# Patient Record
Sex: FEMALE | Race: WHITE | NOT HISPANIC OR LATINO
[De-identification: names, ages, dates, MRNs, and addresses within clinical notes are randomized per-mention and may not be internally consistent; named-entity substitution may affect disease eponyms.]

---

## 2015-12-29 RX ORDER — AMIODARONE HYDROCHLORIDE 400 MG/1
0.5 TABLET ORAL
Qty: 0 | Refills: 0 | COMMUNITY
Start: 2015-12-29

## 2017-01-19 ENCOUNTER — APPOINTMENT (OUTPATIENT)
Dept: ELECTROPHYSIOLOGY | Facility: CLINIC | Age: 82
End: 2017-01-19

## 2017-01-19 ENCOUNTER — NON-APPOINTMENT (OUTPATIENT)
Age: 82
End: 2017-01-19

## 2017-01-19 VITALS — HEART RATE: 65 BPM | DIASTOLIC BLOOD PRESSURE: 91 MMHG | OXYGEN SATURATION: 97 % | SYSTOLIC BLOOD PRESSURE: 155 MMHG

## 2017-04-17 ENCOUNTER — APPOINTMENT (OUTPATIENT)
Dept: ELECTROPHYSIOLOGY | Facility: CLINIC | Age: 82
End: 2017-04-17

## 2017-04-18 ENCOUNTER — APPOINTMENT (OUTPATIENT)
Dept: ELECTROPHYSIOLOGY | Facility: CLINIC | Age: 82
End: 2017-04-18

## 2017-04-18 ENCOUNTER — NON-APPOINTMENT (OUTPATIENT)
Age: 82
End: 2017-04-18

## 2017-04-18 VITALS — HEART RATE: 60 BPM | SYSTOLIC BLOOD PRESSURE: 139 MMHG | DIASTOLIC BLOOD PRESSURE: 76 MMHG

## 2017-04-18 DIAGNOSIS — Z86.79 PERSONAL HISTORY OF OTHER DISEASES OF THE CIRCULATORY SYSTEM: ICD-10-CM

## 2017-04-26 ENCOUNTER — EMERGENCY (EMERGENCY)
Facility: HOSPITAL | Age: 82
LOS: 1 days | Discharge: HOME | End: 2017-04-26
Attending: EMERGENCY MEDICINE | Admitting: EMERGENCY MEDICINE
Payer: COMMERCIAL

## 2017-04-26 VITALS
DIASTOLIC BLOOD PRESSURE: 84 MMHG | HEART RATE: 66 BPM | RESPIRATION RATE: 17 BRPM | TEMPERATURE: 98 F | SYSTOLIC BLOOD PRESSURE: 186 MMHG | OXYGEN SATURATION: 94 %

## 2017-04-26 VITALS
OXYGEN SATURATION: 98 % | TEMPERATURE: 98 F | RESPIRATION RATE: 18 BRPM | SYSTOLIC BLOOD PRESSURE: 161 MMHG | HEART RATE: 65 BPM | DIASTOLIC BLOOD PRESSURE: 79 MMHG

## 2017-04-26 DIAGNOSIS — Z79.82 LONG TERM (CURRENT) USE OF ASPIRIN: ICD-10-CM

## 2017-04-26 DIAGNOSIS — S40.021A CONTUSION OF RIGHT UPPER ARM, INITIAL ENCOUNTER: ICD-10-CM

## 2017-04-26 DIAGNOSIS — F17.200 NICOTINE DEPENDENCE, UNSPECIFIED, UNCOMPLICATED: ICD-10-CM
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
## 2017-04-26 DIAGNOSIS — S80.211A ABRASION, RIGHT KNEE, INITIAL ENCOUNTER: ICD-10-CM

## 2017-04-26 DIAGNOSIS — I10 ESSENTIAL (PRIMARY) HYPERTENSION: ICD-10-CM

## 2017-04-26 DIAGNOSIS — Z86.79 PERSONAL HISTORY OF OTHER DISEASES OF THE CIRCULATORY SYSTEM: ICD-10-CM
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
## 2017-04-26 DIAGNOSIS — E11.9 TYPE 2 DIABETES MELLITUS WITHOUT COMPLICATIONS: ICD-10-CM

## 2017-04-26 DIAGNOSIS — Z95.0 PRESENCE OF CARDIAC PACEMAKER: ICD-10-CM

## 2017-04-26 LAB
ALBUMIN SERPL ELPH-MCNC: 4 G/DL — SIGNIFICANT CHANGE UP (ref 3.3–5)
ALP SERPL-CCNC: 87 U/L — SIGNIFICANT CHANGE UP (ref 40–120)
ALT FLD-CCNC: 20 U/L RC — SIGNIFICANT CHANGE UP (ref 10–45)
ANION GAP SERPL CALC-SCNC: 15 MMOL/L — SIGNIFICANT CHANGE UP (ref 5–17)
APPEARANCE UR: CLEAR — SIGNIFICANT CHANGE UP
APTT BLD: 29.8 SEC — SIGNIFICANT CHANGE UP (ref 27.5–37.4)
AST SERPL-CCNC: 40 U/L — SIGNIFICANT CHANGE UP (ref 10–40)
BASOPHILS # BLD AUTO: 0.1 K/UL — SIGNIFICANT CHANGE UP (ref 0–0.2)
BASOPHILS NFR BLD AUTO: 0.7 % — SIGNIFICANT CHANGE UP (ref 0–2)
BILIRUB SERPL-MCNC: 0.4 MG/DL — SIGNIFICANT CHANGE UP (ref 0.2–1.2)
BILIRUB UR-MCNC: NEGATIVE — SIGNIFICANT CHANGE UP
BUN SERPL-MCNC: 24 MG/DL — HIGH (ref 7–23)
CALCIUM SERPL-MCNC: 9.5 MG/DL — SIGNIFICANT CHANGE UP (ref 8.4–10.5)
CHLORIDE SERPL-SCNC: 98 MMOL/L — SIGNIFICANT CHANGE UP (ref 96–108)
CK MB BLD-MCNC: 2 % — SIGNIFICANT CHANGE UP (ref 0–3.5)
CK MB CFR SERPL CALC: 4 NG/ML — HIGH (ref 0–3.8)
CK SERPL-CCNC: 198 U/L — HIGH (ref 25–170)
CO2 SERPL-SCNC: 24 MMOL/L — SIGNIFICANT CHANGE UP (ref 22–31)
COLOR SPEC: SIGNIFICANT CHANGE UP
CREAT SERPL-MCNC: 1.61 MG/DL — HIGH (ref 0.5–1.3)
DIFF PNL FLD: NEGATIVE — SIGNIFICANT CHANGE UP
EOSINOPHIL # BLD AUTO: 0.5 K/UL — SIGNIFICANT CHANGE UP (ref 0–0.5)
EOSINOPHIL NFR BLD AUTO: 5.1 % — SIGNIFICANT CHANGE UP (ref 0–6)
EPI CELLS # UR: SIGNIFICANT CHANGE UP /HPF
GAS PNL BLDV: SIGNIFICANT CHANGE UP
GLUCOSE SERPL-MCNC: 143 MG/DL — HIGH (ref 70–99)
GLUCOSE UR QL: ABNORMAL
HCT VFR BLD CALC: 42 % — SIGNIFICANT CHANGE UP (ref 34.5–45)
HGB BLD-MCNC: 13.4 G/DL — SIGNIFICANT CHANGE UP (ref 11.5–15.5)
INR BLD: 1.1 RATIO — SIGNIFICANT CHANGE UP (ref 0.88–1.16)
KETONES UR-MCNC: NEGATIVE — SIGNIFICANT CHANGE UP
LEUKOCYTE ESTERASE UR-ACNC: NEGATIVE — SIGNIFICANT CHANGE UP
LYMPHOCYTES # BLD AUTO: 1.5 K/UL — SIGNIFICANT CHANGE UP (ref 1–3.3)
LYMPHOCYTES # BLD AUTO: 14.2 % — SIGNIFICANT CHANGE UP (ref 13–44)
MCHC RBC-ENTMCNC: 28.5 PG — SIGNIFICANT CHANGE UP (ref 27–34)
MCHC RBC-ENTMCNC: 31.9 GM/DL — LOW (ref 32–36)
MCV RBC AUTO: 89.4 FL — SIGNIFICANT CHANGE UP (ref 80–100)
MONOCYTES # BLD AUTO: 0.8 K/UL — SIGNIFICANT CHANGE UP (ref 0–0.9)
MONOCYTES NFR BLD AUTO: 7.7 % — SIGNIFICANT CHANGE UP (ref 2–14)
NEUTROPHILS # BLD AUTO: 7.7 K/UL — HIGH (ref 1.8–7.4)
NEUTROPHILS NFR BLD AUTO: 72.3 % — SIGNIFICANT CHANGE UP (ref 43–77)
NITRITE UR-MCNC: NEGATIVE — SIGNIFICANT CHANGE UP
NT-PROBNP SERPL-SCNC: 6106 PG/ML — HIGH (ref 0–300)
PH UR: 6.5 — SIGNIFICANT CHANGE UP (ref 5–8)
PLATELET # BLD AUTO: 262 K/UL — SIGNIFICANT CHANGE UP (ref 150–400)
POTASSIUM SERPL-MCNC: 4.5 MMOL/L — SIGNIFICANT CHANGE UP (ref 3.5–5.3)
POTASSIUM SERPL-SCNC: 4.5 MMOL/L — SIGNIFICANT CHANGE UP (ref 3.5–5.3)
PROT SERPL-MCNC: 8.1 G/DL — SIGNIFICANT CHANGE UP (ref 6–8.3)
PROT UR-MCNC: 150 MG/DL
PROTHROM AB SERPL-ACNC: 12 SEC — SIGNIFICANT CHANGE UP (ref 9.8–12.7)
RBC # BLD: 4.7 M/UL — SIGNIFICANT CHANGE UP (ref 3.8–5.2)
RBC # FLD: 14.9 % — HIGH (ref 10.3–14.5)
SODIUM SERPL-SCNC: 137 MMOL/L — SIGNIFICANT CHANGE UP (ref 135–145)
SP GR SPEC: 1.01 — LOW (ref 1.01–1.02)
TROPONIN T SERPL-MCNC: <0.01 NG/ML — SIGNIFICANT CHANGE UP (ref 0–0.06)
UROBILINOGEN FLD QL: NEGATIVE — SIGNIFICANT CHANGE UP
WBC # BLD: 10.7 K/UL — HIGH (ref 3.8–10.5)
WBC # FLD AUTO: 10.7 K/UL — HIGH (ref 3.8–10.5)
WBC UR QL: SIGNIFICANT CHANGE UP /HPF (ref 0–5)

## 2017-04-26 PROCEDURE — 82803 BLOOD GASES ANY COMBINATION: CPT

## 2017-04-26 PROCEDURE — 85730 THROMBOPLASTIN TIME PARTIAL: CPT

## 2017-04-26 PROCEDURE — 71010: CPT | Mod: 26

## 2017-04-26 PROCEDURE — 80053 COMPREHEN METABOLIC PANEL: CPT

## 2017-04-26 PROCEDURE — 82435 ASSAY OF BLOOD CHLORIDE: CPT

## 2017-04-26 PROCEDURE — 82947 ASSAY GLUCOSE BLOOD QUANT: CPT

## 2017-04-26 PROCEDURE — 81001 URINALYSIS AUTO W/SCOPE: CPT

## 2017-04-26 PROCEDURE — 85014 HEMATOCRIT: CPT

## 2017-04-26 PROCEDURE — 99284 EMERGENCY DEPT VISIT MOD MDM: CPT | Mod: 25

## 2017-04-26 PROCEDURE — 71045 X-RAY EXAM CHEST 1 VIEW: CPT

## 2017-04-26 PROCEDURE — 87086 URINE CULTURE/COLONY COUNT: CPT

## 2017-04-26 PROCEDURE — 93010 ELECTROCARDIOGRAM REPORT: CPT

## 2017-04-26 PROCEDURE — 99285 EMERGENCY DEPT VISIT HI MDM: CPT | Mod: 25

## 2017-04-26 PROCEDURE — 85610 PROTHROMBIN TIME: CPT

## 2017-04-26 PROCEDURE — 72125 CT NECK SPINE W/O DYE: CPT

## 2017-04-26 PROCEDURE — 84132 ASSAY OF SERUM POTASSIUM: CPT

## 2017-04-26 PROCEDURE — 82553 CREATINE MB FRACTION: CPT

## 2017-04-26 PROCEDURE — 70450 CT HEAD/BRAIN W/O DYE: CPT | Mod: 26

## 2017-04-26 PROCEDURE — 85027 COMPLETE CBC AUTOMATED: CPT

## 2017-04-26 PROCEDURE — 70450 CT HEAD/BRAIN W/O DYE: CPT

## 2017-04-26 PROCEDURE — 72125 CT NECK SPINE W/O DYE: CPT | Mod: 26

## 2017-04-26 PROCEDURE — 84484 ASSAY OF TROPONIN QUANT: CPT

## 2017-04-26 PROCEDURE — 93005 ELECTROCARDIOGRAM TRACING: CPT

## 2017-04-26 PROCEDURE — 82550 ASSAY OF CK (CPK): CPT

## 2017-04-26 PROCEDURE — 82330 ASSAY OF CALCIUM: CPT

## 2017-04-26 PROCEDURE — 83605 ASSAY OF LACTIC ACID: CPT

## 2017-04-26 PROCEDURE — 84295 ASSAY OF SERUM SODIUM: CPT

## 2017-04-26 PROCEDURE — 83880 ASSAY OF NATRIURETIC PEPTIDE: CPT

## 2017-04-26 RX ORDER — SODIUM CHLORIDE 9 MG/ML
3 INJECTION INTRAMUSCULAR; INTRAVENOUS; SUBCUTANEOUS ONCE
Qty: 0 | Refills: 0 | Status: COMPLETED | OUTPATIENT
Start: 2017-04-26 | End: 2017-04-26

## 2017-04-26 RX ORDER — SODIUM CHLORIDE 9 MG/ML
500 INJECTION INTRAMUSCULAR; INTRAVENOUS; SUBCUTANEOUS ONCE
Qty: 0 | Refills: 0 | Status: COMPLETED | OUTPATIENT
Start: 2017-04-26 | End: 2017-04-26

## 2017-04-26 RX ADMIN — SODIUM CHLORIDE 3 MILLILITER(S): 9 INJECTION INTRAMUSCULAR; INTRAVENOUS; SUBCUTANEOUS at 14:38

## 2017-04-26 RX ADMIN — SODIUM CHLORIDE 500 MILLILITER(S): 9 INJECTION INTRAMUSCULAR; INTRAVENOUS; SUBCUTANEOUS at 14:39
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
## 2017-04-26 NOTE — ED ADULT NURSE NOTE - PMH
Arteriosclerotic Heart Disease (ASHD)    Atrial fibrillation, unspecified type    Cigarette Smoker    Diabetes    HTN (Hypertension)    Overweight    Pacemaker    PVD (Peripheral Vascular Disease)    Renal Cancer
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
## 2017-04-26 NOTE — ED PROVIDER NOTE - CONSTITUTIONAL, MLM
normal... Well appearing, well nourished, awake, alert, oriented to person, place, NOT ORIENTED to time (states 1971) and in no apparent distress.

## 2017-04-26 NOTE — ED ADULT NURSE NOTE - CHPI ED SYMPTOMS NEG
no blurred vision/no numbness/no vomiting/no loss of consciousness/no dizziness/no nausea/no fever/no confusion
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
## 2017-04-27 LAB
CULTURE RESULTS: SIGNIFICANT CHANGE UP
SPECIMEN SOURCE: SIGNIFICANT CHANGE UP

## 2017-04-28 NOTE — ED POST DISCHARGE NOTE - RESULT SUMMARY
UCx contaminated, spoke with patient and advised she follow up with her PMD to repeat the urine culture. She understood and will follow up. Ace Holm PA-C.

## 2017-06-04 ENCOUNTER — EMERGENCY (EMERGENCY)
Facility: HOSPITAL | Age: 82
LOS: 1 days | Discharge: HOME | End: 2017-06-04
Attending: EMERGENCY MEDICINE | Admitting: EMERGENCY MEDICINE
Payer: MEDICARE

## 2017-06-04 VITALS
TEMPERATURE: 98 F | DIASTOLIC BLOOD PRESSURE: 64 MMHG | SYSTOLIC BLOOD PRESSURE: 182 MMHG | HEART RATE: 77 BPM | RESPIRATION RATE: 18 BRPM | OXYGEN SATURATION: 98 %

## 2017-06-04 DIAGNOSIS — R68.83 CHILLS (WITHOUT FEVER): ICD-10-CM

## 2017-06-04 LAB
ALBUMIN SERPL ELPH-MCNC: 3.9 G/DL — SIGNIFICANT CHANGE UP (ref 3.3–5)
ALP SERPL-CCNC: 99 U/L — SIGNIFICANT CHANGE UP (ref 40–120)
ALT FLD-CCNC: 19 U/L RC — SIGNIFICANT CHANGE UP (ref 10–45)
ANION GAP SERPL CALC-SCNC: 16 MMOL/L — SIGNIFICANT CHANGE UP (ref 5–17)
AST SERPL-CCNC: 33 U/L — SIGNIFICANT CHANGE UP (ref 10–40)
BASOPHILS # BLD AUTO: 0 K/UL — SIGNIFICANT CHANGE UP (ref 0–0.2)
BASOPHILS NFR BLD AUTO: 0.2 % — SIGNIFICANT CHANGE UP (ref 0–2)
BILIRUB SERPL-MCNC: 0.3 MG/DL — SIGNIFICANT CHANGE UP (ref 0.2–1.2)
BUN SERPL-MCNC: 23 MG/DL — SIGNIFICANT CHANGE UP (ref 7–23)
CALCIUM SERPL-MCNC: 9.4 MG/DL — SIGNIFICANT CHANGE UP (ref 8.4–10.5)
CHLORIDE SERPL-SCNC: 100 MMOL/L — SIGNIFICANT CHANGE UP (ref 96–108)
CO2 SERPL-SCNC: 22 MMOL/L — SIGNIFICANT CHANGE UP (ref 22–31)
CREAT SERPL-MCNC: 1.56 MG/DL — HIGH (ref 0.5–1.3)
EOSINOPHIL # BLD AUTO: 0.1 K/UL — SIGNIFICANT CHANGE UP (ref 0–0.5)
EOSINOPHIL NFR BLD AUTO: 2 % — SIGNIFICANT CHANGE UP (ref 0–6)
GAS PNL BLDV: SIGNIFICANT CHANGE UP
GLUCOSE SERPL-MCNC: 151 MG/DL — HIGH (ref 70–99)
HCT VFR BLD CALC: 44.7 % — SIGNIFICANT CHANGE UP (ref 34.5–45)
HGB BLD-MCNC: 14.7 G/DL — SIGNIFICANT CHANGE UP (ref 11.5–15.5)
LYMPHOCYTES # BLD AUTO: 1.3 K/UL — SIGNIFICANT CHANGE UP (ref 1–3.3)
LYMPHOCYTES # BLD AUTO: 21.6 % — SIGNIFICANT CHANGE UP (ref 13–44)
MCHC RBC-ENTMCNC: 29.9 PG — SIGNIFICANT CHANGE UP (ref 27–34)
MCHC RBC-ENTMCNC: 32.9 GM/DL — SIGNIFICANT CHANGE UP (ref 32–36)
MCV RBC AUTO: 90.7 FL — SIGNIFICANT CHANGE UP (ref 80–100)
MONOCYTES # BLD AUTO: 0.3 K/UL — SIGNIFICANT CHANGE UP (ref 0–0.9)
MONOCYTES NFR BLD AUTO: 4.9 % — SIGNIFICANT CHANGE UP (ref 2–14)
NEUTROPHILS # BLD AUTO: 4.2 K/UL — SIGNIFICANT CHANGE UP (ref 1.8–7.4)
NEUTROPHILS NFR BLD AUTO: 71.2 % — SIGNIFICANT CHANGE UP (ref 43–77)
PLATELET # BLD AUTO: 298 K/UL — SIGNIFICANT CHANGE UP (ref 150–400)
POTASSIUM SERPL-MCNC: 4.3 MMOL/L — SIGNIFICANT CHANGE UP (ref 3.5–5.3)
POTASSIUM SERPL-SCNC: 4.3 MMOL/L — SIGNIFICANT CHANGE UP (ref 3.5–5.3)
PROT SERPL-MCNC: 7.8 G/DL — SIGNIFICANT CHANGE UP (ref 6–8.3)
RBC # BLD: 4.93 M/UL — SIGNIFICANT CHANGE UP (ref 3.8–5.2)
RBC # FLD: 14.2 % — SIGNIFICANT CHANGE UP (ref 10.3–14.5)
SODIUM SERPL-SCNC: 138 MMOL/L — SIGNIFICANT CHANGE UP (ref 135–145)
WBC # BLD: 5.8 K/UL — SIGNIFICANT CHANGE UP (ref 3.8–10.5)
WBC # FLD AUTO: 5.8 K/UL — SIGNIFICANT CHANGE UP (ref 3.8–10.5)

## 2017-06-04 PROCEDURE — 99284 EMERGENCY DEPT VISIT MOD MDM: CPT | Mod: 25

## 2017-06-04 RX ORDER — DIPHENHYDRAMINE HCL 50 MG
50 CAPSULE ORAL ONCE
Qty: 0 | Refills: 0 | Status: DISCONTINUED | OUTPATIENT
Start: 2017-06-04 | End: 2017-06-04

## 2017-06-04 RX ORDER — DIPHENHYDRAMINE HCL 50 MG
25 CAPSULE ORAL ONCE
Qty: 0 | Refills: 0 | Status: COMPLETED | OUTPATIENT
Start: 2017-06-04 | End: 2017-06-04

## 2017-06-04 RX ORDER — FAMOTIDINE 10 MG/ML
20 INJECTION INTRAVENOUS ONCE
Qty: 0 | Refills: 0 | Status: COMPLETED | OUTPATIENT
Start: 2017-06-04 | End: 2017-06-04

## 2017-06-04 RX ORDER — ONDANSETRON 8 MG/1
4 TABLET, FILM COATED ORAL ONCE
Qty: 0 | Refills: 0 | Status: COMPLETED | OUTPATIENT
Start: 2017-06-04 | End: 2017-06-04

## 2017-06-04 RX ADMIN — ONDANSETRON 4 MILLIGRAM(S): 8 TABLET, FILM COATED ORAL at 23:00

## 2017-06-04 RX ADMIN — Medication 60 MILLIGRAM(S): at 21:49

## 2017-06-04 RX ADMIN — FAMOTIDINE 20 MILLIGRAM(S): 10 INJECTION INTRAVENOUS at 22:50

## 2017-06-04 RX ADMIN — Medication 25 MILLIGRAM(S): at 21:48

## 2017-06-04 NOTE — ED PROVIDER NOTE - PLAN OF CARE
Please do not take amoxicillin any longer.     Please take the prednisone as prescribed for 4 more days to prevent any return of the allergic reaction.     Return to the ED if you develop vomiting, trouble breathing, rashes, or tongue or lip swelling

## 2017-06-04 NOTE — ED PROVIDER NOTE - PROGRESS NOTE DETAILS
Blood pressure stable. . Will continue to monitor. Attending MD Rosenberg: patient received in signout from Dr. Rodas, patient observed for presumed allergic reaction to amoxicillin. No recurrent emesis, no airway edema, no rashes, no e/o residual allergic rxn. Will dc home on prednisone burst x 4 days, advised to stop taking amoxcillin
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
## 2017-06-04 NOTE — ED PROVIDER NOTE - CARE PLAN
Principal Discharge DX:	Allergic reaction caused by a drug, initial encounter Principal Discharge DX:	Allergic reaction caused by a drug, initial encounter  Instructions for follow-up, activity and diet:	Please do not take amoxicillin any longer.     Please take the prednisone as prescribed for 4 more days to prevent any return of the allergic reaction.     Return to the ED if you develop vomiting, trouble breathing, rashes, or tongue or lip swelling

## 2017-06-04 NOTE — ED PROVIDER NOTE - ATTENDING CONTRIBUTION TO CARE
I have seen and evaluated this patient with the resident.   I agree with the findings  unless other wise stated.  I have made appropriate changes in documentations where needed, After my face to face bedside evaluation, I am further  notinF with possible allergic reaction. Will give Benadryl, Solumedrol, Pepcid and re-assess vitals. Will monitor in ED for 6  hours.

## 2017-06-04 NOTE — ED ADULT NURSE NOTE - PSH
Hip Replacement    History of Total Knee Replacement    S/P carpal tunnel release    S/P Cholecystectomy    s/p colon resection    S/P Hernia Surgery    S/P left cataract extraction    S/P Nephrectomy  right
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
## 2017-06-04 NOTE — ED PROVIDER NOTE - MEDICAL DECISION MAKING DETAILS
85F with possible allergic reaction. Will give Benadryl, Solumedrol, Pepcid and re-assess vitals. 85F with possible allergic reaction. Will give Benadryl, Solumedrol, Pepcid and re-assess vitals. Will monitor in ED for 6  hours.

## 2017-06-05 VITALS
HEART RATE: 64 BPM | SYSTOLIC BLOOD PRESSURE: 163 MMHG | OXYGEN SATURATION: 95 % | RESPIRATION RATE: 16 BRPM | DIASTOLIC BLOOD PRESSURE: 84 MMHG

## 2017-06-05 LAB
APPEARANCE UR: CLEAR — SIGNIFICANT CHANGE UP
BILIRUB UR-MCNC: NEGATIVE — SIGNIFICANT CHANGE UP
COLOR SPEC: YELLOW — SIGNIFICANT CHANGE UP
DIFF PNL FLD: NEGATIVE — SIGNIFICANT CHANGE UP
EPI CELLS # UR: SIGNIFICANT CHANGE UP /HPF
GLUCOSE UR QL: 50
KETONES UR-MCNC: NEGATIVE — SIGNIFICANT CHANGE UP
LEUKOCYTE ESTERASE UR-ACNC: NEGATIVE — SIGNIFICANT CHANGE UP
NITRITE UR-MCNC: NEGATIVE — SIGNIFICANT CHANGE UP
PH UR: 6.5 — SIGNIFICANT CHANGE UP (ref 5–8)
PROT UR-MCNC: 500 MG/DL
RBC CASTS # UR COMP ASSIST: SIGNIFICANT CHANGE UP /HPF (ref 0–2)
SP GR SPEC: 1.02 — SIGNIFICANT CHANGE UP (ref 1.01–1.02)
UROBILINOGEN FLD QL: NEGATIVE — SIGNIFICANT CHANGE UP
WBC UR QL: SIGNIFICANT CHANGE UP /HPF (ref 0–5)

## 2017-06-05 PROCEDURE — 96375 TX/PRO/DX INJ NEW DRUG ADDON: CPT

## 2017-06-05 PROCEDURE — 71045 X-RAY EXAM CHEST 1 VIEW: CPT

## 2017-06-05 PROCEDURE — 96374 THER/PROPH/DIAG INJ IV PUSH: CPT

## 2017-06-05 PROCEDURE — 87040 BLOOD CULTURE FOR BACTERIA: CPT

## 2017-06-05 PROCEDURE — 84295 ASSAY OF SERUM SODIUM: CPT

## 2017-06-05 PROCEDURE — 81001 URINALYSIS AUTO W/SCOPE: CPT

## 2017-06-05 PROCEDURE — 84132 ASSAY OF SERUM POTASSIUM: CPT

## 2017-06-05 PROCEDURE — 82435 ASSAY OF BLOOD CHLORIDE: CPT

## 2017-06-05 PROCEDURE — 83605 ASSAY OF LACTIC ACID: CPT

## 2017-06-05 PROCEDURE — 99284 EMERGENCY DEPT VISIT MOD MDM: CPT | Mod: 25

## 2017-06-05 PROCEDURE — 80053 COMPREHEN METABOLIC PANEL: CPT

## 2017-06-05 PROCEDURE — 82803 BLOOD GASES ANY COMBINATION: CPT

## 2017-06-05 PROCEDURE — 85014 HEMATOCRIT: CPT

## 2017-06-05 PROCEDURE — 71010: CPT | Mod: 26

## 2017-06-05 PROCEDURE — 82330 ASSAY OF CALCIUM: CPT

## 2017-06-05 PROCEDURE — 85027 COMPLETE CBC AUTOMATED: CPT

## 2017-06-05 PROCEDURE — 82947 ASSAY GLUCOSE BLOOD QUANT: CPT

## 2017-06-05 NOTE — ED ADULT NURSE REASSESSMENT NOTE - NS ED NURSE REASSESS COMMENT FT1
Received report from previous shift RN. Patient resting comfortably in bed, awaiting reevaluation and d/c home. No apparent distress noted. Denies chest pain, SOB, itching.

## 2017-06-10 LAB
CULTURE RESULTS: SIGNIFICANT CHANGE UP
SPECIMEN SOURCE: SIGNIFICANT CHANGE UP

## 2017-08-15 ENCOUNTER — APPOINTMENT (OUTPATIENT)
Dept: ELECTROPHYSIOLOGY | Facility: CLINIC | Age: 82
End: 2017-08-15
Payer: MEDICARE

## 2017-08-15 ENCOUNTER — NON-APPOINTMENT (OUTPATIENT)
Age: 82
End: 2017-08-15

## 2017-08-15 VITALS
WEIGHT: 165 LBS | BODY MASS INDEX: 28.17 KG/M2 | HEIGHT: 64 IN | OXYGEN SATURATION: 95 % | HEART RATE: 62 BPM | DIASTOLIC BLOOD PRESSURE: 75 MMHG | SYSTOLIC BLOOD PRESSURE: 157 MMHG

## 2017-08-15 DIAGNOSIS — I49.5 SICK SINUS SYNDROME: ICD-10-CM

## 2017-08-15 PROCEDURE — 93280 PM DEVICE PROGR EVAL DUAL: CPT

## 2017-08-15 PROCEDURE — 99215 OFFICE O/P EST HI 40 MIN: CPT

## 2017-08-15 RX ORDER — INSULIN GLARGINE 300 U/ML
300 INJECTION, SOLUTION SUBCUTANEOUS
Refills: 0 | Status: ACTIVE | COMMUNITY

## 2017-09-15 ENCOUNTER — INPATIENT (INPATIENT)
Facility: HOSPITAL | Age: 82
LOS: 5 days | Discharge: HOME | DRG: 291 | End: 2017-09-21
Attending: INTERNAL MEDICINE | Admitting: INTERNAL MEDICINE
Payer: MEDICARE

## 2017-09-15 VITALS
DIASTOLIC BLOOD PRESSURE: 87 MMHG | RESPIRATION RATE: 16 BRPM | TEMPERATURE: 100 F | OXYGEN SATURATION: 91 % | HEART RATE: 64 BPM | SYSTOLIC BLOOD PRESSURE: 135 MMHG

## 2017-09-15 DIAGNOSIS — I48.91 UNSPECIFIED ATRIAL FIBRILLATION: ICD-10-CM

## 2017-09-15 DIAGNOSIS — R06.00 DYSPNEA, UNSPECIFIED: ICD-10-CM

## 2017-09-15 DIAGNOSIS — I12.9 HYPERTENSIVE CHRONIC KIDNEY DISEASE WITH STAGE 1 THROUGH STAGE 4 CHRONIC KIDNEY DISEASE, OR UNSPECIFIED CHRONIC KIDNEY DISEASE: ICD-10-CM

## 2017-09-15 DIAGNOSIS — N18.4 CHRONIC KIDNEY DISEASE, STAGE 4 (SEVERE): ICD-10-CM

## 2017-09-15 DIAGNOSIS — R80.8 OTHER PROTEINURIA: ICD-10-CM

## 2017-09-15 DIAGNOSIS — R60.0 LOCALIZED EDEMA: ICD-10-CM

## 2017-09-15 DIAGNOSIS — E13.22 OTHER SPECIFIED DIABETES MELLITUS WITH DIABETIC CHRONIC KIDNEY DISEASE: ICD-10-CM

## 2017-09-15 DIAGNOSIS — E11.9 TYPE 2 DIABETES MELLITUS WITHOUT COMPLICATIONS: ICD-10-CM

## 2017-09-15 DIAGNOSIS — E87.5 HYPERKALEMIA: ICD-10-CM

## 2017-09-15 LAB
ALBUMIN SERPL ELPH-MCNC: 3.6 G/DL — SIGNIFICANT CHANGE UP (ref 3.3–5)
ALP SERPL-CCNC: 86 U/L — SIGNIFICANT CHANGE UP (ref 40–120)
ALT FLD-CCNC: 19 U/L RC — SIGNIFICANT CHANGE UP (ref 10–45)
ANION GAP SERPL CALC-SCNC: 13 MMOL/L — SIGNIFICANT CHANGE UP (ref 5–17)
APTT BLD: 31.3 SEC — SIGNIFICANT CHANGE UP (ref 27.5–37.4)
AST SERPL-CCNC: 41 U/L — HIGH (ref 10–40)
BASE EXCESS BLDV CALC-SCNC: -0.5 MMOL/L — SIGNIFICANT CHANGE UP (ref -2–2)
BASOPHILS # BLD AUTO: 0 K/UL — SIGNIFICANT CHANGE UP (ref 0–0.2)
BASOPHILS NFR BLD AUTO: 0.2 % — SIGNIFICANT CHANGE UP (ref 0–2)
BILIRUB SERPL-MCNC: 0.6 MG/DL — SIGNIFICANT CHANGE UP (ref 0.2–1.2)
BUN SERPL-MCNC: 27 MG/DL — HIGH (ref 7–23)
CA-I SERPL-SCNC: 1.21 MMOL/L — SIGNIFICANT CHANGE UP (ref 1.12–1.3)
CALCIUM SERPL-MCNC: 8.7 MG/DL — SIGNIFICANT CHANGE UP (ref 8.4–10.5)
CHLORIDE BLDV-SCNC: 103 MMOL/L — SIGNIFICANT CHANGE UP (ref 96–108)
CHLORIDE SERPL-SCNC: 99 MMOL/L — SIGNIFICANT CHANGE UP (ref 96–108)
CK MB BLD-MCNC: 1.3 % — SIGNIFICANT CHANGE UP (ref 0–3.5)
CK MB CFR SERPL CALC: 1.7 NG/ML — SIGNIFICANT CHANGE UP (ref 0–3.8)
CK SERPL-CCNC: 130 U/L — SIGNIFICANT CHANGE UP (ref 25–170)
CO2 BLDV-SCNC: 27 MMOL/L — SIGNIFICANT CHANGE UP (ref 22–30)
CO2 SERPL-SCNC: 24 MMOL/L — SIGNIFICANT CHANGE UP (ref 22–31)
CREAT SERPL-MCNC: 1.63 MG/DL — HIGH (ref 0.5–1.3)
EOSINOPHIL # BLD AUTO: 0.6 K/UL — HIGH (ref 0–0.5)
EOSINOPHIL NFR BLD AUTO: 5 % — SIGNIFICANT CHANGE UP (ref 0–6)
GAS PNL BLDV: 135 MMOL/L — LOW (ref 136–145)
GAS PNL BLDV: SIGNIFICANT CHANGE UP
GAS PNL BLDV: SIGNIFICANT CHANGE UP
GLUCOSE BLDV-MCNC: 137 MG/DL — HIGH (ref 70–99)
GLUCOSE SERPL-MCNC: 142 MG/DL — HIGH (ref 70–99)
HCO3 BLDV-SCNC: 25 MMOL/L — SIGNIFICANT CHANGE UP (ref 21–29)
HCT VFR BLD CALC: 34.9 % — SIGNIFICANT CHANGE UP (ref 34.5–45)
HCT VFR BLDA CALC: 35 % — LOW (ref 39–50)
HGB BLD CALC-MCNC: 11.3 G/DL — LOW (ref 11.5–15.5)
HGB BLD-MCNC: 11.6 G/DL — SIGNIFICANT CHANGE UP (ref 11.5–15.5)
INR BLD: 1.2 RATIO — HIGH (ref 0.88–1.16)
LACTATE BLDV-MCNC: 1.3 MMOL/L — SIGNIFICANT CHANGE UP (ref 0.7–2)
LYMPHOCYTES # BLD AUTO: 1.5 K/UL — SIGNIFICANT CHANGE UP (ref 1–3.3)
LYMPHOCYTES # BLD AUTO: 12.8 % — LOW (ref 13–44)
MCHC RBC-ENTMCNC: 30.5 PG — SIGNIFICANT CHANGE UP (ref 27–34)
MCHC RBC-ENTMCNC: 33.3 GM/DL — SIGNIFICANT CHANGE UP (ref 32–36)
MCV RBC AUTO: 91.6 FL — SIGNIFICANT CHANGE UP (ref 80–100)
MONOCYTES # BLD AUTO: 1.5 K/UL — HIGH (ref 0–0.9)
MONOCYTES NFR BLD AUTO: 12.3 % — SIGNIFICANT CHANGE UP (ref 2–14)
NEUTROPHILS # BLD AUTO: 8.3 K/UL — HIGH (ref 1.8–7.4)
NEUTROPHILS NFR BLD AUTO: 69.6 % — SIGNIFICANT CHANGE UP (ref 43–77)
NT-PROBNP SERPL-SCNC: 8579 PG/ML — HIGH (ref 0–300)
PCO2 BLDV: 50 MMHG — SIGNIFICANT CHANGE UP (ref 35–50)
PH BLDV: 7.33 — LOW (ref 7.35–7.45)
PLATELET # BLD AUTO: 235 K/UL — SIGNIFICANT CHANGE UP (ref 150–400)
PO2 BLDV: 30 MMHG — SIGNIFICANT CHANGE UP (ref 25–45)
POTASSIUM BLDV-SCNC: 4.1 MMOL/L — SIGNIFICANT CHANGE UP (ref 3.5–5)
POTASSIUM SERPL-MCNC: 6 MMOL/L — HIGH (ref 3.5–5.3)
POTASSIUM SERPL-SCNC: 6 MMOL/L — HIGH (ref 3.5–5.3)
PROT SERPL-MCNC: 7.6 G/DL — SIGNIFICANT CHANGE UP (ref 6–8.3)
PROTHROM AB SERPL-ACNC: 13.1 SEC — HIGH (ref 9.8–12.7)
RAPID RVP RESULT: SIGNIFICANT CHANGE UP
RBC # BLD: 3.81 M/UL — SIGNIFICANT CHANGE UP (ref 3.8–5.2)
RBC # FLD: 14.2 % — SIGNIFICANT CHANGE UP (ref 10.3–14.5)
SAO2 % BLDV: 49 % — LOW (ref 67–88)
SODIUM SERPL-SCNC: 136 MMOL/L — SIGNIFICANT CHANGE UP (ref 135–145)
TROPONIN T SERPL-MCNC: <0.01 NG/ML — SIGNIFICANT CHANGE UP (ref 0–0.06)
WBC # BLD: 12 K/UL — HIGH (ref 3.8–10.5)
WBC # FLD AUTO: 12 K/UL — HIGH (ref 3.8–10.5)

## 2017-09-15 PROCEDURE — 71010: CPT | Mod: 26

## 2017-09-15 PROCEDURE — 93010 ELECTROCARDIOGRAM REPORT: CPT

## 2017-09-15 PROCEDURE — 99285 EMERGENCY DEPT VISIT HI MDM: CPT | Mod: 25

## 2017-09-15 RX ORDER — TIOTROPIUM BROMIDE 18 UG/1
1 CAPSULE ORAL; RESPIRATORY (INHALATION) DAILY
Qty: 0 | Refills: 0 | Status: DISCONTINUED | OUTPATIENT
Start: 2017-09-15 | End: 2017-09-21

## 2017-09-15 RX ORDER — AMIODARONE HYDROCHLORIDE 400 MG/1
100 TABLET ORAL DAILY
Qty: 0 | Refills: 0 | Status: DISCONTINUED | OUTPATIENT
Start: 2017-09-15 | End: 2017-09-21

## 2017-09-15 RX ORDER — ARIPIPRAZOLE 15 MG/1
2 TABLET ORAL DAILY
Qty: 0 | Refills: 0 | Status: DISCONTINUED | OUTPATIENT
Start: 2017-09-15 | End: 2017-09-21

## 2017-09-15 RX ORDER — ALBUTEROL 90 UG/1
1 AEROSOL, METERED ORAL EVERY 4 HOURS
Qty: 0 | Refills: 0 | Status: DISCONTINUED | OUTPATIENT
Start: 2017-09-15 | End: 2017-09-21

## 2017-09-15 RX ORDER — DULOXETINE HYDROCHLORIDE 30 MG/1
60 CAPSULE, DELAYED RELEASE ORAL DAILY
Qty: 0 | Refills: 0 | Status: DISCONTINUED | OUTPATIENT
Start: 2017-09-15 | End: 2017-09-21

## 2017-09-15 RX ORDER — DEXTROSE 50 % IN WATER 50 %
1 SYRINGE (ML) INTRAVENOUS ONCE
Qty: 0 | Refills: 0 | Status: DISCONTINUED | OUTPATIENT
Start: 2017-09-15 | End: 2017-09-21

## 2017-09-15 RX ORDER — DEXTROSE 50 % IN WATER 50 %
25 SYRINGE (ML) INTRAVENOUS ONCE
Qty: 0 | Refills: 0 | Status: DISCONTINUED | OUTPATIENT
Start: 2017-09-15 | End: 2017-09-21

## 2017-09-15 RX ORDER — ASPIRIN/CALCIUM CARB/MAGNESIUM 324 MG
81 TABLET ORAL DAILY
Qty: 0 | Refills: 0 | Status: DISCONTINUED | OUTPATIENT
Start: 2017-09-15 | End: 2017-09-21

## 2017-09-15 RX ORDER — METOPROLOL TARTRATE 50 MG
100 TABLET ORAL DAILY
Qty: 0 | Refills: 0 | Status: DISCONTINUED | OUTPATIENT
Start: 2017-09-15 | End: 2017-09-21

## 2017-09-15 RX ORDER — FUROSEMIDE 40 MG
40 TABLET ORAL DAILY
Qty: 0 | Refills: 0 | Status: DISCONTINUED | OUTPATIENT
Start: 2017-09-16 | End: 2017-09-17

## 2017-09-15 RX ORDER — FUROSEMIDE 40 MG
40 TABLET ORAL ONCE
Qty: 0 | Refills: 0 | Status: COMPLETED | OUTPATIENT
Start: 2017-09-15 | End: 2017-09-15

## 2017-09-15 RX ORDER — GLUCAGON INJECTION, SOLUTION 0.5 MG/.1ML
1 INJECTION, SOLUTION SUBCUTANEOUS ONCE
Qty: 0 | Refills: 0 | Status: DISCONTINUED | OUTPATIENT
Start: 2017-09-15 | End: 2017-09-21

## 2017-09-15 RX ORDER — AMLODIPINE BESYLATE 2.5 MG/1
2.5 TABLET ORAL DAILY
Qty: 0 | Refills: 0 | Status: DISCONTINUED | OUTPATIENT
Start: 2017-09-15 | End: 2017-09-18

## 2017-09-15 RX ORDER — SIMVASTATIN 20 MG/1
20 TABLET, FILM COATED ORAL AT BEDTIME
Qty: 0 | Refills: 0 | Status: DISCONTINUED | OUTPATIENT
Start: 2017-09-15 | End: 2017-09-21

## 2017-09-15 RX ORDER — SODIUM CHLORIDE 9 MG/ML
1000 INJECTION, SOLUTION INTRAVENOUS
Qty: 0 | Refills: 0 | Status: DISCONTINUED | OUTPATIENT
Start: 2017-09-15 | End: 2017-09-21

## 2017-09-15 RX ORDER — VANCOMYCIN HCL 1 G
750 VIAL (EA) INTRAVENOUS EVERY 12 HOURS
Qty: 0 | Refills: 0 | Status: DISCONTINUED | OUTPATIENT
Start: 2017-09-15 | End: 2017-09-15

## 2017-09-15 RX ORDER — INSULIN LISPRO 100/ML
VIAL (ML) SUBCUTANEOUS
Qty: 0 | Refills: 0 | Status: DISCONTINUED | OUTPATIENT
Start: 2017-09-15 | End: 2017-09-21

## 2017-09-15 RX ORDER — INSULIN LISPRO 100/ML
7 VIAL (ML) SUBCUTANEOUS
Qty: 0 | Refills: 0 | Status: DISCONTINUED | OUTPATIENT
Start: 2017-09-15 | End: 2017-09-17

## 2017-09-15 RX ORDER — INSULIN GLARGINE 100 [IU]/ML
20 INJECTION, SOLUTION SUBCUTANEOUS AT BEDTIME
Qty: 0 | Refills: 0 | Status: DISCONTINUED | OUTPATIENT
Start: 2017-09-15 | End: 2017-09-17

## 2017-09-15 RX ORDER — INFLUENZA VIRUS VACCINE 15; 15; 15; 15 UG/.5ML; UG/.5ML; UG/.5ML; UG/.5ML
0.5 SUSPENSION INTRAMUSCULAR ONCE
Qty: 0 | Refills: 0 | Status: DISCONTINUED | OUTPATIENT
Start: 2017-09-15 | End: 2017-09-21

## 2017-09-15 RX ORDER — APIXABAN 2.5 MG/1
2.5 TABLET, FILM COATED ORAL EVERY 12 HOURS
Qty: 0 | Refills: 0 | Status: DISCONTINUED | OUTPATIENT
Start: 2017-09-15 | End: 2017-09-21

## 2017-09-15 RX ORDER — CLONAZEPAM 1 MG
0.5 TABLET ORAL
Qty: 0 | Refills: 0 | Status: DISCONTINUED | OUTPATIENT
Start: 2017-09-15 | End: 2017-09-21

## 2017-09-15 RX ORDER — MIRTAZAPINE 45 MG/1
15 TABLET, ORALLY DISINTEGRATING ORAL AT BEDTIME
Qty: 0 | Refills: 0 | Status: DISCONTINUED | OUTPATIENT
Start: 2017-09-15 | End: 2017-09-21

## 2017-09-15 RX ORDER — DEXTROSE 50 % IN WATER 50 %
12.5 SYRINGE (ML) INTRAVENOUS ONCE
Qty: 0 | Refills: 0 | Status: DISCONTINUED | OUTPATIENT
Start: 2017-09-15 | End: 2017-09-21

## 2017-09-15 RX ORDER — DOCUSATE SODIUM 100 MG
100 CAPSULE ORAL DAILY
Qty: 0 | Refills: 0 | Status: DISCONTINUED | OUTPATIENT
Start: 2017-09-15 | End: 2017-09-21

## 2017-09-15 RX ORDER — SODIUM CHLORIDE 9 MG/ML
3 INJECTION INTRAMUSCULAR; INTRAVENOUS; SUBCUTANEOUS ONCE
Qty: 0 | Refills: 0 | Status: COMPLETED | OUTPATIENT
Start: 2017-09-15 | End: 2017-09-15

## 2017-09-15 RX ORDER — IPRATROPIUM/ALBUTEROL SULFATE 18-103MCG
3 AEROSOL WITH ADAPTER (GRAM) INHALATION EVERY 6 HOURS
Qty: 0 | Refills: 0 | Status: DISCONTINUED | OUTPATIENT
Start: 2017-09-15 | End: 2017-09-21

## 2017-09-15 RX ADMIN — Medication 40 MILLIGRAM(S): at 08:17

## 2017-09-15 RX ADMIN — INSULIN GLARGINE 20 UNIT(S): 100 INJECTION, SOLUTION SUBCUTANEOUS at 23:57

## 2017-09-15 RX ADMIN — Medication 100 MILLIGRAM(S): at 17:23

## 2017-09-15 RX ADMIN — Medication 3 MILLILITER(S): at 12:30

## 2017-09-15 RX ADMIN — Medication 40 MILLIGRAM(S): at 14:39

## 2017-09-15 RX ADMIN — Medication 40 MILLIGRAM(S): at 23:26

## 2017-09-15 RX ADMIN — Medication 4: at 18:24

## 2017-09-15 RX ADMIN — AMLODIPINE BESYLATE 2.5 MILLIGRAM(S): 2.5 TABLET ORAL at 17:24

## 2017-09-15 RX ADMIN — APIXABAN 2.5 MILLIGRAM(S): 2.5 TABLET, FILM COATED ORAL at 23:26

## 2017-09-15 RX ADMIN — SODIUM CHLORIDE 3 MILLILITER(S): 9 INJECTION INTRAMUSCULAR; INTRAVENOUS; SUBCUTANEOUS at 07:58

## 2017-09-15 RX ADMIN — Medication 3 MILLILITER(S): at 17:25

## 2017-09-15 RX ADMIN — Medication 81 MILLIGRAM(S): at 12:30

## 2017-09-15 RX ADMIN — MIRTAZAPINE 15 MILLIGRAM(S): 45 TABLET, ORALLY DISINTEGRATING ORAL at 23:26

## 2017-09-15 RX ADMIN — SIMVASTATIN 20 MILLIGRAM(S): 20 TABLET, FILM COATED ORAL at 23:26

## 2017-09-15 RX ADMIN — Medication 0.5 MILLIGRAM(S): at 17:24

## 2017-09-15 RX ADMIN — DULOXETINE HYDROCHLORIDE 60 MILLIGRAM(S): 30 CAPSULE, DELAYED RELEASE ORAL at 23:26

## 2017-09-15 NOTE — H&P ADULT - NSHPLABSRESULTS_GEN_ALL_CORE
11.6   12.0  )-----------( 235      ( 15 Sep 2017 08:07 )             34.9       09-15    136  |  99  |  27<H>  ----------------------------<  142<H>  6.0<H>   |  24  |  1.63<H>    Ca    8.7      15 Sep 2017 08:07    TPro  7.6  /  Alb  3.6  /  TBili  0.6  /  DBili  x   /  AST  41<H>  /  ALT  19  /  AlkPhos  86  09-15                  PT/INR - ( 15 Sep 2017 08:07 )   PT: 13.1 sec;   INR: 1.20 ratio         PTT - ( 15 Sep 2017 08:07 )  PTT:31.3 sec    Lactate Trend      CARDIAC MARKERS ( 15 Sep 2017 08:07 )  x     / <0.01 ng/mL / 130 U/L / x     / 1.7 ng/mL        CAPILLARY BLOOD GLUCOSE

## 2017-09-15 NOTE — ED PROVIDER NOTE - PROGRESS NOTE DETAILS
pt w active CHF, orthopnea and pulmonary edema on xray.  given that pt has low grade fever will treat w abx for possible bacterial source but sepsis protocol for fluids will be deferred as pt has pulmonary edema and crackles at b/l lung bases. Spoke with Dr. Reggie Rhoades, reportedly uses the full time hospitalist, full time hospitalist reports to admit to unattached Dr. Raymond.  - Juan Pickett MD Spoke with Dr. Reggie Rhoades, reportedly uses the full time hospitalist, full time hospitalist reports to admit to unattached.  - Juan Pickett MD

## 2017-09-15 NOTE — ED PROVIDER NOTE - MEDICAL DECISION MAKING DETAILS
Osvaldo:  possibly CHF but given low grade fever pneumonia is consideration as well. labs, lasix, admit

## 2017-09-15 NOTE — ED ADULT NURSE NOTE - OBJECTIVE STATEMENT
86 year old female patient BIBA c/o SOB. Patient states she awoke this morning to difficulty breathing and pain in throat which she attributes it to drinking too much soup last night. EMS reports pt 91%on RA, and rales b/l - given 1 SL nitro and placed on O2 with improvement in SOB. Patient denies chest pain, headache, cough, fever, chills, abd pain, n/v/d. 20G IV present to R hand, flushes without difficulty. EKG completed at bedside. Pt placed on 4L NC with improvement to 99%.

## 2017-09-15 NOTE — CONSULT NOTE ADULT - PROBLEM SELECTOR PROBLEM 6
Other specified diabetes mellitus with stage 4 chronic kidney disease, unspecified long term insulin use status

## 2017-09-15 NOTE — ED PROVIDER NOTE - OBJECTIVE STATEMENT
moderate SOB and chest pressure since last night that is worse w lying down. chest pressure and SOB resolved w sitting up right.  denies fever, cough. +PPM, HTN, DM. moderate SOB and chest pressure since last night that is worse w lying down. chest pressure and SOB resolved w sitting up right.  denies fever, cough. +PPM, HTN, DM.    PCP: Dr Rgegie Rhoades

## 2017-09-15 NOTE — ED PROVIDER NOTE - PHYSICAL EXAMINATION
Gen:  alert, awake, no acute distress  HEENT:  atraumatic head, airway clear, pupils equal and round  CV:  rrr, nl S1, S2, no m/r/g  Pulm:  crackles b/l bases  Abd: s/nt/nd, +BS  Ext:  SAHA, no peripheral edema  Neuro:  grossly intact, no deficits  Skin:  clear, dry, intact

## 2017-09-15 NOTE — ED ADULT NURSE REASSESSMENT NOTE - NS ED NURSE REASSESS COMMENT FT1
Report received from EMMIE Land. Pt. sitting in stretcher in no acute distress. Breathing unlabored on 4L NC. Comfort and safety measures in place. Pending MD evaluation.

## 2017-09-15 NOTE — H&P ADULT - NSHPREVIEWOFSYSTEMS_GEN_ALL_CORE
CONSTITUTIONAL: mild distress  NECK: No pain or stiffness  RESPIRATORY: SOB  CARDIOVASCULAR: chest pressure  GASTROINTESTINAL: No abdominal or epigastric pain. No nausea, vomiting, or hematemesis; No diarrhea or constipation. No melena or hematochezia.  GENITOURINARY: No dysuria, frequency or hematuria  NEUROLOGICAL: No numbness or weakness  SKIN: No itching, burning, rashes, or lesions   All other review of systems is negative unless indicated above.

## 2017-09-15 NOTE — CONSULT NOTE ADULT - SUBJECTIVE AND OBJECTIVE BOX
[facility] NEPHROLOGY- CONSULTATION NOTE    86 year old female with history of below presents with chest pain and associated dyspnea for the past several days. In ER, patient found to have elevated Scr (1.63).    Patient noted to have elevated Scr since 2016. Patient states she had followed with nephrology (Dr. Bass?) for history of chronic kidney disease however has not seen nephrology for 2-3 years. Patient with h/o R RCC s/p nephrectomy in 1964. Patient also with strong smoking history in the past which is thought to be etiology of COPD. Patient with long standing h/o HTN and DM (over 50 years) however denies any diabetic retinopathy or neuropathy. Patient denies any h/o CHF or furosemide use at home. Patient with chronic RLE edema secondary to knee surgery in the past.    Patient denies any h/o nephrolithiasis, hepatitis B, C, HIV, IVDA, tattoos, prior PRBC transfusions, herbal medication use or chronic NSAID use. Patient on valsartan 160 mg daily at home for h/o HTN.      REVIEW OF SYSTEMS:  Gen: no changes in weight  HEENT: no rhinorrhea  Neck: no sore throat  Cards: + chest pain  Resp: + dyspnea  GI: no nausea or vomiting or diarrhea  : no dysuria or hematuria  Vascular: + chronic  RLE edema  Derm: no rashes  Neuro: no numbness/tingling    amoxicillin (Flushing; Vomiting; Nausea)      Home Medications Reviewed  Hospital Medications:   MEDICATIONS  (STANDING):  aspirin  chewable 81 milliGRAM(s) Oral daily  simvastatin 20 milliGRAM(s) Oral at bedtime  ALBUTerol/ipratropium for Nebulization 3 milliLiter(s) Nebulizer every 6 hours  ALBUTerol    90 MICROgram(s) HFA Inhaler 1 Puff(s) Inhalation every 4 hours  tiotropium 18 MICROgram(s) Capsule 1 Capsule(s) Inhalation daily  vancomycin  IVPB 750 milliGRAM(s) IV Intermittent every 12 hours  levoFLOXacin  Tablet 250 milliGRAM(s) Oral every 24 hours  amiodarone    Tablet 100 milliGRAM(s) Oral daily  clonazePAM Tablet 0.5 milliGRAM(s) Oral two times a day  DULoxetine 60 milliGRAM(s) Oral daily  mirtazapine 15 milliGRAM(s) Oral at bedtime  ARIPiprazole 2 milliGRAM(s) Oral daily  metoprolol succinate  milliGRAM(s) Oral daily  amLODIPine   Tablet 2.5 milliGRAM(s) Oral daily  docusate sodium 100 milliGRAM(s) Oral daily  apixaban 2.5 milliGRAM(s) Oral every 12 hours  methylPREDNISolone sodium succinate Injectable 40 milliGRAM(s) IV Push every 8 hours  insulin lispro (HumaLOG) corrective regimen sliding scale   SubCutaneous three times a day before meals  dextrose 5%. 1000 milliLiter(s) (50 mL/Hr) IV Continuous <Continuous>  dextrose 50% Injectable 12.5 Gram(s) IV Push once  dextrose 50% Injectable 25 Gram(s) IV Push once  dextrose 50% Injectable 25 Gram(s) IV Push once      PAST MEDICAL & SURGICAL HISTORY:  Atrial fibrillation, unspecified type  Pacemaker  Renal Cancer  Overweight  PVD (Peripheral Vascular Disease)  Cigarette Smoker  Arteriosclerotic Heart Disease (ASHD)  HTN (Hypertension)  Diabetes  S/P carpal tunnel release  S/P left cataract extraction  s/p colon resection  S/P Nephrectomy: right  Hip Replacement  History of Total Knee Replacement  S/P Hernia Surgery  S/P Cholecystectomy      FAMILY HISTORY:  Family history of pancreatic cancer (Sibling)      SOCIAL HISTORY:  Denies toxic substance use       VITALS:  T(F): 99.5 (09-15-17 @ 12:25), Max: 100.3 (09-15-17 @ 08:16)  HR: 63 (09-15-17 @ 12:25)  BP: 153/68 (09-15-17 @ 12:25)  RR: 18 (09-15-17 @ 12:25)  SpO2: 95% (09-15-17 @ 12:25)  Wt(kg): --      PHYSICAL EXAM:  Gen: NAD, calm  HEENT: MMM  Neck: no JVD  Cards: RRR, +S1/S2, +ZAKI  Resp: CTA B/L  GI: soft, NT/ND, NABS  : no CVA tenderness  Vascular: RLE edema (chronic as per patient)  Derm: no rashes  Neuro: non-focal    LABS:  09-15    136  |  99  |  27<H>  ----------------------------<  142<H>  6.0<H>   |  24  |  1.63<H>    Ca    8.7      15 Sep 2017 08:07    TPro  7.6  /  Alb  3.6  /  TBili  0.6  /  DBili      /  AST  41<H>  /  ALT  19  /  AlkPhos  86  09-15    Creatinine Trend: 1.63 <--                        11.6   12.0  )-----------( 235      ( 15 Sep 2017 08:07 )             34.9     Urine Studies:        RADIOLOGY & ADDITIONAL STUDIES:  CXR reviewd

## 2017-09-15 NOTE — H&P ADULT - HISTORY OF PRESENT ILLNESS
86 yr old F with SOB and chest pressure since last night that is worse w lying down. chest pressure and SOB resolved w sitting up right.  denies fever, cough. +PPM, HTN, DM. 86 yr old F with COPD, CHF A FIB, HTN , here  with SOB and chest pressure since last night that is worse w lying down. Chest pressure is substernal, more like discomfort 5/10 with no radiation, resolved with  sitting up right.  Denies fever, cough , N/V/Diarrhea, HA or any other associated symptoms.

## 2017-09-15 NOTE — ED ADULT NURSE REASSESSMENT NOTE - NS ED NURSE REASSESS COMMENT FT1
Pt. admitted to tele, pending RVP result. Breathing unlabored on NC. Comfort and safety measures in place.

## 2017-09-15 NOTE — H&P ADULT - ASSESSMENT
86 yr old F with COPD, CHF A FIB, HTN , here  with SOB and chest pressure since last night that is worse w lying down. Chest pressure is substernal, more like discomfort 5/10 with no radiation, resolved with  sitting up right.  Denies fever, cough , N/V/Diarrhea, HA or any other associated symptoms.

## 2017-09-15 NOTE — H&P ADULT - NSHPPHYSICALEXAM_GEN_ALL_CORE
General: WN/WD NAD  Neurology: A&Ox3, nonfocal, SAHA x 4  Eyes: PERRLA/ EOMI, Gross vision intact  ENT/Neck: Neck supple, trachea midline, No JVD, Gross hearing intact  Respiratory: CTA B/L, No wheezing, rales, rhonchi  CV: RRR, S1S2, no murmurs, rubs or gallops  Abdominal: Soft, NT, ND +BS,   Extremities: No edema, + peripheral pulses  Skin: No Rashes, Hematoma, Ecchymosis  Incisions:   Tubes:

## 2017-09-15 NOTE — CONSULT NOTE ADULT - SUBJECTIVE AND OBJECTIVE BOX
CHIEF COMPLAINT:Patient is a 86y old  Female who presents with a chief complaint of chest pressure (15 Sep 2017 11:40)      HISTORY OF PRESENT ILLNESS:HPI:  86 yr old F with COPD, CHF A FIB on eliquis, PPM, HTN , here  with SOB and chest pressure since last night that is worse w lying down. Chest pressure is substernal, more like discomfort 5/10 with no radiation, resolved with  sitting up right.  Denies fever, cough , N/V/Diarrhea, HA or any other associated symptoms. (15 Sep 2017 11:40)      PAST MEDICAL & SURGICAL HISTORY:  Atrial fibrillation, unspecified type  Pacemaker  Renal Cancer  Overweight  PVD (Peripheral Vascular Disease)  Cigarette Smoker  Arteriosclerotic Heart Disease (ASHD)  HTN (Hypertension)  Diabetes  S/P carpal tunnel release  S/P left cataract extraction  s/p colon resection  S/P Nephrectomy: right  Hip Replacement  History of Total Knee Replacement  S/P Hernia Surgery  S/P Cholecystectomy          MEDICATIONS:  aspirin  chewable 81 milliGRAM(s) Oral daily  amiodarone    Tablet 100 milliGRAM(s) Oral daily  metoprolol succinate  milliGRAM(s) Oral daily  amLODIPine   Tablet 2.5 milliGRAM(s) Oral daily  apixaban 2.5 milliGRAM(s) Oral every 12 hours    vancomycin  IVPB 750 milliGRAM(s) IV Intermittent every 12 hours  levoFLOXacin  Tablet 250 milliGRAM(s) Oral every 24 hours    ALBUTerol/ipratropium for Nebulization 3 milliLiter(s) Nebulizer every 6 hours  ALBUTerol    90 MICROgram(s) HFA Inhaler 1 Puff(s) Inhalation every 4 hours  tiotropium 18 MICROgram(s) Capsule 1 Capsule(s) Inhalation daily    clonazePAM Tablet 0.5 milliGRAM(s) Oral two times a day  DULoxetine 60 milliGRAM(s) Oral daily  mirtazapine 15 milliGRAM(s) Oral at bedtime  ARIPiprazole 2 milliGRAM(s) Oral daily    docusate sodium 100 milliGRAM(s) Oral daily    simvastatin 20 milliGRAM(s) Oral at bedtime  methylPREDNISolone sodium succinate Injectable 40 milliGRAM(s) IV Push every 8 hours  insulin lispro (HumaLOG) corrective regimen sliding scale   SubCutaneous three times a day before meals  dextrose Gel 1 Dose(s) Oral once PRN  dextrose 50% Injectable 12.5 Gram(s) IV Push once  dextrose 50% Injectable 25 Gram(s) IV Push once  dextrose 50% Injectable 25 Gram(s) IV Push once  glucagon  Injectable 1 milliGRAM(s) IntraMuscular once PRN    dextrose 5%. 1000 milliLiter(s) IV Continuous <Continuous>      FAMILY HISTORY:  Family history of pancreatic cancer (Sibling)      Non-contributory    SOCIAL HISTORY:    not an active smoker    Allergies    amoxicillin (Flushing; Vomiting; Nausea)    Intolerances    	    REVIEW OF SYSTEMS:  CONSTITUTIONAL: No fever  EYES: No eye pain, visual disturbances, or discharge  ENMT:  No difficulty hearing, tinnitus  NECK: No pain or stiffness  RESPIRATORY: No cough, wheezing,  CARDIOVASCULAR: See HPI  GASTROINTESTINAL:  No nausea, vomiting, diarrhea or constipation. No melena.  GENITOURINARY: No dysuria, hematuria  NEUROLOGICAL: No stroke like symptoms  SKIN: No burning or lesions   LYMPH Nodes: No enlarged glands  ENDOCRINE: No heat or cold intolerance  MUSCULOSKELETAL: No joint pain or swelling  PSYCHIATRIC: No  anxiety, mood swings  HEME/LYMPH: No bleeding gums  ALLERY AND IMMUNOLOGIC: No hives or eczema	    All other ROS negative    PHYSICAL EXAM:  T(C): 37.7 (09-15-17 @ 17:18), Max: 37.9 (09-15-17 @ 08:16)  HR: 78 (09-15-17 @ 17:18) (60 - 78)  BP: 158/80 (09-15-17 @ 17:18) (135/87 - 158/80)  RR: 18 (09-15-17 @ 17:18) (16 - 20)  SpO2: 95% (09-15-17 @ 17:18) (91% - 100%)  Wt(kg): --  I&O's Summary      Appearance: Normal	  HEENT:   Normal oral mucosa, EOMI	  Lymphatic: No lymphadenopathy  Cardiovascular: Normal S1 S2, No JVD, No murmurs, No edema  Respiratory: Lungs clear to auscultation	  Psychiatry: Alert, Mood & affect appropriate  Gastrointestinal:  Soft, Non-tender, + BS	  Skin: No rashes, No ecchymoses, No cyanosis	  Neurologic: Non-focal  Extremities:  No clubbing, cyanosis or edema  Vascular: Peripheral pulses palpable 2+ bilaterally  	    ECG:  	  RADIOLOGY:  	  	  CARDIAC MARKERS:  Troponin T, Serum: <0.01 ng/mL (09-15 @ 08:07)                                  11.6   12.0  )-----------( 235      ( 15 Sep 2017 08:07 )             34.9     09-15    136  |  99  |  27<H>  ----------------------------<  142<H>  6.0<H>   |  24  |  1.63<H>    Ca    8.7      15 Sep 2017 08:07    TPro  7.6  /  Alb  3.6  /  TBili  0.6  /  DBili  x   /  AST  41<H>  /  ALT  19  /  AlkPhos  86  09-15    proBNP: Serum Pro-Brain Natriuretic Peptide: 8579 pg/mL (09-15 @ 08:07)    Lipid Profile:   HgA1c:   TSH:       Assesment/Plan:   86 yr old F with COPD, CHF,  A FIB on eliquis, PPM, HTN , HLD,  here  with SOB and chest pressure with decompensated heart failure and ? super imposed PNA  1. Heart failure - Agree with Lasix 40mg IV daily for now with strict ins/outs. Continue with bb and ASA  - TTE to assess EF    2. AF - continue with Eliquis for AC and home dose Amio  - EP to interogate device on Monday    3. HTN - continue with home meds now of Toprol and Norvasc  - will consider adding ARB if ok with renal and CKD stable    4. HLD - continue with statin    5. Pulmonary - Abx/COPD management per pulm/primary team      Waylon Cates DO [facility]  [facility]  [phone] CHIEF COMPLAINT:Patient is a 86y old  Female who presents with a chief complaint of chest pressure (15 Sep 2017 11:40)      HISTORY OF PRESENT ILLNESS:HPI:  86 yr old F with COPD, CHF A FIB on eliquis, PPM, HTN , here  with SOB and chest pressure since last night that is worse w lying down. Chest pressure is substernal, more like discomfort 5/10 with no radiation, resolved with  sitting up right.  Denies fever, cough , N/V/Diarrhea, HA or any other associated symptoms. (15 Sep 2017 11:40)      PAST MEDICAL & SURGICAL HISTORY:  Atrial fibrillation, unspecified type  Pacemaker  Renal Cancer  Overweight  PVD (Peripheral Vascular Disease)  Cigarette Smoker  Arteriosclerotic Heart Disease (ASHD)  HTN (Hypertension)  Diabetes  S/P carpal tunnel release  S/P left cataract extraction  s/p colon resection  S/P Nephrectomy: right  Hip Replacement  History of Total Knee Replacement  S/P Hernia Surgery  S/P Cholecystectomy          MEDICATIONS:  aspirin  chewable 81 milliGRAM(s) Oral daily  amiodarone    Tablet 100 milliGRAM(s) Oral daily  metoprolol succinate  milliGRAM(s) Oral daily  amLODIPine   Tablet 2.5 milliGRAM(s) Oral daily  apixaban 2.5 milliGRAM(s) Oral every 12 hours    vancomycin  IVPB 750 milliGRAM(s) IV Intermittent every 12 hours  levoFLOXacin  Tablet 250 milliGRAM(s) Oral every 24 hours    ALBUTerol/ipratropium for Nebulization 3 milliLiter(s) Nebulizer every 6 hours  ALBUTerol    90 MICROgram(s) HFA Inhaler 1 Puff(s) Inhalation every 4 hours  tiotropium 18 MICROgram(s) Capsule 1 Capsule(s) Inhalation daily    clonazePAM Tablet 0.5 milliGRAM(s) Oral two times a day  DULoxetine 60 milliGRAM(s) Oral daily  mirtazapine 15 milliGRAM(s) Oral at bedtime  ARIPiprazole 2 milliGRAM(s) Oral daily    docusate sodium 100 milliGRAM(s) Oral daily    simvastatin 20 milliGRAM(s) Oral at bedtime  methylPREDNISolone sodium succinate Injectable 40 milliGRAM(s) IV Push every 8 hours  insulin lispro (HumaLOG) corrective regimen sliding scale   SubCutaneous three times a day before meals  dextrose Gel 1 Dose(s) Oral once PRN  dextrose 50% Injectable 12.5 Gram(s) IV Push once  dextrose 50% Injectable 25 Gram(s) IV Push once  dextrose 50% Injectable 25 Gram(s) IV Push once  glucagon  Injectable 1 milliGRAM(s) IntraMuscular once PRN    dextrose 5%. 1000 milliLiter(s) IV Continuous <Continuous>      FAMILY HISTORY:  Family history of pancreatic cancer (Sibling)      Non-contributory    SOCIAL HISTORY:    not an active smoker    Allergies    amoxicillin (Flushing; Vomiting; Nausea)    Intolerances    	    REVIEW OF SYSTEMS:  CONSTITUTIONAL: No fever  EYES: No eye pain, visual disturbances, or discharge  ENMT:  No difficulty hearing, tinnitus  NECK: No pain or stiffness  RESPIRATORY: No cough, wheezing,  CARDIOVASCULAR: See HPI  GASTROINTESTINAL:  No nausea, vomiting, diarrhea or constipation. No melena.  GENITOURINARY: No dysuria, hematuria  NEUROLOGICAL: No stroke like symptoms  SKIN: No burning or lesions   LYMPH Nodes: No enlarged glands  ENDOCRINE: No heat or cold intolerance  MUSCULOSKELETAL: No joint pain or swelling  PSYCHIATRIC: No  anxiety, mood swings  HEME/LYMPH: No bleeding gums  ALLERY AND IMMUNOLOGIC: No hives or eczema	    All other ROS negative    PHYSICAL EXAM:  T(C): 37.7 (09-15-17 @ 17:18), Max: 37.9 (09-15-17 @ 08:16)  HR: 78 (09-15-17 @ 17:18) (60 - 78)  BP: 158/80 (09-15-17 @ 17:18) (135/87 - 158/80)  RR: 18 (09-15-17 @ 17:18) (16 - 20)  SpO2: 95% (09-15-17 @ 17:18) (91% - 100%)  Wt(kg): --  I&O's Summary      Appearance: Normal	  HEENT:   Normal oral mucosa, EOMI	  Lymphatic: No lymphadenopathy  Cardiovascular: Normal S1 S2, No JVD, No murmurs, No edema  Respiratory: Lungs clear to auscultation	  Psychiatry: Alert, Mood & affect appropriate  Gastrointestinal:  Soft, Non-tender, + BS	  Skin: No rashes, No ecchymoses, No cyanosis	  Neurologic: Non-focal  Extremities:  No clubbing, cyanosis or edema  Vascular: Peripheral pulses palpable 2+ bilaterally  	    ECG:  	nsr 1st degree lbbb  RADIOLOGY:  	  	  CARDIAC MARKERS:  Troponin T, Serum: <0.01 ng/mL (09-15 @ 08:07)                                  11.6   12.0  )-----------( 235      ( 15 Sep 2017 08:07 )             34.9     09-15    136  |  99  |  27<H>  ----------------------------<  142<H>  6.0<H>   |  24  |  1.63<H>    Ca    8.7      15 Sep 2017 08:07    TPro  7.6  /  Alb  3.6  /  TBili  0.6  /  DBili  x   /  AST  41<H>  /  ALT  19  /  AlkPhos  86  09-15    proBNP: Serum Pro-Brain Natriuretic Peptide: 8579 pg/mL (09-15 @ 08:07)    Lipid Profile:   HgA1c:   TSH:       Assesment/Plan:   86 yr old F with COPD, CHF,  A FIB on eliquis, PPM, HTN , HLD,  here  with SOB and chest pressure with decompensated heart failure and ? super imposed PNA  1. Heart failure - Agree with Lasix 40mg IV daily for now with strict ins/outs. Continue with bb and ASA  - TTE to assess EF    2. AF - continue with Eliquis for AC and home dose Amio  - EP to interogate device on Monday    3. HTN - continue with home meds now of Toprol and Norvasc  - will consider adding ARB if ok with renal and CKD stable    4. HLD - continue with statin    5. Pulmonary - Abx/COPD management per pulm/primary team      Waylon Cates DO [facility]  [facility]  [phone]

## 2017-09-15 NOTE — H&P ADULT - PROBLEM SELECTOR PLAN 1
COPD and CHF excaerbation  cw duonebs  solumedrol  pulmonary and cards fu  will monitor  cw antibiotics

## 2017-09-15 NOTE — CONSULT NOTE ADULT - ATTENDING COMMENTS
[facility] NEPHROLOGY  Rad Mojica M.D.  Les Mckeon D.O.  Selene Conn M.D.  Odessa Rodriguez, MSN, ANP-C    Telephone: [phone]  Facsimile: [phone]    [address] [place] [address]

## 2017-09-15 NOTE — CONSULT NOTE ADULT - PROBLEM SELECTOR RECOMMENDATION 4
Patient with chronic RLE edema as per history however given dyspnea and elevated pro-BNP, will continue diuresis with lasix 40 mg IV daily for now. Monitor UO.

## 2017-09-15 NOTE — CONSULT NOTE ADULT - SUBJECTIVE AND OBJECTIVE BOX
HPI:  86 yr old F with COPD, CHF A FIB, HTN , here  with SOB and chest pressure since last night that is worse w lying down. Chest pressure is substernal, more like discomfort 5/10 with no radiation, resolved with  sitting up right.  Denies fever, cough , N/V/Diarrhea, HA or any other associated symptoms. (15 Sep 2017 11:40)      PAST MEDICAL & SURGICAL HISTORY:  Atrial fibrillation, unspecified type  Pacemaker  Renal Cancer  Overweight  PVD (Peripheral Vascular Disease)  Cigarette Smoker  Arteriosclerotic Heart Disease (ASHD)  HTN (Hypertension)  Diabetes  S/P carpal tunnel release  S/P left cataract extraction  s/p colon resection  S/P Nephrectomy: right  Hip Replacement  History of Total Knee Replacement  S/P Hernia Surgery  S/P Cholecystectomy      FAMILY HISTORY:  Family history of pancreatic cancer (Sibling)      Social History:    Marital Status:  (   )    (   ) Single    (   )    (  )   Occupation:   Lives with: (  ) alone  (  ) children   (  ) spouse   (  ) parents  (  ) other    Substance Use (street drugs): (  ) never used  (  ) other:  Tobacco Usage:  (   ) never smoked   (   ) former smoker   (   ) current smoker  (     ) pack year  (        ) last cigarette date  Alcohol Usage:      Allergies    amoxicillin (Flushing; Vomiting; Nausea)    Intolerances        MEDICATIONS  (STANDING):  aspirin  chewable 81 milliGRAM(s) Oral daily  simvastatin 20 milliGRAM(s) Oral at bedtime  ALBUTerol/ipratropium for Nebulization 3 milliLiter(s) Nebulizer every 6 hours  ALBUTerol    90 MICROgram(s) HFA Inhaler 1 Puff(s) Inhalation every 4 hours  tiotropium 18 MICROgram(s) Capsule 1 Capsule(s) Inhalation daily  vancomycin  IVPB 750 milliGRAM(s) IV Intermittent every 12 hours  levoFLOXacin  Tablet 250 milliGRAM(s) Oral every 24 hours  amiodarone    Tablet 100 milliGRAM(s) Oral daily  clonazePAM Tablet 0.5 milliGRAM(s) Oral two times a day  DULoxetine 60 milliGRAM(s) Oral daily  mirtazapine 15 milliGRAM(s) Oral at bedtime  ARIPiprazole 2 milliGRAM(s) Oral daily  metoprolol succinate  milliGRAM(s) Oral daily  amLODIPine   Tablet 2.5 milliGRAM(s) Oral daily  docusate sodium 100 milliGRAM(s) Oral daily  apixaban 2.5 milliGRAM(s) Oral every 12 hours  methylPREDNISolone sodium succinate Injectable 40 milliGRAM(s) IV Push every 8 hours  insulin lispro (HumaLOG) corrective regimen sliding scale   SubCutaneous three times a day before meals  dextrose 5%. 1000 milliLiter(s) (50 mL/Hr) IV Continuous <Continuous>  dextrose 50% Injectable 12.5 Gram(s) IV Push once  dextrose 50% Injectable 25 Gram(s) IV Push once  dextrose 50% Injectable 25 Gram(s) IV Push once    MEDICATIONS  (PRN):  dextrose Gel 1 Dose(s) Oral once PRN Blood Glucose LESS THAN 70 milliGRAM(s)/deciLiter  glucagon  Injectable 1 milliGRAM(s) IntraMuscular once PRN Glucose <70 milliGRAM(s)/deciLiter        LABS:    CBC Full  -  ( 15 Sep 2017 08:07 )  WBC Count : 12.0 K/uL  Hemoglobin : 11.6 g/dL  Hematocrit : 34.9 %  Platelet Count - Automated : 235 K/uL  Mean Cell Volume : 91.6 fl  Mean Cell Hemoglobin : 30.5 pg  Mean Cell Hemoglobin Concentration : 33.3 gm/dL  Auto Neutrophil # : 8.3 K/uL  Auto Lymphocyte # : 1.5 K/uL  Auto Monocyte # : 1.5 K/uL  Auto Eosinophil # : 0.6 K/uL  Auto Basophil # : 0.0 K/uL  Auto Neutrophil % : 69.6 %  Auto Lymphocyte % : 12.8 %  Auto Monocyte % : 12.3 %  Auto Eosinophil % : 5.0 %  Auto Basophil % : 0.2 %    09-15    136  |  99  |  27<H>  ----------------------------<  142<H>  6.0<H>   |  24  |  1.63<H>    Ca    8.7      15 Sep 2017 08:07    TPro  7.6  /  Alb  3.6  /  TBili  0.6  /  DBili  x   /  AST  41<H>  /  ALT  19  /  AlkPhos  86  09-15    PT/INR - ( 15 Sep 2017 08:07 )   PT: 13.1 sec;   INR: 1.20 ratio         PTT - ( 15 Sep 2017 08:07 )  PTT:31.3 sec      Blood Gas Venous - Lactate: 1.3 mmoL/L (09-15 @ 08:40)              Cultures:  RECENT CULTURES:          Imaging Studies:    Vital Signs:   Vital Signs Last 24 Hrs  T(C): 37.5 (09-15-17 @ 12:25), Max: 37.9 (09-15-17 @ 08:16)  T(F): 99.5 (09-15-17 @ 12:25), Max: 100.3 (09-15-17 @ 08:16)  HR: 63 (09-15-17 @ 12:25) (60 - 64)  BP: 153/68 (09-15-17 @ 12:25) (135/87 - 155/60)  BP(mean): --  RR: 18 (09-15-17 @ 12:25) (16 - 20)  SpO2: 95% (09-15-17 @ 12:25) (91% - 100%) HPI:  86 yr old F with COPD, CHF A FIB, HTN , here  with SOB and chest pressure for 1 day, orthopnea. Chest pressure is substernal, more like discomfort 5/10 with no radiation, resolved with  sitting up right.  Denies fever, cough , N/V/Diarrhea, HA or any other associated symptoms.   Pt with mild leukocytosis of 12K.  CXR:  Left basilar atelectasis. Otherwise, clear lungs.  Given Levaquin and Vancomycin in ER.    PAST MEDICAL & SURGICAL HISTORY:  Atrial fibrillation, unspecified type  Pacemaker  Renal Cancer  Overweight  PVD (Peripheral Vascular Disease)  Cigarette Smoker  Arteriosclerotic Heart Disease (ASHD)  HTN (Hypertension)  Diabetes  S/P carpal tunnel release  S/P left cataract extraction  s/p colon resection  S/P Nephrectomy: right  Hip Replacement  History of Total Knee Replacement  S/P Hernia Surgery  S/P Cholecystectomy    Family Hx: Non-contributory review     Social Hx: No smoking, no ETOH, no IVDU       Allergies    amoxicillin (Flushing; Vomiting; Nausea)    MEDICATIONS  (STANDING):  aspirin  chewable 81 milliGRAM(s) Oral daily  simvastatin 20 milliGRAM(s) Oral at bedtime  ALBUTerol/ipratropium for Nebulization 3 milliLiter(s) Nebulizer every 6 hours  ALBUTerol    90 MICROgram(s) HFA Inhaler 1 Puff(s) Inhalation every 4 hours  tiotropium 18 MICROgram(s) Capsule 1 Capsule(s) Inhalation daily  vancomycin  IVPB 750 milliGRAM(s) IV Intermittent every 12 hours  levoFLOXacin  Tablet 250 milliGRAM(s) Oral every 24 hours  amiodarone    Tablet 100 milliGRAM(s) Oral daily  clonazePAM Tablet 0.5 milliGRAM(s) Oral two times a day  DULoxetine 60 milliGRAM(s) Oral daily  mirtazapine 15 milliGRAM(s) Oral at bedtime  ARIPiprazole 2 milliGRAM(s) Oral daily  metoprolol succinate  milliGRAM(s) Oral daily  amLODIPine   Tablet 2.5 milliGRAM(s) Oral daily  docusate sodium 100 milliGRAM(s) Oral daily  apixaban 2.5 milliGRAM(s) Oral every 12 hours  methylPREDNISolone sodium succinate Injectable 40 milliGRAM(s) IV Push every 8 hours  insulin lispro (HumaLOG) corrective regimen sliding scale   SubCutaneous three times a day before meals  dextrose 5%. 1000 milliLiter(s) (50 mL/Hr) IV Continuous <Continuous>  dextrose 50% Injectable 12.5 Gram(s) IV Push once  dextrose 50% Injectable 25 Gram(s) IV Push once  dextrose 50% Injectable 25 Gram(s) IV Push once    MEDICATIONS  (PRN):  dextrose Gel 1 Dose(s) Oral once PRN Blood Glucose LESS THAN 70 milliGRAM(s)/deciLiter  glucagon  Injectable 1 milliGRAM(s) IntraMuscular once PRN Glucose <70 milliGRAM(s)/deciLiter        LABS:    CBC Full  -  ( 15 Sep 2017 08:07 )  WBC Count : 12.0 K/uL  Hemoglobin : 11.6 g/dL  Hematocrit : 34.9 %  Platelet Count - Automated : 235 K/uL  Mean Cell Volume : 91.6 fl  Mean Cell Hemoglobin : 30.5 pg  Mean Cell Hemoglobin Concentration : 33.3 gm/dL  Auto Neutrophil # : 8.3 K/uL  Auto Lymphocyte # : 1.5 K/uL  Auto Monocyte # : 1.5 K/uL  Auto Eosinophil # : 0.6 K/uL  Auto Basophil # : 0.0 K/uL  Auto Neutrophil % : 69.6 %  Auto Lymphocyte % : 12.8 %  Auto Monocyte % : 12.3 %  Auto Eosinophil % : 5.0 %  Auto Basophil % : 0.2 %    09-15    136  |  99  |  27<H>  ----------------------------<  142<H>  6.0<H>   |  24  |  1.63<H>    Ca    8.7      15 Sep 2017 08:07    TPro  7.6  /  Alb  3.6  /  TBili  0.6  /  DBili  x   /  AST  41<H>  /  ALT  19  /  AlkPhos  86  09-15    PT/INR - ( 15 Sep 2017 08:07 )   PT: 13.1 sec;   INR: 1.20 ratio         PTT - ( 15 Sep 2017 08:07 )  PTT:31.3 sec      Blood Gas Venous - Lactate: 1.3 mmoL/L (09-15 @ 08:40)    RVP neg    Imaging Studies: CXR:  Left basilar atelectasis. Otherwise, clear lungs.    Vital Signs:   Vital Signs Last 24 Hrs  T(C): 37.5 (09-15-17 @ 12:25), Max: 37.9 (09-15-17 @ 08:16)  T(F): 99.5 (09-15-17 @ 12:25), Max: 100.3 (09-15-17 @ 08:16)  HR: 63 (09-15-17 @ 12:25) (60 - 64)  BP: 153/68 (09-15-17 @ 12:25) (135/87 - 155/60)  BP(mean): --  RR: 18 (09-15-17 @ 12:25) (16 - 20)  SpO2: 95% (09-15-17 @ 12:25) (91% - 100%)

## 2017-09-16 LAB
ALBUMIN SERPL ELPH-MCNC: 3.1 G/DL — LOW (ref 3.3–5)
ALP SERPL-CCNC: 85 U/L — SIGNIFICANT CHANGE UP (ref 40–120)
ALT FLD-CCNC: 13 U/L — SIGNIFICANT CHANGE UP (ref 10–45)
ANION GAP SERPL CALC-SCNC: 18 MMOL/L — HIGH (ref 5–17)
APPEARANCE UR: CLEAR — SIGNIFICANT CHANGE UP
AST SERPL-CCNC: 22 U/L — SIGNIFICANT CHANGE UP (ref 10–40)
BACTERIA # UR AUTO: NEGATIVE — SIGNIFICANT CHANGE UP
BILIRUB SERPL-MCNC: 0.6 MG/DL — SIGNIFICANT CHANGE UP (ref 0.2–1.2)
BILIRUB UR-MCNC: NEGATIVE — SIGNIFICANT CHANGE UP
BUN SERPL-MCNC: 25 MG/DL — HIGH (ref 7–23)
CALCIUM SERPL-MCNC: 9.5 MG/DL — SIGNIFICANT CHANGE UP (ref 8.4–10.5)
CHLORIDE SERPL-SCNC: 97 MMOL/L — SIGNIFICANT CHANGE UP (ref 96–108)
CO2 SERPL-SCNC: 21 MMOL/L — LOW (ref 22–31)
COLOR SPEC: YELLOW — SIGNIFICANT CHANGE UP
CREAT SERPL-MCNC: 1.51 MG/DL — HIGH (ref 0.5–1.3)
DIFF PNL FLD: NEGATIVE — SIGNIFICANT CHANGE UP
EPI CELLS # UR: 3 /HPF — SIGNIFICANT CHANGE UP (ref 0–5)
GLUCOSE SERPL-MCNC: 197 MG/DL — HIGH (ref 70–99)
GLUCOSE UR QL: 100 MG/DL
HBV SURFACE AG SER-ACNC: SIGNIFICANT CHANGE UP
HCT VFR BLD CALC: 38.2 % — SIGNIFICANT CHANGE UP (ref 34.5–45)
HCV AB S/CO SERPL IA: 0.11 S/CO — SIGNIFICANT CHANGE UP
HCV AB SERPL-IMP: SIGNIFICANT CHANGE UP
HGB BLD-MCNC: 12.2 G/DL — SIGNIFICANT CHANGE UP (ref 11.5–15.5)
HYALINE CASTS # UR AUTO: 9 /LPF — HIGH (ref 0–7)
KETONES UR-MCNC: NEGATIVE — SIGNIFICANT CHANGE UP
LEUKOCYTE ESTERASE UR-ACNC: NEGATIVE — SIGNIFICANT CHANGE UP
MCHC RBC-ENTMCNC: 27.7 PG — SIGNIFICANT CHANGE UP (ref 27–34)
MCHC RBC-ENTMCNC: 31.9 GM/DL — LOW (ref 32–36)
MCV RBC AUTO: 86.8 FL — SIGNIFICANT CHANGE UP (ref 80–100)
NITRITE UR-MCNC: NEGATIVE — SIGNIFICANT CHANGE UP
PH UR: 6 — SIGNIFICANT CHANGE UP (ref 5–8)
PLATELET # BLD AUTO: 269 K/UL — SIGNIFICANT CHANGE UP (ref 150–400)
POTASSIUM SERPL-MCNC: 3.9 MMOL/L — SIGNIFICANT CHANGE UP (ref 3.5–5.3)
POTASSIUM SERPL-SCNC: 3.9 MMOL/L — SIGNIFICANT CHANGE UP (ref 3.5–5.3)
PROT SERPL-MCNC: 7.2 G/DL — SIGNIFICANT CHANGE UP (ref 6–8.3)
PROT UR-MCNC: 300 MG/DL
RBC # BLD: 4.4 M/UL — SIGNIFICANT CHANGE UP (ref 3.8–5.2)
RBC # FLD: 15.4 % — HIGH (ref 10.3–14.5)
RBC CASTS # UR COMP ASSIST: 1 /HPF — SIGNIFICANT CHANGE UP (ref 0–4)
SODIUM SERPL-SCNC: 136 MMOL/L — SIGNIFICANT CHANGE UP (ref 135–145)
SP GR SPEC: 1.01 — SIGNIFICANT CHANGE UP (ref 1.01–1.02)
UROBILINOGEN FLD QL: NEGATIVE MG/DL — SIGNIFICANT CHANGE UP
WBC # BLD: 15.11 K/UL — HIGH (ref 3.8–10.5)
WBC # FLD AUTO: 15.11 K/UL — HIGH (ref 3.8–10.5)
WBC UR QL: 40 /HPF — HIGH (ref 0–5)

## 2017-09-16 RX ADMIN — Medication 3 MILLILITER(S): at 00:17

## 2017-09-16 RX ADMIN — Medication 40 MILLIGRAM(S): at 15:40

## 2017-09-16 RX ADMIN — Medication 1: at 09:04

## 2017-09-16 RX ADMIN — MIRTAZAPINE 15 MILLIGRAM(S): 45 TABLET, ORALLY DISINTEGRATING ORAL at 22:27

## 2017-09-16 RX ADMIN — Medication 40 MILLIGRAM(S): at 06:36

## 2017-09-16 RX ADMIN — AMIODARONE HYDROCHLORIDE 100 MILLIGRAM(S): 400 TABLET ORAL at 06:37

## 2017-09-16 RX ADMIN — AMLODIPINE BESYLATE 2.5 MILLIGRAM(S): 2.5 TABLET ORAL at 06:36

## 2017-09-16 RX ADMIN — Medication 81 MILLIGRAM(S): at 12:24

## 2017-09-16 RX ADMIN — Medication 1: at 12:21

## 2017-09-16 RX ADMIN — Medication 0.5 MILLIGRAM(S): at 06:36

## 2017-09-16 RX ADMIN — Medication 3 MILLILITER(S): at 06:37

## 2017-09-16 RX ADMIN — Medication 7 UNIT(S): at 09:03

## 2017-09-16 RX ADMIN — DULOXETINE HYDROCHLORIDE 60 MILLIGRAM(S): 30 CAPSULE, DELAYED RELEASE ORAL at 22:26

## 2017-09-16 RX ADMIN — Medication 100 MILLIGRAM(S): at 06:36

## 2017-09-16 RX ADMIN — SIMVASTATIN 20 MILLIGRAM(S): 20 TABLET, FILM COATED ORAL at 21:09

## 2017-09-16 RX ADMIN — Medication 7 UNIT(S): at 16:58

## 2017-09-16 RX ADMIN — Medication 40 MILLIGRAM(S): at 22:18

## 2017-09-16 RX ADMIN — APIXABAN 2.5 MILLIGRAM(S): 2.5 TABLET, FILM COATED ORAL at 06:36

## 2017-09-16 RX ADMIN — Medication 100 MILLIGRAM(S): at 21:09

## 2017-09-16 RX ADMIN — ARIPIPRAZOLE 2 MILLIGRAM(S): 15 TABLET ORAL at 06:36

## 2017-09-16 RX ADMIN — APIXABAN 2.5 MILLIGRAM(S): 2.5 TABLET, FILM COATED ORAL at 19:26

## 2017-09-16 RX ADMIN — Medication 3 MILLILITER(S): at 12:23

## 2017-09-16 RX ADMIN — INSULIN GLARGINE 20 UNIT(S): 100 INJECTION, SOLUTION SUBCUTANEOUS at 22:28

## 2017-09-16 RX ADMIN — Medication 3 MILLILITER(S): at 19:25

## 2017-09-16 RX ADMIN — Medication 7 UNIT(S): at 12:21

## 2017-09-16 RX ADMIN — Medication 0.5 MILLIGRAM(S): at 19:28

## 2017-09-16 NOTE — PROGRESS NOTE ADULT - SUBJECTIVE AND OBJECTIVE BOX
Patient is a 86y old  Female who presents with a chief complaint of chest pressure (15 Sep 2017 11:40)  looks much better today, less SOB, eating breakfast      INTERVAL HPI/OVERNIGHT EVENTS:  T(C): 36.6 (09-16-17 @ 04:49), Max: 37.7 (09-15-17 @ 17:18)  HR: 61 (09-16-17 @ 06:35) (61 - 78)  BP: 121/71 (09-16-17 @ 06:35) (121/71 - 158/80)  RR: 19 (09-16-17 @ 04:49) (18 - 19)  SpO2: 93% (09-16-17 @ 04:49) (93% - 95%)  Wt(kg): --  I&O's Summary    15 Sep 2017 07:01  -  16 Sep 2017 07:00  --------------------------------------------------------  IN: 0 mL / OUT: 400 mL / NET: -400 mL        LABS:                        11.6   12.0  )-----------( 235      ( 15 Sep 2017 08:07 )             34.9     09-15    136  |  99  |  27<H>  ----------------------------<  142<H>  6.0<H>   |  24  |  1.63<H>    Ca    8.7      15 Sep 2017 08:07    TPro  7.6  /  Alb  3.6  /  TBili  0.6  /  DBili  x   /  AST  41<H>  /  ALT  19  /  AlkPhos  86  09-15    PT/INR - ( 15 Sep 2017 08:07 )   PT: 13.1 sec;   INR: 1.20 ratio         PTT - ( 15 Sep 2017 08:07 )  PTT:31.3 sec    CAPILLARY BLOOD GLUCOSE  174 (16 Sep 2017 07:34)  329 (15 Sep 2017 18:14)                MEDICATIONS  (STANDING):  aspirin  chewable 81 milliGRAM(s) Oral daily  simvastatin 20 milliGRAM(s) Oral at bedtime  ALBUTerol/ipratropium for Nebulization 3 milliLiter(s) Nebulizer every 6 hours  ALBUTerol    90 MICROgram(s) HFA Inhaler 1 Puff(s) Inhalation every 4 hours  tiotropium 18 MICROgram(s) Capsule 1 Capsule(s) Inhalation daily  levoFLOXacin  Tablet 250 milliGRAM(s) Oral every 24 hours  amiodarone    Tablet 100 milliGRAM(s) Oral daily  clonazePAM Tablet 0.5 milliGRAM(s) Oral two times a day  DULoxetine 60 milliGRAM(s) Oral daily  mirtazapine 15 milliGRAM(s) Oral at bedtime  ARIPiprazole 2 milliGRAM(s) Oral daily  metoprolol succinate  milliGRAM(s) Oral daily  amLODIPine   Tablet 2.5 milliGRAM(s) Oral daily  docusate sodium 100 milliGRAM(s) Oral daily  apixaban 2.5 milliGRAM(s) Oral every 12 hours  methylPREDNISolone sodium succinate Injectable 40 milliGRAM(s) IV Push every 8 hours  insulin lispro (HumaLOG) corrective regimen sliding scale   SubCutaneous three times a day before meals  dextrose 5%. 1000 milliLiter(s) (50 mL/Hr) IV Continuous <Continuous>  dextrose 50% Injectable 12.5 Gram(s) IV Push once  dextrose 50% Injectable 25 Gram(s) IV Push once  dextrose 50% Injectable 25 Gram(s) IV Push once  furosemide   Injectable 40 milliGRAM(s) IV Push daily  influenza   Vaccine 0.5 milliLiter(s) IntraMuscular once  insulin glargine Injectable (LANTUS) 20 Unit(s) SubCutaneous at bedtime  insulin lispro Injectable (HumaLOG) 7 Unit(s) SubCutaneous three times a day before meals    MEDICATIONS  (PRN):  dextrose Gel 1 Dose(s) Oral once PRN Blood Glucose LESS THAN 70 milliGRAM(s)/deciLiter  glucagon  Injectable 1 milliGRAM(s) IntraMuscular once PRN Glucose <70 milliGRAM(s)/deciLiter          PHYSICAL EXAM:  GENERAL: NAD, well-groomed, well-developed  HEAD:  Atraumatic, Normocephalic  CHEST/LUNG: bl scattered wheezing present  HEART: Regular rate and rhythm; No murmurs, rubs, or gallops  ABDOMEN: Soft, Nontender, Nondistended; Bowel sounds present  EXTREMITIES:  2+ Peripheral Pulses, No clubbing, cyanosis, or edema  LYMPH: No lymphadenopathy noted  SKIN: No rashes or lesions    Care Discussed with Consultants/Other Providers [ ] YES  [ ] NO

## 2017-09-16 NOTE — PROGRESS NOTE ADULT - SUBJECTIVE AND OBJECTIVE BOX
PULMONARY PROGRESS NOTE    MARCEL AGOSTO  MRN-[id number]    Patient is a 86y old  Female who presents with a chief complaint of chest pressure (15 Sep 2017 11:40)      HPI:  -Patient known to my partner , admitted with chest pressure and tightness.  Was given a new prescription for an inhaler and wasn't sure how to use it.  Was previously stable on advair.  Had been off inhalers for a few days at home prior to admission secondary to not knowing how to use new inhaler.  Denies cough/sputum/fevers/chills/sick contacts.  Currently feeling "100% better"    ROS:  otherwise negative    ACTIVE MEDICATION LIST:  MEDICATIONS  (STANDING):  aspirin  chewable 81 milliGRAM(s) Oral daily  simvastatin 20 milliGRAM(s) Oral at bedtime  ALBUTerol/ipratropium for Nebulization 3 milliLiter(s) Nebulizer every 6 hours  ALBUTerol    90 MICROgram(s) HFA Inhaler 1 Puff(s) Inhalation every 4 hours  tiotropium 18 MICROgram(s) Capsule 1 Capsule(s) Inhalation daily  levoFLOXacin  Tablet 250 milliGRAM(s) Oral every 24 hours  amiodarone    Tablet 100 milliGRAM(s) Oral daily  clonazePAM Tablet 0.5 milliGRAM(s) Oral two times a day  DULoxetine 60 milliGRAM(s) Oral daily  mirtazapine 15 milliGRAM(s) Oral at bedtime  ARIPiprazole 2 milliGRAM(s) Oral daily  metoprolol succinate  milliGRAM(s) Oral daily  amLODIPine   Tablet 2.5 milliGRAM(s) Oral daily  docusate sodium 100 milliGRAM(s) Oral daily  apixaban 2.5 milliGRAM(s) Oral every 12 hours  methylPREDNISolone sodium succinate Injectable 40 milliGRAM(s) IV Push every 8 hours  insulin lispro (HumaLOG) corrective regimen sliding scale   SubCutaneous three times a day before meals  dextrose 5%. 1000 milliLiter(s) (50 mL/Hr) IV Continuous <Continuous>  dextrose 50% Injectable 12.5 Gram(s) IV Push once  dextrose 50% Injectable 25 Gram(s) IV Push once  dextrose 50% Injectable 25 Gram(s) IV Push once  furosemide   Injectable 40 milliGRAM(s) IV Push daily  influenza   Vaccine 0.5 milliLiter(s) IntraMuscular once  insulin glargine Injectable (LANTUS) 20 Unit(s) SubCutaneous at bedtime  insulin lispro Injectable (HumaLOG) 7 Unit(s) SubCutaneous three times a day before meals    MEDICATIONS  (PRN):  dextrose Gel 1 Dose(s) Oral once PRN Blood Glucose LESS THAN 70 milliGRAM(s)/deciLiter  glucagon  Injectable 1 milliGRAM(s) IntraMuscular once PRN Glucose <70 milliGRAM(s)/deciLiter      EXAM:  Vital Signs Last 24 Hrs  T(C): 36.6 (16 Sep 2017 11:43), Max: 37.7 (15 Sep 2017 17:18)  T(F): 97.9 (16 Sep 2017 11:43), Max: 99.9 (15 Sep 2017 17:18)  HR: 60 (16 Sep 2017 11:43) (60 - 78)  BP: 121/70 (16 Sep 2017 11:43) (107/73 - 158/80)  BP(mean): --  RR: 19 (16 Sep 2017 11:43) (18 - 19)  SpO2: 90% (16 Sep 2017 11:43) (90% - 95%)    GENERAL: The patient is awake and alert in no apparent distress.     LUNGS: Clear to auscultation bilaterally    HEART: Regular rate and rhythm without murmur.    ABDOMEN: soft, +BS, nontender    LABS/IMAGING: reviewed                          12.2   15.11 )-----------( 269      ( 16 Sep 2017 08:48 )             38.2     09-16    136  |  97  |  25<H>  ----------------------------<  197<H>  3.9   |  21<L>  |  1.51<H>    Ca    9.5      16 Sep 2017 08:50    TPro  7.2  /  Alb  3.1<L>  /  TBili  0.6  /  DBili  x   /  AST  22  /  ALT  13  /  AlkPhos  85  09-16    < from: Xray Chest 1 View AP/PA (09.15.17 @ 07:45) >  IMPRESSION: Left basilar atelectasis. Otherwise, clear lungs.    < end of copied text >      PROBLEM LIST:  86y Female with HEALTH ISSUES - PROBLEM Dx:  COPD  CKD (chronic kidney disease), stage IV  Diabetes  Atrial fibrillation    RECS:  -COPD exacerbation: would add symbicort BID in house, upon discharge would give new script for Advair, continue spiriva, duonebs  -Continue solumedrol 40mg IV Q8, would decrease to 20mg IV Q8 tomorrow if continues to feel well.  -No evidence of pna clinically or on xray, would consider d/c antibiotics.  -Wean O2 as tolerated, patient only using this at home during the night time.  -Incentive spirometry, OOB to chair, PT    Joy Abdi MD   [phone]

## 2017-09-16 NOTE — PROGRESS NOTE ADULT - SUBJECTIVE AND OBJECTIVE BOX
[facility] NEPHROLOGY- PROGRESS NOTE    86 year old female h/o HTN and DM, RCC s/p right nephrectomy presents with chest pain and dyspnea. Nephrology consulted for elevated Scr.    REVIEW OF SYSTEMS:  Gen: no changes in weight  Cards: no chest pain  Resp: no dyspnea  GI: no nausea or vomiting or diarrhea  Vascular: no LE edema    amoxicillin (Flushing; Vomiting; Nausea)      Hospital Medications: Medications reviewed    VITALS:  T(F): 97.9 (09-16-17 @ 11:43), Max: 99.9 (09-15-17 @ 17:18)  HR: 60 (09-16-17 @ 11:43)  BP: 121/70 (09-16-17 @ 11:43)  RR: 19 (09-16-17 @ 11:43)  SpO2: 90% (09-16-17 @ 11:43)  Wt(kg): --  Height (cm): 165.1 (09-15 @ 20:22)  Weight (kg): 71.7 (09-15 @ 20:22)  BMI (kg/m2): 26.3 (09-15 @ 20:22)  BSA (m2): 1.79 (09-15 @ 20:22)    09-15 @ 07:01  -  09-16 @ 07:00  --------------------------------------------------------  IN: 0 mL / OUT: 400 mL / NET: -400 mL    09-16 @ 07:01  -  09-16 @ 13:00  --------------------------------------------------------  IN: 240 mL / OUT: 800 mL / NET: -560 mL      PHYSICAL EXAM:    Gen: NAD, calm  Cards: RRR, +S1/S2, +ZAKI  Resp: CTA B/L  GI: soft, NT/ND, NABS  Vascular: no LE edema B/L    LABS:  09-16    136  |  97  |  25<H>  ----------------------------<  197<H>  3.9   |  21<L>  |  1.51<H>    Ca    9.5      16 Sep 2017 08:50    TPro  7.2  /  Alb  3.1<L>  /  TBili  0.6  /  DBili      /  AST  22  /  ALT  13  /  AlkPhos  85  09-16    Creatinine Trend: 1.51 <--, 1.63 <--                        12.2   15.11 )-----------( 269      ( 16 Sep 2017 08:48 )             38.2

## 2017-09-16 NOTE — PROGRESS NOTE ADULT - SUBJECTIVE AND OBJECTIVE BOX
HPI:  86 yr old F with COPD, CHF A FIB, HTN , here  with SOB and chest pressure since last night that is worse w lying down. Chest pressure is substernal, more like discomfort 5/10 with no radiation, resolved with  sitting up right.  Denies fever, cough , N/V/Diarrhea, HA or any other associated symptoms. (15 Sep 2017 11:40)  Patient has history of diabetes, on oral meds at home,  no polyuria polydipsia. Patient follows up with PCP for diabetes management.    PAST MEDICAL & SURGICAL HISTORY:  Atrial fibrillation, unspecified type  Pacemaker  Renal Cancer  Overweight  PVD (Peripheral Vascular Disease)  Cigarette Smoker  Arteriosclerotic Heart Disease (ASHD)  HTN (Hypertension)  Diabetes  S/P carpal tunnel release  S/P left cataract extraction  s/p colon resection  S/P Nephrectomy: right  Hip Replacement  History of Total Knee Replacement  S/P Hernia Surgery  S/P Cholecystectomy      FAMILY HISTORY:  Family history of pancreatic cancer (Sibling)      Social History:    Outpatient Medications:    MEDICATIONS  (STANDING):  aspirin  chewable 81 milliGRAM(s) Oral daily  simvastatin 20 milliGRAM(s) Oral at bedtime  ALBUTerol/ipratropium for Nebulization 3 milliLiter(s) Nebulizer every 6 hours  ALBUTerol    90 MICROgram(s) HFA Inhaler 1 Puff(s) Inhalation every 4 hours  tiotropium 18 MICROgram(s) Capsule 1 Capsule(s) Inhalation daily  levoFLOXacin  Tablet 250 milliGRAM(s) Oral every 24 hours  amiodarone    Tablet 100 milliGRAM(s) Oral daily  clonazePAM Tablet 0.5 milliGRAM(s) Oral two times a day  DULoxetine 60 milliGRAM(s) Oral daily  mirtazapine 15 milliGRAM(s) Oral at bedtime  ARIPiprazole 2 milliGRAM(s) Oral daily  metoprolol succinate  milliGRAM(s) Oral daily  amLODIPine   Tablet 2.5 milliGRAM(s) Oral daily  docusate sodium 100 milliGRAM(s) Oral daily  apixaban 2.5 milliGRAM(s) Oral every 12 hours  methylPREDNISolone sodium succinate Injectable 40 milliGRAM(s) IV Push every 8 hours  insulin lispro (HumaLOG) corrective regimen sliding scale   SubCutaneous three times a day before meals  dextrose 5%. 1000 milliLiter(s) (50 mL/Hr) IV Continuous <Continuous>  dextrose 50% Injectable 12.5 Gram(s) IV Push once  dextrose 50% Injectable 25 Gram(s) IV Push once  dextrose 50% Injectable 25 Gram(s) IV Push once  furosemide   Injectable 40 milliGRAM(s) IV Push daily  influenza   Vaccine 0.5 milliLiter(s) IntraMuscular once  insulin glargine Injectable (LANTUS) 20 Unit(s) SubCutaneous at bedtime  insulin lispro Injectable (HumaLOG) 7 Unit(s) SubCutaneous three times a day before meals    MEDICATIONS  (PRN):  dextrose Gel 1 Dose(s) Oral once PRN Blood Glucose LESS THAN 70 milliGRAM(s)/deciLiter  glucagon  Injectable 1 milliGRAM(s) IntraMuscular once PRN Glucose <70 milliGRAM(s)/deciLiter      Allergies    amoxicillin (Flushing; Vomiting; Nausea)    Intolerances      Review of Systems:  Constitutional: No fever, no chills  Eyes: No blurry vision  Neuro: No tremors  HEENT: No pain, no neck swelling  Cardiovascular: No chest pain, no palpitations  Respiratory: Has SOB, no cough  GI: No nausea, vomiting, abdominal pain  : No dysuria  Skin: no rash  MSK: Has leg swelling, no foot ulcers.  Psych: no depression  Endocrine: no polyuria, polydipsia    ALL OTHER SYSTEMS REVIEWED AND NEGATIVE    UNABLE TO OBTAIN    PHYSICAL EXAM:  VITALS: T(C): 36.6 (09-16-17 @ 11:43)  T(F): 97.9 (09-16-17 @ 11:43), Max: 99.1 (09-15-17 @ 19:40)  HR: 60 (09-16-17 @ 11:43) (60 - 76)  BP: 121/70 (09-16-17 @ 11:43) (107/73 - 152/76)  RR:  (18 - 19)  SpO2:  (90% - 95%)  Wt(kg): --  GENERAL: NAD, well-groomed, well-developed  EYES: No proptosis, no lid lag  HEENT:  Atraumatic, Normocephalic  THYROID: Normal size, no palpable nodules  RESPIRATORY: Clear to auscultation bilaterally; No rales, rhonchi, wheezing  CARDIOVASCULAR: Si S2, No murmurs;  GI: Soft, non distended, normal bowel sounds  SKIN: Dry, intact, No rashes or lesions  MUSCULOSKELETAL: Has BL lower extremity edema.  NEURO:  no tremor, sensation decreased in feet BL,    CAPILLARY BLOOD GLUCOSE  131 (09-16 @ 16:12)  222 (09-16 @ 11:33)  174 (09-16 @ 07:34)  329 (09-15 @ 18:14)                            12.2   15.11 )-----------( 269      ( 16 Sep 2017 08:48 )             38.2       09-16    136  |  97  |  25<H>  ----------------------------<  197<H>  3.9   |  21<L>  |  1.51<H>    EGFR if : 36<L>  EGFR if non : 31<L>    Ca    9.5      09-16    TPro  7.2  /  Alb  3.1<L>  /  TBili  0.6  /  DBili  x   /  AST  22  /  ALT  13  /  AlkPhos  85  09-16      Thyroid Function Tests:              Radiology:

## 2017-09-16 NOTE — PROGRESS NOTE ADULT - SUBJECTIVE AND OBJECTIVE BOX
Allergies    amoxicillin (Flushing; Vomiting; Nausea)    Intolerances        MEDICATIONS  (STANDING):  aspirin  chewable 81 milliGRAM(s) Oral daily  simvastatin 20 milliGRAM(s) Oral at bedtime  ALBUTerol/ipratropium for Nebulization 3 milliLiter(s) Nebulizer every 6 hours  ALBUTerol    90 MICROgram(s) HFA Inhaler 1 Puff(s) Inhalation every 4 hours  tiotropium 18 MICROgram(s) Capsule 1 Capsule(s) Inhalation daily  levoFLOXacin  Tablet 250 milliGRAM(s) Oral every 24 hours  amiodarone    Tablet 100 milliGRAM(s) Oral daily  clonazePAM Tablet 0.5 milliGRAM(s) Oral two times a day  DULoxetine 60 milliGRAM(s) Oral daily  mirtazapine 15 milliGRAM(s) Oral at bedtime  ARIPiprazole 2 milliGRAM(s) Oral daily  metoprolol succinate  milliGRAM(s) Oral daily  amLODIPine   Tablet 2.5 milliGRAM(s) Oral daily  docusate sodium 100 milliGRAM(s) Oral daily  apixaban 2.5 milliGRAM(s) Oral every 12 hours  methylPREDNISolone sodium succinate Injectable 40 milliGRAM(s) IV Push every 8 hours  insulin lispro (HumaLOG) corrective regimen sliding scale   SubCutaneous three times a day before meals  dextrose 5%. 1000 milliLiter(s) (50 mL/Hr) IV Continuous <Continuous>  dextrose 50% Injectable 12.5 Gram(s) IV Push once  dextrose 50% Injectable 25 Gram(s) IV Push once  dextrose 50% Injectable 25 Gram(s) IV Push once  furosemide   Injectable 40 milliGRAM(s) IV Push daily  influenza   Vaccine 0.5 milliLiter(s) IntraMuscular once  insulin glargine Injectable (LANTUS) 20 Unit(s) SubCutaneous at bedtime  insulin lispro Injectable (HumaLOG) 7 Unit(s) SubCutaneous three times a day before meals    MEDICATIONS  (PRN):  dextrose Gel 1 Dose(s) Oral once PRN Blood Glucose LESS THAN 70 milliGRAM(s)/deciLiter  glucagon  Injectable 1 milliGRAM(s) IntraMuscular once PRN Glucose <70 milliGRAM(s)/deciLiter          Antimicrobials:  levoFLOXacin  Tablet 250 milliGRAM(s) Oral every 24 hours        LABS:  CBC Full  -  ( 16 Sep 2017 08:48 )  WBC Count : 15.11 K/uL  Hemoglobin : 12.2 g/dL  Hematocrit : 38.2 %  Platelet Count - Automated : 269 K/uL  Mean Cell Volume : 86.8 fl  Mean Cell Hemoglobin : 27.7 pg  Mean Cell Hemoglobin Concentration : 31.9 gm/dL  Auto Neutrophil # : x  Auto Lymphocyte # : x  Auto Monocyte # : x  Auto Eosinophil # : x  Auto Basophil # : x  Auto Neutrophil % : x  Auto Lymphocyte % : x  Auto Monocyte % : x  Auto Eosinophil % : x  Auto Basophil % : x    09-16    136  |  97  |  25<H>  ----------------------------<  197<H>  3.9   |  21<L>  |  1.51<H>    Ca    9.5      16 Sep 2017 08:50    TPro  7.2  /  Alb  3.1<L>  /  TBili  0.6  /  DBili  x   /  AST  22  /  ALT  13  /  AlkPhos  85  09-16    PT/INR - ( 15 Sep 2017 08:07 )   PT: 13.1 sec;   INR: 1.20 ratio         PTT - ( 15 Sep 2017 08:07 )  PTT:31.3 sec      Blood Gas Venous - Lactate: 1.3 mmoL/L (09-15 @ 08:40)            Cultures:  RECENT CULTURES:            Imaging Studies:    Vital Signs:    Vital Signs Last 24 Hrs  T(C): 36.6 (16 Sep 2017 11:43), Max: 37.7 (15 Sep 2017 17:18)  T(F): 97.9 (16 Sep 2017 11:43), Max: 99.9 (15 Sep 2017 17:18)  HR: 60 (16 Sep 2017 11:43) (60 - 78)  BP: 121/70 (16 Sep 2017 11:43) (107/73 - 158/80)  BP(mean): --  RR: 19 (16 Sep 2017 11:43) (18 - 19)  SpO2: 90% (16 Sep 2017 11:43) (90% - 95%) Afebrile, leukocytosis increased to 15K, on steroids.   Feeling much better today. no CP.    Allergies    amoxicillin (Flushing; Vomiting; Nausea)      MEDICATIONS  (STANDING):  aspirin  chewable 81 milliGRAM(s) Oral daily  simvastatin 20 milliGRAM(s) Oral at bedtime  ALBUTerol/ipratropium for Nebulization 3 milliLiter(s) Nebulizer every 6 hours  ALBUTerol    90 MICROgram(s) HFA Inhaler 1 Puff(s) Inhalation every 4 hours  tiotropium 18 MICROgram(s) Capsule 1 Capsule(s) Inhalation daily  levoFLOXacin  Tablet 250 milliGRAM(s) Oral every 24 hours  amiodarone    Tablet 100 milliGRAM(s) Oral daily  clonazePAM Tablet 0.5 milliGRAM(s) Oral two times a day  DULoxetine 60 milliGRAM(s) Oral daily  mirtazapine 15 milliGRAM(s) Oral at bedtime  ARIPiprazole 2 milliGRAM(s) Oral daily  metoprolol succinate  milliGRAM(s) Oral daily  amLODIPine   Tablet 2.5 milliGRAM(s) Oral daily  docusate sodium 100 milliGRAM(s) Oral daily  apixaban 2.5 milliGRAM(s) Oral every 12 hours  methylPREDNISolone sodium succinate Injectable 40 milliGRAM(s) IV Push every 8 hours  insulin lispro (HumaLOG) corrective regimen sliding scale   SubCutaneous three times a day before meals  dextrose 5%. 1000 milliLiter(s) (50 mL/Hr) IV Continuous <Continuous>  dextrose 50% Injectable 12.5 Gram(s) IV Push once  dextrose 50% Injectable 25 Gram(s) IV Push once  dextrose 50% Injectable 25 Gram(s) IV Push once  furosemide   Injectable 40 milliGRAM(s) IV Push daily  influenza   Vaccine 0.5 milliLiter(s) IntraMuscular once  insulin glargine Injectable (LANTUS) 20 Unit(s) SubCutaneous at bedtime  insulin lispro Injectable (HumaLOG) 7 Unit(s) SubCutaneous three times a day before meals    MEDICATIONS  (PRN):  dextrose Gel 1 Dose(s) Oral once PRN Blood Glucose LESS THAN 70 milliGRAM(s)/deciLiter  glucagon  Injectable 1 milliGRAM(s) IntraMuscular once PRN Glucose <70 milliGRAM(s)/deciLiter          Antimicrobials:  levoFLOXacin  Tablet 250 milliGRAM(s) Oral every 24 hours        LABS:  CBC Full  -  ( 16 Sep 2017 08:48 )  WBC Count : 15.11 K/uL  Hemoglobin : 12.2 g/dL  Hematocrit : 38.2 %  Platelet Count - Automated : 269 K/uL  Mean Cell Volume : 86.8 fl  Mean Cell Hemoglobin : 27.7 pg  Mean Cell Hemoglobin Concentration : 31.9 gm/dL  Auto Neutrophil # : x  Auto Lymphocyte # : x  Auto Monocyte # : x  Auto Eosinophil # : x  Auto Basophil # : x  Auto Neutrophil % : x  Auto Lymphocyte % : x  Auto Monocyte % : x  Auto Eosinophil % : x  Auto Basophil % : x    09-16    136  |  97  |  25<H>  ----------------------------<  197<H>  3.9   |  21<L>  |  1.51<H>    Ca    9.5      16 Sep 2017 08:50    TPro  7.2  /  Alb  3.1<L>  /  TBili  0.6  /  DBili  x   /  AST  22  /  ALT  13  /  AlkPhos  85  09-16    PT/INR - ( 15 Sep 2017 08:07 )   PT: 13.1 sec;   INR: 1.20 ratio         PTT - ( 15 Sep 2017 08:07 )  PTT:31.3 sec      Blood Gas Venous - Lactate: 1.3 mmoL/L (09-15 @ 08:40)      Vital Signs:    Vital Signs Last 24 Hrs  T(C): 36.6 (16 Sep 2017 11:43), Max: 37.7 (15 Sep 2017 17:18)  T(F): 97.9 (16 Sep 2017 11:43), Max: 99.9 (15 Sep 2017 17:18)  HR: 60 (16 Sep 2017 11:43) (60 - 78)  BP: 121/70 (16 Sep 2017 11:43) (107/73 - 158/80)  BP(mean): --  RR: 19 (16 Sep 2017 11:43) (18 - 19)  SpO2: 90% (16 Sep 2017 11:43) (90% - 95%)

## 2017-09-16 NOTE — PROGRESS NOTE ADULT - SUBJECTIVE AND OBJECTIVE BOX
INTERVAL HISTORY: No chest pain, SOB, paralysis, fevers, vomiting    TELEMETRY: no events  	  MEDICATIONS:  amiodarone    Tablet 100 milliGRAM(s) Oral daily  metoprolol succinate  milliGRAM(s) Oral daily  amLODIPine   Tablet 2.5 milliGRAM(s) Oral daily  furosemide   Injectable 40 milliGRAM(s) IV Push daily        PHYSICAL EXAM:  T(C): 37 (09-16-17 @ 20:26), Max: 37 (09-16-17 @ 20:26)  HR: 73 (09-16-17 @ 20:26) (60 - 73)  BP: 128/79 (09-16-17 @ 20:26) (107/73 - 142/78)  RR: 19 (09-16-17 @ 20:26) (19 - 19)  SpO2: 96% (09-16-17 @ 20:26) (90% - 96%)  Wt(kg): --  I&O's Summary    15 Sep 2017 07:01  -  16 Sep 2017 07:00  --------------------------------------------------------  IN: 0 mL / OUT: 400 mL / NET: -400 mL    16 Sep 2017 07:01  -  16 Sep 2017 21:11  --------------------------------------------------------  IN: 725 mL / OUT: 1200 mL / NET: -475 mL          Appearance: Normal	  HEENT:    PERRL, EOMI	  Lymphatic: No lymphadenopathy  Cardiovascular: Normal S1 S2, No JVD  Respiratory: Lungs clear to auscultation	  Psychiatry: Alert, Mood & affect appropriate  Gastrointestinal:  Soft, Non-tender, + BS	  Skin: No rashes, No cyanosis  Neurologic: Non-focal  Extremities:  No edema of LE  Vascular: Peripheral pulses palpable  bilaterally      CARDIAC MARKERS:                          12.2   15.11 )-----------( 269      ( 16 Sep 2017 08:48 )             38.2     09-16    136  |  97  |  25<H>  ----------------------------<  197<H>  3.9   |  21<L>  |  1.51<H>    Ca    9.5      16 Sep 2017 08:50    TPro  7.2  /  Alb  3.1<L>  /  TBili  0.6  /  DBili  x   /  AST  22  /  ALT  13  /  AlkPhos  85  09-16         Labs, imaging and telemetry personally reviewed  ekg - sr, 1st degree with lbbb    ASSESSMENT/PLAN: 	  86 yr old F with COPD, CHF,  A FIB on eliquis, PPM, HTN , HLD,  here  with SOB and chest pressure with decompensated heart failure and ? super imposed PNA  1. Heart failure - Agree with Lasix 40mg IV daily for now and switch to Lasix 40mg PO daily on Sunday Continue with bb and ASA  - TTE to assess EF    2. AF - continue with Eliquis for AC and home dose Amio  - EP to interrogate device on Monday    3. HTN - continue with home meds now of Toprol and Norvasc  - will consider adding ARB if ok with renal and CKD stable    4. HLD - continue with statin    5. Pulmonary - Abx/COPD management per pulm/primary team    Discussed with primary team.         Waylon Cates DO [facility]  Cardiovascular Medicine  [phone]

## 2017-09-17 LAB
ANION GAP SERPL CALC-SCNC: 11 MMOL/L — SIGNIFICANT CHANGE UP (ref 5–17)
APPEARANCE UR: CLEAR — SIGNIFICANT CHANGE UP
BILIRUB UR-MCNC: NEGATIVE — SIGNIFICANT CHANGE UP
BUN SERPL-MCNC: 43 MG/DL — HIGH (ref 7–23)
CALCIUM SERPL-MCNC: 9.3 MG/DL — SIGNIFICANT CHANGE UP (ref 8.4–10.5)
CHLORIDE SERPL-SCNC: 96 MMOL/L — SIGNIFICANT CHANGE UP (ref 96–108)
CO2 SERPL-SCNC: 26 MMOL/L — SIGNIFICANT CHANGE UP (ref 22–31)
COLOR SPEC: YELLOW — SIGNIFICANT CHANGE UP
CREAT ?TM UR-MCNC: 53 MG/DL — SIGNIFICANT CHANGE UP
CREAT SERPL-MCNC: 1.78 MG/DL — HIGH (ref 0.5–1.3)
DIFF PNL FLD: NEGATIVE — SIGNIFICANT CHANGE UP
GLUCOSE SERPL-MCNC: 225 MG/DL — HIGH (ref 70–99)
GLUCOSE UR QL: 300
HBA1C BLD-MCNC: 8.6 % — HIGH (ref 4–5.6)
HCT VFR BLD CALC: 40.8 % — SIGNIFICANT CHANGE UP (ref 34.5–45)
HGB BLD-MCNC: 13.2 G/DL — SIGNIFICANT CHANGE UP (ref 11.5–15.5)
KETONES UR-MCNC: NEGATIVE — SIGNIFICANT CHANGE UP
LEUKOCYTE ESTERASE UR-ACNC: NEGATIVE — SIGNIFICANT CHANGE UP
MCHC RBC-ENTMCNC: 28.1 PG — SIGNIFICANT CHANGE UP (ref 27–34)
MCHC RBC-ENTMCNC: 32.4 GM/DL — SIGNIFICANT CHANGE UP (ref 32–36)
MCV RBC AUTO: 87 FL — SIGNIFICANT CHANGE UP (ref 80–100)
NITRITE UR-MCNC: NEGATIVE — SIGNIFICANT CHANGE UP
PH UR: 6 — SIGNIFICANT CHANGE UP (ref 5–8)
PLATELET # BLD AUTO: 272 K/UL — SIGNIFICANT CHANGE UP (ref 150–400)
POTASSIUM SERPL-MCNC: 4.2 MMOL/L — SIGNIFICANT CHANGE UP (ref 3.5–5.3)
POTASSIUM SERPL-SCNC: 4.2 MMOL/L — SIGNIFICANT CHANGE UP (ref 3.5–5.3)
PROT ?TM UR-MCNC: 251 MG/DL — HIGH (ref 0–12)
PROT UR-MCNC: 150 MG/DL
PROT/CREAT UR-RTO: 4.7 RATIO — HIGH (ref 0–0.2)
RBC # BLD: 4.69 M/UL — SIGNIFICANT CHANGE UP (ref 3.8–5.2)
RBC # FLD: 15.8 % — HIGH (ref 10.3–14.5)
SODIUM SERPL-SCNC: 133 MMOL/L — LOW (ref 135–145)
SP GR SPEC: 1.01 — SIGNIFICANT CHANGE UP (ref 1.01–1.02)
UROBILINOGEN FLD QL: NEGATIVE — SIGNIFICANT CHANGE UP
WBC # BLD: 17.34 K/UL — HIGH (ref 3.8–10.5)
WBC # FLD AUTO: 17.34 K/UL — HIGH (ref 3.8–10.5)
WBC UR QL: SIGNIFICANT CHANGE UP /HPF (ref 0–5)

## 2017-09-17 RX ORDER — INSULIN GLARGINE 100 [IU]/ML
32 INJECTION, SOLUTION SUBCUTANEOUS AT BEDTIME
Qty: 0 | Refills: 0 | Status: DISCONTINUED | OUTPATIENT
Start: 2017-09-17 | End: 2017-09-20

## 2017-09-17 RX ORDER — INSULIN LISPRO 100/ML
12 VIAL (ML) SUBCUTANEOUS
Qty: 0 | Refills: 0 | Status: DISCONTINUED | OUTPATIENT
Start: 2017-09-17 | End: 2017-09-20

## 2017-09-17 RX ORDER — BUDESONIDE AND FORMOTEROL FUMARATE DIHYDRATE 160; 4.5 UG/1; UG/1
2 AEROSOL RESPIRATORY (INHALATION)
Qty: 0 | Refills: 0 | Status: DISCONTINUED | OUTPATIENT
Start: 2017-09-17 | End: 2017-09-21

## 2017-09-17 RX ADMIN — BUDESONIDE AND FORMOTEROL FUMARATE DIHYDRATE 2 PUFF(S): 160; 4.5 AEROSOL RESPIRATORY (INHALATION) at 18:38

## 2017-09-17 RX ADMIN — Medication 3 MILLILITER(S): at 05:17

## 2017-09-17 RX ADMIN — APIXABAN 2.5 MILLIGRAM(S): 2.5 TABLET, FILM COATED ORAL at 05:07

## 2017-09-17 RX ADMIN — Medication 3 MILLILITER(S): at 11:48

## 2017-09-17 RX ADMIN — Medication 6: at 12:02

## 2017-09-17 RX ADMIN — Medication 0.5 MILLIGRAM(S): at 05:06

## 2017-09-17 RX ADMIN — Medication 3: at 08:30

## 2017-09-17 RX ADMIN — APIXABAN 2.5 MILLIGRAM(S): 2.5 TABLET, FILM COATED ORAL at 18:39

## 2017-09-17 RX ADMIN — Medication 7 UNIT(S): at 08:30

## 2017-09-17 RX ADMIN — Medication 3: at 17:21

## 2017-09-17 RX ADMIN — Medication 40 MILLIGRAM(S): at 05:10

## 2017-09-17 RX ADMIN — AMIODARONE HYDROCHLORIDE 100 MILLIGRAM(S): 400 TABLET ORAL at 05:06

## 2017-09-17 RX ADMIN — Medication 81 MILLIGRAM(S): at 11:48

## 2017-09-17 RX ADMIN — Medication 100 MILLIGRAM(S): at 05:08

## 2017-09-17 RX ADMIN — INSULIN GLARGINE 32 UNIT(S): 100 INJECTION, SOLUTION SUBCUTANEOUS at 22:27

## 2017-09-17 RX ADMIN — Medication 7 UNIT(S): at 17:20

## 2017-09-17 RX ADMIN — SIMVASTATIN 20 MILLIGRAM(S): 20 TABLET, FILM COATED ORAL at 22:26

## 2017-09-17 RX ADMIN — Medication 3 MILLILITER(S): at 23:24

## 2017-09-17 RX ADMIN — AMLODIPINE BESYLATE 2.5 MILLIGRAM(S): 2.5 TABLET ORAL at 05:08

## 2017-09-17 RX ADMIN — Medication 0.5 MILLIGRAM(S): at 18:41

## 2017-09-17 RX ADMIN — Medication 7 UNIT(S): at 12:02

## 2017-09-17 RX ADMIN — DULOXETINE HYDROCHLORIDE 60 MILLIGRAM(S): 30 CAPSULE, DELAYED RELEASE ORAL at 22:26

## 2017-09-17 RX ADMIN — ARIPIPRAZOLE 2 MILLIGRAM(S): 15 TABLET ORAL at 05:06

## 2017-09-17 RX ADMIN — MIRTAZAPINE 15 MILLIGRAM(S): 45 TABLET, ORALLY DISINTEGRATING ORAL at 22:25

## 2017-09-17 RX ADMIN — Medication 100 MILLIGRAM(S): at 22:25

## 2017-09-17 RX ADMIN — Medication 3 MILLILITER(S): at 18:38

## 2017-09-17 RX ADMIN — Medication 40 MILLIGRAM(S): at 05:14

## 2017-09-17 NOTE — PROGRESS NOTE ADULT - SUBJECTIVE AND OBJECTIVE BOX
PULMONARY PROGRESS NOTE    MARCEL AGOSTO  MRN-[id number]    Patient is a 86y old  Female who presents with a chief complaint of chest pressure (15 Sep 2017 11:40)      HPI:  -feeling "fine" denies cp/sob/cough.  Wants to go home.    ROS:  otherwise negative     MEDICATIONS  (STANDING):  aspirin  chewable 81 milliGRAM(s) Oral daily  simvastatin 20 milliGRAM(s) Oral at bedtime  ALBUTerol/ipratropium for Nebulization 3 milliLiter(s) Nebulizer every 6 hours  ALBUTerol    90 MICROgram(s) HFA Inhaler 1 Puff(s) Inhalation every 4 hours  tiotropium 18 MICROgram(s) Capsule 1 Capsule(s) Inhalation daily  amiodarone    Tablet 100 milliGRAM(s) Oral daily  clonazePAM Tablet 0.5 milliGRAM(s) Oral two times a day  DULoxetine 60 milliGRAM(s) Oral daily  mirtazapine 15 milliGRAM(s) Oral at bedtime  ARIPiprazole 2 milliGRAM(s) Oral daily  metoprolol succinate  milliGRAM(s) Oral daily  amLODIPine   Tablet 2.5 milliGRAM(s) Oral daily  docusate sodium 100 milliGRAM(s) Oral daily  apixaban 2.5 milliGRAM(s) Oral every 12 hours  methylPREDNISolone sodium succinate Injectable 40 milliGRAM(s) IV Push every 8 hours  insulin lispro (HumaLOG) corrective regimen sliding scale   SubCutaneous three times a day before meals  dextrose 5%. 1000 milliLiter(s) (50 mL/Hr) IV Continuous <Continuous>  dextrose 50% Injectable 12.5 Gram(s) IV Push once  dextrose 50% Injectable 25 Gram(s) IV Push once  dextrose 50% Injectable 25 Gram(s) IV Push once  influenza   Vaccine 0.5 milliLiter(s) IntraMuscular once  insulin glargine Injectable (LANTUS) 20 Unit(s) SubCutaneous at bedtime  insulin lispro Injectable (HumaLOG) 7 Unit(s) SubCutaneous three times a day before meals  buDESOnide 160 MICROgram(s)/formoterol 4.5 MICROgram(s) Inhaler 2 Puff(s) Inhalation two times a day    MEDICATIONS  (PRN):  dextrose Gel 1 Dose(s) Oral once PRN Blood Glucose LESS THAN 70 milliGRAM(s)/deciLiter  glucagon  Injectable 1 milliGRAM(s) IntraMuscular once PRN Glucose <70 milliGRAM(s)/deciLiter        Vital Signs Last 24 Hrs  T(C): 36.7 (17 Sep 2017 11:54), Max: 37 (16 Sep 2017 20:26)  T(F): 98 (17 Sep 2017 11:54), Max: 98.6 (16 Sep 2017 20:26)  HR: 60 (17 Sep 2017 11:54) (60 - 74)  BP: 114/70 (17 Sep 2017 11:54) (114/70 - 143/79)  BP(mean): --  RR: 18 (17 Sep 2017 11:54) (18 - 19)  SpO2: 96% (17 Sep 2017 11:54) (93% - 96%)    GENERAL: The patient is awake and alert in no apparent distress.     LUNGS: Clear to auscultation bilaterally    HEART: Regular rate and rhythm without murmur.    ABDOMEN: soft, +BS, nontender    LABS/IMAGING: reviewed                                     13.2   17.34 )-----------( 272      ( 17 Sep 2017 07:21 )             40.8     09-17    133<L>  |  96  |  43<H>  ----------------------------<  225<H>  4.2   |  26  |  1.78<H>    Ca    9.3      17 Sep 2017 07:21    TPro  7.2  /  Alb  3.1<L>  /  TBili  0.6  /  DBili  x   /  AST  22  /  ALT  13  /  AlkPhos  85  09-16      < from: Xray Chest 1 View AP/PA (09.15.17 @ 07:45) >  IMPRESSION: Left basilar atelectasis. Otherwise, clear lungs.    < end of copied text >      PROBLEM LIST:  86y Female with HEALTH ISSUES - PROBLEM Dx:  COPD  CKD (chronic kidney disease), stage IV  Diabetes  Atrial fibrillation    RECS:  -COPD exacerbation: pt asymptomatic, add symbicort BID while in house (ordered), continue spiriva and duonebs, change solumedrol to prednisone 50mg PO daily x 5 days.  Upon discharge please give patient new prescription for advair 250/50 BID  -No evidence of pna clinically or on xray,  antibiotics discontinued  -Wean O2 as tolerated, patient only using this at home during the night time.  -Incentive spirometry, OOB to chair, PT  -No pulmonary objections to discharge home    Joy Abdi MD   [phone]

## 2017-09-17 NOTE — PROGRESS NOTE ADULT - SUBJECTIVE AND OBJECTIVE BOX
Patient is a 86y old  Female who presents with a chief complaint of chest pressure (15 Sep 2017 11:40)  NAD    INTERVAL HPI/OVERNIGHT EVENTS:  T(C): 36.7 (17 @ 11:54), Max: 37 (17 @ 20:26)  HR: 60 (17 @ 11:54) (60 - 74)  BP: 114/70 (17 @ 11:54) (114/70 - 143/79)  RR: 18 (17 @ 11:54) (18 - 19)  SpO2: 96% (17 @ 11:54) (93% - 96%)  Wt(kg): --  I&O's Summary    16 Sep 2017 07:  -  17 Sep 2017 07:00  --------------------------------------------------------  IN: 725 mL / OUT: 1200 mL / NET: -475 mL    17 Sep 2017 07:01  -  17 Sep 2017 19:45  --------------------------------------------------------  IN: 840 mL / OUT: 600 mL / NET: 240 mL        LABS:                        13.2   17.34 )-----------( 272      ( 17 Sep 2017 07:21 )             40.8         133<L>  |  96  |  43<H>  ----------------------------<  225<H>  4.2   |  26  |  1.78<H>    Ca    9.3      17 Sep 2017 07:21    TPro  7.2  /  Alb  3.1<L>  /  TBili  0.6  /  DBili  x   /  AST  22  /  ALT  13  /  AlkPhos  85  09-16      Urinalysis Basic - ( 16 Sep 2017 22:16 )    Color: Yellow / Appearance: Clear / S.015 / pH: x  Gluc: x / Ketone: Negative  / Bili: Negative / Urobili: Negative mg/dL   Blood: x / Protein: 300 mg/dL / Nitrite: Negative   Leuk Esterase: Negative / RBC: 1 /HPF / WBC 40 /HPF   Sq Epi: x / Non Sq Epi: 3 /HPF / Bacteria: Negative      CAPILLARY BLOOD GLUCOSE  293 (17 Sep 2017 16:20)  438 (17 Sep 2017 11:10)  254 (17 Sep 2017 07:56)  187 (16 Sep 2017 21:10)            Urinalysis Basic - ( 16 Sep 2017 22:16 )    Color: Yellow / Appearance: Clear / S.015 / pH: x  Gluc: x / Ketone: Negative  / Bili: Negative / Urobili: Negative mg/dL   Blood: x / Protein: 300 mg/dL / Nitrite: Negative   Leuk Esterase: Negative / RBC: 1 /HPF / WBC 40 /HPF   Sq Epi: x / Non Sq Epi: 3 /HPF / Bacteria: Negative        MEDICATIONS  (STANDING):  aspirin  chewable 81 milliGRAM(s) Oral daily  simvastatin 20 milliGRAM(s) Oral at bedtime  ALBUTerol/ipratropium for Nebulization 3 milliLiter(s) Nebulizer every 6 hours  ALBUTerol    90 MICROgram(s) HFA Inhaler 1 Puff(s) Inhalation every 4 hours  tiotropium 18 MICROgram(s) Capsule 1 Capsule(s) Inhalation daily  amiodarone    Tablet 100 milliGRAM(s) Oral daily  clonazePAM Tablet 0.5 milliGRAM(s) Oral two times a day  DULoxetine 60 milliGRAM(s) Oral daily  mirtazapine 15 milliGRAM(s) Oral at bedtime  ARIPiprazole 2 milliGRAM(s) Oral daily  metoprolol succinate  milliGRAM(s) Oral daily  amLODIPine   Tablet 2.5 milliGRAM(s) Oral daily  docusate sodium 100 milliGRAM(s) Oral daily  apixaban 2.5 milliGRAM(s) Oral every 12 hours  insulin lispro (HumaLOG) corrective regimen sliding scale   SubCutaneous three times a day before meals  dextrose 5%. 1000 milliLiter(s) (50 mL/Hr) IV Continuous <Continuous>  dextrose 50% Injectable 12.5 Gram(s) IV Push once  dextrose 50% Injectable 25 Gram(s) IV Push once  dextrose 50% Injectable 25 Gram(s) IV Push once  influenza   Vaccine 0.5 milliLiter(s) IntraMuscular once  buDESOnide 160 MICROgram(s)/formoterol 4.5 MICROgram(s) Inhaler 2 Puff(s) Inhalation two times a day  predniSONE   Tablet 50 milliGRAM(s) Oral daily  insulin glargine Injectable (LANTUS) 32 Unit(s) SubCutaneous at bedtime  insulin lispro Injectable (HumaLOG) 12 Unit(s) SubCutaneous three times a day before meals    MEDICATIONS  (PRN):  dextrose Gel 1 Dose(s) Oral once PRN Blood Glucose LESS THAN 70 milliGRAM(s)/deciLiter  glucagon  Injectable 1 milliGRAM(s) IntraMuscular once PRN Glucose <70 milliGRAM(s)/deciLiter          PHYSICAL EXAM:  GENERAL: NAD, well-groomed, well-developed  HEAD:  Atraumatic, Normocephalic  CHEST/LUNG: Clear to percussion bilaterally; No rales, rhonchi, wheezing, or rubs  HEART: Regular rate and rhythm; No murmurs, rubs, or gallops  ABDOMEN: Soft, Nontender, Nondistended; Bowel sounds present  EXTREMITIES:  2+ Peripheral Pulses, No clubbing, cyanosis, or edema  LYMPH: No lymphadenopathy noted  SKIN: No rashes or lesions    Care Discussed with Consultants/Other Providers [ ] YES  [ ] NO

## 2017-09-17 NOTE — PROGRESS NOTE ADULT - PROBLEM SELECTOR PLAN 4
Resolved. Patient already received intravenous lasix this morning. Will change to 40 mg PO daily on 9/18. Monitor UO. Resolved. Patient already received intravenous lasix this morning. Will discontinue for now as patient appears euvolemic and start patient on oral diuretic therapy as needed. Monitor UO.

## 2017-09-17 NOTE — PROGRESS NOTE ADULT - SUBJECTIVE AND OBJECTIVE BOX
[facility] NEPHROLOGY- PROGRESS NOTE    86 year old female h/o HTN and DM, RCC s/p right nephrectomy presents with chest pain and dyspnea. Nephrology consulted for elevated Scr.    REVIEW OF SYSTEMS:  Gen: no changes in weight  Cards: no chest pain  Resp: no dyspnea  GI: no nausea or vomiting or diarrhea  Vascular: no LE edema    amoxicillin (Flushing; Vomiting; Nausea)      Hospital Medications: Medications reviewed      VITALS:  T(F): 97.4 (17 @ 04:35), Max: 98.6 (17 @ 20:26)  HR: 74 (17 @ 05:05)  BP: 143/79 (17 @ 05:05)  RR: 19 (17 @ 04:35)  SpO2: 93% (17 @ 04:35)  Wt(kg): --     @ 07:01  -   @ 07:00  --------------------------------------------------------  IN: 725 mL / OUT: 1200 mL / NET: -475 mL     @ 07:01  -   @ 08:56  --------------------------------------------------------  IN: 240 mL / OUT: 200 mL / NET: 40 mL      PHYSICAL EXAM:    Gen: NAD, calm  Cards: RRR, +S1/S2, +ZAKI  Resp: CTA B/L  GI: soft, NT/ND, NABS  Vascular: no LE edema B/L      LABS:      133<L>  |  96  |  43<H>  ----------------------------<  225<H>  4.2   |  26  |  1.78<H>    Ca    9.3      17 Sep 2017 07:21    TPro  7.2  /  Alb  3.1<L>  /  TBili  0.6  /  DBili      /  AST  22  /  ALT  13  /  AlkPhos  85  09-16    Creatinine Trend: 1.78 <--, 1.51 <--, 1.63 <--                        13.2   17.34 )-----------( 272      ( 17 Sep 2017 07:21 )             40.8     Urine Studies:  Urinalysis Basic - ( 16 Sep 2017 22:16 )    Color: Yellow / Appearance: Clear / S.015 / pH:   Gluc:  / Ketone: Negative  / Bili: Negative / Urobili: Negative mg/dL   Blood:  / Protein: 300 mg/dL / Nitrite: Negative   Leuk Esterase: Negative / RBC: 1 /HPF / WBC 40 /HPF   Sq Epi:  / Non Sq Epi: 3 /HPF / Bacteria: Negative      Creatinine, Random Urine: 53 mg/dL ( @ 02:24)  Protein/Creatinine Ratio Calculation: 4.7 Ratio ( @ 02:24)

## 2017-09-17 NOTE — PROGRESS NOTE ADULT - SUBJECTIVE AND OBJECTIVE BOX
Allergies    amoxicillin (Flushing; Vomiting; Nausea)    Intolerances        MEDICATIONS  (STANDING):  aspirin  chewable 81 milliGRAM(s) Oral daily  simvastatin 20 milliGRAM(s) Oral at bedtime  ALBUTerol/ipratropium for Nebulization 3 milliLiter(s) Nebulizer every 6 hours  ALBUTerol    90 MICROgram(s) HFA Inhaler 1 Puff(s) Inhalation every 4 hours  tiotropium 18 MICROgram(s) Capsule 1 Capsule(s) Inhalation daily  amiodarone    Tablet 100 milliGRAM(s) Oral daily  clonazePAM Tablet 0.5 milliGRAM(s) Oral two times a day  DULoxetine 60 milliGRAM(s) Oral daily  mirtazapine 15 milliGRAM(s) Oral at bedtime  ARIPiprazole 2 milliGRAM(s) Oral daily  metoprolol succinate  milliGRAM(s) Oral daily  amLODIPine   Tablet 2.5 milliGRAM(s) Oral daily  docusate sodium 100 milliGRAM(s) Oral daily  apixaban 2.5 milliGRAM(s) Oral every 12 hours  insulin lispro (HumaLOG) corrective regimen sliding scale   SubCutaneous three times a day before meals  dextrose 5%. 1000 milliLiter(s) (50 mL/Hr) IV Continuous <Continuous>  dextrose 50% Injectable 12.5 Gram(s) IV Push once  dextrose 50% Injectable 25 Gram(s) IV Push once  dextrose 50% Injectable 25 Gram(s) IV Push once  influenza   Vaccine 0.5 milliLiter(s) IntraMuscular once  buDESOnide 160 MICROgram(s)/formoterol 4.5 MICROgram(s) Inhaler 2 Puff(s) Inhalation two times a day  predniSONE   Tablet 50 milliGRAM(s) Oral daily  insulin glargine Injectable (LANTUS) 32 Unit(s) SubCutaneous at bedtime  insulin lispro Injectable (HumaLOG) 12 Unit(s) SubCutaneous three times a day before meals    MEDICATIONS  (PRN):  dextrose Gel 1 Dose(s) Oral once PRN Blood Glucose LESS THAN 70 milliGRAM(s)/deciLiter  glucagon  Injectable 1 milliGRAM(s) IntraMuscular once PRN Glucose <70 milliGRAM(s)/deciLiter          Antimicrobials:        LABS:  CBC Full  -  ( 17 Sep 2017 07:21 )  WBC Count : 17.34 K/uL  Hemoglobin : 13.2 g/dL  Hematocrit : 40.8 %  Platelet Count - Automated : 272 K/uL  Mean Cell Volume : 87.0 fl  Mean Cell Hemoglobin : 28.1 pg  Mean Cell Hemoglobin Concentration : 32.4 gm/dL  Auto Neutrophil # : x  Auto Lymphocyte # : x  Auto Monocyte # : x  Auto Eosinophil # : x  Auto Basophil # : x  Auto Neutrophil % : x  Auto Lymphocyte % : x  Auto Monocyte % : x  Auto Eosinophil % : x  Auto Basophil % : x        133<L>  |  96  |  43<H>  ----------------------------<  225<H>  4.2   |  26  |  1.78<H>    Ca    9.3      17 Sep 2017 07:21    TPro  7.2  /  Alb  3.1<L>  /  TBili  0.6  /  DBili  x   /  AST  22  /  ALT  13  /  AlkPhos  85  0916        Urinalysis Basic - ( 16 Sep 2017 22:16 )    Color: Yellow / Appearance: Clear / S.015 / pH: x  Gluc: x / Ketone: Negative  / Bili: Negative / Urobili: Negative mg/dL   Blood: x / Protein: 300 mg/dL / Nitrite: Negative   Leuk Esterase: Negative / RBC: 1 /HPF / WBC 40 /HPF   Sq Epi: x / Non Sq Epi: 3 /HPF / Bacteria: Negative      Blood Gas Venous - Lactate: 1.3 mmoL/L (09-15 @ 08:40)            Cultures:  RECENT CULTURES:            Imaging Studies:    Vital Signs:    Vital Signs Last 24 Hrs  T(C): 36.7 (17 Sep 2017 11:54), Max: 37 (16 Sep 2017 20:26)  T(F): 98 (17 Sep 2017 11:54), Max: 98.6 (16 Sep 2017 20:26)  HR: 60 (17 Sep 2017 11:54) (60 - 74)  BP: 114/70 (17 Sep 2017 11:54) (114/70 - 143/79)  BP(mean): --  RR: 18 (17 Sep 2017 11:54) (18 - 19)  SpO2: 96% (17 Sep 2017 11:54) (93% - 96%) Afebrile, leukocytosis 17K on steroids.  No somatic complaints at present time       Allergies    amoxicillin (Flushing; Vomiting; Nausea)      MEDICATIONS  (STANDING):  aspirin  chewable 81 milliGRAM(s) Oral daily  simvastatin 20 milliGRAM(s) Oral at bedtime  ALBUTerol/ipratropium for Nebulization 3 milliLiter(s) Nebulizer every 6 hours  ALBUTerol    90 MICROgram(s) HFA Inhaler 1 Puff(s) Inhalation every 4 hours  tiotropium 18 MICROgram(s) Capsule 1 Capsule(s) Inhalation daily  amiodarone    Tablet 100 milliGRAM(s) Oral daily  clonazePAM Tablet 0.5 milliGRAM(s) Oral two times a day  DULoxetine 60 milliGRAM(s) Oral daily  mirtazapine 15 milliGRAM(s) Oral at bedtime  ARIPiprazole 2 milliGRAM(s) Oral daily  metoprolol succinate  milliGRAM(s) Oral daily  amLODIPine   Tablet 2.5 milliGRAM(s) Oral daily  docusate sodium 100 milliGRAM(s) Oral daily  apixaban 2.5 milliGRAM(s) Oral every 12 hours  insulin lispro (HumaLOG) corrective regimen sliding scale   SubCutaneous three times a day before meals  dextrose 5%. 1000 milliLiter(s) (50 mL/Hr) IV Continuous <Continuous>  dextrose 50% Injectable 12.5 Gram(s) IV Push once  dextrose 50% Injectable 25 Gram(s) IV Push once  dextrose 50% Injectable 25 Gram(s) IV Push once  influenza   Vaccine 0.5 milliLiter(s) IntraMuscular once  buDESOnide 160 MICROgram(s)/formoterol 4.5 MICROgram(s) Inhaler 2 Puff(s) Inhalation two times a day  predniSONE   Tablet 50 milliGRAM(s) Oral daily  insulin glargine Injectable (LANTUS) 32 Unit(s) SubCutaneous at bedtime  insulin lispro Injectable (HumaLOG) 12 Unit(s) SubCutaneous three times a day before meals    MEDICATIONS  (PRN):  dextrose Gel 1 Dose(s) Oral once PRN Blood Glucose LESS THAN 70 milliGRAM(s)/deciLiter  glucagon  Injectable 1 milliGRAM(s) IntraMuscular once PRN Glucose <70 milliGRAM(s)/deciLiter          Antimicrobials: off AB        LABS:  CBC Full  -  ( 17 Sep 2017 07:21 )  WBC Count : 17.34 K/uL  Hemoglobin : 13.2 g/dL  Hematocrit : 40.8 %  Platelet Count - Automated : 272 K/uL  Mean Cell Volume : 87.0 fl  Mean Cell Hemoglobin : 28.1 pg  Mean Cell Hemoglobin Concentration : 32.4 gm/dL  Auto Neutrophil # : x  Auto Lymphocyte # : x  Auto Monocyte # : x  Auto Eosinophil # : x  Auto Basophil # : x  Auto Neutrophil % : x  Auto Lymphocyte % : x  Auto Monocyte % : x  Auto Eosinophil % : x  Auto Basophil % : x        133<L>  |  96  |  43<H>  ----------------------------<  225<H>  4.2   |  26  |  1.78<H>    Ca    9.3      17 Sep 2017 07:21    TPro  7.2  /  Alb  3.1<L>  /  TBili  0.6  /  DBili  x   /  AST  22  /  ALT  13  /  AlkPhos  85  16        Urinalysis Basic - ( 16 Sep 2017 22:16 )    Color: Yellow / Appearance: Clear / S.015 / pH: x  Gluc: x / Ketone: Negative  / Bili: Negative / Urobili: Negative mg/dL   Blood: x / Protein: 300 mg/dL / Nitrite: Negative   Leuk Esterase: Negative / RBC: 1 /HPF / WBC 40 /HPF   Sq Epi: x / Non Sq Epi: 3 /HPF / Bacteria: Negative      Blood Gas Venous - Lactate: 1.3 mmoL/L (09-15 @ 08:40)      Vital Signs:    Vital Signs Last 24 Hrs  T(C): 36.7 (17 Sep 2017 11:54), Max: 37 (16 Sep 2017 20:26)  T(F): 98 (17 Sep 2017 11:54), Max: 98.6 (16 Sep 2017 20:26)  HR: 60 (17 Sep 2017 11:54) (60 - 74)  BP: 114/70 (17 Sep 2017 11:54) (114/70 - 143/79)  BP(mean): --  RR: 18 (17 Sep 2017 11:54) (18 - 19)  SpO2: 96% (17 Sep 2017 11:54) (93% - 96%)

## 2017-09-17 NOTE — PROGRESS NOTE ADULT - SUBJECTIVE AND OBJECTIVE BOX
INTERVAL HISTORY: No chest pain, SOB, paralysis, fevers, vomiting    TELEMETRY: no events  	  MEDICATIONS:  amiodarone    Tablet 100 milliGRAM(s) Oral daily  metoprolol succinate  milliGRAM(s) Oral daily  amLODIPine   Tablet 2.5 milliGRAM(s) Oral daily        PHYSICAL EXAM:  T(C): 36.4 (09-17-17 @ 20:30), Max: 36.7 (09-17-17 @ 11:54)  HR: 62 (09-17-17 @ 20:30) (60 - 74)  BP: 139/73 (09-17-17 @ 20:30) (114/70 - 143/79)  RR: 18 (09-17-17 @ 20:30) (18 - 19)  SpO2: 97% (09-17-17 @ 20:30) (93% - 97%)  Wt(kg): --  I&O's Summary    16 Sep 2017 07:01  -  17 Sep 2017 07:00  --------------------------------------------------------  IN: 725 mL / OUT: 1200 mL / NET: -475 mL    17 Sep 2017 07:01  -  17 Sep 2017 22:14  --------------------------------------------------------  IN: 840 mL / OUT: 1100 mL / NET: -260 mL          Appearance: Normal	  HEENT:    PERRL, EOMI	  Lymphatic: No lymphadenopathy  Cardiovascular: Normal S1 S2, No JVD  Respiratory: Lungs clear to auscultation	  Psychiatry: Alert, Mood & affect appropriate  Gastrointestinal:  Soft, Non-tender, + BS	  Skin: No rashes, No cyanosis  Neurologic: Non-focal  Extremities:  No edema of LE  Vascular: Peripheral pulses palpable  bilaterally      CARDIAC MARKERS:                                  13.2   17.34 )-----------( 272      ( 17 Sep 2017 07:21 )             40.8     09-17    133<L>  |  96  |  43<H>  ----------------------------<  225<H>  4.2   |  26  |  1.78<H>    Ca    9.3      17 Sep 2017 07:21    TPro  7.2  /  Alb  3.1<L>  /  TBili  0.6  /  DBili  x   /  AST  22  /  ALT  13  /  AlkPhos  85  09-16    proBNP:   Lipid Profile:   HgA1c: Hemoglobin A1C, Whole Blood: 8.6 % (09-17 @ 07:21)    TSH:     Labs, imaging and telemetry personally reviewed      ASSESSMENT/PLAN: 	  86 yr old F with COPD, CHF,  A FIB on eliquis, PPM, HTN , HLD,  here  with SOB and chest pressure with decompensated heart failure and ? super imposed PNA  1. Heart failure - Hold lasix for now, appears dry. Continue with bb and ASA  - TTE to assess EF    2. AF - continue with Eliquis for AC and home dose Amio  - EP to interrogate device on Monday    3. HTN - continue with home meds now of Toprol and Norvasc  - will consider adding ARB if ok with renal and CKD stable    4. HLD - continue with statin    5. Pulmonary - Abx/COPD management per pulm/primary team      Discussed with primary team.         Waylon Cates DO [facility]  Cardiovascular Medicine  [phone]

## 2017-09-17 NOTE — PROGRESS NOTE ADULT - SUBJECTIVE AND OBJECTIVE BOX
Chief complaint  Patient is a 86y old  Female who presents with a chief complaint of chest pressure (15 Sep 2017 11:40)   Review of systems  Patient in bed, comfortable, no fever,  on steroids now, no hypoglycemia.    Labs and Fingersticks    CAPILLARY BLOOD GLUCOSE  293 (17 Sep 2017 16:20)  438 (17 Sep 2017 11:10)  254 (17 Sep 2017 07:56)  187 (16 Sep 2017 21:10)  131 (16 Sep 2017 16:12)  222 (16 Sep 2017 11:33)  174 (16 Sep 2017 07:34)  329 (15 Sep 2017 18:14)    Anion Gap, Serum: 11 (09-17 @ 07:21)  Anion Gap, Serum: 18 <H> (09-16 @ 08:50)    Hemoglobin A1C, Whole Blood: 8.6 <H> (09-17 @ 07:21)    Calcium, Total Serum: 9.3 (09-17 @ 07:21)  Calcium, Total Serum: 9.5 (09-16 @ 08:50)  Albumin, Serum: 3.1 <L> (09-16 @ 08:50)    Alanine Aminotransferase (ALT/SGPT): 13 (09-16 @ 08:50)  Alkaline Phosphatase, Serum: 85 (09-16 @ 08:50)  Aspartate Aminotransferase (AST/SGOT): 22 (09-16 @ 08:50)        09-17    133<L>  |  96  |  43<H>  ----------------------------<  225<H>  4.2   |  26  |  1.78<H>    Ca    9.3      17 Sep 2017 07:21    TPro  7.2  /  Alb  3.1<L>  /  TBili  0.6  /  DBili  x   /  AST  22  /  ALT  13  /  AlkPhos  85  09-16                        13.2   17.34 )-----------( 272      ( 17 Sep 2017 07:21 )             40.8     Medications  MEDICATIONS  (STANDING):  aspirin  chewable 81 milliGRAM(s) Oral daily  simvastatin 20 milliGRAM(s) Oral at bedtime  ALBUTerol/ipratropium for Nebulization 3 milliLiter(s) Nebulizer every 6 hours  ALBUTerol    90 MICROgram(s) HFA Inhaler 1 Puff(s) Inhalation every 4 hours  tiotropium 18 MICROgram(s) Capsule 1 Capsule(s) Inhalation daily  amiodarone    Tablet 100 milliGRAM(s) Oral daily  clonazePAM Tablet 0.5 milliGRAM(s) Oral two times a day  DULoxetine 60 milliGRAM(s) Oral daily  mirtazapine 15 milliGRAM(s) Oral at bedtime  ARIPiprazole 2 milliGRAM(s) Oral daily  metoprolol succinate  milliGRAM(s) Oral daily  amLODIPine   Tablet 2.5 milliGRAM(s) Oral daily  docusate sodium 100 milliGRAM(s) Oral daily  apixaban 2.5 milliGRAM(s) Oral every 12 hours  insulin lispro (HumaLOG) corrective regimen sliding scale   SubCutaneous three times a day before meals  dextrose 5%. 1000 milliLiter(s) (50 mL/Hr) IV Continuous <Continuous>  dextrose 50% Injectable 12.5 Gram(s) IV Push once  dextrose 50% Injectable 25 Gram(s) IV Push once  dextrose 50% Injectable 25 Gram(s) IV Push once  influenza   Vaccine 0.5 milliLiter(s) IntraMuscular once  buDESOnide 160 MICROgram(s)/formoterol 4.5 MICROgram(s) Inhaler 2 Puff(s) Inhalation two times a day  predniSONE   Tablet 50 milliGRAM(s) Oral daily  insulin glargine Injectable (LANTUS) 32 Unit(s) SubCutaneous at bedtime  insulin lispro Injectable (HumaLOG) 12 Unit(s) SubCutaneous three times a day before meals      Physical Exam  General: Patient comfortable in bed  Vital Signs Last 12 Hrs  T(F): 98 (09-17-17 @ 11:54), Max: 98 (09-17-17 @ 11:54)  HR: 60 (09-17-17 @ 11:54) (60 - 60)  BP: 114/70 (09-17-17 @ 11:54) (114/70 - 114/70)  BP(mean): --  RR: 18 (09-17-17 @ 11:54) (18 - 18)  SpO2: 96% (09-17-17 @ 11:54) (96% - 96%)  Neck: No palpable thyroid nodules.  CVS: S1S2, No murmurs  Respiratory: No wheezing, no crepitations  GI: Abdomen soft, bowel sounds positive  Musculoskeletal: Positive edema lower extremities bilaterally  Skin: No skin rashes, no ecchymosis    Diagnostics    Hemoglobin A1C, Whole Blood: Routine  Cancel Reason: -Lab Reordered (09-15 @ 13:44)

## 2017-09-18 LAB
% ALBUMIN: 43.4 % — SIGNIFICANT CHANGE UP
% ALPHA 1: 6.7 % — SIGNIFICANT CHANGE UP
% ALPHA 2: 17.1 % — SIGNIFICANT CHANGE UP
% BETA: 11.6 % — SIGNIFICANT CHANGE UP
% GAMMA: 21.2 % — SIGNIFICANT CHANGE UP
% M SPIKE: 6.3 % — SIGNIFICANT CHANGE UP
ALBUMIN SERPL ELPH-MCNC: 3.1 G/DL — LOW (ref 3.6–5.5)
ALBUMIN/GLOB SERPL ELPH: 0.8 RATIO — SIGNIFICANT CHANGE UP
ALPHA1 GLOB SERPL ELPH-MCNC: 0.5 G/DL — HIGH (ref 0.1–0.4)
ALPHA2 GLOB SERPL ELPH-MCNC: 1.2 G/DL — HIGH (ref 0.5–1)
ANION GAP SERPL CALC-SCNC: 17 MMOL/L — SIGNIFICANT CHANGE UP (ref 5–17)
B-GLOBULIN SERPL ELPH-MCNC: 0.8 G/DL — SIGNIFICANT CHANGE UP (ref 0.5–1)
BUN SERPL-MCNC: 59 MG/DL — HIGH (ref 7–23)
CALCIUM SERPL-MCNC: 9.2 MG/DL — SIGNIFICANT CHANGE UP (ref 8.4–10.5)
CHLORIDE SERPL-SCNC: 95 MMOL/L — LOW (ref 96–108)
CO2 SERPL-SCNC: 23 MMOL/L — SIGNIFICANT CHANGE UP (ref 22–31)
CREAT ?TM UR-MCNC: 53 MG/DL — SIGNIFICANT CHANGE UP
CREAT SERPL-MCNC: 2.07 MG/DL — HIGH (ref 0.5–1.3)
GAMMA GLOBULIN: 1.5 G/DL — SIGNIFICANT CHANGE UP (ref 0.6–1.6)
GLUCOSE SERPL-MCNC: 147 MG/DL — HIGH (ref 70–99)
HCT VFR BLD CALC: 39.2 % — SIGNIFICANT CHANGE UP (ref 34.5–45)
HGB BLD-MCNC: 12.5 G/DL — SIGNIFICANT CHANGE UP (ref 11.5–15.5)
INTERPRETATION SERPL IFE-IMP: SIGNIFICANT CHANGE UP
KAPPA LC SER QL IFE: 7.55 MG/DL — HIGH (ref 0.33–1.94)
KAPPA/LAMBDA FREE LIGHT CHAIN RATIO, SERUM: 2.23 RATIO — HIGH (ref 0.26–1.65)
LAMBDA LC SER QL IFE: 3.39 MG/DL — HIGH (ref 0.57–2.63)
M-SPIKE: 0.5 G/DL — HIGH (ref 0–0)
MCHC RBC-ENTMCNC: 27.3 PG — SIGNIFICANT CHANGE UP (ref 27–34)
MCHC RBC-ENTMCNC: 31.9 GM/DL — LOW (ref 32–36)
MCV RBC AUTO: 85.6 FL — SIGNIFICANT CHANGE UP (ref 80–100)
PLATELET # BLD AUTO: 342 K/UL — SIGNIFICANT CHANGE UP (ref 150–400)
POTASSIUM SERPL-MCNC: 4 MMOL/L — SIGNIFICANT CHANGE UP (ref 3.5–5.3)
POTASSIUM SERPL-SCNC: 4 MMOL/L — SIGNIFICANT CHANGE UP (ref 3.5–5.3)
PROT ?TM UR-MCNC: 146 MG/DL — HIGH (ref 0–12)
PROT PATTERN SERPL ELPH-IMP: SIGNIFICANT CHANGE UP
PROT/CREAT UR-RTO: 2.8 RATIO — HIGH (ref 0–0.2)
RBC # BLD: 4.58 M/UL — SIGNIFICANT CHANGE UP (ref 3.8–5.2)
RBC # FLD: 15.7 % — HIGH (ref 10.3–14.5)
SODIUM SERPL-SCNC: 135 MMOL/L — SIGNIFICANT CHANGE UP (ref 135–145)
WBC # BLD: 18.27 K/UL — HIGH (ref 3.8–10.5)
WBC # FLD AUTO: 18.27 K/UL — HIGH (ref 3.8–10.5)

## 2017-09-18 PROCEDURE — 93306 TTE W/DOPPLER COMPLETE: CPT | Mod: 26

## 2017-09-18 RX ADMIN — AMIODARONE HYDROCHLORIDE 100 MILLIGRAM(S): 400 TABLET ORAL at 05:39

## 2017-09-18 RX ADMIN — SIMVASTATIN 20 MILLIGRAM(S): 20 TABLET, FILM COATED ORAL at 22:36

## 2017-09-18 RX ADMIN — BUDESONIDE AND FORMOTEROL FUMARATE DIHYDRATE 2 PUFF(S): 160; 4.5 AEROSOL RESPIRATORY (INHALATION) at 05:41

## 2017-09-18 RX ADMIN — DULOXETINE HYDROCHLORIDE 60 MILLIGRAM(S): 30 CAPSULE, DELAYED RELEASE ORAL at 22:36

## 2017-09-18 RX ADMIN — Medication 3 MILLILITER(S): at 12:07

## 2017-09-18 RX ADMIN — Medication 0.5 MILLIGRAM(S): at 05:36

## 2017-09-18 RX ADMIN — APIXABAN 2.5 MILLIGRAM(S): 2.5 TABLET, FILM COATED ORAL at 07:35

## 2017-09-18 RX ADMIN — AMLODIPINE BESYLATE 2.5 MILLIGRAM(S): 2.5 TABLET ORAL at 05:37

## 2017-09-18 RX ADMIN — Medication 12 UNIT(S): at 16:43

## 2017-09-18 RX ADMIN — INSULIN GLARGINE 32 UNIT(S): 100 INJECTION, SOLUTION SUBCUTANEOUS at 22:35

## 2017-09-18 RX ADMIN — Medication 3 MILLILITER(S): at 17:40

## 2017-09-18 RX ADMIN — ARIPIPRAZOLE 2 MILLIGRAM(S): 15 TABLET ORAL at 05:39

## 2017-09-18 RX ADMIN — Medication 3: at 16:42

## 2017-09-18 RX ADMIN — Medication 3 MILLILITER(S): at 05:42

## 2017-09-18 RX ADMIN — Medication 81 MILLIGRAM(S): at 12:07

## 2017-09-18 RX ADMIN — BUDESONIDE AND FORMOTEROL FUMARATE DIHYDRATE 2 PUFF(S): 160; 4.5 AEROSOL RESPIRATORY (INHALATION) at 17:55

## 2017-09-18 RX ADMIN — Medication 50 MILLIGRAM(S): at 05:40

## 2017-09-18 RX ADMIN — Medication 12 UNIT(S): at 08:20

## 2017-09-18 RX ADMIN — Medication 3: at 12:06

## 2017-09-18 RX ADMIN — Medication 12 UNIT(S): at 12:06

## 2017-09-18 RX ADMIN — Medication 100 MILLIGRAM(S): at 22:36

## 2017-09-18 RX ADMIN — Medication 0.5 MILLIGRAM(S): at 17:40

## 2017-09-18 RX ADMIN — MIRTAZAPINE 15 MILLIGRAM(S): 45 TABLET, ORALLY DISINTEGRATING ORAL at 22:36

## 2017-09-18 RX ADMIN — Medication 100 MILLIGRAM(S): at 05:37

## 2017-09-18 RX ADMIN — APIXABAN 2.5 MILLIGRAM(S): 2.5 TABLET, FILM COATED ORAL at 17:40

## 2017-09-18 NOTE — PROGRESS NOTE ADULT - SUBJECTIVE AND OBJECTIVE BOX
[facility] NEPHROLOGY- PROGRESS NOTE    86 year old female h/o HTN and DM, RCC s/p right nephrectomy presents with chest pain and dyspnea. Nephrology consulted for elevated Scr.    REVIEW OF SYSTEMS:  Gen: no changes in weight  Cards: no chest pain  Resp: + dyspnea  GI: no nausea or vomiting or diarrhea  Vascular: no LE edema    amoxicillin (Flushing; Vomiting; Nausea)      Hospital Medications: Medications reviewed      VITALS:  T(F): 97.5 (09-18-17 @ 13:22), Max: 97.5 (09-17-17 @ 20:30)  HR: 75 (09-18-17 @ 13:22)  BP: 131/62 (09-18-17 @ 13:22)  RR: 18 (09-18-17 @ 13:22)  SpO2: 93% (09-18-17 @ 13:22)  Wt(kg): --    09-17 @ 07:01  -  09-18 @ 07:00  --------------------------------------------------------  IN: 840 mL / OUT: 1400 mL / NET: -560 mL    09-18 @ 07:01  -  09-18 @ 13:34  --------------------------------------------------------  IN: 240 mL / OUT: 300 mL / NET: -60 mL      PHYSICAL EXAM:    Gen: NAD, calm  Cards: RRR, +S1/S2, +ZAKI  Resp: decreased airway entry B/L  GI: soft, NT/ND, NABS  Vascular: trace RLE edema (chronic as per patient)      LABS:  09-18    135  |  95<L>  |  59<H>  ----------------------------<  147<H>  4.0   |  23  |  2.07<H>    Ca    9.2      18 Sep 2017 09:36      Creatinine Trend: 2.07 <--, 1.78 <--, 1.51 <--, 1.63 <--                        12.5   18.27 )-----------( 342      ( 18 Sep 2017 09:24 )             39.2

## 2017-09-18 NOTE — PROGRESS NOTE ADULT - SUBJECTIVE AND OBJECTIVE BOX
PULMONARY PROGRESS NOTE    MARCEL AGOSTO  MRN-[id number]    Patient is a 86y old  Female who presents with a chief complaint of chest pressure (15 Sep 2017 11:40)      HPI:  breathing comfortably, no cough/wheeze, no chest pain    ROS:  otherwise negative    MEDICATIONS  (STANDING):  aspirin  chewable 81 milliGRAM(s) Oral daily  simvastatin 20 milliGRAM(s) Oral at bedtime  ALBUTerol/ipratropium for Nebulization 3 milliLiter(s) Nebulizer every 6 hours  ALBUTerol    90 MICROgram(s) HFA Inhaler 1 Puff(s) Inhalation every 4 hours  tiotropium 18 MICROgram(s) Capsule 1 Capsule(s) Inhalation daily  amiodarone    Tablet 100 milliGRAM(s) Oral daily  clonazePAM Tablet 0.5 milliGRAM(s) Oral two times a day  DULoxetine 60 milliGRAM(s) Oral daily  mirtazapine 15 milliGRAM(s) Oral at bedtime  ARIPiprazole 2 milliGRAM(s) Oral daily  metoprolol succinate  milliGRAM(s) Oral daily  amLODIPine   Tablet 2.5 milliGRAM(s) Oral daily  docusate sodium 100 milliGRAM(s) Oral daily  apixaban 2.5 milliGRAM(s) Oral every 12 hours  insulin lispro (HumaLOG) corrective regimen sliding scale   SubCutaneous three times a day before meals  dextrose 5%. 1000 milliLiter(s) (50 mL/Hr) IV Continuous <Continuous>  dextrose 50% Injectable 12.5 Gram(s) IV Push once  dextrose 50% Injectable 25 Gram(s) IV Push once  dextrose 50% Injectable 25 Gram(s) IV Push once  influenza   Vaccine 0.5 milliLiter(s) IntraMuscular once  buDESOnide 160 MICROgram(s)/formoterol 4.5 MICROgram(s) Inhaler 2 Puff(s) Inhalation two times a day  predniSONE   Tablet 50 milliGRAM(s) Oral daily  insulin glargine Injectable (LANTUS) 32 Unit(s) SubCutaneous at bedtime  insulin lispro Injectable (HumaLOG) 12 Unit(s) SubCutaneous three times a day before meals    MEDICATIONS  (PRN):  dextrose Gel 1 Dose(s) Oral once PRN Blood Glucose LESS THAN 70 milliGRAM(s)/deciLiter  glucagon  Injectable 1 milliGRAM(s) IntraMuscular once PRN Glucose <70 milliGRAM(s)/deciLiter        Vital Signs Last 24 Hrs  T(C): 36.4 (18 Sep 2017 13:22), Max: 36.4 (17 Sep 2017 20:30)  T(F): 97.5 (18 Sep 2017 13:22), Max: 97.5 (17 Sep 2017 20:30)  HR: 75 (18 Sep 2017 13:22) (60 - 75)  BP: 131/62 (18 Sep 2017 13:22) (131/62 - 145/74)  BP(mean): --  RR: 18 (18 Sep 2017 13:22) (18 - 18)  SpO2: 93% (18 Sep 2017 13:22) (93% - 97%)    GENERAL: The patient is awake and alert in no apparent distress.     LUNGS: Clear to auscultation bilaterally    ABDOMEN: soft, +BS, nontender    LABS/IMAGING: reviewed                                     12.5 18.27 )-----------( 342      ( 18 Sep 2017 09:24 )             39.2     09-18    135  |  95<L>  |  59<H>  ----------------------------<  147<H>  4.0   |  23  |  2.07<H>    Ca    9.2      18 Sep 2017 09:36          < from: Xray Chest 1 View AP/PA (09.15.17 @ 07:45) >  IMPRESSION: Left basilar atelectasis. Otherwise, clear lungs.    < end of copied text >      PROBLEM LIST:  86y Female with HEALTH ISSUES - PROBLEM Dx:  COPD  CKD (chronic kidney disease), stage IV  Diabetes  Atrial fibrillation    RECS:  -COPD exacerbation: pt asymptomatic, cont symbicort BID while in house , continue spiriva and duonebs, continue prednisone 50mg PO daily x 5 days.  Upon discharge please give patient new prescription for advair 250/50 BID  -No evidence of pna clinically or on xray,  antibiotics discontinued  -Wean O2 as tolerated, patient only using this at home during the night time, check ambulatory sat.  -Incentive spirometry, OOB to chair, PT  -No pulmonary objections to discharge home    Joy Abdi MD   [phone]

## 2017-09-18 NOTE — PROGRESS NOTE ADULT - SUBJECTIVE AND OBJECTIVE BOX
Allergies    amoxicillin (Flushing; Vomiting; Nausea)    Intolerances        MEDICATIONS  (STANDING):  aspirin  chewable 81 milliGRAM(s) Oral daily  simvastatin 20 milliGRAM(s) Oral at bedtime  ALBUTerol/ipratropium for Nebulization 3 milliLiter(s) Nebulizer every 6 hours  ALBUTerol    90 MICROgram(s) HFA Inhaler 1 Puff(s) Inhalation every 4 hours  tiotropium 18 MICROgram(s) Capsule 1 Capsule(s) Inhalation daily  amiodarone    Tablet 100 milliGRAM(s) Oral daily  clonazePAM Tablet 0.5 milliGRAM(s) Oral two times a day  DULoxetine 60 milliGRAM(s) Oral daily  mirtazapine 15 milliGRAM(s) Oral at bedtime  ARIPiprazole 2 milliGRAM(s) Oral daily  metoprolol succinate  milliGRAM(s) Oral daily  amLODIPine   Tablet 2.5 milliGRAM(s) Oral daily  docusate sodium 100 milliGRAM(s) Oral daily  apixaban 2.5 milliGRAM(s) Oral every 12 hours  insulin lispro (HumaLOG) corrective regimen sliding scale   SubCutaneous three times a day before meals  dextrose 5%. 1000 milliLiter(s) (50 mL/Hr) IV Continuous <Continuous>  dextrose 50% Injectable 12.5 Gram(s) IV Push once  dextrose 50% Injectable 25 Gram(s) IV Push once  dextrose 50% Injectable 25 Gram(s) IV Push once  influenza   Vaccine 0.5 milliLiter(s) IntraMuscular once  buDESOnide 160 MICROgram(s)/formoterol 4.5 MICROgram(s) Inhaler 2 Puff(s) Inhalation two times a day  predniSONE   Tablet 50 milliGRAM(s) Oral daily  insulin glargine Injectable (LANTUS) 32 Unit(s) SubCutaneous at bedtime  insulin lispro Injectable (HumaLOG) 12 Unit(s) SubCutaneous three times a day before meals    MEDICATIONS  (PRN):  dextrose Gel 1 Dose(s) Oral once PRN Blood Glucose LESS THAN 70 milliGRAM(s)/deciLiter  glucagon  Injectable 1 milliGRAM(s) IntraMuscular once PRN Glucose <70 milliGRAM(s)/deciLiter          Antimicrobials:        LABS:  CBC Full  -  ( 18 Sep 2017 09:24 )  WBC Count : 18.27 K/uL  Hemoglobin : 12.5 g/dL  Hematocrit : 39.2 %  Platelet Count - Automated : 342 K/uL  Mean Cell Volume : 85.6 fl  Mean Cell Hemoglobin : 27.3 pg  Mean Cell Hemoglobin Concentration : 31.9 gm/dL  Auto Neutrophil # : x  Auto Lymphocyte # : x  Auto Monocyte # : x  Auto Eosinophil # : x  Auto Basophil # : x  Auto Neutrophil % : x  Auto Lymphocyte % : x  Auto Monocyte % : x  Auto Eosinophil % : x  Auto Basophil % : x        135  |  95<L>  |  59<H>  ----------------------------<  147<H>  4.0   |  23  |  2.07<H>    Ca    9.2      18 Sep 2017 09:36          Urinalysis Basic - ( 17 Sep 2017 21:18 )    Color: Yellow / Appearance: Clear / S.010 / pH: x  Gluc: x / Ketone: Negative  / Bili: Negative / Urobili: Negative   Blood: x / Protein: 150 mg/dL / Nitrite: Negative   Leuk Esterase: Negative / RBC: x / WBC 3-5 /HPF   Sq Epi: x / Non Sq Epi: x / Bacteria: x      Blood Gas Venous - Lactate: 1.3 mmoL/L (09-15 @ 08:40)            Cultures:  RECENT CULTURES:            Imaging Studies:    Vital Signs:    Vital Signs Last 24 Hrs  T(C): 36.4 (18 Sep 2017 13:22), Max: 36.4 (17 Sep 2017 20:30)  T(F): 97.5 (18 Sep 2017 13:22), Max: 97.5 (17 Sep 2017 20:30)  HR: 75 (18 Sep 2017 13:22) (60 - 75)  BP: 131/62 (18 Sep 2017 13:22) (131/62 - 145/74)  BP(mean): --  RR: 18 (18 Sep 2017 13:22) (18 - 18)  SpO2: 93% (18 Sep 2017 13:22) (93% - 97%) Feeling better.  Afebrile,  leukocytosis 18.2 on steroids.        Allergies    amoxicillin (Flushing; Vomiting; Nausea)      MEDICATIONS  (STANDING):  aspirin  chewable 81 milliGRAM(s) Oral daily  simvastatin 20 milliGRAM(s) Oral at bedtime  ALBUTerol/ipratropium for Nebulization 3 milliLiter(s) Nebulizer every 6 hours  ALBUTerol    90 MICROgram(s) HFA Inhaler 1 Puff(s) Inhalation every 4 hours  tiotropium 18 MICROgram(s) Capsule 1 Capsule(s) Inhalation daily  amiodarone    Tablet 100 milliGRAM(s) Oral daily  clonazePAM Tablet 0.5 milliGRAM(s) Oral two times a day  DULoxetine 60 milliGRAM(s) Oral daily  mirtazapine 15 milliGRAM(s) Oral at bedtime  ARIPiprazole 2 milliGRAM(s) Oral daily  metoprolol succinate  milliGRAM(s) Oral daily  amLODIPine   Tablet 2.5 milliGRAM(s) Oral daily  docusate sodium 100 milliGRAM(s) Oral daily  apixaban 2.5 milliGRAM(s) Oral every 12 hours  insulin lispro (HumaLOG) corrective regimen sliding scale   SubCutaneous three times a day before meals  dextrose 5%. 1000 milliLiter(s) (50 mL/Hr) IV Continuous <Continuous>  dextrose 50% Injectable 12.5 Gram(s) IV Push once  dextrose 50% Injectable 25 Gram(s) IV Push once  dextrose 50% Injectable 25 Gram(s) IV Push once  influenza   Vaccine 0.5 milliLiter(s) IntraMuscular once  buDESOnide 160 MICROgram(s)/formoterol 4.5 MICROgram(s) Inhaler 2 Puff(s) Inhalation two times a day  predniSONE   Tablet 50 milliGRAM(s) Oral daily  insulin glargine Injectable (LANTUS) 32 Unit(s) SubCutaneous at bedtime  insulin lispro Injectable (HumaLOG) 12 Unit(s) SubCutaneous three times a day before meals    MEDICATIONS  (PRN):  dextrose Gel 1 Dose(s) Oral once PRN Blood Glucose LESS THAN 70 milliGRAM(s)/deciLiter  glucagon  Injectable 1 milliGRAM(s) IntraMuscular once PRN Glucose <70 milliGRAM(s)/deciLiter          Antimicrobials:        LABS:  CBC Full  -  ( 18 Sep 2017 09:24 )  WBC Count : 18.27 K/uL  Hemoglobin : 12.5 g/dL  Hematocrit : 39.2 %  Platelet Count - Automated : 342 K/uL  Mean Cell Volume : 85.6 fl  Mean Cell Hemoglobin : 27.3 pg  Mean Cell Hemoglobin Concentration : 31.9 gm/dL  Auto Neutrophil # : x  Auto Lymphocyte # : x  Auto Monocyte # : x  Auto Eosinophil # : x  Auto Basophil # : x  Auto Neutrophil % : x  Auto Lymphocyte % : x  Auto Monocyte % : x  Auto Eosinophil % : x  Auto Basophil % : x        135  |  95<L>  |  59<H>  ----------------------------<  147<H>  4.0   |  23  |  2.07<H>    Ca    9.2      18 Sep 2017 09:36          Urinalysis Basic - ( 17 Sep 2017 21:18 )    Color: Yellow / Appearance: Clear / S.010 / pH: x  Gluc: x / Ketone: Negative  / Bili: Negative / Urobili: Negative   Blood: x / Protein: 150 mg/dL / Nitrite: Negative   Leuk Esterase: Negative / RBC: x / WBC 3-5 /HPF   Sq Epi: x / Non Sq Epi: x / Bacteria: x      Blood Gas Venous - Lactate: 1.3 mmoL/L (09-15 @ 08:40)      Vital Signs:    Vital Signs Last 24 Hrs  T(C): 36.4 (18 Sep 2017 13:22), Max: 36.4 (17 Sep 2017 20:30)  T(F): 97.5 (18 Sep 2017 13:22), Max: 97.5 (17 Sep 2017 20:30)  HR: 75 (18 Sep 2017 13:22) (60 - 75)  BP: 131/62 (18 Sep 2017 13:22) (131/62 - 145/74)  BP(mean): --  RR: 18 (18 Sep 2017 13:22) (18 - 18)  SpO2: 93% (18 Sep 2017 13:22) (93% - 97%)

## 2017-09-18 NOTE — PROGRESS NOTE ADULT - SUBJECTIVE AND OBJECTIVE BOX
Chief complaint  Patient is a 86y old  Female who presents with a chief complaint of chest pressure (15 Sep 2017 11:40)   Review of systems  Patient in bed, comfortable, no fever, no hypoglycemia.    Labs and Fingersticks    CAPILLARY BLOOD GLUCOSE  233 (18 Sep 2017 21:15)  268 (18 Sep 2017 16:08)  253 (18 Sep 2017 11:27)  139 (18 Sep 2017 07:07)  255 (17 Sep 2017 21:01)  293 (17 Sep 2017 16:20)  438 (17 Sep 2017 11:10)  254 (17 Sep 2017 07:56)  187 (16 Sep 2017 21:10)  131 (16 Sep 2017 16:12)  222 (16 Sep 2017 11:33)  174 (16 Sep 2017 07:34)    Anion Gap, Serum: 17 (09-18 @ 09:36)  Anion Gap, Serum: 11 (09-17 @ 07:21)    Hemoglobin A1C, Whole Blood: 8.6 <H> (09-17 @ 07:21)    Calcium, Total Serum: 9.2 (09-18 @ 09:36)  Calcium, Total Serum: 9.3 (09-17 @ 07:21)          09-18    135  |  95<L>  |  59<H>  ----------------------------<  147<H>  4.0   |  23  |  2.07<H>    Ca    9.2      18 Sep 2017 09:36                          12.5   18.27 )-----------( 342      ( 18 Sep 2017 09:24 )             39.2     Medications  MEDICATIONS  (STANDING):  aspirin  chewable 81 milliGRAM(s) Oral daily  simvastatin 20 milliGRAM(s) Oral at bedtime  ALBUTerol/ipratropium for Nebulization 3 milliLiter(s) Nebulizer every 6 hours  ALBUTerol    90 MICROgram(s) HFA Inhaler 1 Puff(s) Inhalation every 4 hours  tiotropium 18 MICROgram(s) Capsule 1 Capsule(s) Inhalation daily  amiodarone    Tablet 100 milliGRAM(s) Oral daily  clonazePAM Tablet 0.5 milliGRAM(s) Oral two times a day  DULoxetine 60 milliGRAM(s) Oral daily  mirtazapine 15 milliGRAM(s) Oral at bedtime  ARIPiprazole 2 milliGRAM(s) Oral daily  metoprolol succinate  milliGRAM(s) Oral daily  docusate sodium 100 milliGRAM(s) Oral daily  apixaban 2.5 milliGRAM(s) Oral every 12 hours  insulin lispro (HumaLOG) corrective regimen sliding scale   SubCutaneous three times a day before meals  dextrose 5%. 1000 milliLiter(s) (50 mL/Hr) IV Continuous <Continuous>  dextrose 50% Injectable 12.5 Gram(s) IV Push once  dextrose 50% Injectable 25 Gram(s) IV Push once  dextrose 50% Injectable 25 Gram(s) IV Push once  influenza   Vaccine 0.5 milliLiter(s) IntraMuscular once  buDESOnide 160 MICROgram(s)/formoterol 4.5 MICROgram(s) Inhaler 2 Puff(s) Inhalation two times a day  predniSONE   Tablet 50 milliGRAM(s) Oral daily  insulin glargine Injectable (LANTUS) 32 Unit(s) SubCutaneous at bedtime  insulin lispro Injectable (HumaLOG) 12 Unit(s) SubCutaneous three times a day before meals      Physical Exam  General: Patient comfortable in bed  Vital Signs Last 12 Hrs  T(F): 98 (09-18-17 @ 20:16), Max: 98 (09-18-17 @ 20:16)  HR: 66 (09-18-17 @ 20:16) (66 - 75)  BP: 117/65 (09-18-17 @ 20:16) (117/65 - 131/62)  BP(mean): --  RR: 18 (09-18-17 @ 20:16) (18 - 18)  SpO2: 94% (09-18-17 @ 20:16) (93% - 94%)  Neck: No palpable thyroid nodules.  CVS: S1S2, No murmurs  Respiratory: No wheezing, no crepitations  GI: Abdomen soft, bowel sounds positive  Musculoskeletal: Positive edema lower extremities bilaterally  Skin: No skin rashes, no ecchymosis    Diagnostics    Hemoglobin A1C, Whole Blood: Routine  Cancel Reason: -Lab Reordered (09-15 @ 13:44)

## 2017-09-18 NOTE — PROGRESS NOTE ADULT - SUBJECTIVE AND OBJECTIVE BOX
Patient is a 86y old  Female who presents with a chief complaint of chest pressure (15 Sep 2017 11:40)  SOB much better    INTERVAL HPI/OVERNIGHT EVENTS:  T(C): 36.4 (17 @ 13:22), Max: 36.4 (17 @ 20:30)  HR: 75 (17 @ 13:22) (60 - 75)  BP: 131/62 (17 @ 13:22) (131/62 - 145/74)  RR: 18 (17 @ 13:22) (18 - 18)  SpO2: 93% (17 @ 13:22) (93% - 97%)  Wt(kg): --  I&O's Summary    17 Sep 2017 07:  -  18 Sep 2017 07:00  --------------------------------------------------------  IN: 840 mL / OUT: 1400 mL / NET: -560 mL    18 Sep 2017 07:01  -  18 Sep 2017 16:11  --------------------------------------------------------  IN: 480 mL / OUT: 500 mL / NET: -20 mL        LABS:                        12.5   18. )-----------( 342      ( 18 Sep 2017 09:24 )             39.2         135  |  95<L>  |  59<H>  ----------------------------<  147<H>  4.0   |  23  |  2.07<H>    Ca    9.2      18 Sep 2017 09:36        Urinalysis Basic - ( 17 Sep 2017 21:18 )    Color: Yellow / Appearance: Clear / S.010 / pH: x  Gluc: x / Ketone: Negative  / Bili: Negative / Urobili: Negative   Blood: x / Protein: 150 mg/dL / Nitrite: Negative   Leuk Esterase: Negative / RBC: x / WBC 3-5 /HPF   Sq Epi: x / Non Sq Epi: x / Bacteria: x      CAPILLARY BLOOD GLUCOSE  268 (18 Sep 2017 16:08)  253 (18 Sep 2017 11:27)  139 (18 Sep 2017 07:07)  255 (17 Sep 2017 21:01)  293 (17 Sep 2017 16:20)            Urinalysis Basic - ( 17 Sep 2017 21:18 )    Color: Yellow / Appearance: Clear / S.010 / pH: x  Gluc: x / Ketone: Negative  / Bili: Negative / Urobili: Negative   Blood: x / Protein: 150 mg/dL / Nitrite: Negative   Leuk Esterase: Negative / RBC: x / WBC 3-5 /HPF   Sq Epi: x / Non Sq Epi: x / Bacteria: x        MEDICATIONS  (STANDING):  aspirin  chewable 81 milliGRAM(s) Oral daily  simvastatin 20 milliGRAM(s) Oral at bedtime  ALBUTerol/ipratropium for Nebulization 3 milliLiter(s) Nebulizer every 6 hours  ALBUTerol    90 MICROgram(s) HFA Inhaler 1 Puff(s) Inhalation every 4 hours  tiotropium 18 MICROgram(s) Capsule 1 Capsule(s) Inhalation daily  amiodarone    Tablet 100 milliGRAM(s) Oral daily  clonazePAM Tablet 0.5 milliGRAM(s) Oral two times a day  DULoxetine 60 milliGRAM(s) Oral daily  mirtazapine 15 milliGRAM(s) Oral at bedtime  ARIPiprazole 2 milliGRAM(s) Oral daily  metoprolol succinate  milliGRAM(s) Oral daily  amLODIPine   Tablet 2.5 milliGRAM(s) Oral daily  docusate sodium 100 milliGRAM(s) Oral daily  apixaban 2.5 milliGRAM(s) Oral every 12 hours  insulin lispro (HumaLOG) corrective regimen sliding scale   SubCutaneous three times a day before meals  dextrose 5%. 1000 milliLiter(s) (50 mL/Hr) IV Continuous <Continuous>  dextrose 50% Injectable 12.5 Gram(s) IV Push once  dextrose 50% Injectable 25 Gram(s) IV Push once  dextrose 50% Injectable 25 Gram(s) IV Push once  influenza   Vaccine 0.5 milliLiter(s) IntraMuscular once  buDESOnide 160 MICROgram(s)/formoterol 4.5 MICROgram(s) Inhaler 2 Puff(s) Inhalation two times a day  predniSONE   Tablet 50 milliGRAM(s) Oral daily  insulin glargine Injectable (LANTUS) 32 Unit(s) SubCutaneous at bedtime  insulin lispro Injectable (HumaLOG) 12 Unit(s) SubCutaneous three times a day before meals    MEDICATIONS  (PRN):  dextrose Gel 1 Dose(s) Oral once PRN Blood Glucose LESS THAN 70 milliGRAM(s)/deciLiter  glucagon  Injectable 1 milliGRAM(s) IntraMuscular once PRN Glucose <70 milliGRAM(s)/deciLiter          PHYSICAL EXAM:  GENERAL: NAD, well-groomed, well-developed  HEAD:  Atraumatic, Normocephalic  CHEST/LUNG: Clear to percussion bilaterally; No rales, rhonchi, wheezing, or rubs  HEART: Regular rate and rhythm; No murmurs, rubs, or gallops  ABDOMEN: Soft, Nontender, Nondistended; Bowel sounds present  EXTREMITIES:  2+ Peripheral Pulses, No clubbing, cyanosis, or edema  LYMPH: No lymphadenopathy noted  SKIN: No rashes or lesions    Care Discussed with Consultants/Other Providers [ ] YES  [ ] NO

## 2017-09-18 NOTE — PROGRESS NOTE ADULT - SUBJECTIVE AND OBJECTIVE BOX
INTERVAL HISTORY: No chest pain, SOB, paralysis, fevers, vomiting    TELEMETRY: no events  	  MEDICATIONS:  amiodarone    Tablet 100 milliGRAM(s) Oral daily  metoprolol succinate  milliGRAM(s) Oral daily  amLODIPine   Tablet 2.5 milliGRAM(s) Oral daily        PHYSICAL EXAM:  T(C): 36.7 (09-18-17 @ 20:16), Max: 36.7 (09-18-17 @ 20:16)  HR: 66 (09-18-17 @ 20:16) (60 - 75)  BP: 117/65 (09-18-17 @ 20:16) (117/65 - 145/74)  RR: 18 (09-18-17 @ 20:16) (18 - 18)  SpO2: 94% (09-18-17 @ 20:16) (93% - 97%)  Wt(kg): --  I&O's Summary    17 Sep 2017 07:01  -  18 Sep 2017 07:00  --------------------------------------------------------  IN: 840 mL / OUT: 1400 mL / NET: -560 mL    18 Sep 2017 07:01  -  18 Sep 2017 20:52  --------------------------------------------------------  IN: 720 mL / OUT: 1000 mL / NET: -280 mL          Appearance: Normal	  HEENT:    PERRL, EOMI	  Lymphatic: No lymphadenopathy  Cardiovascular: Normal S1 S2, No JVD  Respiratory: Lungs clear to auscultation	  Psychiatry: Alert, Mood & affect appropriate  Gastrointestinal:  Soft, Non-tender, + BS	  Skin: No rashes, No cyanosis  Neurologic: Non-focal  Extremities:  No edema of LE  Vascular: Peripheral pulses palpable  bilaterally      CARDIAC MARKERS:                                  12.5   18.27 )-----------( 342      ( 18 Sep 2017 09:24 )             39.2     09-18    135  |  95<L>  |  59<H>  ----------------------------<  147<H>  4.0   |  23  |  2.07<H>    Ca    9.2      18 Sep 2017 09:36        Labs, imaging and telemetry personally reviewed      < from: Transthoracic Echocardiogram (09.18.17 @ 17:36) >  1. Mitral annular calcification. Mild-moderate mitral  regurgitation.  2. Normal trileaflet aortic valve. Mild aortic  regurgitation.  3. Eccentric left ventricular hypertrophy (dilated left  ventricle with normal relative wall thickness).  4. Moderate-severe segmental left ventricular systolic  dysfunction. The mid to distal inferior wall, mid to distal  septal wall and apex are hypokinetic.  5. Mild diastolic dysfunction (Stage I).  6. Normal right ventricular size and function.        ASSESSMENT/PLAN: 	  86 yr old F with COPD, CHF,  A FIB on eliquis, PPM, HTN , HLD,  here  with SOB and chest pressure with decompensated heart failure and ? super imposed PNA  1. Systolic Heart failure - Hold lasix for now, appears dry. Continue with bb and ASA  - TTE with EF 35% with  mid to distal inferior wall, mid to distal septal wall and apex. Pt denies h/o HF  - Will need NM stress test to evaluate for ischemia vs infarct    2. AF - continue with Eliquis for AC and home dose Amio  - EP to interrogate device     3. HTN - continue with home meds now of Toprol and Norvasc  - will consider adding ARB if ok with renal and CKD stable    4. HLD - continue with statin    5. Pulmonary - Abx/COPD management per pulm/primary team      Discussed with primary team.         Waylon Cates DO [facility]  Cardiovascular Medicine  [phone]

## 2017-09-19 DIAGNOSIS — R06.09 OTHER FORMS OF DYSPNEA: ICD-10-CM

## 2017-09-19 DIAGNOSIS — R07.89 OTHER CHEST PAIN: ICD-10-CM

## 2017-09-19 DIAGNOSIS — D47.2 MONOCLONAL GAMMOPATHY: ICD-10-CM

## 2017-09-19 LAB
ANION GAP SERPL CALC-SCNC: 16 MMOL/L — SIGNIFICANT CHANGE UP (ref 5–17)
BUN SERPL-MCNC: 56 MG/DL — HIGH (ref 7–23)
CALCIUM SERPL-MCNC: 8.8 MG/DL — SIGNIFICANT CHANGE UP (ref 8.4–10.5)
CHLORIDE SERPL-SCNC: 99 MMOL/L — SIGNIFICANT CHANGE UP (ref 96–108)
CO2 SERPL-SCNC: 23 MMOL/L — SIGNIFICANT CHANGE UP (ref 22–31)
CREAT SERPL-MCNC: 1.72 MG/DL — HIGH (ref 0.5–1.3)
CULTURE RESULTS: SIGNIFICANT CHANGE UP
GLUCOSE SERPL-MCNC: 129 MG/DL — HIGH (ref 70–99)
HCT VFR BLD CALC: 38.8 % — SIGNIFICANT CHANGE UP (ref 34.5–45)
HGB BLD-MCNC: 12.2 G/DL — SIGNIFICANT CHANGE UP (ref 11.5–15.5)
MCHC RBC-ENTMCNC: 27.3 PG — SIGNIFICANT CHANGE UP (ref 27–34)
MCHC RBC-ENTMCNC: 31.4 GM/DL — LOW (ref 32–36)
MCV RBC AUTO: 86.8 FL — SIGNIFICANT CHANGE UP (ref 80–100)
PLATELET # BLD AUTO: 323 K/UL — SIGNIFICANT CHANGE UP (ref 150–400)
POTASSIUM SERPL-MCNC: 4 MMOL/L — SIGNIFICANT CHANGE UP (ref 3.5–5.3)
POTASSIUM SERPL-SCNC: 4 MMOL/L — SIGNIFICANT CHANGE UP (ref 3.5–5.3)
RBC # BLD: 4.47 M/UL — SIGNIFICANT CHANGE UP (ref 3.8–5.2)
RBC # FLD: 15.7 % — HIGH (ref 10.3–14.5)
SODIUM SERPL-SCNC: 138 MMOL/L — SIGNIFICANT CHANGE UP (ref 135–145)
SPECIMEN SOURCE: SIGNIFICANT CHANGE UP
WBC # BLD: 12.23 K/UL — HIGH (ref 3.8–10.5)
WBC # FLD AUTO: 12.23 K/UL — HIGH (ref 3.8–10.5)

## 2017-09-19 RX ADMIN — APIXABAN 2.5 MILLIGRAM(S): 2.5 TABLET, FILM COATED ORAL at 17:10

## 2017-09-19 RX ADMIN — SIMVASTATIN 20 MILLIGRAM(S): 20 TABLET, FILM COATED ORAL at 22:27

## 2017-09-19 RX ADMIN — Medication 12 UNIT(S): at 08:25

## 2017-09-19 RX ADMIN — ARIPIPRAZOLE 2 MILLIGRAM(S): 15 TABLET ORAL at 05:31

## 2017-09-19 RX ADMIN — BUDESONIDE AND FORMOTEROL FUMARATE DIHYDRATE 2 PUFF(S): 160; 4.5 AEROSOL RESPIRATORY (INHALATION) at 05:32

## 2017-09-19 RX ADMIN — Medication 100 MILLIGRAM(S): at 05:31

## 2017-09-19 RX ADMIN — MIRTAZAPINE 15 MILLIGRAM(S): 45 TABLET, ORALLY DISINTEGRATING ORAL at 22:27

## 2017-09-19 RX ADMIN — DULOXETINE HYDROCHLORIDE 60 MILLIGRAM(S): 30 CAPSULE, DELAYED RELEASE ORAL at 12:23

## 2017-09-19 RX ADMIN — APIXABAN 2.5 MILLIGRAM(S): 2.5 TABLET, FILM COATED ORAL at 05:31

## 2017-09-19 RX ADMIN — Medication 100 MILLIGRAM(S): at 12:23

## 2017-09-19 RX ADMIN — Medication 81 MILLIGRAM(S): at 12:23

## 2017-09-19 RX ADMIN — Medication 3 MILLILITER(S): at 12:23

## 2017-09-19 RX ADMIN — Medication 12 UNIT(S): at 12:22

## 2017-09-19 RX ADMIN — BUDESONIDE AND FORMOTEROL FUMARATE DIHYDRATE 2 PUFF(S): 160; 4.5 AEROSOL RESPIRATORY (INHALATION) at 17:11

## 2017-09-19 RX ADMIN — AMIODARONE HYDROCHLORIDE 100 MILLIGRAM(S): 400 TABLET ORAL at 05:31

## 2017-09-19 RX ADMIN — Medication 0.5 MILLIGRAM(S): at 17:10

## 2017-09-19 RX ADMIN — Medication 2: at 12:22

## 2017-09-19 RX ADMIN — Medication 0.5 MILLIGRAM(S): at 05:31

## 2017-09-19 RX ADMIN — Medication 12 UNIT(S): at 17:10

## 2017-09-19 RX ADMIN — Medication 50 MILLIGRAM(S): at 05:32

## 2017-09-19 RX ADMIN — Medication 3 MILLILITER(S): at 17:12

## 2017-09-19 RX ADMIN — INSULIN GLARGINE 32 UNIT(S): 100 INJECTION, SOLUTION SUBCUTANEOUS at 22:28

## 2017-09-19 RX ADMIN — Medication 3 MILLILITER(S): at 05:32

## 2017-09-19 RX ADMIN — Medication 3: at 17:10

## 2017-09-19 NOTE — PHYSICAL THERAPY INITIAL EVALUATION ADULT - ADDITIONAL COMMENTS
Pt resides alone in private home with 4 steps to enter (pt states no handrail, usually holds onto wall for support). Pt states that her granddaughter and her 3 year old are currently living with her, however, they are looking for an apt to move into and are not around as the granddaughter works and attends school. Pt states that she is alone most of the time, pt owns cane & rolling walker, has home 02 that she dons when sleeping. Pt recently started receiving HHA services (unclear hours) & received home PT.

## 2017-09-19 NOTE — CONSULT NOTE ADULT - SUBJECTIVE AND OBJECTIVE BOX
History of Present Illness:  Chief Complaint/Reason for Admission: chest pressure	  History of Present Illness: 	  86 yr old F with COPD, CHF A FIB, HTN , admitted  with SOB and chest pressure since the night before admission that was worse w lying down. Chest pressure is substernal, more like discomfort 5/10 with no radiation, resolved with  sitting up right.  Denies fever, cough , N/V/Diarrhea, HA or any other associated symptoms. Blood work done showed monoclonal protein spike on SPEP and hence hematology was called.     86 yr old F with SOB and chest pressure since last night that is worse w lying down. chest pressure and SOB resolved w sitting up right.  denies fever, cough. +PPM, HTN, DM.        Review of Systems:  Review of Systems: CONSTITUTIONAL: mild distress  NECK: No pain or stiffness  RESPIRATORY: SOB  CARDIOVASCULAR: chest pressure  GASTROINTESTINAL: No abdominal or epigastric pain. No nausea, vomiting, or hematemesis; No diarrhea or constipation. No melena or hematochezia.  GENITOURINARY: No dysuria, frequency or hematuria  NEUROLOGICAL: No numbness or weakness  SKIN: No itching, burning, rashes, or lesions  All other review of systems is negative unless indicated above.	      Allergies and Intolerances:        Allergies:  	amoxicillin: Drug, Flushing, Vomiting, Nausea    Home Medications:   * Incomplete Medication History as of 15-Sep-2017 11:20 documented in Structured Notes  · 	predniSONE 50 mg oral tablet: 1 tab(s) orally once a day, Last Dose Taken:    · 	fluticasone-salmeterol 250 mcg-50 mcg inhalation powder: 1 puff(s) inhaled 2 times a day, Last Dose Taken:    · 	albuterol 90 mcg/inh inhalation aerosol: 1 puff(s) inhaled every 4 hours, As Needed, Last Dose Taken:    · 	tiotropium 18 mcg inhalation capsule: 1 cap(s) inhaled once a day, Last Dose Taken:    · 	pantoprazole 40 mg oral delayed release tablet: 1 tab(s) orally once a day (before a meal), Last Dose Taken:    · 	predniSONE 20 mg oral tablet: 1 tab(s) orally once a day, Last Dose Taken:    · 	insulin lispro 100 units/mL subcutaneous solution: 6 unit(s) subcutaneous 3 times a day (before meals), Last Dose Taken:    · 	escitalopram 5 mg oral tablet: 1 tab(s) orally once a day, Last Dose Taken:    · 	amiodarone 200 mg oral tablet: 1 tab(s) orally once a day, Last Dose Taken:    · 	Eliquis 5 mg oral tablet: 1 tab(s) orally 2 times a day, Last Dose Taken:    · 	Diovan: 160 milligram(s) orally once a day, Last Dose Taken:    · 	Remeron 15 mg oral tablet: 1 tab(s) orally once a day (at bedtime), Last Dose Taken:    · 	Cymbalta: 90 milligram(s) orally once a day, Last Dose Taken:    APTT value, not to exceed 100 seconds. Recommended therapeutic Refludan  range is 1.5 to 2.5 times the90.5: baseline APTT. sec (12.22.15 @ 16:24)              · 	Levemir 100 units/mL subcutaneous solution: 20 unit(s) subcutaneous once a day, Last Dose Taken:    · 	amLODIPine 5 mg oral tablet: 1 tab(s) orally once a day, Last Dose Taken:    · 	aspirin 81 mg oral tablet: 1 tab(s) orally once a day, Last Dose Taken:    · 	metoprolol succinate 50 mg oral tablet, extended release: 1 tab(s) orally once a day, Last Dose Taken:    · 	simvastatin 20 mg oral tablet: 1 tab(s) orally once a day (at bedtime), Last Dose Taken:         .       Patient History:    Past Medical History:  Arteriosclerotic Heart Disease (ASHD)    Atrial fibrillation, unspecified type    Cigarette Smoker    Diabetes    HTN (Hypertension)    Overweight    Pacemaker    PVD (Peripheral Vascular Disease)    Renal Cancer.     Past Surgical History:  Hip Replacement    History of Total Knee Replacement    S/P carpal tunnel release    S/P Cholecystectomy    s/p colon resection    S/P Hernia Surgery    S/P left cataract extraction    S/P Nephrectomy  right.     Family History:  Sibling  Still living? No  Family history of pancreatic cancer, Age at diagnosis: Age Unknown.     Social History:  Social History (marital status, living situation, occupation, tobacco use, alcohol and drug use, and sexual history): smoker	     Tobacco Screening:  · Core Measure Site	No	  · Has the patient used tobacco in the past 30 days?	Yes 	    Risk Assessment:    Present on Admission:  Deep Venous Thrombosis	no 	  Pulmonary Embolus	no

## 2017-09-19 NOTE — PHYSICAL THERAPY INITIAL EVALUATION ADULT - GAIT DEVIATIONS NOTED, PT EVAL
decreased velocity of limb motion/decreased stride length/decreased weight-shifting ability/decreased step length/decreased lukasz/increased time in double stance

## 2017-09-19 NOTE — CONSULT NOTE ADULT - PROBLEM SELECTOR RECOMMENDATION 3
BP uncontrolled. Restart home medications. Valsartan held for now as patient on IV diuretic therapy.
Patient likely has small paraproteins due to her underlying comorbidities. Full workup requested to rule out underlying plasma cell dyscrasia. She should follow ni 2 weeks as outpt for discussing the results.

## 2017-09-19 NOTE — PROGRESS NOTE ADULT - SUBJECTIVE AND OBJECTIVE BOX
Chief complaint  Patient is a 86y old  Female who presents with a chief complaint of chest pressure (15 Sep 2017 11:40)   Review of systems  Patient in bed, comfortable, no fever,  no hypoglycemia.    Labs and Fingersticks    CAPILLARY BLOOD GLUCOSE  293 (19 Sep 2017 16:58)  243 (19 Sep 2017 11:35)  129 (19 Sep 2017 07:53)  233 (18 Sep 2017 21:15)  268 (18 Sep 2017 16:08)  253 (18 Sep 2017 11:27)  139 (18 Sep 2017 07:07)  255 (17 Sep 2017 21:01)  293 (17 Sep 2017 16:20)  438 (17 Sep 2017 11:10)  254 (17 Sep 2017 07:56)  187 (16 Sep 2017 21:10)    Anion Gap, Serum: 16 (09-19 @ 07:33)  Anion Gap, Serum: 17 (09-18 @ 09:36)      Calcium, Total Serum: 8.8 (09-19 @ 07:33)  Calcium, Total Serum: 9.2 (09-18 @ 09:36)          09-19    138  |  99  |  56<H>  ----------------------------<  129<H>  4.0   |  23  |  1.72<H>    Ca    8.8      19 Sep 2017 07:33                          12.2   12.23 )-----------( 323      ( 19 Sep 2017 07:32 )             38.8     Medications  MEDICATIONS  (STANDING):  aspirin  chewable 81 milliGRAM(s) Oral daily  simvastatin 20 milliGRAM(s) Oral at bedtime  ALBUTerol/ipratropium for Nebulization 3 milliLiter(s) Nebulizer every 6 hours  ALBUTerol    90 MICROgram(s) HFA Inhaler 1 Puff(s) Inhalation every 4 hours  tiotropium 18 MICROgram(s) Capsule 1 Capsule(s) Inhalation daily  amiodarone    Tablet 100 milliGRAM(s) Oral daily  clonazePAM Tablet 0.5 milliGRAM(s) Oral two times a day  DULoxetine 60 milliGRAM(s) Oral daily  mirtazapine 15 milliGRAM(s) Oral at bedtime  ARIPiprazole 2 milliGRAM(s) Oral daily  metoprolol succinate  milliGRAM(s) Oral daily  docusate sodium 100 milliGRAM(s) Oral daily  apixaban 2.5 milliGRAM(s) Oral every 12 hours  insulin lispro (HumaLOG) corrective regimen sliding scale   SubCutaneous three times a day before meals  dextrose 5%. 1000 milliLiter(s) (50 mL/Hr) IV Continuous <Continuous>  dextrose 50% Injectable 12.5 Gram(s) IV Push once  dextrose 50% Injectable 25 Gram(s) IV Push once  dextrose 50% Injectable 25 Gram(s) IV Push once  influenza   Vaccine 0.5 milliLiter(s) IntraMuscular once  buDESOnide 160 MICROgram(s)/formoterol 4.5 MICROgram(s) Inhaler 2 Puff(s) Inhalation two times a day  predniSONE   Tablet 50 milliGRAM(s) Oral daily  insulin glargine Injectable (LANTUS) 32 Unit(s) SubCutaneous at bedtime  insulin lispro Injectable (HumaLOG) 12 Unit(s) SubCutaneous three times a day before meals      Physical Exam  General: Patient comfortable in bed  Vital Signs Last 12 Hrs  T(F): 98 (09-19-17 @ 12:42), Max: 98 (09-19-17 @ 12:42)  HR: 68 (09-19-17 @ 12:42) (61 - 68)  BP: 122/64 (09-19-17 @ 12:42) (122/64 - 133/58)  BP(mean): --  RR: 18 (09-19-17 @ 12:42) (18 - 18)  SpO2: 96% (09-19-17 @ 12:42) (94% - 96%)  Neck: No palpable thyroid nodules.  CVS: S1S2, No murmurs  Respiratory: No wheezing, no crepitations  GI: Abdomen soft, bowel sounds positive  Musculoskeletal: Positive edema lower extremities bilaterally  Skin: No skin rashes, no ecchymosis    Diagnostics    Hemoglobin A1C, Whole Blood: Routine  Cancel Reason: -Lab Reordered (09-15 @ 13:44)

## 2017-09-19 NOTE — PROCEDURE NOTE - ADDITIONAL PROCEDURE DETAILS
1) Indication for interrogation: Decompensated heart failure  2) Presenting rhythm: A pace/ V sense at 60s  3) Measured data WNL, NL PM function, Pt is not PM dependent  4) Since last interrogation on 8/15/17, stored data revealed no tachyarrhythmia for review.  5) Total AT/AF burden: 0%, Total A pace: 74%, Total V pace: 2%  6) Changes made: none    [id number]

## 2017-09-19 NOTE — PROGRESS NOTE ADULT - SUBJECTIVE AND OBJECTIVE BOX
[facility] NEPHROLOGY- PROGRESS NOTE    86 year old female h/o HTN and DM, RCC s/p right nephrectomy presents with chest pain and dyspnea. Nephrology consulted for elevated Scr.    REVIEW OF SYSTEMS:  Gen: no changes in weight  Cards: no chest pain  Resp: + dyspnea resolved  GI: no nausea or vomiting or diarrhea  Vascular: no LE edema    amoxicillin (Flushing; Vomiting; Nausea)      Hospital Medications: Medications reviewed      VITALS:  T(F): 97.7 (09-19-17 @ 05:26), Max: 98 (09-18-17 @ 20:16)  HR: 64 (09-19-17 @ 05:26)  BP: 117/73 (09-19-17 @ 05:26)  RR: 18 (09-19-17 @ 05:26)  SpO2: 95% (09-19-17 @ 05:26)  Wt(kg): --    09-18 @ 07:01  -  09-19 @ 07:00  --------------------------------------------------------  IN: 720 mL / OUT: 1850 mL / NET: -1130 mL    09-19 @ 07:01  -  09-19 @ 09:39  --------------------------------------------------------  IN: 0 mL / OUT: 420 mL / NET: -420 mL      PHYSICAL EXAM:    Gen: NAD, calm  Cards: RRR, +S1/S2, +ZAKI  Resp: CTA B/L  GI: soft, NT/ND, NABS  Vascular: no LE edema B/L      LABS:  09-18    135  |  95<L>  |  59<H>  ----------------------------<  147<H>  4.0   |  23  |  2.07<H>    Ca    9.2      18 Sep 2017 09:36      Creatinine Trend: 2.07 <--, 1.78 <--, 1.51 <--, 1.63 <--                        12.5   18.27 )-----------( 342      ( 18 Sep 2017 09:24 )             39.2

## 2017-09-19 NOTE — PHYSICAL THERAPY INITIAL EVALUATION ADULT - GENERAL OBSERVATIONS, REHAB EVAL
Pt received in bed, A&Ox4, following simple commands, +supplemental 02, somewhat impulsive with poor safety awareness at times

## 2017-09-19 NOTE — CHART NOTE - NSCHARTNOTEFT_GEN_A_CORE
Called from stress lab to verify with pulm for clearance of stress 2/2 to pt copd and vasodilators used in test  I spoke to Dr. Rendon , pt is cleared for stress

## 2017-09-19 NOTE — PROGRESS NOTE ADULT - PROBLEM SELECTOR PLAN 4
Improved. Holding lasix given ARUN. Will restart on oral therapy as needed (possible 40 mg every other day starting tomorrow?). Monitor UO.

## 2017-09-19 NOTE — PROGRESS NOTE ADULT - COMMENTS
No new somatic complaints at present time
No somatic complaints at present time
No new somatic complaints at present time

## 2017-09-19 NOTE — CONSULT NOTE ADULT - PROBLEM SELECTOR RECOMMENDATION 2
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 Check UA, spot urine TP/CR and proteinuria work up (hepatitis B, C, paraproteinemia). Valsartan held on admission. Can continue to hold and restart once patient on PO diuretic therapy for anti-proteinuric effect.
Due to COPD exacerbation. Management per medicine.

## 2017-09-19 NOTE — CONSULT NOTE ADULT - ASSESSMENT
86 year old female h/o HTN and DM, RCC s/p right nephrectomy presents with chest pain and dyspnea. Nephrology consulted for elevated Scr.
86 yr old F with COPD, CHF A FIB, HTN ,admitted with shortness of breath and cheat pain. Going for stress test tomorrow. Incidentally found to have positive SPEP.
A/P; 86 yr old F with COPD, CHF A FIB, HTN , here  with SOB and chest pressure for 1 day, orthopnea. Chest pressure is substernal, more like discomfort 5/10 with no radiation, resolved with  sitting up right.  Denies fever, cough , N/V/Diarrhea, HA or any other associated symptoms.   Pt with mild leukocytosis of 12K.  CXR:  Left basilar atelectasis. Otherwise, clear lungs.  Given Levaquin and Vancomycin in ER.  No obvious PNA, possibly COPD exacerbation.  D/c vancomycin. Continue Levaquin.  Repeat cbc with diff in am.  Pulmonary evaluation.

## 2017-09-19 NOTE — CONSULT NOTE ADULT - PROBLEM SELECTOR PROBLEM 3
Hypertensive kidney disease with chronic kidney disease, stage 1 through stage 4 or unspecified 
Monoclonal paraproteinemia

## 2017-09-19 NOTE — PROGRESS NOTE ADULT - GUM GEN PE MLT EXAM PC
Normal genitalia; no lesions; no discharge

## 2017-09-19 NOTE — CONSULT NOTE ADULT - PROBLEM SELECTOR RECOMMENDATION 9
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 Renal function appears to be stable and at baseline. Avoid nephrotoxins.
Continue current management and followup with her PCP post discharge.

## 2017-09-19 NOTE — PROGRESS NOTE ADULT - SUBJECTIVE AND OBJECTIVE BOX
INTERVAL HISTORY: No chest pain, SOB, paralysis, fevers, vomiting    TELEMETRY: no evnets  	  MEDICATIONS:  amiodarone    Tablet 100 milliGRAM(s) Oral daily  metoprolol succinate  milliGRAM(s) Oral daily        PHYSICAL EXAM:  T(C): 36.5 (09-19-17 @ 05:26), Max: 36.7 (09-18-17 @ 20:16)  HR: 61 (09-19-17 @ 09:55) (61 - 75)  BP: 133/58 (09-19-17 @ 09:55) (117/65 - 133/58)  RR: 18 (09-19-17 @ 05:26) (18 - 18)  SpO2: 94% (09-19-17 @ 09:55) (93% - 95%)  Wt(kg): --  I&O's Summary    18 Sep 2017 07:01  -  19 Sep 2017 07:00  --------------------------------------------------------  IN: 720 mL / OUT: 1850 mL / NET: -1130 mL    19 Sep 2017 07:01  -  19 Sep 2017 11:58  --------------------------------------------------------  IN: 0 mL / OUT: 420 mL / NET: -420 mL          Appearance: Normal	  HEENT:    PERRL, EOMI	  Lymphatic: No lymphadenopathy  Cardiovascular: Normal S1 S2, No JVD  Respiratory: Lungs clear to auscultation	  Psychiatry: Alert, Mood & affect appropriate  Gastrointestinal:  Soft, Non-tender, + BS	  Skin: No rashes, No cyanosis  Neurologic: Non-focal  Extremities:  No edema of LE  Vascular: Peripheral pulses palpable  bilaterally      CARDIAC MARKERS:                     12.2   12.23 )-----------( 323      ( 19 Sep 2017 07:32 )             38.8     09-19    138  |  99  |  56<H>  ----------------------------<  129<H>  4.0   |  23  |  1.72<H>    Ca    8.8      19 Sep 2017 07:33      proBNP:   Lipid Profile:   HgA1c:   TSH:     Labs, imaging and telemetry personally reviewed      ASSESSMENT/PLAN: 	      Discussed with primary team.         Waylon Cates DO [facility]  Cardiovascular Medicine  [phone] INTERVAL HISTORY: No chest pain, SOB, paralysis, fevers, vomiting    TELEMETRY: no evnets  	  MEDICATIONS:  amiodarone    Tablet 100 milliGRAM(s) Oral daily  metoprolol succinate  milliGRAM(s) Oral daily        PHYSICAL EXAM:  T(C): 36.5 (09-19-17 @ 05:26), Max: 36.7 (09-18-17 @ 20:16)  HR: 61 (09-19-17 @ 09:55) (61 - 75)  BP: 133/58 (09-19-17 @ 09:55) (117/65 - 133/58)  RR: 18 (09-19-17 @ 05:26) (18 - 18)  SpO2: 94% (09-19-17 @ 09:55) (93% - 95%)  Wt(kg): --  I&O's Summary    18 Sep 2017 07:01  -  19 Sep 2017 07:00  --------------------------------------------------------  IN: 720 mL / OUT: 1850 mL / NET: -1130 mL    19 Sep 2017 07:01  -  19 Sep 2017 11:58  --------------------------------------------------------  IN: 0 mL / OUT: 420 mL / NET: -420 mL          Appearance: Normal	  HEENT:    PERRL, EOMI	  Lymphatic: No lymphadenopathy  Cardiovascular: Normal S1 S2, No JVD  Respiratory: Lungs clear to auscultation	  Psychiatry: Alert, Mood & affect appropriate  Gastrointestinal:  Soft, Non-tender, + BS	  Skin: No rashes, No cyanosis  Neurologic: Non-focal  Extremities:  No edema of LE  Vascular: Peripheral pulses palpable  bilaterally      CARDIAC MARKERS:                     12.2   12.23 )-----------( 323      ( 19 Sep 2017 07:32 )             38.8     09-19    138  |  99  |  56<H>  ----------------------------<  129<H>  4.0   |  23  |  1.72<H>    Ca    8.8      19 Sep 2017 07:33        Labs, imaging and telemetry personally reviewed      ASSESSMENT/PLAN: 	  86 yr old F with COPD, CHF,  A FIB on eliquis, PPM, HTN , HLD,  here  with SOB and chest pressure with decompensated heart failure and ? super imposed PNA  1. New acute Systolic Heart failure - continue to Hold lasix for now, good urine outpt. Continue with bb and ASA  - TTE with EF 35% with  mid to distal inferior wall, mid to distal septal wall and apex. Pt denies h/o HF  - Will need NM stress test to evaluate for ischemia vs infarct, ordered ?today    2. AF - continue with Eliquis for AC and home dose Amio  - EP to interrogate device, discussed with EP NP- Pt of Dr Mon    3. HTN - continue with home meds now of Toprol and Norvasc  - will consider adding ARB if ok with renal and CKD stable    4. HLD - continue with statin    5. Pulmonary - Abx/COPD management per pulm/primary team      Discussed with primary team.         Waylon Cates DO [facility]  Cardiovascular Medicine  [phone] INTERVAL HISTORY: No chest pain, SOB, paralysis, fevers, vomiting    TELEMETRY: no evnets  	  MEDICATIONS:  amiodarone    Tablet 100 milliGRAM(s) Oral daily  metoprolol succinate  milliGRAM(s) Oral daily        PHYSICAL EXAM:  T(C): 36.5 (09-19-17 @ 05:26), Max: 36.7 (09-18-17 @ 20:16)  HR: 61 (09-19-17 @ 09:55) (61 - 75)  BP: 133/58 (09-19-17 @ 09:55) (117/65 - 133/58)  RR: 18 (09-19-17 @ 05:26) (18 - 18)  SpO2: 94% (09-19-17 @ 09:55) (93% - 95%)  Wt(kg): --  I&O's Summary    18 Sep 2017 07:01  -  19 Sep 2017 07:00  --------------------------------------------------------  IN: 720 mL / OUT: 1850 mL / NET: -1130 mL    19 Sep 2017 07:01  -  19 Sep 2017 11:58  --------------------------------------------------------  IN: 0 mL / OUT: 420 mL / NET: -420 mL          Appearance: Normal	  HEENT:    PERRL, EOMI	  Lymphatic: No lymphadenopathy  Cardiovascular: Normal S1 S2, No JVD  Respiratory: Lungs clear to auscultation	  Psychiatry: Alert, Mood & affect appropriate  Gastrointestinal:  Soft, Non-tender, + BS	  Skin: No rashes, No cyanosis  Neurologic: Non-focal  Extremities:  No edema of LE  Vascular: Peripheral pulses palpable  bilaterally      CARDIAC MARKERS:                     12.2   12.23 )-----------( 323      ( 19 Sep 2017 07:32 )             38.8     09-19    138  |  99  |  56<H>  ----------------------------<  129<H>  4.0   |  23  |  1.72<H>    Ca    8.8      19 Sep 2017 07:33        Labs, imaging and telemetry personally reviewed      ASSESSMENT/PLAN: 	  86 yr old F with COPD, CHF,  A FIB on eliquis, PPM, HTN , HLD,  here  with SOB and chest pressure with decompensated heart failure and ? super imposed PNA  1. New acute Systolic Heart failure - continue to Hold lasix for now, good urine outpt. Continue with bb and ASA  - TTE with EF 35% with  mid to distal inferior wall, mid to distal septal wall and apex. Pt denies h/o HF  - Will need NM stress test to evaluate for ischemia vs infarct, ordered ?today  - Call placed to Dr Filiberto fang to discuss above    2. AF - continue with Eliquis for AC and home dose Amio  - EP to interrogate device, discussed with EP NP- Pt of Dr Mon    3. HTN - continue with home meds now of Toprol and Norvasc  - will consider adding ARB if ok with renal and CKD stable    4. HLD - continue with statin    5. Pulmonary - Abx/COPD management per pulm/primary team      Discussed with primary team.         Waylon Cates DO [facility]  Cardiovascular Medicine  [phone]

## 2017-09-19 NOTE — PHYSICAL THERAPY INITIAL EVALUATION ADULT - DISCHARGE DISPOSITION, PT EVAL
subacute rehab. if pt/family wants home; home, resume home PT services, assist for all functional mobility and ADL's, pt owns rolling walker./rehabilitation facility

## 2017-09-19 NOTE — PROGRESS NOTE ADULT - SUBJECTIVE AND OBJECTIVE BOX
Afebrile,  leukocytosis decreased to 12k.      Allergies    amoxicillin (Flushing; Vomiting; Nausea)      MEDICATIONS  (STANDING):  aspirin  chewable 81 milliGRAM(s) Oral daily  simvastatin 20 milliGRAM(s) Oral at bedtime  ALBUTerol/ipratropium for Nebulization 3 milliLiter(s) Nebulizer every 6 hours  ALBUTerol    90 MICROgram(s) HFA Inhaler 1 Puff(s) Inhalation every 4 hours  tiotropium 18 MICROgram(s) Capsule 1 Capsule(s) Inhalation daily  amiodarone    Tablet 100 milliGRAM(s) Oral daily  clonazePAM Tablet 0.5 milliGRAM(s) Oral two times a day  DULoxetine 60 milliGRAM(s) Oral daily  mirtazapine 15 milliGRAM(s) Oral at bedtime  ARIPiprazole 2 milliGRAM(s) Oral daily  metoprolol succinate  milliGRAM(s) Oral daily  docusate sodium 100 milliGRAM(s) Oral daily  apixaban 2.5 milliGRAM(s) Oral every 12 hours  insulin lispro (HumaLOG) corrective regimen sliding scale   SubCutaneous three times a day before meals  dextrose 5%. 1000 milliLiter(s) (50 mL/Hr) IV Continuous <Continuous>  dextrose 50% Injectable 12.5 Gram(s) IV Push once  dextrose 50% Injectable 25 Gram(s) IV Push once  dextrose 50% Injectable 25 Gram(s) IV Push once  influenza   Vaccine 0.5 milliLiter(s) IntraMuscular once  buDESOnide 160 MICROgram(s)/formoterol 4.5 MICROgram(s) Inhaler 2 Puff(s) Inhalation two times a day  predniSONE   Tablet 50 milliGRAM(s) Oral daily  insulin glargine Injectable (LANTUS) 32 Unit(s) SubCutaneous at bedtime  insulin lispro Injectable (HumaLOG) 12 Unit(s) SubCutaneous three times a day before meals    MEDICATIONS  (PRN):  dextrose Gel 1 Dose(s) Oral once PRN Blood Glucose LESS THAN 70 milliGRAM(s)/deciLiter  glucagon  Injectable 1 milliGRAM(s) IntraMuscular once PRN Glucose <70 milliGRAM(s)/deciLiter          LABS:  CBC Full  -  ( 19 Sep 2017 07:32 )  WBC Count : 12.23 K/uL  Hemoglobin : 12.2 g/dL  Hematocrit : 38.8 %  Platelet Count - Automated : 323 K/uL  Mean Cell Volume : 86.8 fl  Mean Cell Hemoglobin : 27.3 pg  Mean Cell Hemoglobin Concentration : 31.4 gm/dL  Auto Neutrophil # : x  Auto Lymphocyte # : x  Auto Monocyte # : x  Auto Eosinophil # : x  Auto Basophil # : x  Auto Neutrophil % : x  Auto Lymphocyte % : x  Auto Monocyte % : x  Auto Eosinophil % : x  Auto Basophil % : x    09-19    138  |  99  |  56<H>  ----------------------------<  129<H>  4.0   |  23  |  1.72<H>    Ca    8.8      19 Sep 2017 07:33            Blood Gas Venous - Lactate: 1.3 mmoL/L (09-15 @ 08:40)      Vital Signs:    Vital Signs Last 24 Hrs  T(C): 36.8 (19 Sep 2017 20:00), Max: 36.8 (19 Sep 2017 20:00)  T(F): 98.2 (19 Sep 2017 20:00), Max: 98.2 (19 Sep 2017 20:00)  HR: 76 (19 Sep 2017 20:00) (61 - 76)  BP: 108/62 (19 Sep 2017 20:00) (108/62 - 133/58)  BP(mean): --  RR: 18 (19 Sep 2017 20:00) (18 - 18)  SpO2: 98% (19 Sep 2017 20:00) (94% - 98%)

## 2017-09-19 NOTE — PROGRESS NOTE ADULT - SUBJECTIVE AND OBJECTIVE BOX
Patient is a 86y old  Female who presents with a chief complaint of chest pressure (15 Sep 2017 11:40)  NAD      INTERVAL HPI/OVERNIGHT EVENTS:  T(C): 36.7 (17 @ 12:42), Max: 36.7 (17 @ 20:16)  HR: 68 (17 @ 12:42) (61 - 68)  BP: 122/64 (17 @ 12:42) (117/65 - 133/58)  RR: 18 (17 @ 12:42) (18 - 18)  SpO2: 96% (17 @ 12:42) (94% - 96%)  Wt(kg): --  I&O's Summary    18 Sep 2017 07:  -  19 Sep 2017 07:00  --------------------------------------------------------  IN: 720 mL / OUT: 1850 mL / NET: -1130 mL    19 Sep 2017 07:01  -  19 Sep 2017 18:48  --------------------------------------------------------  IN: 480 mL / OUT: 720 mL / NET: -240 mL        LABS:                        12.2   12. )-----------( 323      ( 19 Sep 2017 07:32 )             38.8         138  |  99  |  56<H>  ----------------------------<  129<H>  4.0   |  23  |  1.72<H>    Ca    8.8      19 Sep 2017 07:33        Urinalysis Basic - ( 17 Sep 2017 21:18 )    Color: Yellow / Appearance: Clear / S.010 / pH: x  Gluc: x / Ketone: Negative  / Bili: Negative / Urobili: Negative   Blood: x / Protein: 150 mg/dL / Nitrite: Negative   Leuk Esterase: Negative / RBC: x / WBC 3-5 /HPF   Sq Epi: x / Non Sq Epi: x / Bacteria: x      CAPILLARY BLOOD GLUCOSE  293 (19 Sep 2017 16:58)  243 (19 Sep 2017 11:35)  129 (19 Sep 2017 07:53)  233 (18 Sep 2017 21:15)            Urinalysis Basic - ( 17 Sep 2017 21:18 )    Color: Yellow / Appearance: Clear / S.010 / pH: x  Gluc: x / Ketone: Negative  / Bili: Negative / Urobili: Negative   Blood: x / Protein: 150 mg/dL / Nitrite: Negative   Leuk Esterase: Negative / RBC: x / WBC 3-5 /HPF   Sq Epi: x / Non Sq Epi: x / Bacteria: x        MEDICATIONS  (STANDING):  aspirin  chewable 81 milliGRAM(s) Oral daily  simvastatin 20 milliGRAM(s) Oral at bedtime  ALBUTerol/ipratropium for Nebulization 3 milliLiter(s) Nebulizer every 6 hours  ALBUTerol    90 MICROgram(s) HFA Inhaler 1 Puff(s) Inhalation every 4 hours  tiotropium 18 MICROgram(s) Capsule 1 Capsule(s) Inhalation daily  amiodarone    Tablet 100 milliGRAM(s) Oral daily  clonazePAM Tablet 0.5 milliGRAM(s) Oral two times a day  DULoxetine 60 milliGRAM(s) Oral daily  mirtazapine 15 milliGRAM(s) Oral at bedtime  ARIPiprazole 2 milliGRAM(s) Oral daily  metoprolol succinate  milliGRAM(s) Oral daily  docusate sodium 100 milliGRAM(s) Oral daily  apixaban 2.5 milliGRAM(s) Oral every 12 hours  insulin lispro (HumaLOG) corrective regimen sliding scale   SubCutaneous three times a day before meals  dextrose 5%. 1000 milliLiter(s) (50 mL/Hr) IV Continuous <Continuous>  dextrose 50% Injectable 12.5 Gram(s) IV Push once  dextrose 50% Injectable 25 Gram(s) IV Push once  dextrose 50% Injectable 25 Gram(s) IV Push once  influenza   Vaccine 0.5 milliLiter(s) IntraMuscular once  buDESOnide 160 MICROgram(s)/formoterol 4.5 MICROgram(s) Inhaler 2 Puff(s) Inhalation two times a day  predniSONE   Tablet 50 milliGRAM(s) Oral daily  insulin glargine Injectable (LANTUS) 32 Unit(s) SubCutaneous at bedtime  insulin lispro Injectable (HumaLOG) 12 Unit(s) SubCutaneous three times a day before meals    MEDICATIONS  (PRN):  dextrose Gel 1 Dose(s) Oral once PRN Blood Glucose LESS THAN 70 milliGRAM(s)/deciLiter  glucagon  Injectable 1 milliGRAM(s) IntraMuscular once PRN Glucose <70 milliGRAM(s)/deciLiter          PHYSICAL EXAM:  GENERAL: NAD, well-groomed, well-developed  HEAD:  Atraumatic, Normocephalic  CHEST/LUNG: Clear to percussion bilaterally; No rales, rhonchi, wheezing, or rubs  HEART: Regular rate and rhythm; No murmurs, rubs, or gallops  ABDOMEN: Soft, Nontender, Nondistended; Bowel sounds present  EXTREMITIES:  2+ Peripheral Pulses, No clubbing, cyanosis, or edema  LYMPH: No lymphadenopathy noted  SKIN: No rashes or lesions    Care Discussed with Consultants/Other Providers [ ] YES  [ ] NO

## 2017-09-19 NOTE — PHYSICAL THERAPY INITIAL EVALUATION ADULT - PERTINENT HX OF CURRENT PROBLEM, REHAB EVAL
Pt is 86F admitted 9/15/17 PMHx COPD,DM, CHF, AFIB, HTN, p/w SOB & chest pressure since last night; worse when lying down.

## 2017-09-20 ENCOUNTER — TRANSCRIPTION ENCOUNTER (OUTPATIENT)
Age: 82
End: 2017-09-20

## 2017-09-20 LAB
ANION GAP SERPL CALC-SCNC: 15 MMOL/L — SIGNIFICANT CHANGE UP (ref 5–17)
BUN SERPL-MCNC: 49 MG/DL — HIGH (ref 7–23)
CALCIUM SERPL-MCNC: 8.6 MG/DL — SIGNIFICANT CHANGE UP (ref 8.4–10.5)
CHLORIDE SERPL-SCNC: 99 MMOL/L — SIGNIFICANT CHANGE UP (ref 96–108)
CO2 SERPL-SCNC: 23 MMOL/L — SIGNIFICANT CHANGE UP (ref 22–31)
CREAT SERPL-MCNC: 1.93 MG/DL — HIGH (ref 0.5–1.3)
CULTURE RESULTS: SIGNIFICANT CHANGE UP
CULTURE RESULTS: SIGNIFICANT CHANGE UP
GLUCOSE SERPL-MCNC: 58 MG/DL — LOW (ref 70–99)
HCT VFR BLD CALC: 38.5 % — SIGNIFICANT CHANGE UP (ref 34.5–45)
HGB BLD-MCNC: 12.3 G/DL — SIGNIFICANT CHANGE UP (ref 11.5–15.5)
IGA FLD-MCNC: 151 MG/DL — SIGNIFICANT CHANGE UP (ref 68–378)
IGG FLD-MCNC: 1180 MG/DL — SIGNIFICANT CHANGE UP (ref 694–1618)
IGM SERPL-MCNC: 84 MG/DL — SIGNIFICANT CHANGE UP (ref 40–230)
KAPPA LC SER QL IFE: 4.33 MG/DL — HIGH (ref 0.33–1.94)
KAPPA LC SER QL IFE: 4.33 MG/DL — HIGH (ref 0.33–1.94)
KAPPA/LAMBDA FREE LIGHT CHAIN RATIO, SERUM: 1.66 RATIO — HIGH (ref 0.26–1.65)
KAPPA/LAMBDA FREE LIGHT CHAIN RATIO, SERUM: 1.66 RATIO — HIGH (ref 0.26–1.65)
LAMBDA LC SER QL IFE: 2.61 MG/DL — SIGNIFICANT CHANGE UP (ref 0.57–2.63)
LAMBDA LC SER QL IFE: 2.61 MG/DL — SIGNIFICANT CHANGE UP (ref 0.57–2.63)
MCHC RBC-ENTMCNC: 27.7 PG — SIGNIFICANT CHANGE UP (ref 27–34)
MCHC RBC-ENTMCNC: 31.9 GM/DL — LOW (ref 32–36)
MCV RBC AUTO: 86.7 FL — SIGNIFICANT CHANGE UP (ref 80–100)
PLATELET # BLD AUTO: 327 K/UL — SIGNIFICANT CHANGE UP (ref 150–400)
POTASSIUM SERPL-MCNC: 4 MMOL/L — SIGNIFICANT CHANGE UP (ref 3.5–5.3)
POTASSIUM SERPL-SCNC: 4 MMOL/L — SIGNIFICANT CHANGE UP (ref 3.5–5.3)
RBC # BLD: 4.44 M/UL — SIGNIFICANT CHANGE UP (ref 3.8–5.2)
RBC # FLD: 15.3 % — HIGH (ref 10.3–14.5)
SODIUM SERPL-SCNC: 137 MMOL/L — SIGNIFICANT CHANGE UP (ref 135–145)
SPECIMEN SOURCE: SIGNIFICANT CHANGE UP
SPECIMEN SOURCE: SIGNIFICANT CHANGE UP
WBC # BLD: 11.58 K/UL — HIGH (ref 3.8–10.5)
WBC # FLD AUTO: 11.58 K/UL — HIGH (ref 3.8–10.5)

## 2017-09-20 PROCEDURE — 93018 CV STRESS TEST I&R ONLY: CPT

## 2017-09-20 PROCEDURE — 78452 HT MUSCLE IMAGE SPECT MULT: CPT | Mod: 26

## 2017-09-20 PROCEDURE — 93016 CV STRESS TEST SUPVJ ONLY: CPT

## 2017-09-20 RX ORDER — INSULIN LISPRO 100/ML
7 VIAL (ML) SUBCUTANEOUS
Qty: 0 | Refills: 0 | Status: DISCONTINUED | OUTPATIENT
Start: 2017-09-20 | End: 2017-09-21

## 2017-09-20 RX ORDER — ASPIRIN/CALCIUM CARB/MAGNESIUM 324 MG
1 TABLET ORAL
Qty: 0 | Refills: 0 | COMMUNITY
Start: 2017-09-20

## 2017-09-20 RX ORDER — INSULIN GLARGINE 100 [IU]/ML
25 INJECTION, SOLUTION SUBCUTANEOUS AT BEDTIME
Qty: 0 | Refills: 0 | Status: DISCONTINUED | OUTPATIENT
Start: 2017-09-20 | End: 2017-09-21

## 2017-09-20 RX ORDER — VALSARTAN 80 MG/1
1 TABLET ORAL
Qty: 0 | Refills: 0 | COMMUNITY

## 2017-09-20 RX ORDER — AMLODIPINE BESYLATE 2.5 MG/1
1 TABLET ORAL
Qty: 0 | Refills: 0 | COMMUNITY

## 2017-09-20 RX ADMIN — APIXABAN 2.5 MILLIGRAM(S): 2.5 TABLET, FILM COATED ORAL at 17:44

## 2017-09-20 RX ADMIN — Medication 100 MILLIGRAM(S): at 12:21

## 2017-09-20 RX ADMIN — APIXABAN 2.5 MILLIGRAM(S): 2.5 TABLET, FILM COATED ORAL at 05:33

## 2017-09-20 RX ADMIN — Medication 3 MILLILITER(S): at 12:21

## 2017-09-20 RX ADMIN — Medication 100 MILLIGRAM(S): at 05:33

## 2017-09-20 RX ADMIN — MIRTAZAPINE 15 MILLIGRAM(S): 45 TABLET, ORALLY DISINTEGRATING ORAL at 22:16

## 2017-09-20 RX ADMIN — Medication 0.5 MILLIGRAM(S): at 17:47

## 2017-09-20 RX ADMIN — Medication 7 UNIT(S): at 17:44

## 2017-09-20 RX ADMIN — Medication 12 UNIT(S): at 12:20

## 2017-09-20 RX ADMIN — DULOXETINE HYDROCHLORIDE 60 MILLIGRAM(S): 30 CAPSULE, DELAYED RELEASE ORAL at 22:17

## 2017-09-20 RX ADMIN — BUDESONIDE AND FORMOTEROL FUMARATE DIHYDRATE 2 PUFF(S): 160; 4.5 AEROSOL RESPIRATORY (INHALATION) at 17:45

## 2017-09-20 RX ADMIN — Medication 2: at 17:44

## 2017-09-20 RX ADMIN — Medication 81 MILLIGRAM(S): at 12:21

## 2017-09-20 RX ADMIN — Medication 12 UNIT(S): at 08:22

## 2017-09-20 RX ADMIN — ARIPIPRAZOLE 2 MILLIGRAM(S): 15 TABLET ORAL at 06:16

## 2017-09-20 RX ADMIN — Medication 50 MILLIGRAM(S): at 05:33

## 2017-09-20 RX ADMIN — INSULIN GLARGINE 25 UNIT(S): 100 INJECTION, SOLUTION SUBCUTANEOUS at 22:26

## 2017-09-20 RX ADMIN — SIMVASTATIN 20 MILLIGRAM(S): 20 TABLET, FILM COATED ORAL at 22:17

## 2017-09-20 RX ADMIN — BUDESONIDE AND FORMOTEROL FUMARATE DIHYDRATE 2 PUFF(S): 160; 4.5 AEROSOL RESPIRATORY (INHALATION) at 05:35

## 2017-09-20 RX ADMIN — Medication 0.5 MILLIGRAM(S): at 05:33

## 2017-09-20 RX ADMIN — AMIODARONE HYDROCHLORIDE 100 MILLIGRAM(S): 400 TABLET ORAL at 05:33

## 2017-09-20 RX ADMIN — Medication 3 MILLILITER(S): at 17:45

## 2017-09-20 NOTE — PROGRESS NOTE ADULT - SUBJECTIVE AND OBJECTIVE BOX
Chief complaint  Patient is a 86y old  Female who presents with a chief complaint of chest pressure (15 Sep 2017 11:40)   Review of systems  Patient in bed, comfortable, no fever,  no hypoglycemia.    Labs and Fingersticks    CAPILLARY BLOOD GLUCOSE  124 (20 Sep 2017 11:41)  92 (20 Sep 2017 07:23)  240 (19 Sep 2017 21:02)  293 (19 Sep 2017 16:58)  243 (19 Sep 2017 11:35)  129 (19 Sep 2017 07:53)  233 (18 Sep 2017 21:15)  268 (18 Sep 2017 16:08)  253 (18 Sep 2017 11:27)  139 (18 Sep 2017 07:07)  255 (17 Sep 2017 21:01)  293 (17 Sep 2017 16:20)    Anion Gap, Serum: 15 (09-20 @ 07:30)  Anion Gap, Serum: 16 (09-19 @ 07:33)      Calcium, Total Serum: 8.6 (09-20 @ 07:30)  Calcium, Total Serum: 8.8 (09-19 @ 07:33)          09-20    137  |  99  |  49<H>  ----------------------------<  58<L>  4.0   |  23  |  1.93<H>    Ca    8.6      20 Sep 2017 07:30                          12.3   11.58 )-----------( 327      ( 20 Sep 2017 07:25 )             38.5     Medications  MEDICATIONS  (STANDING):  aspirin  chewable 81 milliGRAM(s) Oral daily  simvastatin 20 milliGRAM(s) Oral at bedtime  ALBUTerol/ipratropium for Nebulization 3 milliLiter(s) Nebulizer every 6 hours  ALBUTerol    90 MICROgram(s) HFA Inhaler 1 Puff(s) Inhalation every 4 hours  tiotropium 18 MICROgram(s) Capsule 1 Capsule(s) Inhalation daily  amiodarone    Tablet 100 milliGRAM(s) Oral daily  clonazePAM Tablet 0.5 milliGRAM(s) Oral two times a day  DULoxetine 60 milliGRAM(s) Oral daily  mirtazapine 15 milliGRAM(s) Oral at bedtime  ARIPiprazole 2 milliGRAM(s) Oral daily  metoprolol succinate  milliGRAM(s) Oral daily  docusate sodium 100 milliGRAM(s) Oral daily  apixaban 2.5 milliGRAM(s) Oral every 12 hours  insulin lispro (HumaLOG) corrective regimen sliding scale   SubCutaneous three times a day before meals  dextrose 5%. 1000 milliLiter(s) (50 mL/Hr) IV Continuous <Continuous>  dextrose 50% Injectable 12.5 Gram(s) IV Push once  dextrose 50% Injectable 25 Gram(s) IV Push once  dextrose 50% Injectable 25 Gram(s) IV Push once  influenza   Vaccine 0.5 milliLiter(s) IntraMuscular once  buDESOnide 160 MICROgram(s)/formoterol 4.5 MICROgram(s) Inhaler 2 Puff(s) Inhalation two times a day  predniSONE   Tablet 50 milliGRAM(s) Oral daily  insulin glargine Injectable (LANTUS) 25 Unit(s) SubCutaneous at bedtime  insulin lispro Injectable (HumaLOG) 7 Unit(s) SubCutaneous three times a day before meals      Physical Exam  General: Patient comfortable in bed  Vital Signs Last 12 Hrs  T(F): 98.6 (09-20-17 @ 11:43), Max: 98.6 (09-20-17 @ 11:43)  HR: 61 (09-20-17 @ 11:43) (61 - 63)  BP: 122/75 (09-20-17 @ 11:43) (122/75 - 157/85)  BP(mean): --  RR: 18 (09-20-17 @ 11:43) (18 - 18)  SpO2: 98% (09-20-17 @ 11:43) (98% - 100%)  Neck: No palpable thyroid nodules.  CVS: S1S2, No murmurs  Respiratory: No wheezing, no crepitations  GI: Abdomen soft, bowel sounds positive  Musculoskeletal: Positive edema lower extremities bilaterally  Skin: No skin rashes, no ecchymosis    Diagnostics    Hemoglobin A1C, Whole Blood: Routine  Cancel Reason: -Lab Reordered (09-15 @ 13:44)

## 2017-09-20 NOTE — DISCHARGE NOTE ADULT - HOME CARE AGENCY
[facility] [phone] RN to provide skilled services, home physical therapy, home health aide evaluation

## 2017-09-20 NOTE — DISCHARGE NOTE ADULT - HOSPITAL COURSE
86 yr old F with COPD, CHF,  A FIB on eliquis, PPM, HTN , HLD,  here  with SOB and chest pressure with decompensated heart failure and ? super imposed PNA  1. New acute Systolic Heart failure - continue to Hold lasix for now, good urine outpt. Continue with bb and ASA  - TTE with EF 35% with  mid to distal inferior wall, mid to distal septal wall and apex. Pt denies h/o HF  -  NM stress test to evaluate for ischemia vs infarct deepti  - Call placed to Dr Filiberto fang and discussed above    2. AF - continue with Eliquis for AC and home dose Amio  - device interrogation unremarkable    3. HTN - continue with home meds now of Toprol and Norvasc  - will consider adding ARB if ok with renal and CKD stable for cardiomyopathy/systolic heart failure    4. HLD - continue with statin    5. Pulmonary - Abx/COPD management per pulm/primary team    Discussed with primary team. 86 yr old F with COPD, CHF,  A FIB on eliquis, PPM, HTN , HLD,  here  with SOB and chest pressure with decompensated heart failure and ? super imposed PNA  - New acute Systolic Heart failure - continue to Hold lasix for now, good urine outpt. Continue with bb and ASA  - TTE with EF 35% with  mid to distal inferior wall, mid to distal septal wall and apex. Pt denies h/o HF  - AF - continue with Eliquis for AC and home dose Amio  - device interrogation unremarkable  - HTN - continue with home meds now of Toprol and Norvasc  - Added back low dose ace on discharge   - HLD - continue with statin  - Pulmonary - Abx/COPD management per pulm/primary team

## 2017-09-20 NOTE — PROGRESS NOTE ADULT - SUBJECTIVE AND OBJECTIVE BOX
INTERVAL HISTORY: No chest pain, SOB, paralysis, fevers, vomiting    TELEMETRY: sr  	  MEDICATIONS:  amiodarone    Tablet 100 milliGRAM(s) Oral daily  metoprolol succinate  milliGRAM(s) Oral daily        PHYSICAL EXAM:  T(C): 37 (09-20-17 @ 11:43), Max: 37 (09-20-17 @ 11:43)  HR: 61 (09-20-17 @ 11:43) (61 - 76)  BP: 122/75 (09-20-17 @ 11:43) (108/62 - 157/85)  RR: 18 (09-20-17 @ 11:43) (18 - 18)  SpO2: 98% (09-20-17 @ 11:43) (98% - 100%)  Wt(kg): --  I&O's Summary    19 Sep 2017 07:01  -  20 Sep 2017 07:00  --------------------------------------------------------  IN: 720 mL / OUT: 720 mL / NET: 0 mL          Appearance: Normal	  HEENT:    PERRL, EOMI	  Lymphatic: No lymphadenopathy  Cardiovascular: Normal S1 S2, No JVD  Respiratory: Lungs clear to auscultation	  Psychiatry: Alert, Mood & affect appropriate  Gastrointestinal:  Soft, Non-tender, + BS	  Skin: No rashes, No cyanosis  Neurologic: Non-focal  Extremities:  No edema of LE  Vascular: Peripheral pulses palpable  bilaterally      CARDIAC MARKERS:                                  12.3   11.58 )-----------( 327      ( 20 Sep 2017 07:25 )             38.5     09-20    137  |  99  |  49<H>  ----------------------------<  58<L>  4.0   |  23  |  1.93<H>    Ca    8.6      20 Sep 2017 07:30      proBNP:   Lipid Profile:   HgA1c:   TSH:     Labs, imaging and telemetry personally reviewed      ASSESSMENT/PLAN: 	  86 yr old F with COPD, CHF,  A FIB on eliquis, PPM, HTN , HLD,  here  with SOB and chest pressure with decompensated heart failure and ? super imposed PNA  1. New acute Systolic Heart failure - continue to Hold lasix for now, good urine outpt. Continue with bb and ASA  - TTE with EF 35% with  mid to distal inferior wall, mid to distal septal wall and apex. Pt denies h/o HF  -  NM stress test to evaluate for ischemia vs infarct deepti  - Call placed to Dr Filiberto fang and discussed above    2. AF - continue with Eliquis for AC and home dose Amio  - device interrogation unremarkable    3. HTN - continue with home meds now of Toprol and Norvasc  - will consider adding ARB if ok with renal and CKD stable for cardiomyopathy/systolic heart failure    4. HLD - continue with statin    5. Pulmonary - Abx/COPD management per pulm/primary team    Discussed with primary team.         Waylon Cates DO [facility]  Cardiovascular Medicine  [phone]

## 2017-09-20 NOTE — PROGRESS NOTE ADULT - RESPIRATORY
Breath Sounds equal & clear to percussion & auscultation, no accessory muscle use
detailed exam
detailed exam

## 2017-09-20 NOTE — DISCHARGE NOTE ADULT - MEDICATION SUMMARY - MEDICATIONS TO TAKE
I will START or STAY ON the medications listed below when I get home from the hospital:    predniSONE 50 mg oral tablet  -- 1 tab(s) by mouth once a day , ends on 09/22/17  -- It is very important that you take or use this exactly as directed.  Do not skip doses or discontinue unless directed by your doctor.  Obtain medical advice before taking any non-prescription drugs as some may affect the action of this medication.  Take with food or milk.    -- Indication: For Monoclonal paraproteinemia    aspirin 81 mg oral tablet, chewable  -- 1 tab(s) by mouth once a day  -- Indication: For Other chest pain    lisinopril 2.5 mg oral tablet  -- 1 tab(s) by mouth once a day   -- Do not take this drug if you are pregnant.  It is very important that you take or use this exactly as directed.  Do not skip doses or discontinue unless directed by your doctor.  Some non-prescription drugs may aggravate your condition.  Read all labels carefully.  If a warning appears, check with your doctor before taking.    -- Indication: For Hypertensive kidney disease with chronic kidney disease, stage 1 through stage 4 or unspecified     amiodarone 200 mg oral tablet  -- 0.5 tab(s) by mouth once a day  -- Indication: For Atrial fibrillation, unspecified type    Eliquis 2.5 mg oral tablet  -- 1 tab(s) by mouth 2 times a day  -- Indication: For Atrial fibrillation, unspecified type    KlonoPIN 0.5 mg oral tablet  -- 1 tablet by mouth 1-2 times daily, As Needed  -- Indication: For Anticonvulsants     Cymbalta 60 mg oral delayed release capsule  -- 1 cap(s) by mouth once a day  -- Indication: For Anti depression     Remeron 15 mg oral tablet  -- 1 tab(s) by mouth once a day (at bedtime)  -- Indication: For Anti depression     HumaLOG KwikPen 100 units/mL subcutaneous solution  -- 4 unit(s) subcutaneous 3 times a day  -- Indication: For Diabetes    Toujeo SoloStar 300 units/mL subcutaneous solution  -- 12 unit(s) subcutaneous once a day  -- Indication: For Diabetes    simvastatin 20 mg oral tablet  -- 1 tab(s) by mouth once a day (at bedtime)  -- Indication: For Hld    Abilify 2 mg oral tablet  -- 1 tab(s) by mouth once a day  -- Indication: For Antipsychotics     Toprol- mg oral tablet, extended release  -- 1 tab(s) by mouth once a day  -- Indication: For Htn    Advair Diskus 250 mcg-50 mcg inhalation powder  -- 1 puff(s) inhaled 2 times a day  -- Indication: For Dyspnea    Colace 100 mg oral capsule  -- 4 cap(s) by mouth once a day (at night)  -- Indication: For Constipation I will START or STAY ON the medications listed below when I get home from the hospital:    predniSONE 50 mg oral tablet  -- 1 tab(s) by mouth once a day , ends on 09/22/17  -- It is very important that you take or use this exactly as directed.  Do not skip doses or discontinue unless directed by your doctor.  Obtain medical advice before taking any non-prescription drugs as some may affect the action of this medication.  Take with food or milk.    -- Indication: For Monoclonal paraproteinemia    aspirin 81 mg oral tablet, chewable  -- 1 tab(s) by mouth once a day  -- Indication: For Other chest pain    lisinopril 2.5 mg oral tablet  -- 1 tab(s) by mouth once a day   -- Do not take this drug if you are pregnant.  It is very important that you take or use this exactly as directed.  Do not skip doses or discontinue unless directed by your doctor.  Some non-prescription drugs may aggravate your condition.  Read all labels carefully.  If a warning appears, check with your doctor before taking.    -- Indication: For Hypertension, unspecified type    amiodarone 200 mg oral tablet  -- 0.5 tab(s) by mouth once a day  -- Indication: For Chronic diastolic congestive heart failure    Eliquis 2.5 mg oral tablet  -- 1 tab(s) by mouth 2 times a day  -- Indication: For Atrial fibrillation, unspecified type    KlonoPIN 0.5 mg oral tablet  -- 1 tablet by mouth 1-2 times daily, As Needed  -- Indication: For Depression    Cymbalta 60 mg oral delayed release capsule  -- 1 cap(s) by mouth once a day  -- Indication: For Depression    Remeron 15 mg oral tablet  -- 1 tab(s) by mouth once a day (at bedtime)  -- Indication: For Depression    HumaLOG KwikPen 100 units/mL subcutaneous solution  -- 4 unit(s) subcutaneous 3 times a day  -- Indication: For Diabetes    Toujeo SoloStar 300 units/mL subcutaneous solution  -- 12 unit(s) subcutaneous once a day  -- Indication: For Diabetes    simvastatin 20 mg oral tablet  -- 1 tab(s) by mouth once a day (at bedtime)  -- Indication: For HLD    Abilify 2 mg oral tablet  -- 1 tab(s) by mouth once a day  -- Indication: For Depression    Toprol- mg oral tablet, extended release  -- 1 tab(s) by mouth once a day  -- Indication: For Hypertension, unspecified type    Advair Diskus 250 mcg-50 mcg inhalation powder  -- 1 puff(s) inhaled 2 times a day  -- Indication: For Chronic obstructive pulmonary disease, unspecified COPD type    Colace 100 mg oral capsule  -- 4 cap(s) by mouth once a day (at night)  -- Indication: For CONSTIPATION

## 2017-09-20 NOTE — DISCHARGE NOTE ADULT - MEDICATION SUMMARY - MEDICATIONS TO STOP TAKING
I will STOP taking the medications listed below when I get home from the hospital:    amLODIPine 5 mg oral tablet  -- 1 tab(s) by mouth once a day    metoprolol succinate 50 mg oral tablet, extended release  -- 1 tab(s) by mouth once a day    Diovan  -- 160 milligram(s) by mouth once a day    predniSONE 20 mg oral tablet  -- 1 tab(s) by mouth once a day    amiodarone 200 mg oral tablet  -- 0.5 tab(s) by mouth once a day    amLODIPine 2.5 mg oral tablet  -- 1 tab(s) by mouth once a day    Diovan 160 mg oral tablet  -- 1 tab(s) by mouth once a day    Eliquis 2.5 mg oral tablet  -- 1 tab(s) by mouth 2 times a day    Toprol- mg oral tablet, extended release  -- 1 tab(s) by mouth once a day I will STOP taking the medications listed below when I get home from the hospital:    amLODIPine 5 mg oral tablet  -- 1 tab(s) by mouth once a day    metoprolol succinate 50 mg oral tablet, extended release  -- 1 tab(s) by mouth once a day    amiodarone 200 mg oral tablet  -- 1 tab(s) by mouth once a day    Eliquis 5 mg oral tablet  -- 1 tab(s) by mouth 2 times a day    Diovan  -- 160 milligram(s) by mouth once a day    predniSONE 20 mg oral tablet  -- 1 tab(s) by mouth once a day    amLODIPine 2.5 mg oral tablet  -- 1 tab(s) by mouth once a day    Diovan 160 mg oral tablet  -- 1 tab(s) by mouth once a day

## 2017-09-20 NOTE — PROGRESS NOTE ADULT - SUBJECTIVE AND OBJECTIVE BOX
Patient seen this morning, no overnight events.  Vital Signs Last 24 Hrs  T(C): 36.8 (20 Sep 2017 20:07), Max: 37 (20 Sep 2017 11:43)  T(F): 98.3 (20 Sep 2017 20:07), Max: 98.6 (20 Sep 2017 11:43)  HR: 60 (20 Sep 2017 20:07) (60 - 63)  BP: 119/68 (20 Sep 2017 20:07) (119/68 - 157/85)  BP(mean): --  RR: 18 (20 Sep 2017 20:07) (18 - 18)  SpO2: 94% (20 Sep 2017 20:07) (94% - 100%)                          12.3   11.58 )-----------( 327      ( 20 Sep 2017 07:25 )             38.5       09-20    137  |  99  |  49<H>  ----------------------------<  58<L>  4.0   |  23  |  1.93<H>    Ca    8.6      20 Sep 2017 07:30                diagnosis

## 2017-09-20 NOTE — DISCHARGE NOTE ADULT - PATIENT PORTAL LINK FT
“You can access the [employer] Patient Portal, offered by [facility], by registering with the following website: [email or web address]”

## 2017-09-20 NOTE — PROGRESS NOTE ADULT - SUBJECTIVE AND OBJECTIVE BOX
[facility] NEPHROLOGY- PROGRESS NOTE    86 year old female h/o HTN and DM, RCC s/p right nephrectomy presents with chest pain and dyspnea. Nephrology consulted for elevated Scr.    REVIEW OF SYSTEMS:  Gen: no changes in weight  Cards: no chest pain  Resp: + dyspnea resolved  GI: no nausea or vomiting or diarrhea  Vascular: no LE edema    amoxicillin (Flushing; Vomiting; Nausea)      Hospital Medications: Medications reviewed      VITALS:  T(F): 98 (09-20-17 @ 04:59), Max: 98.2 (09-19-17 @ 20:00)  HR: 63 (09-20-17 @ 04:59)  BP: 157/85 (09-20-17 @ 04:59)  RR: 18 (09-20-17 @ 04:59)  SpO2: 100% (09-20-17 @ 04:59)  Wt(kg): --    09-19 @ 07:01  -  09-20 @ 07:00  --------------------------------------------------------  IN: 720 mL / OUT: 720 mL / NET: 0 mL      PHYSICAL EXAM:    Gen: NAD, calm  Cards: RRR, +S1/S2, +ZAKI  Resp: CTA B/L  GI: soft, NT/ND, NABS  Vascular: no LE edema B/L      LABS:  09-20    137  |  99  |  49<H>  ----------------------------<  58<L>  4.0   |  23  |  1.93<H>    Ca    8.6      20 Sep 2017 07:30      Creatinine Trend: 1.93 <--, 1.72 <--, 2.07 <--, 1.78 <--, 1.51 <--, 1.63 <--                        12.3   11.58 )-----------( 327      ( 20 Sep 2017 07:25 )             38.5

## 2017-09-20 NOTE — DISCHARGE NOTE ADULT - SECONDARY DIAGNOSIS.
Other specified diabetes mellitus with stage 4 chronic kidney disease, unspecified long term insulin use status Hypertension, unspecified type Atrial fibrillation, unspecified type Chronic obstructive pulmonary disease, unspecified COPD type

## 2017-09-20 NOTE — DISCHARGE NOTE ADULT - ADDITIONAL INSTRUCTIONS
Please f/u with PMD in 1-2 weeks                        cardiologist in 1-2 weeks                        Nephrologist in 1-2 weeks                        Hematologist 1-3 weeks Please f/u with PMD in 1-2 weeks                        cardiologist in 1-2 weeks                        Nephrologist in 1-2 weeks                        Hematologist in 1-3 weeks                         Hematologist 1-3 weeks

## 2017-09-20 NOTE — DISCHARGE NOTE ADULT - CARE PROVIDERS DIRECT ADDRESSES
[email or web address],DirectAddress_Unknown,DirectAddress_Unknown

## 2017-09-20 NOTE — DISCHARGE NOTE ADULT - CARE PROVIDER_API CALL
Julio Mckeon), Internal Medicine  [address] [place] [address]  Phone: [phone]  Fax: [phone]    Waylon Cates (), Internal Medicine  Phone: [phone]    Jayson Vernon), Critical Care Medicine; Internal Medicine; Pulmonary Disease; Sleep Medicine  [address] [place] [address]  Phone: [phone]  Fax: [phone]

## 2017-09-20 NOTE — PROGRESS NOTE ADULT - SUBJECTIVE AND OBJECTIVE BOX
Patient is a 86y old  Female who presents with a chief complaint of chest pressure (15 Sep 2017 11:40)  NAD      INTERVAL HPI/OVERNIGHT EVENTS:  T(C): 37 (09-20-17 @ 11:43), Max: 37 (09-20-17 @ 11:43)  HR: 61 (09-20-17 @ 11:43) (61 - 76)  BP: 122/75 (09-20-17 @ 11:43) (108/62 - 157/85)  RR: 18 (09-20-17 @ 11:43) (18 - 18)  SpO2: 98% (09-20-17 @ 11:43) (98% - 100%)  Wt(kg): --  I&O's Summary    19 Sep 2017 07:01  -  20 Sep 2017 07:00  --------------------------------------------------------  IN: 720 mL / OUT: 720 mL / NET: 0 mL        LABS:                        12.3   11.58 )-----------( 327      ( 20 Sep 2017 07:25 )             38.5     09-20    137  |  99  |  49<H>  ----------------------------<  58<L>  4.0   |  23  |  1.93<H>    Ca    8.6      20 Sep 2017 07:30          CAPILLARY BLOOD GLUCOSE  124 (20 Sep 2017 11:41)  92 (20 Sep 2017 07:23)  240 (19 Sep 2017 21:02)  293 (19 Sep 2017 16:58)                MEDICATIONS  (STANDING):  aspirin  chewable 81 milliGRAM(s) Oral daily  simvastatin 20 milliGRAM(s) Oral at bedtime  ALBUTerol/ipratropium for Nebulization 3 milliLiter(s) Nebulizer every 6 hours  ALBUTerol    90 MICROgram(s) HFA Inhaler 1 Puff(s) Inhalation every 4 hours  tiotropium 18 MICROgram(s) Capsule 1 Capsule(s) Inhalation daily  amiodarone    Tablet 100 milliGRAM(s) Oral daily  clonazePAM Tablet 0.5 milliGRAM(s) Oral two times a day  DULoxetine 60 milliGRAM(s) Oral daily  mirtazapine 15 milliGRAM(s) Oral at bedtime  ARIPiprazole 2 milliGRAM(s) Oral daily  metoprolol succinate  milliGRAM(s) Oral daily  docusate sodium 100 milliGRAM(s) Oral daily  apixaban 2.5 milliGRAM(s) Oral every 12 hours  insulin lispro (HumaLOG) corrective regimen sliding scale   SubCutaneous three times a day before meals  dextrose 5%. 1000 milliLiter(s) (50 mL/Hr) IV Continuous <Continuous>  dextrose 50% Injectable 12.5 Gram(s) IV Push once  dextrose 50% Injectable 25 Gram(s) IV Push once  dextrose 50% Injectable 25 Gram(s) IV Push once  influenza   Vaccine 0.5 milliLiter(s) IntraMuscular once  buDESOnide 160 MICROgram(s)/formoterol 4.5 MICROgram(s) Inhaler 2 Puff(s) Inhalation two times a day  predniSONE   Tablet 50 milliGRAM(s) Oral daily  insulin glargine Injectable (LANTUS) 25 Unit(s) SubCutaneous at bedtime  insulin lispro Injectable (HumaLOG) 7 Unit(s) SubCutaneous three times a day before meals    MEDICATIONS  (PRN):  dextrose Gel 1 Dose(s) Oral once PRN Blood Glucose LESS THAN 70 milliGRAM(s)/deciLiter  glucagon  Injectable 1 milliGRAM(s) IntraMuscular once PRN Glucose <70 milliGRAM(s)/deciLiter          PHYSICAL EXAM:  GENERAL: NAD, well-groomed, well-developed  HEAD:  Atraumatic, Normocephalic  CHEST/LUNG: Clear to percussion bilaterally; No rales, rhonchi, wheezing, or rubs  HEART: Regular rate and rhythm; No murmurs, rubs, or gallops  ABDOMEN: Soft, Nontender, Nondistended; Bowel sounds present  EXTREMITIES:  2+ Peripheral Pulses, No clubbing, cyanosis, or edema  LYMPH: No lymphadenopathy noted  SKIN: No rashes or lesions    Care Discussed with Consultants/Other Providers [+ ] YES  [ ] NO

## 2017-09-20 NOTE — DISCHARGE NOTE ADULT - PLAN OF CARE
Symptoms resolving Weigh yourself daily.  If you gain 3lbs in 3 days, or 5lbs in a week call your Health Care Provider.  Do not eat or drink foods containing more than 2000mg of salt (sodium) in your diet every day.  Call your Health Care Provider if you have any swelling or increased swelling in your feet, ankles, and/or stomach.  Take all of your medication as directed.  If you become dizzy call your Health Care Provider. HgA1C this admission.  Make sure you get your HgA1c checked every three months.  If you take oral diabetes medications, check your blood glucose two times a day.  If you take insulin, check your blood glucose before meals and at bedtime.  It's important not to skip any meals.  Keep a log of your blood glucose results and always take it with you to your doctor appointments.  Keep a list of your current medications including injectables and over the counter medications and bring this medication list with you to all your doctor appointments.  If you have not seen your ophthalmologist this year call for appointment.  Check your feet daily for redness, sores, or openings. Do not self treat. If no improvement in two days call your primary care physician for an appointment.  Low blood sugar (hypoglycemia) is a blood sugar below 70mg/dl. Check your blood sugar if you feel signs/symptoms of hypoglycemia. If your blood sugar is below 70 take 15 grams of carbohydrates (ex 4 oz of apple juice, 3-4 glucose tablets, or 4-6 oz of regular soda) wait 15 minutes and repeat blood sugar to make sure it comes up above 70.  If your blood sugar is above 70 and you are due for a meal, have a meal.  If you are not due for a meal have a snack.  This snack helps keeps your blood sugar at a safe range. Low salt diet  Activity as tolerated.  Take all medication as prescribed.  Follow up with your medical doctor for routine blood pressure monitoring at your next visit.  Notify your doctor if you have any of the following symptoms:   Dizziness, Lightheadedness, Blurry vision, Headache, Chest pain, Shortness of breath Atrial fibrillation is the most common heart rhythm problem.  The condition puts you at risk for has stroke and heart attack  It helps if you control your blood pressure, not drink more than 1-2 alcohol drinks per day, cut down on caffeine, getting treatment for over active thyroid gland, and get regular exercise  Call your doctor if you feel your heart racing or beating unusually, chest tightness or pain, lightheaded, faint, shortness of breath especially with exercise  It is important to take your heart medication as prescribed  You may be on anticoagulation which is very important to take as directed - you may need blood work to monitor drug levels Please take all meds as needed

## 2017-09-20 NOTE — PROGRESS NOTE ADULT - SUBJECTIVE AND OBJECTIVE BOX
PULMONARY PROGRESS NOTE    MARCEL AGOSTO  MRN-[id number]    Patient is a 86y old  Female who presents with a chief complaint of chest pressure (15 Sep 2017 11:40)      HPI:  breathing comfortably, no cough/wheeze, no chest pain.  awaiting cardiac stress test    ROS:  otherwise negative    MEDICATIONS  (STANDING):  aspirin  chewable 81 milliGRAM(s) Oral daily  simvastatin 20 milliGRAM(s) Oral at bedtime  ALBUTerol/ipratropium for Nebulization 3 milliLiter(s) Nebulizer every 6 hours  ALBUTerol    90 MICROgram(s) HFA Inhaler 1 Puff(s) Inhalation every 4 hours  tiotropium 18 MICROgram(s) Capsule 1 Capsule(s) Inhalation daily  amiodarone    Tablet 100 milliGRAM(s) Oral daily  clonazePAM Tablet 0.5 milliGRAM(s) Oral two times a day  DULoxetine 60 milliGRAM(s) Oral daily  mirtazapine 15 milliGRAM(s) Oral at bedtime  ARIPiprazole 2 milliGRAM(s) Oral daily  metoprolol succinate  milliGRAM(s) Oral daily  docusate sodium 100 milliGRAM(s) Oral daily  apixaban 2.5 milliGRAM(s) Oral every 12 hours  insulin lispro (HumaLOG) corrective regimen sliding scale   SubCutaneous three times a day before meals  dextrose 5%. 1000 milliLiter(s) (50 mL/Hr) IV Continuous <Continuous>  dextrose 50% Injectable 12.5 Gram(s) IV Push once  dextrose 50% Injectable 25 Gram(s) IV Push once  dextrose 50% Injectable 25 Gram(s) IV Push once  influenza   Vaccine 0.5 milliLiter(s) IntraMuscular once  buDESOnide 160 MICROgram(s)/formoterol 4.5 MICROgram(s) Inhaler 2 Puff(s) Inhalation two times a day  predniSONE   Tablet 50 milliGRAM(s) Oral daily  insulin glargine Injectable (LANTUS) 32 Unit(s) SubCutaneous at bedtime  insulin lispro Injectable (HumaLOG) 12 Unit(s) SubCutaneous three times a day before meals    MEDICATIONS  (PRN):  dextrose Gel 1 Dose(s) Oral once PRN Blood Glucose LESS THAN 70 milliGRAM(s)/deciLiter  glucagon  Injectable 1 milliGRAM(s) IntraMuscular once PRN Glucose <70 milliGRAM(s)/deciLiter      Vital Signs Last 24 Hrs  T(C): 37 (20 Sep 2017 11:43), Max: 37 (20 Sep 2017 11:43)  T(F): 98.6 (20 Sep 2017 11:43), Max: 98.6 (20 Sep 2017 11:43)  HR: 61 (20 Sep 2017 11:43) (61 - 76)  BP: 122/75 (20 Sep 2017 11:43) (108/62 - 157/85)  BP(mean): --  RR: 18 (20 Sep 2017 11:43) (18 - 18)  SpO2: 98% (20 Sep 2017 11:43) (96% - 100%)    GENERAL: The patient is awake and alert in no apparent distress.     LUNGS: Clear to auscultation bilaterally    ABDOMEN: soft, +BS, nontender    LABS/IMAGING: reviewed                        12.3   11.58 )-----------( 327      ( 20 Sep 2017 07:25 )             38.5     09-20    137  |  99  |  49<H>  ----------------------------<  58<L>  4.0   |  23  |  1.93<H>    Ca    8.6      20 Sep 2017 07:30            < from: Xray Chest 1 View AP/PA (09.15.17 @ 07:45) >  IMPRESSION: Left basilar atelectasis. Otherwise, clear lungs.    < end of copied text >    < from: Transthoracic Echocardiogram (09.18.17 @ 17:36) >  Conclusions:  1. Mitral annular calcification. Mild-moderate mitral  regurgitation.  2. Normal trileaflet aortic valve. Mild aortic  regurgitation.  3. Eccentric left ventricular hypertrophy (dilated left  ventricle with normal relative wall thickness).  4. Moderate-severe segmental left ventricular systolic  dysfunction. The mid to distal inferior wall, mid to distal  septal wall and apex are hypokinetic.  5. Mild diastolic dysfunction (Stage I).  6. Normal right ventricular size and function.    < end of copied text >    PROBLEM LIST:  86y Female with HEALTH ISSUES - PROBLEM Dx:  COPD  CKD (chronic kidney disease), stage IV  Diabetes  Atrial fibrillation    RECS:  -COPD exacerbation: pt asymptomatic, cont symbicort BID while in house (change to advair 250/50 upon dc) , continue spiriva and duonebs, continue prednisone 50mg PO daily x 5 days.    -No evidence of pna clinically or on xray,  antibiotics discontinued  -No pulmonary objection to cardiac stress test  -Wean O2 as tolerated, patient only using this at home during the night time, check ambulatory sat.  -Incentive spirometry, OOB to chair, PT  -No pulmonary objections to discharge home if no cardiac interventions needed.    Joy Abdi MD   [phone]

## 2017-09-20 NOTE — DISCHARGE NOTE ADULT - CARE PLAN
Principal Discharge DX:	Chronic diastolic congestive heart failure  Goal:	Symptoms resolving  Instructions for follow-up, activity and diet:	Weigh yourself daily.  If you gain 3lbs in 3 days, or 5lbs in a week call your Health Care Provider.  Do not eat or drink foods containing more than 2000mg of salt (sodium) in your diet every day.  Call your Health Care Provider if you have any swelling or increased swelling in your feet, ankles, and/or stomach.  Take all of your medication as directed.  If you become dizzy call your Health Care Provider.  Secondary Diagnosis:	Other specified diabetes mellitus with stage 4 chronic kidney disease, unspecified long term insulin use status  Instructions for follow-up, activity and diet:	HgA1C this admission.  Make sure you get your HgA1c checked every three months.  If you take oral diabetes medications, check your blood glucose two times a day.  If you take insulin, check your blood glucose before meals and at bedtime.  It's important not to skip any meals.  Keep a log of your blood glucose results and always take it with you to your doctor appointments.  Keep a list of your current medications including injectables and over the counter medications and bring this medication list with you to all your doctor appointments.  If you have not seen your ophthalmologist this year call for appointment.  Check your feet daily for redness, sores, or openings. Do not self treat. If no improvement in two days call your primary care physician for an appointment.  Low blood sugar (hypoglycemia) is a blood sugar below 70mg/dl. Check your blood sugar if you feel signs/symptoms of hypoglycemia. If your blood sugar is below 70 take 15 grams of carbohydrates (ex 4 oz of apple juice, 3-4 glucose tablets, or 4-6 oz of regular soda) wait 15 minutes and repeat blood sugar to make sure it comes up above 70.  If your blood sugar is above 70 and you are due for a meal, have a meal.  If you are not due for a meal have a snack.  This snack helps keeps your blood sugar at a safe range.  Secondary Diagnosis:	Hypertension, unspecified type  Instructions for follow-up, activity and diet:	Low salt diet  Activity as tolerated.  Take all medication as prescribed.  Follow up with your medical doctor for routine blood pressure monitoring at your next visit.  Notify your doctor if you have any of the following symptoms:   Dizziness, Lightheadedness, Blurry vision, Headache, Chest pain, Shortness of breath  Secondary Diagnosis:	Atrial fibrillation, unspecified type  Instructions for follow-up, activity and diet:	Atrial fibrillation is the most common heart rhythm problem.  The condition puts you at risk for has stroke and heart attack  It helps if you control your blood pressure, not drink more than 1-2 alcohol drinks per day, cut down on caffeine, getting treatment for over active thyroid gland, and get regular exercise  Call your doctor if you feel your heart racing or beating unusually, chest tightness or pain, lightheaded, faint, shortness of breath especially with exercise  It is important to take your heart medication as prescribed  You may be on anticoagulation which is very important to take as directed - you may need blood work to monitor drug levels  Secondary Diagnosis:	Chronic obstructive pulmonary disease, unspecified COPD type  Instructions for follow-up, activity and diet:	Please take all meds as needed

## 2017-09-21 VITALS
TEMPERATURE: 99 F | OXYGEN SATURATION: 96 % | SYSTOLIC BLOOD PRESSURE: 114 MMHG | DIASTOLIC BLOOD PRESSURE: 61 MMHG | HEART RATE: 61 BPM | RESPIRATION RATE: 18 BRPM

## 2017-09-21 LAB
ANION GAP SERPL CALC-SCNC: 11 MMOL/L — SIGNIFICANT CHANGE UP (ref 5–17)
BUN SERPL-MCNC: 52 MG/DL — HIGH (ref 7–23)
CALCIUM SERPL-MCNC: 9 MG/DL — SIGNIFICANT CHANGE UP (ref 8.4–10.5)
CHLORIDE SERPL-SCNC: 101 MMOL/L — SIGNIFICANT CHANGE UP (ref 96–108)
CO2 SERPL-SCNC: 23 MMOL/L — SIGNIFICANT CHANGE UP (ref 22–31)
CREAT ?TM UR-MCNC: 45 MG/DL — SIGNIFICANT CHANGE UP
CREAT SERPL-MCNC: 1.65 MG/DL — HIGH (ref 0.5–1.3)
GLUCOSE SERPL-MCNC: 104 MG/DL — HIGH (ref 70–99)
POTASSIUM SERPL-MCNC: 3.8 MMOL/L — SIGNIFICANT CHANGE UP (ref 3.5–5.3)
POTASSIUM SERPL-SCNC: 3.8 MMOL/L — SIGNIFICANT CHANGE UP (ref 3.5–5.3)
PROT ?TM UR-MCNC: 104 MG/DL — HIGH (ref 0–12)
SODIUM SERPL-SCNC: 135 MMOL/L — SIGNIFICANT CHANGE UP (ref 135–145)
SODIUM UR-SCNC: 22 MMOL/L — SIGNIFICANT CHANGE UP

## 2017-09-21 RX ORDER — LISINOPRIL 2.5 MG/1
1 TABLET ORAL
Qty: 30 | Refills: 0 | OUTPATIENT
Start: 2017-09-21 | End: 2017-10-21

## 2017-09-21 RX ADMIN — Medication 100 MILLIGRAM(S): at 05:51

## 2017-09-21 RX ADMIN — Medication 81 MILLIGRAM(S): at 12:05

## 2017-09-21 RX ADMIN — AMIODARONE HYDROCHLORIDE 100 MILLIGRAM(S): 400 TABLET ORAL at 05:50

## 2017-09-21 RX ADMIN — Medication 100 MILLIGRAM(S): at 12:06

## 2017-09-21 RX ADMIN — Medication 50 MILLIGRAM(S): at 05:51

## 2017-09-21 RX ADMIN — Medication 7 UNIT(S): at 12:16

## 2017-09-21 RX ADMIN — Medication 0.5 MILLIGRAM(S): at 05:49

## 2017-09-21 RX ADMIN — Medication 4: at 12:17

## 2017-09-21 RX ADMIN — Medication 7 UNIT(S): at 08:22

## 2017-09-21 RX ADMIN — APIXABAN 2.5 MILLIGRAM(S): 2.5 TABLET, FILM COATED ORAL at 05:51

## 2017-09-21 RX ADMIN — Medication 3 MILLILITER(S): at 05:51

## 2017-09-21 RX ADMIN — Medication 3 MILLILITER(S): at 12:05

## 2017-09-21 RX ADMIN — ARIPIPRAZOLE 2 MILLIGRAM(S): 15 TABLET ORAL at 05:51

## 2017-09-21 RX ADMIN — BUDESONIDE AND FORMOTEROL FUMARATE DIHYDRATE 2 PUFF(S): 160; 4.5 AEROSOL RESPIRATORY (INHALATION) at 05:52

## 2017-09-21 NOTE — PROGRESS NOTE ADULT - PROBLEM SELECTOR PROBLEM 3
Hypertensive kidney disease with chronic kidney disease, stage 1 through stage 4 or unspecified 
Dyspnea
Hypertensive kidney disease with chronic kidney disease, stage 1 through stage 4 or unspecified 
Monoclonal paraproteinemia

## 2017-09-21 NOTE — PROGRESS NOTE ADULT - SUBJECTIVE AND OBJECTIVE BOX
Chief complaint  Patient is a 86y old  Female who presents with a chief complaint of chest pressure (20 Sep 2017 17:27)   Review of systems  Patient in bed, comfortable, no fever, feeling better, no hypoglycemia.    Labs and Fingersticks    CAPILLARY BLOOD GLUCOSE  338 (21 Sep 2017 11:23)  109 (21 Sep 2017 07:18)  243 (20 Sep 2017 21:13)  202 (20 Sep 2017 17:30)  124 (20 Sep 2017 11:41)  92 (20 Sep 2017 07:23)  240 (19 Sep 2017 21:02)  293 (19 Sep 2017 16:58)  243 (19 Sep 2017 11:35)  129 (19 Sep 2017 07:53)  233 (18 Sep 2017 21:15)    Anion Gap, Serum: 11 (09-21 @ 07:29)  Anion Gap, Serum: 15 (09-20 @ 07:30)      Calcium, Total Serum: 9.0 (09-21 @ 07:29)  Calcium, Total Serum: 8.6 (09-20 @ 07:30)          09-21    135  |  101  |  52<H>  ----------------------------<  104<H>  3.8   |  23  |  1.65<H>    Ca    9.0      21 Sep 2017 07:29                          12.3   11.58 )-----------( 327      ( 20 Sep 2017 07:25 )             38.5     Medications  MEDICATIONS  (STANDING):  aspirin  chewable 81 milliGRAM(s) Oral daily  simvastatin 20 milliGRAM(s) Oral at bedtime  ALBUTerol/ipratropium for Nebulization 3 milliLiter(s) Nebulizer every 6 hours  ALBUTerol    90 MICROgram(s) HFA Inhaler 1 Puff(s) Inhalation every 4 hours  tiotropium 18 MICROgram(s) Capsule 1 Capsule(s) Inhalation daily  amiodarone    Tablet 100 milliGRAM(s) Oral daily  clonazePAM Tablet 0.5 milliGRAM(s) Oral two times a day  DULoxetine 60 milliGRAM(s) Oral daily  mirtazapine 15 milliGRAM(s) Oral at bedtime  ARIPiprazole 2 milliGRAM(s) Oral daily  metoprolol succinate  milliGRAM(s) Oral daily  docusate sodium 100 milliGRAM(s) Oral daily  apixaban 2.5 milliGRAM(s) Oral every 12 hours  insulin lispro (HumaLOG) corrective regimen sliding scale   SubCutaneous three times a day before meals  dextrose 5%. 1000 milliLiter(s) (50 mL/Hr) IV Continuous <Continuous>  dextrose 50% Injectable 12.5 Gram(s) IV Push once  dextrose 50% Injectable 25 Gram(s) IV Push once  dextrose 50% Injectable 25 Gram(s) IV Push once  influenza   Vaccine 0.5 milliLiter(s) IntraMuscular once  buDESOnide 160 MICROgram(s)/formoterol 4.5 MICROgram(s) Inhaler 2 Puff(s) Inhalation two times a day  predniSONE   Tablet 50 milliGRAM(s) Oral daily  insulin glargine Injectable (LANTUS) 25 Unit(s) SubCutaneous at bedtime  insulin lispro Injectable (HumaLOG) 7 Unit(s) SubCutaneous three times a day before meals      Physical Exam  General: Patient comfortable in bed  Vital Signs Last 12 Hrs  T(F): 98.7 (09-21-17 @ 11:23), Max: 98.7 (09-21-17 @ 11:23)  HR: 61 (09-21-17 @ 11:23) (61 - 61)  BP: 114/61 (09-21-17 @ 11:23) (114/61 - 114/61)  BP(mean): --  RR: 18 (09-21-17 @ 11:23) (18 - 18)  SpO2: 96% (09-21-17 @ 11:23) (96% - 96%)  Neck: No palpable thyroid nodules.  CVS: S1S2, No murmurs  Respiratory: No wheezing, no crepitations  GI: Abdomen soft, bowel sounds positive  Musculoskeletal: Positive edema lower extremities bilaterally  Skin: No skin rashes, no ecchymosis    Diagnostics    Hemoglobin A1C, Whole Blood: Routine  Cancel Reason: -Lab Reordered (09-15 @ 13:44)

## 2017-09-21 NOTE — PROGRESS NOTE ADULT - PROBLEM SELECTOR PROBLEM 2
Nephrogenous proteinuria
CKD (chronic kidney disease), stage IV
Nephrogenous proteinuria
Dyspnea on exertion

## 2017-09-21 NOTE — PROGRESS NOTE ADULT - SUBJECTIVE AND OBJECTIVE BOX
Cardiovascular Disease Progress Note    Overnight events: No acute events overnight. Ms. Marcus feels well. She denies chest pain or SOB.  Otherwise review of systems negative    Objective Findings:  T(C): 36.2 (09-21-17 @ 04:37), Max: 37 (09-20-17 @ 11:43)  HR: 61 (09-21-17 @ 04:37) (60 - 61)  BP: 129/76 (09-21-17 @ 04:37) (119/68 - 129/76)  RR: 18 (09-21-17 @ 04:37) (18 - 18)  SpO2: 91% (09-21-17 @ 04:37) (91% - 98%)  Wt(kg): --  Daily     Daily       Physical Exam:  Gen: NAD  HEENT: EOMI  CV: RRR, normal S1 + S2, no m/r/g  Lungs: Decreased breath sounds at bases  Abd: soft, non-tender  Ext: No edema    Telemetry: Sinus 60s; no ectopy    Laboratory Data:                        12.3   11.58 )-----------( 327      ( 20 Sep 2017 07:25 )             38.5     09-20    137  |  99  |  49<H>  ----------------------------<  58<L>  4.0   |  23  |  1.93<H>    Ca    8.6      20 Sep 2017 07:30      Inpatient Medications:  MEDICATIONS  (STANDING):  aspirin  chewable 81 milliGRAM(s) Oral daily  simvastatin 20 milliGRAM(s) Oral at bedtime  ALBUTerol/ipratropium for Nebulization 3 milliLiter(s) Nebulizer every 6 hours  ALBUTerol    90 MICROgram(s) HFA Inhaler 1 Puff(s) Inhalation every 4 hours  tiotropium 18 MICROgram(s) Capsule 1 Capsule(s) Inhalation daily  amiodarone    Tablet 100 milliGRAM(s) Oral daily  clonazePAM Tablet 0.5 milliGRAM(s) Oral two times a day  DULoxetine 60 milliGRAM(s) Oral daily  mirtazapine 15 milliGRAM(s) Oral at bedtime  ARIPiprazole 2 milliGRAM(s) Oral daily  metoprolol succinate  milliGRAM(s) Oral daily  docusate sodium 100 milliGRAM(s) Oral daily  apixaban 2.5 milliGRAM(s) Oral every 12 hours  insulin lispro (HumaLOG) corrective regimen sliding scale   SubCutaneous three times a day before meals  dextrose 5%. 1000 milliLiter(s) (50 mL/Hr) IV Continuous <Continuous>  dextrose 50% Injectable 12.5 Gram(s) IV Push once  dextrose 50% Injectable 25 Gram(s) IV Push once  dextrose 50% Injectable 25 Gram(s) IV Push once  influenza   Vaccine 0.5 milliLiter(s) IntraMuscular once  buDESOnide 160 MICROgram(s)/formoterol 4.5 MICROgram(s) Inhaler 2 Puff(s) Inhalation two times a day  predniSONE   Tablet 50 milliGRAM(s) Oral daily  insulin glargine Injectable (LANTUS) 25 Unit(s) SubCutaneous at bedtime  insulin lispro Injectable (HumaLOG) 7 Unit(s) SubCutaneous three times a day before meals      Assessment: 86 year old woman with COPD, systolic CHF,  A FIB on eliquis, PPM, HTN , HLD,  presents with respiratory distress, chest pain, and ADHF       #Acute decompensated systolic CHF- Class III, Stage C  Clinically improving.  Continue to hold lasix for now, as patient with good urine output.    Nuclear stress test reviewed- mild ischemia in the inferolateral wall.  No plan for angiogram at this time in the setting of ARUN.  Continue ASA, statin, and beta-blocker for now.    #Paroxysmal atrial fibrillation- currently in sinus.  Continue with Eliquis for AC and home dose Amiodarone.    #HTN - BP controlled on current regimen  Eventually restart ARB once okay with nephrology for cardiomyopathy/systolic heart failure    #HLD - continue with statin    #Pulmonary - COPD management per pulm/primary team    Discussed with primary team.     Over 35 minutes spent on total encounter; more than 50% of the visit was spent counseling and/or coordinating care by the attending physician.      Cristi Lock M.D.   Cardiovascular Disease  [phone] Cardiovascular Disease Progress Note  (For Dr. Cates)    Overnight events: No acute events overnight. Ms. Marcus feels well. She denies chest pain or SOB.  Otherwise review of systems negative    Objective Findings:  T(C): 36.2 (09-21-17 @ 04:37), Max: 37 (09-20-17 @ 11:43)  HR: 61 (09-21-17 @ 04:37) (60 - 61)  BP: 129/76 (09-21-17 @ 04:37) (119/68 - 129/76)  RR: 18 (09-21-17 @ 04:37) (18 - 18)  SpO2: 91% (09-21-17 @ 04:37) (91% - 98%)  Wt(kg): --  Daily     Daily       Physical Exam:  Gen: NAD  HEENT: EOMI  CV: RRR, normal S1 + S2, no m/r/g  Lungs: Decreased breath sounds at bases  Abd: soft, non-tender  Ext: No edema    Telemetry: Sinus 60s; no ectopy    Laboratory Data:                        12.3   11.58 )-----------( 327      ( 20 Sep 2017 07:25 )             38.5     09-20    137  |  99  |  49<H>  ----------------------------<  58<L>  4.0   |  23  |  1.93<H>    Ca    8.6      20 Sep 2017 07:30      Inpatient Medications:  MEDICATIONS  (STANDING):  aspirin  chewable 81 milliGRAM(s) Oral daily  simvastatin 20 milliGRAM(s) Oral at bedtime  ALBUTerol/ipratropium for Nebulization 3 milliLiter(s) Nebulizer every 6 hours  ALBUTerol    90 MICROgram(s) HFA Inhaler 1 Puff(s) Inhalation every 4 hours  tiotropium 18 MICROgram(s) Capsule 1 Capsule(s) Inhalation daily  amiodarone    Tablet 100 milliGRAM(s) Oral daily  clonazePAM Tablet 0.5 milliGRAM(s) Oral two times a day  DULoxetine 60 milliGRAM(s) Oral daily  mirtazapine 15 milliGRAM(s) Oral at bedtime  ARIPiprazole 2 milliGRAM(s) Oral daily  metoprolol succinate  milliGRAM(s) Oral daily  docusate sodium 100 milliGRAM(s) Oral daily  apixaban 2.5 milliGRAM(s) Oral every 12 hours  insulin lispro (HumaLOG) corrective regimen sliding scale   SubCutaneous three times a day before meals  dextrose 5%. 1000 milliLiter(s) (50 mL/Hr) IV Continuous <Continuous>  dextrose 50% Injectable 12.5 Gram(s) IV Push once  dextrose 50% Injectable 25 Gram(s) IV Push once  dextrose 50% Injectable 25 Gram(s) IV Push once  influenza   Vaccine 0.5 milliLiter(s) IntraMuscular once  buDESOnide 160 MICROgram(s)/formoterol 4.5 MICROgram(s) Inhaler 2 Puff(s) Inhalation two times a day  predniSONE   Tablet 50 milliGRAM(s) Oral daily  insulin glargine Injectable (LANTUS) 25 Unit(s) SubCutaneous at bedtime  insulin lispro Injectable (HumaLOG) 7 Unit(s) SubCutaneous three times a day before meals      Assessment: 86 year old woman with COPD, systolic CHF,  A FIB on eliquis, PPM, HTN , HLD,  presents with respiratory distress, chest pain, and ADHF       #Acute decompensated systolic CHF- Class III, Stage C  Clinically improving.  Continue to hold lasix for now, as patient with good urine output.    Nuclear stress test reviewed- mild ischemia in the inferolateral wall.  No plan for angiogram at this time in the setting of ARUN.  Continue ASA, statin, and beta-blocker for now.    #Paroxysmal atrial fibrillation- currently in sinus.  Continue with Eliquis for AC and home dose Amiodarone.    #HTN - BP controlled on current regimen  Eventually restart ARB once okay with nephrology for cardiomyopathy/systolic heart failure    #HLD - continue with statin    #Pulmonary - COPD management per pulm/primary team    Discussed with primary team.     Over 35 minutes spent on total encounter; more than 50% of the visit was spent counseling and/or coordinating care by the attending physician.      Cristi Lock M.D.   (For Dr. Cates)  Cardiovascular Disease  [phone] Cardiovascular Disease Progress Note  (For Dr. Cates)    Overnight events: No acute events overnight. Ms. Marcus feels well. She denies chest pain or SOB.  Otherwise review of systems negative    Objective Findings:  T(C): 36.2 (09-21-17 @ 04:37), Max: 37 (09-20-17 @ 11:43)  HR: 61 (09-21-17 @ 04:37) (60 - 61)  BP: 129/76 (09-21-17 @ 04:37) (119/68 - 129/76)  RR: 18 (09-21-17 @ 04:37) (18 - 18)  SpO2: 91% (09-21-17 @ 04:37) (91% - 98%)  Wt(kg): --  Daily     Daily       Physical Exam:  Gen: NAD  HEENT: EOMI  CV: RRR, normal S1 + S2, no m/r/g  Lungs: Decreased breath sounds at bases  Abd: soft, non-tender  Ext: No edema    Telemetry: Sinus 60s; no ectopy    Laboratory Data:                        12.3   11.58 )-----------( 327      ( 20 Sep 2017 07:25 )             38.5     09-20    137  |  99  |  49<H>  ----------------------------<  58<L>  4.0   |  23  |  1.93<H>    Ca    8.6      20 Sep 2017 07:30      Inpatient Medications:  MEDICATIONS  (STANDING):  aspirin  chewable 81 milliGRAM(s) Oral daily  simvastatin 20 milliGRAM(s) Oral at bedtime  ALBUTerol/ipratropium for Nebulization 3 milliLiter(s) Nebulizer every 6 hours  ALBUTerol    90 MICROgram(s) HFA Inhaler 1 Puff(s) Inhalation every 4 hours  tiotropium 18 MICROgram(s) Capsule 1 Capsule(s) Inhalation daily  amiodarone    Tablet 100 milliGRAM(s) Oral daily  clonazePAM Tablet 0.5 milliGRAM(s) Oral two times a day  DULoxetine 60 milliGRAM(s) Oral daily  mirtazapine 15 milliGRAM(s) Oral at bedtime  ARIPiprazole 2 milliGRAM(s) Oral daily  metoprolol succinate  milliGRAM(s) Oral daily  docusate sodium 100 milliGRAM(s) Oral daily  apixaban 2.5 milliGRAM(s) Oral every 12 hours  insulin lispro (HumaLOG) corrective regimen sliding scale   SubCutaneous three times a day before meals  dextrose 5%. 1000 milliLiter(s) (50 mL/Hr) IV Continuous <Continuous>  dextrose 50% Injectable 12.5 Gram(s) IV Push once  dextrose 50% Injectable 25 Gram(s) IV Push once  dextrose 50% Injectable 25 Gram(s) IV Push once  influenza   Vaccine 0.5 milliLiter(s) IntraMuscular once  buDESOnide 160 MICROgram(s)/formoterol 4.5 MICROgram(s) Inhaler 2 Puff(s) Inhalation two times a day  predniSONE   Tablet 50 milliGRAM(s) Oral daily  insulin glargine Injectable (LANTUS) 25 Unit(s) SubCutaneous at bedtime  insulin lispro Injectable (HumaLOG) 7 Unit(s) SubCutaneous three times a day before meals      Assessment: 86 year old woman with COPD, systolic CHF,  A FIB on eliquis, PPM, HTN , HLD,  presents with respiratory distress, chest pain, and ADHF       #Acute decompensated systolic CHF- Class III, Stage C  Clinically improving.  Continue to hold lasix for now, as patient with good urine output.    Nuclear stress test reviewed- mild ischemia in the inferolateral wall.  No plan for angiogram at this time.  Continue ASA, statin, and beta-blocker for now.    #Paroxysmal atrial fibrillation- currently in sinus.  Continue with Eliquis for AC and home dose Amiodarone.    #HTN - BP controlled on current regimen  Eventually restart ARB once okay with nephrology for cardiomyopathy/systolic heart failure    #HLD - continue with statin    #Pulmonary - COPD management per pulm/primary team    Discussed with primary team.     Over 35 minutes spent on total encounter; more than 50% of the visit was spent counseling and/or coordinating care by the attending physician.      Cristi Lock M.D.   (For Dr. Cates)  Cardiovascular Disease  [phone]

## 2017-09-21 NOTE — PROGRESS NOTE ADULT - PROBLEM SELECTOR PLAN 5
as per primary team.
Resolved. Monitor for hypokalemia on IV lasix.
as per primary team.

## 2017-09-21 NOTE — PROGRESS NOTE ADULT - PROBLEM SELECTOR PLAN 2
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 Paraproteinemia work up positive with two IgG lambda bands. Follow up hematology. Valsartan held on admission. Can restart ACE-I at lower dose prior to discharge for anti-proteinuric effect (lisinopril 2.5 mg daily).
CKD: monitor labs, Renal FU
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 F/U paraproteinemia work up. Valsartan held on admission. Would continue to hold and restart at lower dose on 9/18 if renal function stable for anti-proteinuric effect.
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 F/U paraproteinemia work up. Valsartan held on admission. Would continue to hold and restart at lower dose once ARUN resolved for anti-proteinuric effect.
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 Paraproteinemia work up positive with two IgG lambda bands. Follow up hematology. Valsartan held on admission. Would continue to hold and restart at lower dose once ARUN resolved for anti-proteinuric effect.
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 Paraproteinemia work up positive with two IgG lambda bands. Recommend hematology consult. Valsartan held on admission. Would continue to hold and restart at lower dose once ARUN resolved for anti-proteinuric effect.
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 Check UA, spot urine TP/CR and paraproteinemia work up. Valsartan held on admission. Can continue to hold and restart once patient on PO diuretic therapy for anti-proteinuric effect.
Due to COPD,on supplemental oxygen.

## 2017-09-21 NOTE — PROGRESS NOTE ADULT - PROBLEM SELECTOR PLAN 1
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 Renal function improving towards baseline (1.5-1.6). Avoid nephrotoxins.
DARLING home on home dm meds to FU
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 Renal function appears to be stable and at baseline (1.5-1.6). Avoid nephrotoxins.
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 Scr increased above baseline (1.5-1.6) likely secondary to IV diuretics for which lasix discontinued. F/U renal panel today (P). Avoid nephrotoxins.
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 Scr increased above baseline today (1.5-1.6). Check urine sodium and urine creatinine. Avoid nephrotoxins.
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 Scr increased today above baseline (1.5-1.6) likely secondary to IV diuretics for which lasix discontinued. Avoid nephrotoxins.
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 Scr mildly increased today however near baseline (1.5-1.6). UA with significant pyuria. Can check urine culture and follow up infectious disease recommendations. Avoid nephrotoxins.
Will continue current insulin regimen for now. Will continue monitoring FS, log, will Follow up.  Patient counseled for compliance with consistent low carb diet.
Will increase Lantus to 32 units at bed time.  Will increase Humalog to 12 units before each meal in addition to Humalog correction scale coverage.  Patient counseled for compliance with consistent low carb diet.
continue to follow wit nephrology.

## 2017-09-21 NOTE — PROGRESS NOTE ADULT - PROBLEM SELECTOR PROBLEM 5
Other specified diabetes mellitus with stage 4 chronic kidney disease, unspecified long term insulin use status
Hyperkalemia
Other specified diabetes mellitus with stage 4 chronic kidney disease, unspecified long term insulin use status

## 2017-09-21 NOTE — PROGRESS NOTE ADULT - PROBLEM SELECTOR PROBLEM 1
CKD (chronic kidney disease), stage IV
Diabetes
CKD (chronic kidney disease), stage IV

## 2017-09-21 NOTE — PROGRESS NOTE ADULT - SUBJECTIVE AND OBJECTIVE BOX
[facility] NEPHROLOGY- PROGRESS NOTE    86 year old female h/o HTN and DM, RCC s/p right nephrectomy presents with chest pain and dyspnea. Nephrology consulted for elevated Scr.    REVIEW OF SYSTEMS:  Gen: no changes in weight  Cards: no chest pain  Resp: no dyspnea  GI: no nausea or vomiting or diarrhea  Vascular: no LE edema    amoxicillin (Flushing; Vomiting; Nausea)      Hospital Medications: Medications reviewed      VITALS:  T(F): 97.2 (09-21-17 @ 04:37), Max: 98.6 (09-20-17 @ 11:43)  HR: 61 (09-21-17 @ 04:37)  BP: 129/76 (09-21-17 @ 04:37)  RR: 18 (09-21-17 @ 04:37)  SpO2: 91% (09-21-17 @ 04:37)  Wt(kg): --    09-20 @ 07:01  -  09-21 @ 07:00  --------------------------------------------------------  IN: 960 mL / OUT: 600 mL / NET: 360 mL      PHYSICAL EXAM:    Gen: NAD, calm  Cards: RRR, +S1/S2, +ZAKI  Resp: CTA B/L  GI: soft, NT/ND, NABS  Vascular: no LE edema B/L      LABS:  09-20    137  |  99  |  49<H>  ----------------------------<  58<L>  4.0   |  23  |  1.93<H>    Ca    8.6      20 Sep 2017 07:30      Creatinine Trend: 1.93 <--, 1.72 <--, 2.07 <--, 1.78 <--, 1.51 <--, 1.63 <--                        12.3   11.58 )-----------( 327      ( 20 Sep 2017 07:25 )             38.5

## 2017-09-21 NOTE — PROGRESS NOTE ADULT - PROBLEM SELECTOR PROBLEM 4
Edema, lower extremity
Atrial fibrillation, unspecified type
Edema, lower extremity
Other chest pain

## 2017-09-21 NOTE — CHART NOTE - NSCHARTNOTEFT_GEN_A_CORE
Spoke with Dr. Dasilva/ , asked to facilitate patient discharge home. Medication reconciliation reviewed, revised and resolved with Dr. Peñaloza who has medically cleared patient for discharge with follow up advised.    - Stress test reviewed by cards  - Will follow up with Cards.

## 2017-09-21 NOTE — PROGRESS NOTE ADULT - PROBLEM SELECTOR PLAN 4
Improved. Holding lasix as patient appears euvolemic. Elevated BUN likely secondary to steroids. Monitor UO.

## 2017-09-21 NOTE — PROGRESS NOTE ADULT - PROBLEM SELECTOR PLAN 3
BP controlled. Continue with current medications and low sodium diet. Monitor BP.
BP controlled. Continue with current medications and low sodium diet. Can restart low dose ACE-I/ARB on 9/18 if renal function stable. Monitor BP.
BP controlled. Continue with current medications and low sodium diet. Can restart low dose ACE-I/ARB once on PO diuretic therapy. Monitor BP.
BP controlled. Continue with current medications and low sodium diet. Holding valsartan for now. Monitor BP.
Continue Tx fu primary team.
Workup requested.Followup with the results.

## 2017-09-21 NOTE — PROGRESS NOTE ADULT - ASSESSMENT
86 year old female h/o HTN and DM, RCC s/p right nephrectomy presents with chest pain and dyspnea. Nephrology consulted for elevated Scr.
A/P; 86 yr old F with COPD, CHF A FIB, HTN , here  with SOB and chest pressure for 1 day, orthopnea. Chest pressure is substernal, more like discomfort 5/10 with no radiation, resolved with  sitting up right.  Denies fever, cough , N/V/Diarrhea, HA or any other associated symptoms.   CXR:  Left basilar atelectasis. Otherwise, clear lungs.  Given Levaquin and Vancomycin in ER.  No obvious PNA, possibly COPD exacerbation.  CP resolved, no cough.  Leukocytosis is likely due to steroids.  WBC decreased to 12.2K.  Continue off ABs.
86 year old female h/o HTN and DM, RCC s/p right nephrectomy presents with chest pain and dyspnea. Nephrology consulted for elevated Scr.
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  HTN: Controlled, On med.  HLD:  On statin, tolerating.  SOB: Continue Tx FU with primary team
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  HTN: Controlled, On med.  HLD:  On statin, tolerating.  SOB: Continue Tx FU with primary team
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  HTN: Controlled, On med.  HLD:  On statin, tolerating.  SOB: Continue Tx FU with primary team
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  HTN: Controlled, On med.  HLD:  On statin, tolerating.  SOB: Continue Tx FU with primary team
·  Problem: Dyspnea.  Plan: COPD and CHF excaerbation  cw duonebs  solumedrol  pulmonary and cards fu  will monitor  cw antibiotics.       ·  Problem: Atrial fibrillation, unspecified type.  Plan: cw eliquis  will monitor.       ·  Problem: Diabetes.  Plan: diabetic diet  basal/bolus.
·  Problem: Dyspnea.  Plan: COPD and CHF excaerbation  cw duonebs  solumedrol  pulmonary and cards fu  will monitor  cw antibiotics.       ·  Problem: Atrial fibrillation, unspecified type.  Plan: cw eliquis  will monitor.       ·  Problem: Diabetes.  Plan: diabetic diet  basal/bolus.
·  Problem: Dyspnea.  Plan: COPD and CHF excaerbation  cw duonebs  solumedrol  pulmonary and cards fu  will monitor  cw antibiotics.       ·  Problem: Atrial fibrillation, unspecified type.  Plan: cw eliquis  will monitor.       ·  Problem: Diabetes.  Plan: diabetic diet  basal/bolus.     if stable dc planning in am
·  Problem: Dyspnea.  Plan: COPD and CHF excaerbation  cw duonebs  solumedrol  pulmonary and cards fu  will monitor  cw antibiotics.   fu stress test      ·  Problem: Atrial fibrillation, unspecified type.  Plan: cw eliquis  will monitor.       ·  Problem: Diabetes.  Plan: diabetic diet  basal/bolus.
·  Problem: Dyspnea.  Plan: COPD and CHF excaerbation  cw duonebs  solumedrol  pulmonary and cards fu  will monitor  cw antibiotics.   fu stress test      ·  Problem: Atrial fibrillation, unspecified type.  Plan: cw eliquis  will monitor.       ·  Problem: Diabetes.  Plan: diabetic diet  basal/bolus.     dc if stress test normal
A/P; 86 yr old F with COPD, CHF A FIB, HTN , here  with SOB and chest pressure for 1 day, orthopnea. Chest pressure is substernal, more like discomfort 5/10 with no radiation, resolved with  sitting up right.  Denies fever, cough , N/V/Diarrhea, HA or any other associated symptoms.   CXR:  Left basilar atelectasis. Otherwise, clear lungs.  Given Levaquin and Vancomycin in ER.  No obvious PNA, possibly COPD exacerbation.  CP resolved, no cough.  WBC 18K, likely due to steroids.  Continue off ABs.
A/P; 86 yr old F with COPD, CHF A FIB, HTN , here  with SOB and chest pressure for 1 day, orthopnea. Chest pressure is substernal, more like discomfort 5/10 with no radiation, resolved with  sitting up right.  Denies fever, cough , N/V/Diarrhea, HA or any other associated symptoms.   CXR:  Left basilar atelectasis. Otherwise, clear lungs.  Given Levaquin and Vancomycin in ER.  No obvious PNA, possibly COPD exacerbation.  CP resolved, no cough.  Off AB.  WBC 17K, likely due to steroids.  Continue off ABs.
86 yr old F with COPD, CHF A FIB, HTN ,admitted with shortness of breath and cheat pain. Going for stress test tomorrow. Incidentally found to have positive SPEP.Her blood work and urine tests are requested to rule out underlying plasma cell dyscrasias.
A/P; 86 yr old F with COPD, CHF A FIB, HTN , here  with SOB and chest pressure for 1 day, orthopnea. Chest pressure is substernal, more like discomfort 5/10 with no radiation, resolved with  sitting up right.  Denies fever, cough , N/V/Diarrhea, HA or any other associated symptoms.   Pt with mild leukocytosis of 12K.  CXR:  Left basilar atelectasis. Otherwise, clear lungs.  Given Levaquin and Vancomycin in ER.  No obvious PNA, possibly COPD exacerbation.  Feeling much better today.   Pulmonary consult appreciated.  D/c Levaquin

## 2017-09-21 NOTE — PROGRESS NOTE ADULT - PROVIDER SPECIALTY LIST ADULT
Cardiology
Endocrinology
Heme/Onc
Hospitalist
Infectious Disease
Nephrology
Pulmonology
Infectious Disease
Pulmonology
Nephrology

## 2017-09-21 NOTE — PROGRESS NOTE ADULT - ATTENDING COMMENTS
[facility] NEPHROLOGY  Rad Mojica M.D.  Les Mckeon D.O.  Selene Conn M.D.  Odessa Rodriguez, MSN, ANP-C    Telephone: [phone]  Facsimile: [phone]    [address] [place] [address]
[facility] NEPHROLOGY  Rad Mojica M.D.  Les Mckeon D.O.  Selene Conn M.D.  Odessa Rodriguez, MSN, ANP-C    Telephone: [phone]  Facsimile: [phone]    [address] [place] [address]
[facility] NEPHROLOGY  Rad Mojica M.D.  Les Mckeon D.O.  Selene Conn M.D.  Odessa Rodriguez, MSN, ANP-C    Telephone: [phone]  Facsimile: [phone]    [address] [place] [address]
[facility] NEPHROLOGY  Rad Mojica M.D.  Les Mckeon D.O.  Selene Conn M.D.  Odessa Rodriguez, MSN, ANP-C    Telephone: [phone]  Facsimile: [phone]    [address] [place] [address]
[facility] NEPHROLOGY  Rad Mojica M.D.  Les Mckeon D.O.  Selene Conn M.D.  Odessa Rodriguez, MSN, ANP-C    Telephone: [phone]  Facsimile: [phone]    [address] [place] [address]
[facility] NEPHROLOGY  Rad Mojica M.D.  Les Mckeon D.O.  Selene Conn M.D.  Odessa Rodriguez, MSN, ANP-C    Telephone: [phone]  Facsimile: [phone]    [address] [place] [address]

## 2017-09-22 LAB
CREATININE, URINE RESULT: 38 MG/DL — SIGNIFICANT CHANGE UP
PROT ?TM UR-MCNC: 83 MG/DL — HIGH (ref 0–12)

## 2017-09-24 LAB
DEPRECATED KAPPA LC FREE/LAMBDA SER: 14.37 — HIGH (ref 2.04–10.37)
KAPPA LC UR-MCNC: 126 MG/L — HIGH (ref 1.35–24.19)
LAMBDA LC UR-MCNC: 8.77 MG/L — HIGH (ref 0.24–6.66)

## 2017-09-25 LAB
% GAMMA, URINE: 18.2 % — SIGNIFICANT CHANGE UP
ALBUMIN 24H MFR UR ELPH: 62.3 % — SIGNIFICANT CHANGE UP
ALPHA1 GLOB 24H MFR UR ELPH: 5.9 % — SIGNIFICANT CHANGE UP
ALPHA2 GLOB 24H MFR UR ELPH: 6.1 % — SIGNIFICANT CHANGE UP
B-GLOBULIN 24H MFR UR ELPH: 7.5 % — SIGNIFICANT CHANGE UP
INTERPRETATION 24H UR IFE-IMP: SIGNIFICANT CHANGE UP
INTERPRETATION 24H UR IFE-IMP: SIGNIFICANT CHANGE UP
INTERPRETATION SERPL IFE-IMP: SIGNIFICANT CHANGE UP
M PROTEIN 24H UR ELPH-MRATE: SIGNIFICANT CHANGE UP
PROT ?TM UR-MCNC: 83 MG/DL — HIGH (ref 0–12)
PROT PATTERN 24H UR ELPH-IMP: SIGNIFICANT CHANGE UP
TOTAL VOLUME - 24 HOUR: SIGNIFICANT CHANGE UP ML
URINE CREATININE CALCULATION: SIGNIFICANT CHANGE UP G/24 H (ref 0.8–1.8)

## 2017-10-09 ENCOUNTER — INPATIENT (INPATIENT)
Facility: HOSPITAL | Age: 82
LOS: 2 days | Discharge: HOME HEALTH | DRG: 291 | End: 2017-10-12
Attending: INTERNAL MEDICINE | Admitting: INTERNAL MEDICINE
Payer: MEDICARE

## 2017-10-09 ENCOUNTER — TRANSCRIPTION ENCOUNTER (OUTPATIENT)
Age: 82
End: 2017-10-09

## 2017-10-09 VITALS
DIASTOLIC BLOOD PRESSURE: 88 MMHG | TEMPERATURE: 99 F | RESPIRATION RATE: 20 BRPM | OXYGEN SATURATION: 100 % | HEART RATE: 104 BPM | SYSTOLIC BLOOD PRESSURE: 120 MMHG

## 2017-10-09 DIAGNOSIS — D47.2 MONOCLONAL GAMMOPATHY: ICD-10-CM

## 2017-10-09 DIAGNOSIS — I10 ESSENTIAL (PRIMARY) HYPERTENSION: ICD-10-CM

## 2017-10-09 DIAGNOSIS — C64.9 MALIGNANT NEOPLASM OF UNSPECIFIED KIDNEY, EXCEPT RENAL PELVIS: ICD-10-CM

## 2017-10-09 DIAGNOSIS — E11.9 TYPE 2 DIABETES MELLITUS WITHOUT COMPLICATIONS: ICD-10-CM

## 2017-10-09 DIAGNOSIS — I50.9 HEART FAILURE, UNSPECIFIED: ICD-10-CM

## 2017-10-09 DIAGNOSIS — I48.91 UNSPECIFIED ATRIAL FIBRILLATION: ICD-10-CM

## 2017-10-09 DIAGNOSIS — N17.9 ACUTE KIDNEY FAILURE, UNSPECIFIED: ICD-10-CM

## 2017-10-09 DIAGNOSIS — Z95.0 PRESENCE OF CARDIAC PACEMAKER: ICD-10-CM

## 2017-10-09 DIAGNOSIS — I50.43 ACUTE ON CHRONIC COMBINED SYSTOLIC (CONGESTIVE) AND DIASTOLIC (CONGESTIVE) HEART FAILURE: ICD-10-CM

## 2017-10-09 LAB
ALBUMIN SERPL ELPH-MCNC: 3.7 G/DL — SIGNIFICANT CHANGE UP (ref 3.3–5)
ALP SERPL-CCNC: 88 U/L — SIGNIFICANT CHANGE UP (ref 40–120)
ALT FLD-CCNC: 10 U/L RC — SIGNIFICANT CHANGE UP (ref 10–45)
ANION GAP SERPL CALC-SCNC: 16 MMOL/L — SIGNIFICANT CHANGE UP (ref 5–17)
APPEARANCE UR: CLEAR — SIGNIFICANT CHANGE UP
AST SERPL-CCNC: 22 U/L — SIGNIFICANT CHANGE UP (ref 10–40)
BASOPHILS # BLD AUTO: 0.1 K/UL — SIGNIFICANT CHANGE UP (ref 0–0.2)
BASOPHILS NFR BLD AUTO: 0.6 % — SIGNIFICANT CHANGE UP (ref 0–2)
BILIRUB SERPL-MCNC: 0.3 MG/DL — SIGNIFICANT CHANGE UP (ref 0.2–1.2)
BILIRUB UR-MCNC: NEGATIVE — SIGNIFICANT CHANGE UP
BUN SERPL-MCNC: 27 MG/DL — HIGH (ref 7–23)
CALCIUM SERPL-MCNC: 9.1 MG/DL — SIGNIFICANT CHANGE UP (ref 8.4–10.5)
CHLORIDE SERPL-SCNC: 98 MMOL/L — SIGNIFICANT CHANGE UP (ref 96–108)
CK MB CFR SERPL CALC: 1.9 NG/ML — SIGNIFICANT CHANGE UP (ref 0–3.8)
CK SERPL-CCNC: 59 U/L — SIGNIFICANT CHANGE UP (ref 25–170)
CO2 SERPL-SCNC: 25 MMOL/L — SIGNIFICANT CHANGE UP (ref 22–31)
COLOR SPEC: SIGNIFICANT CHANGE UP
CREAT SERPL-MCNC: 1.96 MG/DL — HIGH (ref 0.5–1.3)
DIFF PNL FLD: NEGATIVE — SIGNIFICANT CHANGE UP
EOSINOPHIL # BLD AUTO: 0.3 K/UL — SIGNIFICANT CHANGE UP (ref 0–0.5)
EOSINOPHIL NFR BLD AUTO: 3.5 % — SIGNIFICANT CHANGE UP (ref 0–6)
GLUCOSE SERPL-MCNC: 237 MG/DL — HIGH (ref 70–99)
GLUCOSE UR QL: NEGATIVE — SIGNIFICANT CHANGE UP
HCT VFR BLD CALC: 39.6 % — SIGNIFICANT CHANGE UP (ref 34.5–45)
HGB BLD-MCNC: 12.7 G/DL — SIGNIFICANT CHANGE UP (ref 11.5–15.5)
KETONES UR-MCNC: NEGATIVE — SIGNIFICANT CHANGE UP
LEUKOCYTE ESTERASE UR-ACNC: NEGATIVE — SIGNIFICANT CHANGE UP
LYMPHOCYTES # BLD AUTO: 1.2 K/UL — SIGNIFICANT CHANGE UP (ref 1–3.3)
LYMPHOCYTES # BLD AUTO: 15 % — SIGNIFICANT CHANGE UP (ref 13–44)
MCHC RBC-ENTMCNC: 29.3 PG — SIGNIFICANT CHANGE UP (ref 27–34)
MCHC RBC-ENTMCNC: 32.1 GM/DL — SIGNIFICANT CHANGE UP (ref 32–36)
MCV RBC AUTO: 91.3 FL — SIGNIFICANT CHANGE UP (ref 80–100)
MONOCYTES # BLD AUTO: 1 K/UL — HIGH (ref 0–0.9)
MONOCYTES NFR BLD AUTO: 11.7 % — SIGNIFICANT CHANGE UP (ref 2–14)
NEUTROPHILS # BLD AUTO: 5.8 K/UL — SIGNIFICANT CHANGE UP (ref 1.8–7.4)
NEUTROPHILS NFR BLD AUTO: 69.2 % — SIGNIFICANT CHANGE UP (ref 43–77)
NITRITE UR-MCNC: NEGATIVE — SIGNIFICANT CHANGE UP
NT-PROBNP SERPL-SCNC: HIGH PG/ML (ref 0–300)
PH UR: 7 — SIGNIFICANT CHANGE UP (ref 5–8)
PLATELET # BLD AUTO: 265 K/UL — SIGNIFICANT CHANGE UP (ref 150–400)
POTASSIUM SERPL-MCNC: 4.8 MMOL/L — SIGNIFICANT CHANGE UP (ref 3.5–5.3)
POTASSIUM SERPL-SCNC: 4.8 MMOL/L — SIGNIFICANT CHANGE UP (ref 3.5–5.3)
PROT SERPL-MCNC: 7.1 G/DL — SIGNIFICANT CHANGE UP (ref 6–8.3)
PROT UR-MCNC: 100 MG/DL
RBC # BLD: 4.34 M/UL — SIGNIFICANT CHANGE UP (ref 3.8–5.2)
RBC # FLD: 13.7 % — SIGNIFICANT CHANGE UP (ref 10.3–14.5)
SODIUM SERPL-SCNC: 139 MMOL/L — SIGNIFICANT CHANGE UP (ref 135–145)
SP GR SPEC: 1.01 — LOW (ref 1.01–1.02)
TROPONIN T SERPL-MCNC: 0.02 NG/ML — SIGNIFICANT CHANGE UP (ref 0–0.06)
UROBILINOGEN FLD QL: NEGATIVE — SIGNIFICANT CHANGE UP
WBC # BLD: 8.4 K/UL — SIGNIFICANT CHANGE UP (ref 3.8–10.5)
WBC # FLD AUTO: 8.4 K/UL — SIGNIFICANT CHANGE UP (ref 3.8–10.5)

## 2017-10-09 PROCEDURE — 71010: CPT | Mod: 26

## 2017-10-09 PROCEDURE — 99285 EMERGENCY DEPT VISIT HI MDM: CPT | Mod: 25

## 2017-10-09 PROCEDURE — 93010 ELECTROCARDIOGRAM REPORT: CPT

## 2017-10-09 RX ORDER — DEXTROSE 50 % IN WATER 50 %
25 SYRINGE (ML) INTRAVENOUS ONCE
Qty: 0 | Refills: 0 | Status: DISCONTINUED | OUTPATIENT
Start: 2017-10-09 | End: 2017-10-12

## 2017-10-09 RX ORDER — TIOTROPIUM BROMIDE 18 UG/1
1 CAPSULE ORAL; RESPIRATORY (INHALATION) DAILY
Qty: 0 | Refills: 0 | Status: DISCONTINUED | OUTPATIENT
Start: 2017-10-09 | End: 2017-10-12

## 2017-10-09 RX ORDER — ARIPIPRAZOLE 15 MG/1
2 TABLET ORAL DAILY
Qty: 0 | Refills: 0 | Status: DISCONTINUED | OUTPATIENT
Start: 2017-10-09 | End: 2017-10-12

## 2017-10-09 RX ORDER — ASPIRIN/CALCIUM CARB/MAGNESIUM 324 MG
81 TABLET ORAL DAILY
Qty: 0 | Refills: 0 | Status: DISCONTINUED | OUTPATIENT
Start: 2017-10-09 | End: 2017-10-12

## 2017-10-09 RX ORDER — ARIPIPRAZOLE 15 MG/1
1 TABLET ORAL
Qty: 0 | Refills: 0 | COMMUNITY

## 2017-10-09 RX ORDER — INSULIN LISPRO 100/ML
3 VIAL (ML) SUBCUTANEOUS
Qty: 0 | Refills: 0 | Status: DISCONTINUED | OUTPATIENT
Start: 2017-10-09 | End: 2017-10-09

## 2017-10-09 RX ORDER — DOCUSATE SODIUM 100 MG
100 CAPSULE ORAL DAILY
Qty: 0 | Refills: 0 | Status: DISCONTINUED | OUTPATIENT
Start: 2017-10-09 | End: 2017-10-12

## 2017-10-09 RX ORDER — DEXTROSE 50 % IN WATER 50 %
12.5 SYRINGE (ML) INTRAVENOUS ONCE
Qty: 0 | Refills: 0 | Status: DISCONTINUED | OUTPATIENT
Start: 2017-10-09 | End: 2017-10-12

## 2017-10-09 RX ORDER — INSULIN LISPRO 100/ML
6 VIAL (ML) SUBCUTANEOUS
Qty: 0 | Refills: 0 | Status: DISCONTINUED | OUTPATIENT
Start: 2017-10-09 | End: 2017-10-12

## 2017-10-09 RX ORDER — APIXABAN 2.5 MG/1
2.5 TABLET, FILM COATED ORAL EVERY 12 HOURS
Qty: 0 | Refills: 0 | Status: DISCONTINUED | OUTPATIENT
Start: 2017-10-09 | End: 2017-10-12

## 2017-10-09 RX ORDER — AMIODARONE HYDROCHLORIDE 400 MG/1
100 TABLET ORAL DAILY
Qty: 0 | Refills: 0 | Status: DISCONTINUED | OUTPATIENT
Start: 2017-10-09 | End: 2017-10-12

## 2017-10-09 RX ORDER — ALBUTEROL 90 UG/1
1 AEROSOL, METERED ORAL EVERY 4 HOURS
Qty: 0 | Refills: 0 | Status: DISCONTINUED | OUTPATIENT
Start: 2017-10-09 | End: 2017-10-12

## 2017-10-09 RX ORDER — GLUCAGON INJECTION, SOLUTION 0.5 MG/.1ML
1 INJECTION, SOLUTION SUBCUTANEOUS ONCE
Qty: 0 | Refills: 0 | Status: DISCONTINUED | OUTPATIENT
Start: 2017-10-09 | End: 2017-10-12

## 2017-10-09 RX ORDER — SODIUM CHLORIDE 9 MG/ML
1000 INJECTION, SOLUTION INTRAVENOUS
Qty: 0 | Refills: 0 | Status: DISCONTINUED | OUTPATIENT
Start: 2017-10-09 | End: 2017-10-12

## 2017-10-09 RX ORDER — IPRATROPIUM/ALBUTEROL SULFATE 18-103MCG
3 AEROSOL WITH ADAPTER (GRAM) INHALATION EVERY 6 HOURS
Qty: 0 | Refills: 0 | Status: DISCONTINUED | OUTPATIENT
Start: 2017-10-09 | End: 2017-10-12

## 2017-10-09 RX ORDER — LISINOPRIL 2.5 MG/1
2.5 TABLET ORAL DAILY
Qty: 0 | Refills: 0 | Status: DISCONTINUED | OUTPATIENT
Start: 2017-10-09 | End: 2017-10-12

## 2017-10-09 RX ORDER — CLONAZEPAM 1 MG
0.5 TABLET ORAL
Qty: 0 | Refills: 0 | Status: DISCONTINUED | OUTPATIENT
Start: 2017-10-09 | End: 2017-10-10

## 2017-10-09 RX ORDER — FUROSEMIDE 40 MG
40 TABLET ORAL ONCE
Qty: 0 | Refills: 0 | Status: COMPLETED | OUTPATIENT
Start: 2017-10-09 | End: 2017-10-09

## 2017-10-09 RX ORDER — INSULIN LISPRO 100/ML
2 VIAL (ML) SUBCUTANEOUS ONCE
Qty: 0 | Refills: 0 | Status: COMPLETED | OUTPATIENT
Start: 2017-10-09 | End: 2017-10-09

## 2017-10-09 RX ORDER — DEXTROSE 50 % IN WATER 50 %
1 SYRINGE (ML) INTRAVENOUS ONCE
Qty: 0 | Refills: 0 | Status: DISCONTINUED | OUTPATIENT
Start: 2017-10-09 | End: 2017-10-12

## 2017-10-09 RX ORDER — FUROSEMIDE 40 MG
40 TABLET ORAL DAILY
Qty: 0 | Refills: 0 | Status: DISCONTINUED | OUTPATIENT
Start: 2017-10-09 | End: 2017-10-09

## 2017-10-09 RX ORDER — MIRTAZAPINE 45 MG/1
15 TABLET, ORALLY DISINTEGRATING ORAL AT BEDTIME
Qty: 0 | Refills: 0 | Status: DISCONTINUED | OUTPATIENT
Start: 2017-10-09 | End: 2017-10-12

## 2017-10-09 RX ORDER — INSULIN LISPRO 100/ML
VIAL (ML) SUBCUTANEOUS
Qty: 0 | Refills: 0 | Status: DISCONTINUED | OUTPATIENT
Start: 2017-10-09 | End: 2017-10-12

## 2017-10-09 RX ORDER — DULOXETINE HYDROCHLORIDE 30 MG/1
60 CAPSULE, DELAYED RELEASE ORAL DAILY
Qty: 0 | Refills: 0 | Status: DISCONTINUED | OUTPATIENT
Start: 2017-10-09 | End: 2017-10-12

## 2017-10-09 RX ORDER — INSULIN GLARGINE 100 [IU]/ML
13 INJECTION, SOLUTION SUBCUTANEOUS AT BEDTIME
Qty: 0 | Refills: 0 | Status: DISCONTINUED | OUTPATIENT
Start: 2017-10-09 | End: 2017-10-12

## 2017-10-09 RX ORDER — INSULIN GLARGINE 100 [IU]/ML
10 INJECTION, SOLUTION SUBCUTANEOUS AT BEDTIME
Qty: 0 | Refills: 0 | Status: DISCONTINUED | OUTPATIENT
Start: 2017-10-09 | End: 2017-10-09

## 2017-10-09 RX ORDER — FUROSEMIDE 40 MG
40 TABLET ORAL
Qty: 0 | Refills: 0 | Status: DISCONTINUED | OUTPATIENT
Start: 2017-10-09 | End: 2017-10-10

## 2017-10-09 RX ORDER — SIMVASTATIN 20 MG/1
20 TABLET, FILM COATED ORAL AT BEDTIME
Qty: 0 | Refills: 0 | Status: DISCONTINUED | OUTPATIENT
Start: 2017-10-09 | End: 2017-10-12

## 2017-10-09 RX ORDER — METOPROLOL TARTRATE 50 MG
100 TABLET ORAL DAILY
Qty: 0 | Refills: 0 | Status: DISCONTINUED | OUTPATIENT
Start: 2017-10-09 | End: 2017-10-12

## 2017-10-09 RX ADMIN — APIXABAN 2.5 MILLIGRAM(S): 2.5 TABLET, FILM COATED ORAL at 17:26

## 2017-10-09 RX ADMIN — Medication 6 UNIT(S): at 17:38

## 2017-10-09 RX ADMIN — Medication 40 MILLIGRAM(S): at 17:26

## 2017-10-09 RX ADMIN — INSULIN GLARGINE 13 UNIT(S): 100 INJECTION, SOLUTION SUBCUTANEOUS at 22:22

## 2017-10-09 RX ADMIN — Medication 3 MILLILITER(S): at 17:26

## 2017-10-09 RX ADMIN — SIMVASTATIN 20 MILLIGRAM(S): 20 TABLET, FILM COATED ORAL at 22:22

## 2017-10-09 RX ADMIN — MIRTAZAPINE 15 MILLIGRAM(S): 45 TABLET, ORALLY DISINTEGRATING ORAL at 22:22

## 2017-10-09 RX ADMIN — Medication 3 UNIT(S): at 12:12

## 2017-10-09 RX ADMIN — Medication 3 MILLILITER(S): at 13:46

## 2017-10-09 RX ADMIN — Medication 40 MILLIGRAM(S): at 06:12

## 2017-10-09 RX ADMIN — Medication 2 UNIT(S): at 10:01

## 2017-10-09 RX ADMIN — Medication 0.5 MILLIGRAM(S): at 17:26

## 2017-10-09 RX ADMIN — Medication 81 MILLIGRAM(S): at 13:46

## 2017-10-09 RX ADMIN — Medication 100 MILLIGRAM(S): at 13:47

## 2017-10-09 RX ADMIN — Medication 3 MILLILITER(S): at 23:01

## 2017-10-09 RX ADMIN — Medication 2: at 12:11

## 2017-10-09 RX ADMIN — LISINOPRIL 2.5 MILLIGRAM(S): 2.5 TABLET ORAL at 13:46

## 2017-10-09 RX ADMIN — DULOXETINE HYDROCHLORIDE 60 MILLIGRAM(S): 30 CAPSULE, DELAYED RELEASE ORAL at 22:22

## 2017-10-09 RX ADMIN — AMIODARONE HYDROCHLORIDE 100 MILLIGRAM(S): 400 TABLET ORAL at 13:47

## 2017-10-09 NOTE — ED ADULT NURSE NOTE - ED STAT RN HANDOFF DETAILS
0700 report received from night nurse Luiza Troy RN. Pt in ER Red rm 34. A&Ox3. Family at [facility]. Fall risk precautions maintained. IVL intact LACF without sx of infilt. A fib on monitor. Denies c/o chest pain , palp or dizziness. states breathing improved since on oxygen. Crackles 1/3 up on right and at left base. 0715 voided 475 cc clear yellow urine on bedpan. 0700 report received from night nurse Luiza Troy RN. Pt in ER Red rm 34. A&Ox3. Family at [facility]. Fall risk precautions maintained. IVL intact LACF without sx of infilt. A fib on monitor. Denies c/o chest pain , palp or dizziness. states breathing improved since on oxygen. Crackles 1/3 up on right and at left base. 0715 voided 475 cc clear yellow urine on bedpan. TBA tele. Awaiting bed

## 2017-10-09 NOTE — CONSULT NOTE ADULT - PROBLEM SELECTOR RECOMMENDATION 4
Patient with chronic RLE edema mild due to CHF and venous insufficiency. Continue with current anti-hypertensive medications. Monitor BP.  Cardiology may change meds as necessary for treatment of HF.

## 2017-10-09 NOTE — CONSULT NOTE ADULT - ASSESSMENT
86 year old female h/o HTN and DM, RCC s/p right nephrectomy presents with dyspnea. Nephrology consulted for elevated Scr.    ARUN in the setting of decompensated HF.  CKD IV due to chronic heart failure.   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  HTN with BP controlled.    f asdf 86 year old female h/o HTN and DM, RCC s/p right nephrectomy presents with dyspnea. Nephrology consulted for elevated Scr.    ARUN in the setting of decompensated HF.  CKD IV due to chronic heart failure.   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  HTN with BP controlled.

## 2017-10-09 NOTE — ED PROVIDER NOTE - ATTENDING CONTRIBUTION TO CARE
Attending MD Haas:  I personally have seen and examined this patient.  Resident note reviewed and agree on plan of care and except where noted.  See MDM for details.

## 2017-10-09 NOTE — CONSULT NOTE ADULT - PROBLEM SELECTOR PROBLEM 3
Hypertensive kidney disease with chronic kidney disease, stage 1 through stage 4 or unspecified  Nephrogenous proteinuria

## 2017-10-09 NOTE — ED PROVIDER NOTE - CHPI ED SYMPTOMS NEG
no diaphoresis/no chills/no dizziness/no vomiting/no back pain/no syncope/no cough/no fever/no nausea/no chest pain

## 2017-10-09 NOTE — ED ADULT NURSE NOTE - ED STAT RN HANDOFF DETAILS 2
0750 Report given to @ U EMMIE Rodrigues. 0758 c/o sweating. "states I think my blood sugar is low". Pt eating a piece of bread prior to fingerstick Fingerstick checked by Ed tech 201. Denies chest pain.0812 repeat EKG done. Pt reevaluated by Dr. Ceballos written pt okay to go to floor off tele per Dr Delgadillo

## 2017-10-09 NOTE — DISCHARGE NOTE ADULT - PATIENT PORTAL LINK FT
“You can access the Follow[employer] Patient Portal, offered by [facility], by registering with the following website: [email or web address]”

## 2017-10-09 NOTE — CONSULT NOTE ADULT - ASSESSMENT
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  HTN: Controlled, On med.  HLD:  On statin, tolerating.  SOB: Continue Tx FU with primary team

## 2017-10-09 NOTE — ED ADULT NURSE NOTE - OBJECTIVE STATEMENT
Patent arrived by EMS complaining of SOB when laying flat, pt with Hx of CHF, diabetes pace maker, a-fib on lasix, eloquis. Patient presented on 95% room air, oxygen 2L given. Patient alerted and oriented x, no signs of acute respiratory or cardiac distress noted, EKG done, presented. Patient breathing fast, mouth open, lungs clear. Heart sounds normal, abdomen soft, non distended, hernia noted on left abdomen. No edeman noted on legs, as per family, pt had lost 10 pounds on the last week - family at the bedside. IV placed on left AC 20 G, blood drawn, Oxygen given 2L on NC. Pt placed on cardiac monitor. Will continue to monitor closely.

## 2017-10-09 NOTE — PROGRESS NOTE ADULT - SUBJECTIVE AND OBJECTIVE BOX
PULMONARY PROGRESS NOTE    MARCEL AGOSTO  MRN-[id number]    Patient is a 86y old  Female who presents with a chief complaint of sob (09 Oct 2017 14:15)      HPI:  -feeling much better than on admission.  denies sob/cough/chest pain    ROS:   -no abdominal pain, afebrile    ACTIVE MEDICATION LIST:  MEDICATIONS  (STANDING):  ALBUTerol    90 MICROgram(s) HFA Inhaler 1 Puff(s) Inhalation every 4 hours  ALBUTerol/ipratropium for Nebulization 3 milliLiter(s) Nebulizer every 6 hours  amiodarone    Tablet 100 milliGRAM(s) Oral daily  apixaban 2.5 milliGRAM(s) Oral every 12 hours  ARIPiprazole 2 milliGRAM(s) Oral daily  aspirin  chewable 81 milliGRAM(s) Oral daily  clonazePAM Tablet 0.5 milliGRAM(s) Oral two times a day  dextrose 5%. 1000 milliLiter(s) (50 mL/Hr) IV Continuous <Continuous>  dextrose 50% Injectable 12.5 Gram(s) IV Push once  dextrose 50% Injectable 25 Gram(s) IV Push once  dextrose 50% Injectable 25 Gram(s) IV Push once  docusate sodium 100 milliGRAM(s) Oral daily  DULoxetine 60 milliGRAM(s) Oral daily  furosemide   Injectable 40 milliGRAM(s) IV Push two times a day  insulin glargine Injectable (LANTUS) 13 Unit(s) SubCutaneous at bedtime  insulin lispro (HumaLOG) corrective regimen sliding scale   SubCutaneous three times a day before meals  insulin lispro Injectable (HumaLOG) 6 Unit(s) SubCutaneous three times a day before meals  lisinopril 2.5 milliGRAM(s) Oral daily  metoprolol succinate  milliGRAM(s) Oral daily  mirtazapine 15 milliGRAM(s) Oral at bedtime  simvastatin 20 milliGRAM(s) Oral at bedtime  tiotropium 18 MICROgram(s) Capsule 1 Capsule(s) Inhalation daily    MEDICATIONS  (PRN):  dextrose Gel 1 Dose(s) Oral once PRN Blood Glucose LESS THAN 70 milliGRAM(s)/deciliter  glucagon  Injectable 1 milliGRAM(s) IntraMuscular once PRN Glucose LESS THAN 70 milligrams/deciliter      EXAM:  Vital Signs Last 24 Hrs  T(C): 36.5 (09 Oct 2017 13:37), Max: 37.3 (09 Oct 2017 07:10)  T(F): 97.7 (09 Oct 2017 13:37), Max: 99.1 (09 Oct 2017 07:10)  HR: 83 (09 Oct 2017 13:37) (83 - 107)  BP: 125/76 (09 Oct 2017 13:37) (120/88 - 137/83)  BP(mean): --  RR: 20 (09 Oct 2017 13:37) (19 - 26)  SpO2: 97% (09 Oct 2017 13:37) (92% - 100%)    GENERAL: The patient is awake and alert in no apparent distress.     LUNGS: clear     HEART: S1/S2    LABS/IMAGING: reviewed                        12.7   8.4   )-----------( 265      ( 09 Oct 2017 05:58 )             39.6     10-09    139  |  98  |  27<H>  ----------------------------<  237<H>  4.8   |  25  |  1.96<H>    Ca    9.1      09 Oct 2017 05:58    TPro  7.1  /  Alb  3.7  /  TBili  0.3  /  DBili  x   /  AST  22  /  ALT  10  /  AlkPhos  88  10-09    ProBNP--22,000+    < from: Xray Chest 1 View AP/PA (10.09.17 @ 06:10) >  IMPRESSION:    Congestive heart failure, unchanged from theprior examination.    < end of copied text >        PROBLEM LIST:  86y Female with HEALTH ISSUES - PROBLEM Dx:  COPD not in exacerbation  Acute on chronic combined systolic and diastolic heart failure  Acute kidney injury  Monoclonal paraproteinemia  Malignant neoplasm of kidney  HTN (Hypertension)  Atrial fibrillation      RECS:  -COPD: stable on advair at home, would give symbicort while in house.  Cont spiriva and nebs  -CHF: pulm edema on cxr and elevated BNP improved with diuresis, cardiology follow up.  -Incentive candy, OOB to chair  -wean O2   -PT      Joy Abdi MD   [phone]

## 2017-10-09 NOTE — DISCHARGE NOTE ADULT - MEDICATION SUMMARY - MEDICATIONS TO STOP TAKING
I will STOP taking the medications listed below when I get home from the hospital:    predniSONE 50 mg oral tablet  -- 1 tab(s) by mouth once a day , ends on 09/22/17  -- It is very important that you take or use this exactly as directed.  Do not skip doses or discontinue unless directed by your doctor.  Obtain medical advice before taking any non-prescription drugs as some may affect the action of this medication.  Take with food or milk.

## 2017-10-09 NOTE — CONSULT NOTE ADULT - PROBLEM SELECTOR RECOMMENDATION 2
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 Check UA, spot urine TP/CR and proteinuria work up (hepatitis B, C, paraproteinemia). Valsartan held on admission. Can continue to hold and restart once patient on PO diuretic therapy for anti-proteinuric effect. 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 Renal function appears to be stable and at baseline. Avoid nephrotoxins.

## 2017-10-09 NOTE — DISCHARGE NOTE ADULT - PLAN OF CARE
Patient remains hemodynamically stable. Weigh yourself daily.  If you gain 3lbs in 3 days, or 5lbs in a week call your Health Care Provider.  Do not eat or drink foods containing more than 2000mg of salt (sodium) in your diet every day.  Call your Health Care Provider if you have any swelling or increased swelling in your feet, ankles, and/or stomach.  Take all of your medication as directed.  If you become dizzy call your Health Care Provider. Make sure you get your HgA1c checked every three months.  If you take oral diabetes medications, check your blood glucose two times a day.  If you take insulin, check your blood glucose before meals and at bedtime.  It's important not to skip any meals.  Keep a log of your blood glucose results and always take it with you to your doctor appointments.  Keep a list of your current medications including injectables and over the counter medications and bring this medication list with you to all your doctor appointments.  If you have not seen your opthalmologist this year call for appointment.  Check your feet daily for redness, sores, or openings. Do not self treat. If no improvement in two days call your primary care physician for an appointment.  Low blood sugar (hypoglycemia) is a blood sugar below 70mg/dl. Check your blood sugar if you feel signs/symptoms of hypoglycemia. If your blood sugar is below 70 take 15 grams of carbohydrates (ex 4 oz of apple juice, 3-4 glucosr tablets, or 4-6 oz of regular soda) wait 15 minutes and repeat blood sugar to make sure it comes up above 70.  If your blood sugar is above 70 and you are due for a meal, have a meal.  If you are not due for a meal have a snack.  This snack helps keeps your blood sugar at a safe range. Atrial fibrillation is the most common heart rhythm problem & has the risk of stroke & heart attack  It helps if you control your blood pressure, not drink more than 1-2 alcohol drinks per day, cut down on caffeine, getting treatment for over active thyroid gland, & getting exercise  Call your doctor if you feel your heart racing or beating unusually, chest tightness or pain, lightheaded, faint, shortness of breath especially with exercise  It is important to take your heart medication as prescribed  You may be on anticoagulation which is very important to take as directed - you may need blood work to monitor drug levels Low salt diet  Activity as tolerated.  Take all medication as prescribed.  Follow up with your medical doctor for routine blood pressure monitoring at your next visit.  Notify your doctor if you have any of the following symptoms:   Dizziness, Lightheadedness, Blurry vision, Headache, Chest pain, Shortness of breath

## 2017-10-09 NOTE — CONSULT NOTE ADULT - PROBLEM SELECTOR RECOMMENDATION 3
BP uncontrolled. Restart home medications. Valsartan held for now as patient on IV diuretic therapy. 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 Check UA, spot urine TP/CR and proteinuria work up (hepatitis B, C, paraproteinemia). Valsartan held on admission. Can continue to hold and restart once patient on PO diuretic therapy for anti-proteinuric effect.

## 2017-10-09 NOTE — CONSULT NOTE ADULT - PROBLEM SELECTOR RECOMMENDATION 9
Will increase Lantus to 13 units at bed time.  Will increase Humalog to 6 units before each meal in addition to Humalog correction scale coverage.  Patient counseled for compliance with consistent low carb diet.

## 2017-10-09 NOTE — H&P ADULT - NSHPLABSRESULTS_GEN_ALL_CORE
12.7   8.4   )-----------( 265      ( 09 Oct 2017 05:58 )             39.6       10-09    139  |  98  |  27<H>  ----------------------------<  237<H>  4.8   |  25  |  1.96<H>    Ca    9.1      09 Oct 2017 05:58    TPro  7.1  /  Alb  3.7  /  TBili  0.3  /  DBili  x   /  AST  22  /  ALT  10  /  AlkPhos  88  10-09              Urinalysis Basic - ( 09 Oct 2017 07:40 )    Color: PL Yellow / Appearance: Clear / S.009 / pH: x  Gluc: x / Ketone: Negative  / Bili: Negative / Urobili: Negative   Blood: x / Protein: 100 mg/dL / Nitrite: Negative   Leuk Esterase: Negative / RBC: 0-2 /HPF / WBC 0-2 /HPF   Sq Epi: x / Non Sq Epi: x / Bacteria: x            Lactate Trend      CARDIAC MARKERS ( 09 Oct 2017 05:58 )  x     / 0.02 ng/mL / 59 U/L / x     / 1.9 ng/mL        CAPILLARY BLOOD GLUCOSE  212 (09 Oct 2017 09:31)            Culture Results:   Normal Urogenital anthony present ( @ 13:36)  Culture Results:   No growth at 5 days. (09-15 @ 11:01)  Culture Results:   No growth at 5 days. (09-15 @ 11:01)

## 2017-10-09 NOTE — ED PROVIDER NOTE - OBJECTIVE STATEMENT
86 yr old F with COPD on nocturnal o2 (2L) , HrEF,  AFIB on eliquis, PPM, HTN , HLD, recent dx of Monoclonal paraproteinemia presents with complains of orthopnea x 3 days. Last night, pt was unable to sleep and pressed her medic bracelet to call EMS. Pt was recently discharged from [facility] for ADHF-per family she is currently not on any diuretics. SHe follows with Dr. Doty and was told to call if she gains 3 pounds and then cardiologist would start her on diuretics. Pt denies SOB on exertion, chest pain, ever, chills, diarrhea, abd pain, dysuria, denies PND, or URI symptoms, denies weight gain.

## 2017-10-09 NOTE — H&P ADULT - NSHPREVIEWOFSYSTEMS_GEN_ALL_CORE
CONSTITUTIONAL: No weakness, fevers or chills  EYES/ENT: No visual changes;  No vertigo or throat pain   NECK: No pain or stiffness  RESPIRATORY: No cough, wheezing, hemoptysis; No shortness of breath  CARDIOVASCULAR: SOB  GASTROINTESTINAL: No abdominal or epigastric pain. No nausea, vomiting, or hematemesis; No diarrhea or constipation. No melena or hematochezia.  GENITOURINARY: No dysuria, frequency or hematuria  NEUROLOGICAL: No numbness or weakness  SKIN: No itching, burning, rashes, or lesions   All other review of systems is negative unless indicated above.

## 2017-10-09 NOTE — H&P ADULT - ASSESSMENT
86 yr old F with COPD on OXYGEN, CHF,  Afib on eliquis, PPM, HTN , HLD, recent dx of Monoclonal paraproteinemia presents with complains of orthopnea x 3 days. Last night, pt was unable to sleep and pressed her medic bracelet to call EMS. Pt was recently discharged from [facility] for ADHF-per family she is currently not on any diuretics. She follows with Dr. Doty and was told to call if she gains 3 pounds and then cardiologist would start her on diuretics. Pt denies SOB on exertion, chest pain, ever, chills, diarrhea, abd pain, dysuria, denies PND, or URI symptoms, denies weight gain. No other associated symptoms

## 2017-10-09 NOTE — H&P ADULT - NSHPPHYSICALEXAM_GEN_ALL_CORE
General: WN/WD NAD  Neurology: A&Ox3  neck SUPPLE  Eyes: PERRLA/ EOMI, Gross vision intact  ENT/Neck: Neck supple, trachea midline, No JVD, Gross hearing intact  Respiratory: CTA B/L, No wheezing, rales, rhonchi  CV: RRR, S1S2, no murmurs, rubs or gallops  Abdominal: Soft, NT, ND +BS,   Extremities: No edema, + peripheral pulses

## 2017-10-09 NOTE — CONSULT NOTE ADULT - PROBLEM SELECTOR RECOMMENDATION 9
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 Renal function appears to be stable and at baseline. Avoid nephrotoxins. Due to decompensated HF.  Lasix per cardiology.  Avoid nephrotoxins/ RCA/ NSAIDs. Monitor BMP.

## 2017-10-09 NOTE — DISCHARGE NOTE ADULT - HOME CARE AGENCY
Home [facility]- visiting nurse, PT, RN will assess for HHA. [phone]. They will call you to set up 1st appointment, requested for 10/13.

## 2017-10-09 NOTE — ED PROVIDER NOTE - MEDICAL DECISION MAKING DETAILS
Samina BRUNO: 87 y/o female with PMH of A fib on Eliquis, CHF, HTN, HLD here with orthopnea. Patient reports 3days of inability to lay down w/o getting SOB. Patient was recently admitted to [facility] for CHF exacerbation and is not on diuretics curently. Denies CP, palpitations, MCDONALD, fever, cough, LE edema, abd pain, back pain or trauma. Exam shows a female in no resp distress with rales in lung exam and irregular rhythm with HR of 103 bpm. Abd soft and nontender and nondistended. LE pitting edema +2. Consider CHF exacerbation vs CAD vs Pulm edema vs CAP. Plan CBC, CMP, CXR, Diuretics, Cardiacs, BNP, EKG and admit.

## 2017-10-09 NOTE — CONSULT NOTE ADULT - PROBLEM SELECTOR PROBLEM 4
Edema, lower extremity Hypertensive kidney disease with chronic kidney disease, stage 1 through stage 4 or unspecified

## 2017-10-09 NOTE — H&P ADULT - HISTORY OF PRESENT ILLNESS
86 yr old F with COPD on nocturnal o2 (2L) , HrEF,  AFIB on eliquis, PPM, HTN , HLD, recent dx of Monoclonal paraproteinemia presents with complains of orthopnea x 3 days. Last night, pt was unable to sleep and pressed her medic bracelet to call EMS. Pt was recently discharged from [facility] for ADHF-per family she is currently not on any diuretics. SHe follows with Dr. Doty and was told to call if she gains 3 pounds and then cardiologist would start her on diuretics. Pt denies SOB on exertion, chest pain, ever, chills, diarrhea, abd pain, dysuria, denies PND, or URI symptoms, denies weight gain. 86 yr old F with COPD on OXYGEN, CHF,  Afib on eliquis, PPM, HTN , HLD, recent dx of Monoclonal paraproteinemia presents with complains of orthopnea x 3 days. Last night, pt was unable to sleep and pressed her medic bracelet to call EMS. Pt was recently discharged from [facility] for ADHF-per family she is currently not on any diuretics. She follows with Dr. Doty and was told to call if she gains 3 pounds and then cardiologist would start her on diuretics. Pt denies SOB on exertion, chest pain, ever, chills, diarrhea, abd pain, dysuria, denies PND, or URI symptoms, denies weight gain. No other associated symptoms

## 2017-10-09 NOTE — CONSULT NOTE ADULT - ASSESSMENT
86 yr old F with COPD, CHF,  A FIB on eliquis, PPM, HTN , HLD,  here  with SOB and chest pressure with decompensated heart failure and ? super imposed PNA. Recently discharged about 2 weeks ago when found to have monoclonal proteins in her blood.  At present there is no sign of end orgsn damage of excesive proteins.Monoclonal band can be either due to MGUS or indolent myeloma. She will need outpt followup with repeat myeloma profile every 4-6 months to follow the trend.

## 2017-10-09 NOTE — CONSULT NOTE ADULT - SUBJECTIVE AND OBJECTIVE BOX
CHIEF COMPLAINT:Patient is a 86y old  Female who presents with a chief complaint of sob (09 Oct 2017 14:15)      HISTORY OF PRESENT ILLNESS:HPI:  86 yr old F with COPD on OXYGEN, CHF,  Afib on eliquis, PPM, HTN , HLD, recent dx of Monoclonal paraproteinemia presents with complains of orthopnea x 3 days. Last night, pt was unable to sleep and pressed her medic bracelet to call EMS. Pt was recently discharged from [facility] for ASHF- she is currently not on any diuretics as she was euvolemic when she saw Dr Dickinson (outpt cardio) and was told to call if she gains 3 pounds and then cardiologist would start her on diuretics. Pt denies SOB on exertion, chest pain, ever, chills, diarrhea, abd pain, dysuria, denies PND, or URI symptoms, denies weight gain. No other associated symptoms (09 Oct 2017 07:49)      PAST MEDICAL & SURGICAL HISTORY:  Atrial fibrillation, unspecified type  Pacemaker  Overweight  PVD (Peripheral Vascular Disease)  Cigarette Smoker  Arteriosclerotic Heart Disease (ASHD)  HTN (Hypertension)  Diabetes  S/P carpal tunnel release  S/P left cataract extraction  s/p colon resection  S/P Nephrectomy: right  Hip Replacement  History of Total Knee Replacement  S/P Hernia Surgery  S/P Cholecystectomy          MEDICATIONS:  amiodarone    Tablet 100 milliGRAM(s) Oral daily  apixaban 2.5 milliGRAM(s) Oral every 12 hours  aspirin  chewable 81 milliGRAM(s) Oral daily  furosemide   Injectable 40 milliGRAM(s) IV Push two times a day  lisinopril 2.5 milliGRAM(s) Oral daily  metoprolol succinate  milliGRAM(s) Oral daily      ALBUTerol    90 MICROgram(s) HFA Inhaler 1 Puff(s) Inhalation every 4 hours  ALBUTerol/ipratropium for Nebulization 3 milliLiter(s) Nebulizer every 6 hours  tiotropium 18 MICROgram(s) Capsule 1 Capsule(s) Inhalation daily    ARIPiprazole 2 milliGRAM(s) Oral daily  clonazePAM Tablet 0.5 milliGRAM(s) Oral two times a day  DULoxetine 60 milliGRAM(s) Oral daily  mirtazapine 15 milliGRAM(s) Oral at bedtime    docusate sodium 100 milliGRAM(s) Oral daily    dextrose 50% Injectable 12.5 Gram(s) IV Push once  dextrose 50% Injectable 25 Gram(s) IV Push once  dextrose 50% Injectable 25 Gram(s) IV Push once  dextrose Gel 1 Dose(s) Oral once PRN  glucagon  Injectable 1 milliGRAM(s) IntraMuscular once PRN  insulin glargine Injectable (LANTUS) 13 Unit(s) SubCutaneous at bedtime  insulin lispro (HumaLOG) corrective regimen sliding scale   SubCutaneous three times a day before meals  insulin lispro Injectable (HumaLOG) 6 Unit(s) SubCutaneous three times a day before meals  simvastatin 20 milliGRAM(s) Oral at bedtime    dextrose 5%. 1000 milliLiter(s) IV Continuous <Continuous>      FAMILY HISTORY:  Family history of pancreatic cancer (Sibling)      Non-contributory    SOCIAL HISTORY:    not an active smoker    Allergies    amoxicillin (Flushing; Vomiting; Nausea)    Intolerances    	    REVIEW OF SYSTEMS:  CONSTITUTIONAL: No fever  EYES: No eye pain, visual disturbances, or discharge  ENMT:  No difficulty hearing, tinnitus  NECK: No pain or stiffness  RESPIRATORY: No cough, wheezing,  CARDIOVASCULAR: See HPI  GASTROINTESTINAL:  No nausea, vomiting, diarrhea or constipation. No melena.  GENITOURINARY: No dysuria, hematuria  NEUROLOGICAL: No stroke like symptoms  SKIN: No burning or lesions   LYMPH Nodes: No enlarged glands  ENDOCRINE: No heat or cold intolerance  MUSCULOSKELETAL: No joint pain or swelling  PSYCHIATRIC: No  anxiety, mood swings  HEME/LYMPH: No bleeding gums  ALLERY AND IMMUNOLOGIC: No hives or eczema	    All other ROS negative    PHYSICAL EXAM:  T(C): 36.7 (10-09-17 @ 20:40), Max: 37.3 (10-09-17 @ 07:10)  HR: 78 (10-09-17 @ 20:40) (78 - 107)  BP: 127/80 (10-09-17 @ 20:40) (120/88 - 137/83)  RR: 20 (10-09-17 @ 20:40) (19 - 26)  SpO2: 96% (10-09-17 @ 20:40) (92% - 100%)  Wt(kg): --  I&O's Summary    09 Oct 2017 07:01  -  09 Oct 2017 23:11  --------------------------------------------------------  IN: 800 mL / OUT: 875 mL / NET: -75 mL        Appearance: Normal	  HEENT:   Normal oral mucosa, EOMI	  Lymphatic: No lymphadenopathy  Cardiovascular: Normal S1 S2, No JVD, No murmurs, No edema  Respiratory: Lungs clear to auscultation	  Psychiatry: Alert, Mood & affect appropriate  Gastrointestinal:  Soft, Non-tender, + BS	  Skin: No rashes, No ecchymoses, No cyanosis	  Neurologic: Non-focal  Extremities:  No clubbing, cyanosis or edema  Vascular: Peripheral pulses palpable 2+ bilaterally  	    ECG:  af rvr, lbbb  RADIOLOGY: chf on CXR  	  	  CARDIAC MARKERS:  Troponin T, Serum: 0.02 ng/mL (10-09 @ 05:58)                                  12.7   8.4   )-----------( 265      ( 09 Oct 2017 05:58 )             39.6     10-09    139  |  98  |  27<H>  ----------------------------<  237<H>  4.8   |  25  |  1.96<H>    Ca    9.1      09 Oct 2017 05:58    TPro  7.1  /  Alb  3.7  /  TBili  0.3  /  DBili  x   /  AST  22  /  ALT  10  /  AlkPhos  88  10-09    proBNP: Serum Pro-Brain Natriuretic Peptide: 51994 pg/mL (10-09 @ 05:58)    Lipid Profile:   HgA1c:   TSH:       Assesment/Plan:   86 yr old F with COPD, CHF,  A FIB on eliquis, PPM, HTN , HLD,  here  with SOB in acute decompensated systolic heart failure    1. Systolic Heart failure - Lasix 40mg IV BID for today with strict ins/outs. Continue with bb, ACE, and ASA  - TTE last admission with severe reduced EF, NM stress test with mild ischemia and mostly infarct - cath deferred 2/2 renal function    2. AF - continue with Eliquis for AC and home dose Amio    3. HTN - continue with home meds now of Toprol and Norvasc and Lisinopril 2.5mg    4. HLD - continue with statin    5. Pulmonary - COPD management per pulm/primary team      Waylon Cates DO [facility]  [phone]
HPI:  86 yr old F with COPD on OXYGEN, CHF,  Afib on eliquis, PPM, HTN , HLD, recent dx of Monoclonal paraproteinemia presents with complains of orthopnea x 3 days. Last night, pt was unable to sleep and pressed her medic bracelet to call EMS. Pt was recently discharged from [facility] for ADHF-per family she is currently not on any diuretics. She follows with Dr. Doty and was told to call if she gains 3 pounds and then cardiologist would start her on diuretics. Pt denies SOB on exertion, chest pain, ever, chills, diarrhea, abd pain, dysuria, denies PND, or URI symptoms, denies weight gain. No other associated symptoms (09 Oct 2017 07:49)  Patient has history of diabetes, on insulin at home, no recent hypoglycemic episodes, no polyuria polydipsia. Patient follows up with PCP for diabetes management.    PAST MEDICAL & SURGICAL HISTORY:  Atrial fibrillation, unspecified type  Pacemaker  Overweight  PVD (Peripheral Vascular Disease)  Cigarette Smoker  Arteriosclerotic Heart Disease (ASHD)  HTN (Hypertension)  Diabetes  S/P carpal tunnel release  S/P left cataract extraction  s/p colon resection  S/P Nephrectomy: right  Hip Replacement  History of Total Knee Replacement  S/P Hernia Surgery  S/P Cholecystectomy      FAMILY HISTORY:  Family history of pancreatic cancer (Sibling)      Social History:    Outpatient Medications:    MEDICATIONS  (STANDING):  ALBUTerol    90 MICROgram(s) HFA Inhaler 1 Puff(s) Inhalation every 4 hours  ALBUTerol/ipratropium for Nebulization 3 milliLiter(s) Nebulizer every 6 hours  amiodarone    Tablet 100 milliGRAM(s) Oral daily  apixaban 2.5 milliGRAM(s) Oral every 12 hours  ARIPiprazole 2 milliGRAM(s) Oral daily  aspirin  chewable 81 milliGRAM(s) Oral daily  clonazePAM Tablet 0.5 milliGRAM(s) Oral two times a day  dextrose 5%. 1000 milliLiter(s) (50 mL/Hr) IV Continuous <Continuous>  dextrose 50% Injectable 12.5 Gram(s) IV Push once  dextrose 50% Injectable 25 Gram(s) IV Push once  dextrose 50% Injectable 25 Gram(s) IV Push once  docusate sodium 100 milliGRAM(s) Oral daily  DULoxetine 60 milliGRAM(s) Oral daily  furosemide   Injectable 40 milliGRAM(s) IV Push two times a day  insulin glargine Injectable (LANTUS) 13 Unit(s) SubCutaneous at bedtime  insulin lispro (HumaLOG) corrective regimen sliding scale   SubCutaneous three times a day before meals  insulin lispro Injectable (HumaLOG) 6 Unit(s) SubCutaneous three times a day before meals  lisinopril 2.5 milliGRAM(s) Oral daily  metoprolol succinate  milliGRAM(s) Oral daily  mirtazapine 15 milliGRAM(s) Oral at bedtime  simvastatin 20 milliGRAM(s) Oral at bedtime  tiotropium 18 MICROgram(s) Capsule 1 Capsule(s) Inhalation daily    MEDICATIONS  (PRN):  dextrose Gel 1 Dose(s) Oral once PRN Blood Glucose LESS THAN 70 milliGRAM(s)/deciliter  glucagon  Injectable 1 milliGRAM(s) IntraMuscular once PRN Glucose LESS THAN 70 milligrams/deciliter      Allergies    amoxicillin (Flushing; Vomiting; Nausea)    Intolerances      Review of Systems:  Constitutional: No fever, no chills  Eyes: No blurry vision  Neuro: No tremors  HEENT: No pain, no neck swelling  Cardiovascular: No chest pain, no palpitations  Respiratory: Has SOB, no cough  GI: No nausea, vomiting, abdominal pain  : No dysuria  Skin: no rash  MSK: Has leg swelling, no foot ulcers.  Psych: no depression  Endocrine: no polyuria, polydipsia    ALL OTHER SYSTEMS REVIEWED AND NEGATIVE    UNABLE TO OBTAIN    PHYSICAL EXAM:  VITALS: T(C): 36.5 (10-09-17 @ 13:37)  T(F): 97.7 (10-09-17 @ 13:37), Max: 99.1 (10-09-17 @ 07:10)  HR: 83 (10-09-17 @ 13:37) (83 - 107)  BP: 125/76 (10-09-17 @ 13:37) (120/88 - 137/83)  RR:  (19 - 26)  SpO2:  (92% - 100%)  Wt(kg): --  GENERAL: NAD, well-groomed, well-developed  EYES: No proptosis, no lid lag  HEENT:  Atraumatic, Normocephalic  THYROID: Normal size, no palpable nodules  RESPIRATORY: Clear to auscultation bilaterally; No rales, rhonchi, wheezing  CARDIOVASCULAR: Si S2, No murmurs;  GI: Soft, non distended, normal bowel sounds  SKIN: Dry, intact, No rashes or lesions  MUSCULOSKELETAL: Has BL lower extremity edema.  NEURO:  no tremor, sensation decreased in feet BL,    CAPILLARY BLOOD GLUCOSE  237 (10-09 @ 12:09)  212 (10-09 @ 09:31)  201 (10-09 @ 08:05)                            12.7   8.4   )-----------( 265      ( 09 Oct 2017 05:58 )             39.6       10-09    139  |  98  |  27<H>  ----------------------------<  237<H>  4.8   |  25  |  1.96<H>    EGFR if : 26<L>  EGFR if non : 23<L>    Ca    9.1      10-09    TPro  7.1  /  Alb  3.7  /  TBili  0.3  /  DBili  x   /  AST  22  /  ALT  10  /  AlkPhos  88  10-09      Thyroid Function Tests:      Hemoglobin A1C, Whole Blood: 8.6 % <H> [4.0 - 5.6] (09-17-17 @ 07:21)          Radiology:
[facility] NEPHROLOGY- CONSULTATION NOTE    86 year old female with CKD in the setting of CHF with recent admission two weeks ago for decompensated HF presents with SOB and thought to have the same.  She reports imporvement overall with lasix, but still SOB now.    Past kidney Hx:  Patient noted to have elevated Scr since 2016. Patient states she had followed with nephrology (Dr. Bass?) for history of chronic kidney disease however has not seen nephrology for 2-3 years. Patient with h/o R RCC s/p nephrectomy in . Patient also with strong smoking history in the past which is thought to be etiology of COPD. Patient with long standing h/o HTN and DM (over 50 years) however denies any diabetic retinopathy or neuropathy. Patient denies any h/o CHF or furosemide use at home. Patient with chronic RLE edema secondary to knee surgery in the past.  Patient denies any h/o nephrolithiasis, hepatitis B, C, HIV, IVDA, tattoos, prior PRBC transfusions, herbal medication use or chronic NSAID use. Patient on valsartan 160 mg daily at home for h/o HTN.    REVIEW OF SYSTEMS:  Gen: no changes in weight  HEENT: no rhinorrhea  Neck: no sore throat  Cards: no chest pain  Resp: + dyspnea  GI: no nausea or vomiting or diarrhea  : no dysuria or hematuria  Vascular: + chronic  RLE edema  Derm: no rashes  Neuro: no numbness/tingling    amoxicillin (Flushing; Vomiting; Nausea)      Home Medications Reviewed    PAST MEDICAL & SURGICAL HISTORY:  Atrial fibrillation, unspecified type  Pacemaker  Renal Cancer  Overweight  PVD (Peripheral Vascular Disease)  Cigarette Smoker  Arteriosclerotic Heart Disease (ASHD)  HTN (Hypertension)  Diabetes  S/P carpal tunnel release  S/P left cataract extraction  s/p colon resection  S/P Nephrectomy: right  Hip Replacement  History of Total Knee Replacement  S/P Hernia Surgery  S/P Cholecystectomy      FAMILY HISTORY:  Family history of pancreatic cancer (Sibling)      SOCIAL HISTORY:  Denies toxic substance use       VITALS:  T(F): 98 (10-09-17 @ 08:49), Max: 99.1 (10-09-17 @ 07:10)  HR: 89 (10-09-17 @ 08:49)  BP: 125/74 (10-09-17 @ 08:49)  RR: 20 (10-09-17 @ 08:49)  SpO2: 92% (10-09-17 @ 08:49)  Wt(kg): --    10-09 @ 07:01  -  10-09 @ 12:37  --------------------------------------------------------  IN: 440 mL / OUT: 675 mL / NET: -235 mL      Height (cm): 165.1 (10-09 @ 07:10)  Weight (kg): 72.6 (10-09 @ 07:10)  BMI (kg/m2): 26.6 (10-09 @ 07:10)  BSA (m2): 1.8 (10-09 @ :10)      PHYSICAL EXAM:  Gen: NAD, calm  HEENT: MMM  Neck: no JVD  Cards: RRR, +S1/S2, +ZAKI  Resp: CTA B/L  GI: soft, NT/ND, NABS  : no CVA tenderness  Vascular: RLE edema, mild (chronic as per patient)  Derm: no rashes  Neuro: non-focal      LABS:  10-09    139  |  98  |  27<H>  ----------------------------<  237<H>  4.8   |  25  |  1.96<H>    Ca    9.1      09 Oct 2017 05:58    TPro  7.1  /  Alb  3.7  /  TBili  0.3  /  DBili      /  AST  22  /  ALT  10  /  AlkPhos  88  10-09    Creatinine Trend: 1.96 <--                        12.7   8.4   )-----------( 265      ( 09 Oct 2017 05:58 )             39.6     Urine Studies:  Urinalysis Basic - ( 09 Oct 2017 07:40 )    Color: PL Yellow / Appearance: Clear / S.009 / pH:   Gluc:  / Ketone: Negative  / Bili: Negative / Urobili: Negative   Blood:  / Protein: 100 mg/dL / Nitrite: Negative   Leuk Esterase: Negative / RBC: 0-2 /HPF / WBC 0-2 /HPF   Sq Epi:  / Non Sq Epi:  / Bacteria:     RADIOLOGY:    < from: Xray Chest 1 View AP/PA (10.09.17 @ 06:10) >    EXAM:  CHEST SINGLE AP OR PA                            PROCEDURE DATE:  10/09/2017            INTERPRETATION:  CLINICAL INDICATION: Shortness of breath, orthopnea.    TECHNIQUE: Frontal view chest    COMPARISON: Radiograph the chest from 9/15/2017.    FINDINGS:     The heart is large.    Pacing electrodes enter the heart via the left subclavian vein.    There is pulmonary vascular congestion.     There is a left pleural effusion.      IMPRESSION:    Congestive heart failure, unchanged from theprior examination.                EPIFANIO HONG M.D., RADIOLOGY RESIDENT  This document has been electronically signed.  ARLYN SELLERS M.D., ATTENDING RADIOLOGIST  This document has been electronically signed. Oct  9 2017 10:52AM            < from: Transthoracic Echocardiogram (17 @ 17:36) >    Patient name: MARCEL AGOSTO  YOB: 1931   Age: 86 (F)   MR#: [id number]  Study Date: 2017  [facility] Madison Hall [facility]  Study quality: Technically good  Referring Physician: BRAD COULTER MD  Blood Pressure: 133/81 mmHg  Height: 152 cm  Weight: 81 kg  BSA: 1.8 m2  ------------------------------------------------------------------------  PROCEDURE: Transthoracic echocardiogram with 2-D, M-Mode  and complete spectral and color flow Doppler.  INDICATION: Heart failure, unspecified (I50.9)  ------------------------------------------------------------------------  Dimensions:    Normal Values:  LA:     4.1    2.0 - 4.0 cm  Ao:     2.5    2.0 - 3.8 cm  SEPTUM: 1.1    0.6 - 1.2 cm  PWT:    1.0    0.6 -1.1 cm  LVIDd:  5.4    3.0 - 5.6 cm  LVIDs:  4.6    1.8 - 4.0 cm  Derived variables:  LVMI: 124 g/m2  RWT: 0.37  Fractional short: 15 %  EF (Visual Estimate): 35 %  Doppler Peak Velocity (m/sec): AoV=2.4  ------------------------------------------------------------------------  Observations:  Mitral Valve: Mitral annular calcification. Mild-moderate  mitral regurgitation.  Aortic Valve/Aorta: Normal trileaflet aortic valve. Peak  transaortic valve gradient equals 23 mm Hg, mean  transaortic valvegradient equals 13 mm Hg, aortic valve  velocity time integral equals 41 cm, consistent with mild  aortic stenosis. Mild aortic regurgitation.  Peak left  ventricular outflow tract gradient equals 4 mm Hg, mean  gradient is equal to 2 mm Hg, LVOT velocity time integral  equals 20 cm.  Aortic Root: 2.5 cm.  LVOT diameter: 1.9 cm.  Left Atrium: Mildly dilated left atrium.  LA volume index =  41 cc/m2.  Left Ventricle: Moderate-severe segmental left ventricular  systolic dysfunction. The mid to distal inferior wall, mid  to distal septal wall and apex are hypokinetic.   Eccentric  left ventricular hypertrophy (dilated left ventricle with  normal relative wall thickness). Mild diastolic dysfunction  (Stage I).  Right Heart: Normal right atrium. Normal right ventricular  size and function. Normal tricuspid valve. Normal pulmonic  valve.  Pericardium/Pleura: Normal pericardium with no pericardial  effusion.  Hemodynamic: Estimated right atrial pressure is 8 mm Hg.  Inadequate tricuspid regurgitation Doppler envelope  precludes estimation of RVSP.  ------------------------------------------------------------------------  Conclusions:  1. Mitral annular calcification. Mild-moderate mitral  regurgitation.  2. Normal trileaflet aortic valve. Mild aortic  regurgitation.  3. Eccentric left ventricular hypertrophy (dilated left  ventricle with normal relative wall thickness).  4. Moderate-severe segmental left ventricular systolic  dysfunction. The mid to distal inferior wall, mid to distal  septal wall and apex are hypokinetic.  5. Mild diastolic dysfunction (Stage I).  6. Normal right ventricular size and function.  ------------------------------------------------------------------------  Confirmed on  2017 - 18:07:13 by Kevin Villegas MD  ------------------------------------------------------------------------    < end of copied text >
History of Present Illness:  Chief Complaint/Reason for Admission: sob	  History of Present Illness: 	  86 yr old F with COPD on OXYGEN, CHF,  Afib on eliquis, PPM, HTN , HLD, recent dx of Monoclonal paraproteinemia presents with complains of orthopnea x 3 days. Last night, pt was unable to sleep and pressed her medic bracelet to call EMS. Pt was recently discharged from [facility] for ADHF-per family she is currently not on any diuretics. She follows with Dr. Doty and was told to call if she gains 3 pounds and then cardiologist would start her on diuretics. Pt denies SOB on exertion, chest pain, ever, chills, diarrhea, abd pain, dysuria, denies PND, or URI symptoms, denies weight gain. No other associated symptoms.Patient was found to have increased serum plasma proteins and hence hematology consult called.   86 yr old F with COPD on nocturnal o2 (2L) , HrEF,  AFIB on eliquis, PPM, HTN , HLD, recent dx of Monoclonal paraproteinemia presents with complains of orthopnea x 3 days. Last night, pt was unable to sleep and pressed her medic bracelet to call EMS. Pt was recently discharged from [facility] for ADHF-per family she is currently not on any diuretics. SHe follows with Dr. Doty and was told to call if she gains 3 pounds and then cardiologist would start her on diuretics. Pt denies SOB on exertion, chest pain, ever, chills, diarrhea, abd pain, dysuria, denies PND, or URI symptoms, denies weight gain.     Review of Systems:  Review of Systems: CONSTITUTIONAL: No weakness, fevers or chills  EYES/ENT: No visual changes;  No vertigo or throat pain   NECK: No pain or stiffness  RESPIRATORY: No cough, wheezing, hemoptysis; No shortness of breath  CARDIOVASCULAR: SOB  GASTROINTESTINAL: No abdominal or epigastric pain. No nausea, vomiting, or hematemesis; No diarrhea or constipation. No melena or hematochezia.  GENITOURINARY: No dysuria, frequency or hematuria  NEUROLOGICAL: No numbness or weakness  SKIN: No itching, burning, rashes, or lesions  All other review of systems is negative unless indicated above.	      Allergies and Intolerances:        Allergies:  	amoxicillin: Drug, Flushing, Vomiting, Nausea    Home Medications:  Patient Currently Takes Medications as of 21-Sep-2017 11:19 documented in Structured Notes  · 	predniSONE 50 mg oral tablet: 1 tab(s) orally once a day , ends on 17  · 	lisinopril 2.5 mg oral tablet: 1 tab(s) orally once a day   · 	aspirin 81 mg oral tablet, chewable: 1 tab(s) orally once a day  · 	amiodarone 200 mg oral tablet: 0.5 tab(s) orally once a day  · 	Cymbalta 60 mg oral delayed release capsule: 1 cap(s) orally once a day  · 	Eliquis 2.5 mg oral tablet: 1 tab(s) orally 2 times a day  · 	HumaLOG KwikPen 100 units/mL subcutaneous solution: 4 unit(s) subcutaneous 3 times a day  · 	Toprol- mg oral tablet, extended release: 1 tab(s) orally once a day  · 	Toujeo SoloStar 300 units/mL subcutaneous solution: 12 unit(s) subcutaneous once a day  · 	Abilify 2 mg oral tablet: 1 tab(s) orally once a day  · 	Colace 100 mg oral capsule: 4 cap(s) orally once a day (at night)  · 	KlonoPIN 0.5 mg oral tablet: 1 tablet orally 1-2 times daily, As Needed  · 	Advair Diskus 250 mcg-50 mcg inhalation powder: 1 puff(s) inhaled 2 times a day  · 	Remeron 15 mg oral tablet: 1 tab(s) orally once a day (at bedtime)  · 	simvastatin 20 mg oral tablet: 1 tab(s) orally once a day (at bedtime)    .    Patient History:   Past Medical History:  Arteriosclerotic Heart Disease (ASHD)    Atrial fibrillation, unspecified type    Cigarette Smoker    Diabetes  HTN (Hypertension)    Overweight    Pacemaker    PVD (Peripheral Vascular Disease)    Renal Cancer.    Past Surgical History:  Hip Replacement    History of Total Knee Replacement    S/P carpal tunnel release    PAST MEDICAL & SURGICAL HISTORY:  Atrial fibrillation, unspecified type  Pacemaker  Renal Cancer  Overweight  PVD (Peripheral Vascular Disease)  Cigarette Smoker  Arteriosclerotic Heart Disease (ASHD)  HTN (Hypertension)  Diabetes  S/P carpal tunnel release  S/P left cataract extraction  s/p colon resection  S/P Nephrectomy: right  Hip Replacement  History of Total Knee Replacement  S/P Hernia Surgery  S/P Cholecystectomy      amoxicillin (Flushing; Vomiting; Nausea)  FAMILY HISTORY:  Family history of pancreatic cancer (Sibling)             Ass/rec:S/P Cholecystectomy    s/p colon resection    S/P Hernia Surgery    S/P left cataract extraction    S/P Nephrectomy  right.    Family History:  Sibling  Still living? No  Family history of pancreatic cancer, Age at diagnosis: Age Unknown.    Social History:  Social History (marital status, living situation, occupation, tobacco use, alcohol and drug use, and sexual history): -ve no smoking            12.7  8.4   )-----------( 265      ( 09 Oct 2017 05:58 )            39.6    10  139  |  98  |  27<H> ----------------------------<  237<H> 4.8   |  25  |  1.96<H>  Ca    9.1      09 Oct 2017 05:58  TPro  7.1  /  Alb  3.7  /  TBili  0.3  /  DBili  x   /  AST  22  /  ALT  10  /  AlkPhos  88  10 Quantitative Ig mg/dL (17 @ 07:30)  Free Kappa and Lambda Light Chains, Urine (17 @ 19:16)   [place] Free Light Chains, Urine: 126.00 mg/L   Lambda Free Light Chains, Urine: 8.77 mg/L   Kappa/Lambda Free Light Chain Ratio, Urine: 14.37: Test Performed by Jessica Tamayo,   Vital Signs Last 24 Hrs T(C): 36.7 (09 Oct 2017 08:49), Max: 37.3 (09 Oct 2017 07:10) T(F): 98 (09 Oct 2017 08:49), Max: 99.1 (09 Oct 2017 07:10) HR: 89 (09 Oct 2017 08:49) (87 - 107) BP: 125/74 (09 Oct 2017 08:49) (120/88 - 137/83) BP(mean): -- RR: 20 (09 Oct 2017 08:49) (19 - 26) SpO2: 92% (09 Oct 2017 08:49) (92% - 100%)

## 2017-10-09 NOTE — DISCHARGE NOTE ADULT - ADDITIONAL INSTRUCTIONS
Follow up with PMD within 1 week of discharge. Follow up with Cardiologist within 1 week of discharge.

## 2017-10-09 NOTE — DISCHARGE NOTE ADULT - HOSPITAL COURSE
86 yr old F with COPD on OXYGEN, CHF,  Afib on eliquis, PPM, HTN , HLD, recent dx of Monoclonal paraproteinemia presents with complains of orthopnea x 3 days. Last night, pt was unable to sleep and pressed her medic bracelet to call EMS. Pt was recently [place] [facility] [place] [facility]-per family she is currently not on any diuretics.     1. Systolic Heart failure - s/p Lasix 40mg IV BID, appears euvolemic today. hold Lasix today. Continue with bb, ACE, and ASA  - TTE last admission with severe reduced EF, NM stress test with mild ischemia and mostly infarct - cath deferred 2/2 renal function  2. AF - AF RVR overnight s/p dig with rate controlled now. Continue with Eliquis for AC and home dose Amio.  3. HTN - continue with home meds now of Toprol and Norvasc and Lisinopril 2.5mg  4. HLD - continue with statin  5. COPD: stable on advair at home, cont symbicort while in house.  Cont spiriva and nebs    Pt stable discharged home with outpt follow up with PMD, Cardiologist within 1 week of discharge.

## 2017-10-09 NOTE — DISCHARGE NOTE ADULT - CARE PLAN
Principal Discharge DX:	Acute decompensated heart failure  Goal:	Patient remains hemodynamically stable.  Secondary Diagnosis:	Diabetes  Secondary Diagnosis:	Atrial fibrillation, unspecified type  Secondary Diagnosis:	HTN (Hypertension) Principal Discharge DX:	Acute decompensated heart failure  Goal:	Patient remains hemodynamically stable.  Instructions for follow-up, activity and diet:	Weigh yourself daily.  If you gain 3lbs in 3 days, or 5lbs in a week call your Health Care Provider.  Do not eat or drink foods containing more than 2000mg of salt (sodium) in your diet every day.  Call your Health Care Provider if you have any swelling or increased swelling in your feet, ankles, and/or stomach.  Take all of your medication as directed.  If you become dizzy call your Health Care Provider.  Secondary Diagnosis:	Diabetes  Instructions for follow-up, activity and diet:	Make sure you get your HgA1c checked every three months.  If you take oral diabetes medications, check your blood glucose two times a day.  If you take insulin, check your blood glucose before meals and at bedtime.  It's important not to skip any meals.  Keep a log of your blood glucose results and always take it with you to your doctor appointments.  Keep a list of your current medications including injectables and over the counter medications and bring this medication list with you to all your doctor appointments.  If you have not seen your opthalmologist this year call for appointment.  Check your feet daily for redness, sores, or openings. Do not self treat. If no improvement in two days call your primary care physician for an appointment.  Low blood sugar (hypoglycemia) is a blood sugar below 70mg/dl. Check your blood sugar if you feel signs/symptoms of hypoglycemia. If your blood sugar is below 70 take 15 grams of carbohydrates (ex 4 oz of apple juice, 3-4 glucosr tablets, or 4-6 oz of regular soda) wait 15 minutes and repeat blood sugar to make sure it comes up above 70.  If your blood sugar is above 70 and you are due for a meal, have a meal.  If you are not due for a meal have a snack.  This snack helps keeps your blood sugar at a safe range.  Secondary Diagnosis:	Atrial fibrillation, unspecified type  Instructions for follow-up, activity and diet:	Atrial fibrillation is the most common heart rhythm problem & has the risk of stroke & heart attack  It helps if you control your blood pressure, not drink more than 1-2 alcohol drinks per day, cut down on caffeine, getting treatment for over active thyroid gland, & getting exercise  Call your doctor if you feel your heart racing or beating unusually, chest tightness or pain, lightheaded, faint, shortness of breath especially with exercise  It is important to take your heart medication as prescribed  You may be on anticoagulation which is very important to take as directed - you may need blood work to monitor drug levels  Secondary Diagnosis:	HTN (Hypertension)  Instructions for follow-up, activity and diet:	Low salt diet  Activity as tolerated.  Take all medication as prescribed.  Follow up with your medical doctor for routine blood pressure monitoring at your next visit.  Notify your doctor if you have any of the following symptoms:   Dizziness, Lightheadedness, Blurry vision, Headache, Chest pain, Shortness of breath

## 2017-10-09 NOTE — DISCHARGE NOTE ADULT - MEDICATION SUMMARY - MEDICATIONS TO TAKE
I will START or STAY ON the medications listed below when I get home from the hospital:    aspirin 81 mg oral tablet, chewable  -- 1 tab(s) by mouth once a day  -- Indication: For CAD    lisinopril 2.5 mg oral tablet  -- 1 tab(s) by mouth once a day  -- Indication: For Blood Pressure    amiodarone 200 mg oral tablet  -- 0.5 tab(s) by mouth once a day  -- Indication: For AFib    Eliquis 2.5 mg oral tablet  -- 1 tab(s) by mouth 2 times a day  -- Indication: For Afib    KlonoPIN 0.5 mg oral tablet  -- 1 tablet by mouth 1-2 times daily, As Needed  -- Indication: For Anxiety    Remeron 15 mg oral tablet  -- 1 tab(s) by mouth once a day (at bedtime)  -- Indication: For Sleep Aid    Cymbalta 60 mg oral delayed release capsule  -- 1 cap(s) by mouth once a day  -- Indication: For Depression    HumaLOG KwikPen 100 units/mL subcutaneous solution  -- 4 unit(s) subcutaneous 3 times a day  -- Indication: For Diabetes    Toujeo SoloStar 300 units/mL subcutaneous solution  -- 12 unit(s) subcutaneous once a day  -- Indication: For Diabetes    simvastatin 20 mg oral tablet  -- 1 tab(s) by mouth once a day (at bedtime)  -- Indication: For High Cholesterol    ARIPiprazole 2 mg oral tablet  -- 1 tab(s) by mouth once a day  -- Indication: For Anti-psychotic    Toprol- mg oral tablet, extended release  -- 1 tab(s) by mouth once a day  -- Indication: For Afib    Advair Diskus 250 mcg-50 mcg inhalation powder  -- 1 puff(s) inhaled 2 times a day  -- Indication: For Inhaler    budesonide-formoterol 160 mcg-4.5 mcg/inh inhalation aerosol  -- 1 puff(s) inhaled 2 times a day   -- Indication: For Inhaler    Lasix 40 mg oral tablet  -- 1 tab(s) by mouth every 48 hours x 30 days   -- Avoid prolonged or excessive exposure to direct and/or artificial sunlight while taking this medication.  It is very important that you take or use this exactly as directed.  Do not skip doses or discontinue unless directed by your doctor.  It may be advisable to drink a full glass orange juice or eat a banana daily while taking this medication.    -- Indication: For Diuretic    Colace 100 mg oral capsule  -- 4 cap(s) by mouth once a day (at night)  -- Indication: For Laxative I will START or STAY ON the medications listed below when I get home from the hospital:    aspirin 81 mg oral tablet, chewable  -- 1 tab(s) by mouth once a day  -- Indication: For CAD    lisinopril 2.5 mg oral tablet  -- 1 tab(s) by mouth once a day  -- Indication: For Blood Pressure    amiodarone 200 mg oral tablet  -- 0.5 tab(s) by mouth once a day  -- Indication: For AFIB    Eliquis 2.5 mg oral tablet  -- 1 tab(s) by mouth 2 times a day  -- Indication: For AFIB    KlonoPIN 0.5 mg oral tablet  -- 1 tablet by mouth 1-2 times daily, As Needed  -- Indication: For Anxiety    Remeron 15 mg oral tablet  -- 1 tab(s) by mouth once a day (at bedtime)  -- Indication: For Depression    Cymbalta 60 mg oral delayed release capsule  -- 1 cap(s) by mouth once a day  -- Indication: For Depression    HumaLOG KwikPen 100 units/mL subcutaneous solution  -- 4 unit(s) subcutaneous 3 times a day  -- Indication: For Diabetes    Toujeo SoloStar 300 units/mL subcutaneous solution  -- 12 unit(s) subcutaneous once a day  -- Indication: For Diabetes    simvastatin 20 mg oral tablet  -- 1 tab(s) by mouth once a day (at bedtime)  -- Indication: For HIgh Cholesterol    ARIPiprazole 2 mg oral tablet  -- 1 tab(s) by mouth once a day  -- Indication: For Anti-psychotics    Toprol- mg oral tablet, extended release  -- 1 tab(s) by mouth once a day  -- Indication: For High Blood Pressure    Advair Diskus 250 mcg-50 mcg inhalation powder  -- 1 puff(s) inhaled 2 times a day  -- Indication: For Inhaler    budesonide-formoterol 160 mcg-4.5 mcg/inh inhalation aerosol  -- 1 puff(s) inhaled 2 times a day   -- Indication: For Inhaler    Lasix 40 mg oral tablet  -- 1 tab(s) by mouth every 48 hours x 30 days   -- Avoid prolonged or excessive exposure to direct and/or artificial sunlight while taking this medication.  It is very important that you take or use this exactly as directed.  Do not skip doses or discontinue unless directed by your doctor.  It may be advisable to drink a full glass orange juice or eat a banana daily while taking this medication.    -- Indication: For Diuretic    Colace 100 mg oral capsule  -- 4 cap(s) by mouth once a day (at night)  -- Indication: For Laxative

## 2017-10-10 LAB
ALBUMIN SERPL ELPH-MCNC: 3.4 G/DL — SIGNIFICANT CHANGE UP (ref 3.3–5)
ALP SERPL-CCNC: 83 U/L — SIGNIFICANT CHANGE UP (ref 40–120)
ALT FLD-CCNC: 18 U/L — SIGNIFICANT CHANGE UP (ref 10–45)
ANION GAP SERPL CALC-SCNC: 17 MMOL/L — SIGNIFICANT CHANGE UP (ref 5–17)
AST SERPL-CCNC: 30 U/L — SIGNIFICANT CHANGE UP (ref 10–40)
BILIRUB SERPL-MCNC: 0.4 MG/DL — SIGNIFICANT CHANGE UP (ref 0.2–1.2)
BUN SERPL-MCNC: 31 MG/DL — HIGH (ref 7–23)
CALCIUM SERPL-MCNC: 9.4 MG/DL — SIGNIFICANT CHANGE UP (ref 8.4–10.5)
CHLORIDE SERPL-SCNC: 96 MMOL/L — SIGNIFICANT CHANGE UP (ref 96–108)
CO2 SERPL-SCNC: 23 MMOL/L — SIGNIFICANT CHANGE UP (ref 22–31)
CREAT SERPL-MCNC: 1.86 MG/DL — HIGH (ref 0.5–1.3)
GLUCOSE BLDC GLUCOMTR-MCNC: 143 MG/DL — HIGH (ref 70–99)
GLUCOSE BLDC GLUCOMTR-MCNC: 144 MG/DL — HIGH (ref 70–99)
GLUCOSE BLDC GLUCOMTR-MCNC: 147 MG/DL — HIGH (ref 70–99)
GLUCOSE SERPL-MCNC: 151 MG/DL — HIGH (ref 70–99)
HCT VFR BLD CALC: 39.2 % — SIGNIFICANT CHANGE UP (ref 34.5–45)
HGB BLD-MCNC: 12.5 G/DL — SIGNIFICANT CHANGE UP (ref 11.5–15.5)
MAGNESIUM SERPL-MCNC: 1.9 MG/DL — SIGNIFICANT CHANGE UP (ref 1.6–2.6)
MCHC RBC-ENTMCNC: 28.1 PG — SIGNIFICANT CHANGE UP (ref 27–34)
MCHC RBC-ENTMCNC: 31.9 GM/DL — LOW (ref 32–36)
MCV RBC AUTO: 88.1 FL — SIGNIFICANT CHANGE UP (ref 80–100)
PHOSPHATE SERPL-MCNC: 3.7 MG/DL — SIGNIFICANT CHANGE UP (ref 2.5–4.5)
PLATELET # BLD AUTO: 266 K/UL — SIGNIFICANT CHANGE UP (ref 150–400)
POTASSIUM SERPL-MCNC: 3.9 MMOL/L — SIGNIFICANT CHANGE UP (ref 3.5–5.3)
POTASSIUM SERPL-SCNC: 3.9 MMOL/L — SIGNIFICANT CHANGE UP (ref 3.5–5.3)
PROT SERPL-MCNC: 7.3 G/DL — SIGNIFICANT CHANGE UP (ref 6–8.3)
RBC # BLD: 4.45 M/UL — SIGNIFICANT CHANGE UP (ref 3.8–5.2)
RBC # FLD: 15.4 % — HIGH (ref 10.3–14.5)
SODIUM SERPL-SCNC: 136 MMOL/L — SIGNIFICANT CHANGE UP (ref 135–145)
WBC # BLD: 8.52 K/UL — SIGNIFICANT CHANGE UP (ref 3.8–10.5)
WBC # FLD AUTO: 8.52 K/UL — SIGNIFICANT CHANGE UP (ref 3.8–10.5)

## 2017-10-10 PROCEDURE — 93010 ELECTROCARDIOGRAM REPORT: CPT

## 2017-10-10 RX ORDER — ACETAMINOPHEN 500 MG
325 TABLET ORAL ONCE
Qty: 0 | Refills: 0 | Status: COMPLETED | OUTPATIENT
Start: 2017-10-10 | End: 2017-10-10

## 2017-10-10 RX ORDER — CLONAZEPAM 1 MG
0.5 TABLET ORAL
Qty: 0 | Refills: 0 | Status: DISCONTINUED | OUTPATIENT
Start: 2017-10-10 | End: 2017-10-12

## 2017-10-10 RX ORDER — METOPROLOL TARTRATE 50 MG
5 TABLET ORAL ONCE
Qty: 0 | Refills: 0 | Status: COMPLETED | OUTPATIENT
Start: 2017-10-10 | End: 2017-10-10

## 2017-10-10 RX ORDER — BUDESONIDE AND FORMOTEROL FUMARATE DIHYDRATE 160; 4.5 UG/1; UG/1
2 AEROSOL RESPIRATORY (INHALATION)
Qty: 0 | Refills: 0 | Status: DISCONTINUED | OUTPATIENT
Start: 2017-10-10 | End: 2017-10-12

## 2017-10-10 RX ORDER — INSULIN LISPRO 100/ML
VIAL (ML) SUBCUTANEOUS AT BEDTIME
Qty: 0 | Refills: 0 | Status: DISCONTINUED | OUTPATIENT
Start: 2017-10-10 | End: 2017-10-12

## 2017-10-10 RX ORDER — DIGOXIN 250 MCG
0.25 TABLET ORAL ONCE
Qty: 0 | Refills: 0 | Status: COMPLETED | OUTPATIENT
Start: 2017-10-10 | End: 2017-10-10

## 2017-10-10 RX ADMIN — APIXABAN 2.5 MILLIGRAM(S): 2.5 TABLET, FILM COATED ORAL at 17:34

## 2017-10-10 RX ADMIN — Medication 5 MILLIGRAM(S): at 02:42

## 2017-10-10 RX ADMIN — DULOXETINE HYDROCHLORIDE 60 MILLIGRAM(S): 30 CAPSULE, DELAYED RELEASE ORAL at 21:13

## 2017-10-10 RX ADMIN — Medication 6 UNIT(S): at 17:42

## 2017-10-10 RX ADMIN — SIMVASTATIN 20 MILLIGRAM(S): 20 TABLET, FILM COATED ORAL at 21:13

## 2017-10-10 RX ADMIN — Medication 100 MILLIGRAM(S): at 11:07

## 2017-10-10 RX ADMIN — Medication 3 MILLILITER(S): at 17:34

## 2017-10-10 RX ADMIN — Medication 3 MILLILITER(S): at 06:26

## 2017-10-10 RX ADMIN — Medication 0.25 MILLIGRAM(S): at 06:57

## 2017-10-10 RX ADMIN — LISINOPRIL 2.5 MILLIGRAM(S): 2.5 TABLET ORAL at 10:49

## 2017-10-10 RX ADMIN — Medication 325 MILLIGRAM(S): at 01:35

## 2017-10-10 RX ADMIN — Medication 3 MILLILITER(S): at 11:07

## 2017-10-10 RX ADMIN — Medication 100 MILLIGRAM(S): at 04:24

## 2017-10-10 RX ADMIN — AMIODARONE HYDROCHLORIDE 100 MILLIGRAM(S): 400 TABLET ORAL at 04:23

## 2017-10-10 RX ADMIN — Medication 3 MILLILITER(S): at 23:57

## 2017-10-10 RX ADMIN — Medication 1: at 07:47

## 2017-10-10 RX ADMIN — Medication 6 UNIT(S): at 12:30

## 2017-10-10 RX ADMIN — Medication 325 MILLIGRAM(S): at 01:08

## 2017-10-10 RX ADMIN — Medication 6 UNIT(S): at 07:47

## 2017-10-10 RX ADMIN — Medication 0.5 MILLIGRAM(S): at 01:08

## 2017-10-10 RX ADMIN — INSULIN GLARGINE 13 UNIT(S): 100 INJECTION, SOLUTION SUBCUTANEOUS at 21:59

## 2017-10-10 RX ADMIN — APIXABAN 2.5 MILLIGRAM(S): 2.5 TABLET, FILM COATED ORAL at 04:23

## 2017-10-10 RX ADMIN — Medication 5 MILLIGRAM(S): at 03:24

## 2017-10-10 RX ADMIN — BUDESONIDE AND FORMOTEROL FUMARATE DIHYDRATE 2 PUFF(S): 160; 4.5 AEROSOL RESPIRATORY (INHALATION) at 17:34

## 2017-10-10 RX ADMIN — MIRTAZAPINE 15 MILLIGRAM(S): 45 TABLET, ORALLY DISINTEGRATING ORAL at 21:12

## 2017-10-10 RX ADMIN — Medication 81 MILLIGRAM(S): at 11:09

## 2017-10-10 RX ADMIN — ARIPIPRAZOLE 2 MILLIGRAM(S): 15 TABLET ORAL at 07:47

## 2017-10-10 NOTE — PROGRESS NOTE ADULT - ATTENDING COMMENTS
[facility] NEPHROLOGY  Rad Mojica M.D.  Les Mckeon D.O.  Selene Conn M.D.  Odessa Rodriguez, MSN, ANP-C    Telephone: [phone]  Facsimile: [phone]    [address] [place] [address]

## 2017-10-10 NOTE — PROGRESS NOTE ADULT - SUBJECTIVE AND OBJECTIVE BOX
Chief complaint  Patient is a 86y old  Female who presents with a chief complaint of sob (09 Oct 2017 14:15)   Review of systems  Patient in bed, comfortable, no fever, no hypoglycemia.    Labs and Fingersticks    CAPILLARY BLOOD GLUCOSE  144 (10 Oct 2017 17:01)  147 (10 Oct 2017 12:01)  155 (10 Oct 2017 07:41)  138 (09 Oct 2017 21:05)  94 (09 Oct 2017 16:57)  237 (09 Oct 2017 12:09)  212 (09 Oct 2017 09:31)  201 (09 Oct 2017 08:05)    Anion Gap, Serum: 17 (10-10 @ 07:41)  Anion Gap, Serum: 16 (10-09 @ 05:58)      Calcium, Total Serum: 9.4 (10-10 @ 07:41)  Calcium, Total Serum: 9.1 (10-09 @ 05:58)  Albumin, Serum: 3.4 (10-10 @ 07:41)  Albumin, Serum: 3.7 (10-09 @ 05:58)    Alanine Aminotransferase (ALT/SGPT): 18 (10-10 @ 07:41)  Alanine Aminotransferase (ALT/SGPT): 10 (10-09 @ 05:58)  Alkaline Phosphatase, Serum: 83 (10-10 @ 07:41)  Alkaline Phosphatase, Serum: 88 (10-09 @ 05:58)  Aspartate Aminotransferase (AST/SGOT): 30 (10-10 @ 07:41)  Aspartate Aminotransferase (AST/SGOT): 22 (10-09 @ 05:58)        10-10    136  |  96  |  31<H>  ----------------------------<  151<H>  3.9   |  23  |  1.86<H>    Ca    9.4      10 Oct 2017 07:41  Phos  3.7     10-10  Mg     1.9     10-10    TPro  7.3  /  Alb  3.4  /  TBili  0.4  /  DBili  x   /  AST  30  /  ALT  18  /  AlkPhos  83  10-10                        12.5   8.52  )-----------( 266      ( 10 Oct 2017 07:43 )             39.2     Medications  MEDICATIONS  (STANDING):  ALBUTerol    90 MICROgram(s) HFA Inhaler 1 Puff(s) Inhalation every 4 hours  ALBUTerol/ipratropium for Nebulization 3 milliLiter(s) Nebulizer every 6 hours  amiodarone    Tablet 100 milliGRAM(s) Oral daily  apixaban 2.5 milliGRAM(s) Oral every 12 hours  ARIPiprazole 2 milliGRAM(s) Oral daily  aspirin  chewable 81 milliGRAM(s) Oral daily  buDESOnide 160 MICROgram(s)/formoterol 4.5 MICROgram(s) Inhaler 2 Puff(s) Inhalation two times a day  dextrose 5%. 1000 milliLiter(s) (50 mL/Hr) IV Continuous <Continuous>  dextrose 50% Injectable 12.5 Gram(s) IV Push once  dextrose 50% Injectable 25 Gram(s) IV Push once  dextrose 50% Injectable 25 Gram(s) IV Push once  docusate sodium 100 milliGRAM(s) Oral daily  DULoxetine 60 milliGRAM(s) Oral daily  insulin glargine Injectable (LANTUS) 13 Unit(s) SubCutaneous at bedtime  insulin lispro (HumaLOG) corrective regimen sliding scale   SubCutaneous at bedtime  insulin lispro (HumaLOG) corrective regimen sliding scale   SubCutaneous three times a day before meals  insulin lispro Injectable (HumaLOG) 6 Unit(s) SubCutaneous three times a day before meals  lisinopril 2.5 milliGRAM(s) Oral daily  metoprolol succinate  milliGRAM(s) Oral daily  mirtazapine 15 milliGRAM(s) Oral at bedtime  simvastatin 20 milliGRAM(s) Oral at bedtime  tiotropium 18 MICROgram(s) Capsule 1 Capsule(s) Inhalation daily      Physical Exam  General: Patient comfortable in bed  Vital Signs Last 12 Hrs  T(F): 97.9 (10-10-17 @ 20:10), Max: 97.9 (10-10-17 @ 20:10)  HR: 76 (10-10-17 @ 20:10) (63 - 76)  BP: 97/64 (10-10-17 @ 20:10) (94/63 - 155/70)  BP(mean): --  RR: 20 (10-10-17 @ 20:10) (18 - 20)  SpO2: 97% (10-10-17 @ 20:10) (95% - 97%)  Neck: No palpable thyroid nodules.  CVS: S1S2, No murmurs  Respiratory: No wheezing, no crepitations  GI: Abdomen soft, bowel sounds positive  Musculoskeletal: Positive edema lower extremities bilaterally  Skin: No skin rashes, no ecchymosis    Diagnostics

## 2017-10-10 NOTE — PROGRESS NOTE ADULT - PROBLEM SELECTOR PLAN 5
Improving with low BP for which lasix discontinued. No objection to restart lasix at lower frequency (40 mg IV daily) once BP improved. Monitor UO.

## 2017-10-10 NOTE — PROGRESS NOTE ADULT - PROBLEM SELECTOR PLAN 1
Likely hemodynamically mediated secondary to CHF exacerbation and afib with RVR. UA bland and patient non-oliguric. F/U renal panel today (P). If Scr increases today, would discontinue lisinopril 2.5 mg daily. Monitor electrolytes.

## 2017-10-10 NOTE — PROGRESS NOTE ADULT - SUBJECTIVE AND OBJECTIVE BOX
Vital Signs Last 24 Hrs  T(C): 36.6 (10 Oct 2017 20:10), Max: 37.3 (10 Oct 2017 02:25)  Patient seen this morinig ,no overnight events.    T(F): 97.9 (10 Oct 2017 20:10), Max: 99.1 (10 Oct 2017 02:25)  HR: 76 (10 Oct 2017 20:10) (63 - 147)  BP: 97/64 (10 Oct 2017 20:10) (92/66 - 155/70)  BP(mean): --  RR: 20 (10 Oct 2017 20:10) (18 - 20)  SpO2: 97% (10 Oct 2017 20:10) (93% - 98%)                          12.5   8.52  )-----------( 266      ( 10 Oct 2017 07:43 )             39.2       10-10    136  |  96  |  31<H>  ----------------------------<  151<H>  3.9   |  23  |  1.86<H>    Ca    9.4      10 Oct 2017 07:41  Phos  3.7     10-10  Mg     1.9     10-10    TPro  7.3  /  Alb  3.4  /  TBili  0.4  /  DBili  x   /  AST  30  /  ALT  18  /  AlkPhos  83  10-10      Review of Systems:  Review of Systems: CONSTITUTIONAL: No weakness, fevers or chills  EYES/ENT: No visual changes;  No vertigo or throat pain   NECK: No pain or stiffness  RESPIRATORY: No cough, wheezing, hemoptysis; No shortness of breath  CARDIOVASCULAR: SOB  GASTROINTESTINAL: No abdominal or epigastric pain. No nausea, vomiting, or hematemesis; No diarrhea or constipation. No melena or hematochezia.  GENITOURINARY: No dysuria, frequency or hematuria  NEUROLOGICAL: No numbness or weakness  SKIN: No itching, burning, rashes, or lesions  All other review of systems is negative unless indicated above.	  Physical Exam:   · Constitutional	Well-developed, well nourished	  · Eyes	EOMI; PERRL; no drainage or redness	  · ENMT	No oral lesions; no gross abnormalities	  · Neck	No bruits; no thyromegaly or nodules	  · Breasts	No masses; no nipple discharge	  · Back	No deformity or limitation of movement	  · Respiratory	detailed exam	  · Respiratory Comments	b/l basal crackles	  · Cardiovascular	Regular rate & rhythm, normal S1, S2; no murmurs, gallops or rubs; no S3, S4	  · Gastrointestinal	Soft, non-tender, no hepatosplenomegaly, normal bowel sounds	  · Extremities	No cyanosis, clubbing or edema	    Assessment and Recommendation:   · Assessment		  86 yr old F with COPD, CHF,  A FIB on eliquis, PPM, HTN , HLD,  here  with SOB and chest pressure with decompensated heart failure and ? super imposed PNA. Recently discharged about 2 weeks ago when found to have monoclonal proteins in her blood.  At present there is no sign of end orgsn damage of excesive proteins.Monoclonal band can be either due to MGUS or indolent myeloma. She will need outpt followup with repeat myeloma profile every 4-6 months to follow the trend.    Problem/Recommendation - 1:  Problem: Malignant neoplasm of kidney, unspecified laterality. Recommendation: No signs of recurrence, her surgery was more then 20 years ago. Continue to monitor.    Problem/Recommendation - 2:  ·  Problem: Monoclonal paraproteinemia.  Recommendation: Outpt followup with repeat blood work every 4-6 months.No evidence of end organ damage.     Problem/Recommendation - 3:  ·  Problem: Acute decompensated heart failure.  Recommendation: Cardiology followup. Problem/Recommendation - 4:  ·  Problem: Pacemaker.

## 2017-10-10 NOTE — PROGRESS NOTE ADULT - ASSESSMENT
86 year old female h/o HTN and DM, RCC s/p right nephrectomy presents with dyspnea and tachycardia. Nephrology consulted for elevated Scr.

## 2017-10-10 NOTE — PROGRESS NOTE ADULT - PROBLEM SELECTOR PLAN 3
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 Paraproteinemia work up positive with two IgG lambda bands. Follow up hematology. On low dose lisinopril for anti-proteinuric effect.

## 2017-10-10 NOTE — PROGRESS NOTE ADULT - SUBJECTIVE AND OBJECTIVE BOX
Patient is a 86y old  Female who presents with a chief complaint of sob (09 Oct 2017 14:15)      INTERVAL HPI/OVERNIGHT EVENTS:  T(C): 36.6 (10-10-17 @ 10:45), Max: 37.3 (10-10-17 @ 02:25)  HR: 69 (10-10-17 @ 10:45) (69 - 147)  BP: 155/70 (10-10-17 @ 10:45) (92/66 - 155/70)  RR: 18 (10-10-17 @ 10:45) (18 - 20)  SpO2: 95% (10-10-17 @ 10:45) (93% - 98%)  Wt(kg): --  I&O's Summary    09 Oct 2017 07:01  -  10 Oct 2017 07:00  --------------------------------------------------------  IN: 800 mL / OUT: 875 mL / NET: -75 mL    10 Oct 2017 07:01  -  10 Oct 2017 12:37  --------------------------------------------------------  IN: 240 mL / OUT: 200 mL / NET: 40 mL        LABS:                        12.5   8.52  )-----------( 266      ( 10 Oct 2017 07:43 )             39.2     10-10    136  |  96  |  31<H>  ----------------------------<  151<H>  3.9   |  23  |  1.86<H>    Ca    9.4      10 Oct 2017 07:41  Phos  3.7     10-10  Mg     1.9     10-10    TPro  7.3  /  Alb  3.4  /  TBili  0.4  /  DBili  x   /  AST  30  /  ALT  18  /  AlkPhos  83  10-10      Urinalysis Basic - ( 09 Oct 2017 07:40 )    Color: PL Yellow / Appearance: Clear / S.009 / pH: x  Gluc: x / Ketone: Negative  / Bili: Negative / Urobili: Negative   Blood: x / Protein: 100 mg/dL / Nitrite: Negative   Leuk Esterase: Negative / RBC: 0-2 /HPF / WBC 0-2 /HPF   Sq Epi: x / Non Sq Epi: x / Bacteria: x      CAPILLARY BLOOD GLUCOSE  147 (10 Oct 2017 12:01)  155 (10 Oct 2017 07:41)  138 (09 Oct 2017 21:05)  94 (09 Oct 2017 16:57)      POCT Blood Glucose.: 147 mg/dL (10 Oct 2017 11:55)        Urinalysis Basic - ( 09 Oct 2017 07:40 )    Color: PL Yellow / Appearance: Clear / S.009 / pH: x  Gluc: x / Ketone: Negative  / Bili: Negative / Urobili: Negative   Blood: x / Protein: 100 mg/dL / Nitrite: Negative   Leuk Esterase: Negative / RBC: 0-2 /HPF / WBC 0-2 /HPF   Sq Epi: x / Non Sq Epi: x / Bacteria: x        MEDICATIONS  (STANDING):  ALBUTerol    90 MICROgram(s) HFA Inhaler 1 Puff(s) Inhalation every 4 hours  ALBUTerol/ipratropium for Nebulization 3 milliLiter(s) Nebulizer every 6 hours  amiodarone    Tablet 100 milliGRAM(s) Oral daily  apixaban 2.5 milliGRAM(s) Oral every 12 hours  ARIPiprazole 2 milliGRAM(s) Oral daily  aspirin  chewable 81 milliGRAM(s) Oral daily  buDESOnide 160 MICROgram(s)/formoterol 4.5 MICROgram(s) Inhaler 2 Puff(s) Inhalation two times a day  dextrose 5%. 1000 milliLiter(s) (50 mL/Hr) IV Continuous <Continuous>  dextrose 50% Injectable 12.5 Gram(s) IV Push once  dextrose 50% Injectable 25 Gram(s) IV Push once  dextrose 50% Injectable 25 Gram(s) IV Push once  docusate sodium 100 milliGRAM(s) Oral daily  DULoxetine 60 milliGRAM(s) Oral daily  insulin glargine Injectable (LANTUS) 13 Unit(s) SubCutaneous at bedtime  insulin lispro (HumaLOG) corrective regimen sliding scale   SubCutaneous at bedtime  insulin lispro (HumaLOG) corrective regimen sliding scale   SubCutaneous three times a day before meals  insulin lispro Injectable (HumaLOG) 6 Unit(s) SubCutaneous three times a day before meals  lisinopril 2.5 milliGRAM(s) Oral daily  metoprolol succinate  milliGRAM(s) Oral daily  mirtazapine 15 milliGRAM(s) Oral at bedtime  simvastatin 20 milliGRAM(s) Oral at bedtime  tiotropium 18 MICROgram(s) Capsule 1 Capsule(s) Inhalation daily    MEDICATIONS  (PRN):  clonazePAM Tablet 0.5 milliGRAM(s) Oral two times a day PRN anxiety  dextrose Gel 1 Dose(s) Oral once PRN Blood Glucose LESS THAN 70 milliGRAM(s)/deciliter  glucagon  Injectable 1 milliGRAM(s) IntraMuscular once PRN Glucose LESS THAN 70 milligrams/deciliter          PHYSICAL EXAM:  GENERAL: NAD, well-groomed, well-developed  HEAD:  Atraumatic, Normocephalic  CHEST/LUNG: Clear to percussion bilaterally; No rales, rhonchi, wheezing, or rubs  HEART: Regular rate and rhythm; No murmurs, rubs, or gallops  ABDOMEN: Soft, Nontender, Nondistended; Bowel sounds present  EXTREMITIES:  2+ Peripheral Pulses, No clubbing, cyanosis, or edema  LYMPH: No lymphadenopathy noted  SKIN: No rashes or lesions    Care Discussed with Consultants/Other Providers [+ ] YES  [ ] NO

## 2017-10-10 NOTE — PROGRESS NOTE ADULT - SUBJECTIVE AND OBJECTIVE BOX
[facility] NEPHROLOGY- PROGRESS NOTE    86 year old female h/o HTN and DM, RCC s/p right nephrectomy presents with dyspnea and tachycardia. Nephrology consulted for elevated Scr.    REVIEW OF SYSTEMS:  Gen: no changes in weight  Cards: no chest pain  Resp: + dyspnea resolved  GI: no nausea or vomiting or diarrhea  Vascular: RLE edema chronic as per patient    amoxicillin (Flushing; Vomiting; Nausea)      Hospital Medications: Medications reviewed      VITALS:  T(F): 98.3 (10-10-17 @ 04:22), Max: 99.1 (10-10-17 @ 02:25)  HR: 122 (10-10-17 @ 06:22)  BP: 92/66 (10-10-17 @ 06:22)  RR: 20 (10-10-17 @ 06:22)  SpO2: 95% (10-10-17 @ 06:22)  Wt(kg): --    10-09 @ 07:01  -  10-10 @ 07:00  --------------------------------------------------------  IN: 800 mL / OUT: 875 mL / NET: -75 mL    10-10 @ 07:01  -  10-10 @ 08:11  --------------------------------------------------------  IN: 240 mL / OUT: 0 mL / NET: 240 mL      PHYSICAL EXAM:    Gen: NAD, calm  Cards: Irregularly irregular, +S1/S2, +ZAKI  Resp: course BS B/L  GI: soft, NT/ND, NABS  Vascular: trace RLE edema (chronic as per patient)      LABS:  10-09    139  |  98  |  27<H>  ----------------------------<  237<H>  4.8   |  25  |  1.96<H>    Ca    9.1      09 Oct 2017 05:58    TPro  7.1  /  Alb  3.7  /  TBili  0.3  /  DBili      /  AST  22  /  ALT  10  /  AlkPhos  88  10-09    Creatinine Trend: 1.96 <--                        12.7   8.4   )-----------( 265      ( 09 Oct 2017 05:58 )             39.6

## 2017-10-10 NOTE — PROGRESS NOTE ADULT - PROBLEM SELECTOR PLAN 2
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 Avoid nephrotoxins. 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 Baseline appears to be 1.5-1.6. Avoid nephrotoxins.

## 2017-10-10 NOTE — PROVIDER CONTACT NOTE (OTHER) - ASSESSMENT
/87  Temp 99.1  96 %
Pt denies SOB or CP.
PT C/O ANXIOUS bp 129/55 ,SPO2 98%ON 2LTS O2 VIA NASAL CANULA TEMP 98F

## 2017-10-10 NOTE — PROGRESS NOTE ADULT - SUBJECTIVE AND OBJECTIVE BOX
Patient is a 86y old  Female who presents with a chief complaint of sob (09 Oct 2017 14:15)      INTERVAL HISTORY: No chest pain, SOB, paralysis, fevers, vomiting  af rvr overnight s/p dig    TELEMETRY: af rvr, now controlled  	  MEDICATIONS:  amiodarone    Tablet 100 milliGRAM(s) Oral daily  lisinopril 2.5 milliGRAM(s) Oral daily  metoprolol succinate  milliGRAM(s) Oral daily        PHYSICAL EXAM:  T(C): 36.6 (10-10-17 @ 10:45), Max: 37.3 (10-10-17 @ 02:25)  HR: 69 (10-10-17 @ 10:45) (69 - 147)  BP: 155/70 (10-10-17 @ 10:45) (92/66 - 155/70)  RR: 18 (10-10-17 @ 10:45) (18 - 20)  SpO2: 95% (10-10-17 @ 10:45) (93% - 98%)  Wt(kg): --  I&O's Summary    09 Oct 2017 07:01  -  10 Oct 2017 07:00  --------------------------------------------------------  IN: 800 mL / OUT: 875 mL / NET: -75 mL    10 Oct 2017 07:01  -  10 Oct 2017 11:38  --------------------------------------------------------  IN: 240 mL / OUT: 200 mL / NET: 40 mL          Appearance: Normal	  HEENT:    PERRL, EOMI	  Lymphatic: No lymphadenopathy  Cardiovascular: Normal S1 S2, No JVD  Respiratory: Lungs clear to auscultation	  Psychiatry: Alert  Gastrointestinal:  Soft, Non-tender, + BS	  Skin: No rashes, No cyanosis  Extremities:  No edema of LE  Vascular: Peripheral pulses palpable  bilaterally      CARDIAC MARKERS:                                  12.5   8.52  )-----------( 266      ( 10 Oct 2017 07:43 )             39.2     10-10    136  |  96  |  31<H>  ----------------------------<  151<H>  3.9   |  23  |  1.86<H>    Ca    9.4      10 Oct 2017 07:41  Phos  3.7     10-10  Mg     1.9     10-10    TPro  7.3  /  Alb  3.4  /  TBili  0.4  /  DBili  x   /  AST  30  /  ALT  18  /  AlkPhos  83  10-10    proBNP:   Lipid Profile:   HgA1c:   TSH:     Labs, imaging and telemetry personally reviewed      ASSESSMENT/PLAN: 	  86 yr old F with COPD, CHF,  A FIB on eliquis, PPM, HTN , HLD,  here  with SOB in acute decompensated systolic heart failure    1. Systolic Heart failure - s/p Lasix 40mg IV BID, hold lasix for now as BP soft. Continue with bb, ACE, and ASA  - TTE last admission with severe reduced EF, NM stress test with mild ischemia and mostly infarct - cath deferred 2/2 renal function    2. AF - AF RVR overnight s/p dig with rate controlled now. Continue with Eliquis for AC and home dose Amio.  - will try to avoid both Dig and Amio long term. Will prefer to increase bb dose as BP tolerates    3. HTN - continue with home meds now of Toprol and Norvasc and Lisinopril 2.5mg    4. HLD - continue with statin    5. Pulmonary - COPD management per pulm/primary team      Discussed with primary team.         Waylon Cates DO [facility]  Cardiovascular Medicine  [phone]

## 2017-10-10 NOTE — CHART NOTE - NSCHARTNOTEFT_GEN_A_CORE
MEDICINE NP    Notified by RN patient with HR up to 150. Seen and examined patient at bedside. Patient is alert, NAD. Denies  CP, palpitations, SOB, or dizziness.    VITAL SIGNS:  T(C): 36.8 (10-10-17 @ 04:22), Max: 37.3 (10-10-17 @ 02:25)  HR: 122 (10-10-17 @ 06:22) (78 - 147)  BP: 92/66 (10-10-17 @ 06:22) (92/66 - 137/83)  RR: 20 (10-10-17 @ 06:22) (18 - 26)  SpO2: 95% (10-10-17 @ 06:22) (92% - 98%)      MEDICATIONS  (STANDING):  ALBUTerol    90 MICROgram(s) HFA Inhaler 1 Puff(s) Inhalation every 4 hours  ALBUTerol/ipratropium for Nebulization 3 milliLiter(s) Nebulizer every 6 hours  amiodarone    Tablet 100 milliGRAM(s) Oral daily  apixaban 2.5 milliGRAM(s) Oral every 12 hours  ARIPiprazole 2 milliGRAM(s) Oral daily  aspirin  chewable 81 milliGRAM(s) Oral daily  dextrose 5%. 1000 milliLiter(s) (50 mL/Hr) IV Continuous <Continuous>  dextrose 50% Injectable 12.5 Gram(s) IV Push once  dextrose 50% Injectable 25 Gram(s) IV Push once  dextrose 50% Injectable 25 Gram(s) IV Push once  docusate sodium 100 milliGRAM(s) Oral daily  DULoxetine 60 milliGRAM(s) Oral daily  insulin glargine Injectable (LANTUS) 13 Unit(s) SubCutaneous at bedtime  insulin lispro (HumaLOG) corrective regimen sliding scale   SubCutaneous three times a day before meals  insulin lispro Injectable (HumaLOG) 6 Unit(s) SubCutaneous three times a day before meals  lisinopril 2.5 milliGRAM(s) Oral daily  metoprolol succinate  milliGRAM(s) Oral daily  mirtazapine 15 milliGRAM(s) Oral at bedtime  simvastatin 20 milliGRAM(s) Oral at bedtime  tiotropium 18 MICROgram(s) Capsule 1 Capsule(s) Inhalation daily    MEDICATIONS  (PRN):  clonazePAM Tablet 0.5 milliGRAM(s) Oral two times a day PRN anxiety  dextrose Gel 1 Dose(s) Oral once PRN Blood Glucose LESS THAN 70 milliGRAM(s)/deciliter  glucagon  Injectable 1 milliGRAM(s) IntraMuscular once PRN Glucose LESS THAN 70 milligrams/deciliter        LABORATORY:                          12.7   8.4   )-----------( 265      ( 09 Oct 2017 05:58 )             39.6       10-09    139  |  98  |  27<H>  ----------------------------<  237<H>  4.8   |  25  |  1.96<H>    Ca    9.1      09 Oct 2017 05:58    TPro  7.1  /  Alb  3.7  /  TBili  0.3  /  DBili  x   /  AST  22  /  ALT  10  /  AlkPhos  88  10-09      Urinalysis Basic - ( 09 Oct 2017 07:40 )    Color: PL Yellow / Appearance: Clear / S.009 / pH: x  Gluc: x / Ketone: Negative  / Bili: Negative / Urobili: Negative   Blood: x / Protein: 100 mg/dL / Nitrite: Negative   Leuk Esterase: Negative / RBC: 0-2 /HPF / WBC 0-2 /HPF   Sq Epi: x / Non Sq Epi: x / Bacteria: x            PHYSICAL EXAM:    Constitutional: AOx3. NAD.    Respiratory: clear lungs bilaterally.     Cardiovascular: S1 S2.    Gastrointestinal: BS X4 active. soft. nontender.    Extremities/Vascular: +2 pulses bilaterally. No BLE edema.      ASSESSMENT/PLAN:   HPI:  86 yr old F with COPD on OXYGEN, CHF,  Afib on eliquis, PPM, HTN , HLD, recent dx of Monoclonal paraproteinemia presents with complains of orthopnea x 3 days. Last night, pt was unable to sleep and pressed her medic bracelet to call EMS. Pt was recently discharged from [facility] for ADHF-per family she is currently not on any diuretics. She follows with Dr. Doty and was told to call if she gains 3 pounds and then cardiologist would start her on diuretics. pt admitted for CHF on Lasix IV BID, now wit Afib with RVR, HR up to 150's    -EKG  -Lopressor 5 mg IV x2  -Amio and Toprol schedules doses given early  -D/w Dr Connors (cards), Digoxin 0.25 IV x1 and d/c Lasix 2/2 soft BP  -Continue Telemetry monitoring   -F/U primary team in [facility] [place]  [phone]

## 2017-10-10 NOTE — PROGRESS NOTE ADULT - ASSESSMENT
86 yr old F with COPD on OXYGEN, CHF,  Afib on eliquis, PPM, HTN , HLD, recent dx of Monoclonal paraproteinemia presents with complains of orthopnea x 3 days. Last night, pt was unable to sleep and pressed her medic bracelet to call EMS. Pt was recently discharged from [facility] for ADHF-per family she is currently not on any diuretics. She follows with Dr. Doty and was told to call if she gains 3 pounds and then cardiologist would start her on diuretics. Pt denies SOB on exertion, chest pain, ever, chills, diarrhea, abd pain, dysuria, denies PND, or URI symptoms, denies weight gain. No other associated symptoms     Problem/Plan - 1:  ·  Problem: Acute decompensated heart failure.  Plan: telemonitor  cards fu  lasix if ok by cards  cw home meds  will monitor.     Problem/Plan - 2:  ·  Problem: Atrial fibrillation, unspecified type.  Plan: cw eliquis.     Problem/Plan - 3:  ·  Problem: HTN (Hypertension).  Plan: cw home meds.     Problem/Plan - 4:  ·  Problem: Diabetes.  Plan: monitor FS  basal/bolus.

## 2017-10-11 LAB
ANION GAP SERPL CALC-SCNC: 14 MMOL/L — SIGNIFICANT CHANGE UP (ref 5–17)
BUN SERPL-MCNC: 35 MG/DL — HIGH (ref 7–23)
CALCIUM SERPL-MCNC: 8.6 MG/DL — SIGNIFICANT CHANGE UP (ref 8.4–10.5)
CHLORIDE SERPL-SCNC: 99 MMOL/L — SIGNIFICANT CHANGE UP (ref 96–108)
CO2 SERPL-SCNC: 25 MMOL/L — SIGNIFICANT CHANGE UP (ref 22–31)
CREAT SERPL-MCNC: 1.98 MG/DL — HIGH (ref 0.5–1.3)
GLUCOSE BLDC GLUCOMTR-MCNC: 134 MG/DL — HIGH (ref 70–99)
GLUCOSE BLDC GLUCOMTR-MCNC: 146 MG/DL — HIGH (ref 70–99)
GLUCOSE BLDC GLUCOMTR-MCNC: 154 MG/DL — HIGH (ref 70–99)
GLUCOSE BLDC GLUCOMTR-MCNC: 212 MG/DL — HIGH (ref 70–99)
GLUCOSE SERPL-MCNC: 121 MG/DL — HIGH (ref 70–99)
POTASSIUM SERPL-MCNC: 3.9 MMOL/L — SIGNIFICANT CHANGE UP (ref 3.5–5.3)
POTASSIUM SERPL-SCNC: 3.9 MMOL/L — SIGNIFICANT CHANGE UP (ref 3.5–5.3)
SODIUM SERPL-SCNC: 138 MMOL/L — SIGNIFICANT CHANGE UP (ref 135–145)

## 2017-10-11 RX ORDER — POLYETHYLENE GLYCOL 3350 17 G/17G
17 POWDER, FOR SOLUTION ORAL DAILY
Qty: 0 | Refills: 0 | Status: DISCONTINUED | OUTPATIENT
Start: 2017-10-11 | End: 2017-10-12

## 2017-10-11 RX ORDER — SENNA PLUS 8.6 MG/1
2 TABLET ORAL AT BEDTIME
Qty: 0 | Refills: 0 | Status: DISCONTINUED | OUTPATIENT
Start: 2017-10-11 | End: 2017-10-12

## 2017-10-11 RX ADMIN — Medication 6 UNIT(S): at 11:54

## 2017-10-11 RX ADMIN — Medication 2: at 11:53

## 2017-10-11 RX ADMIN — APIXABAN 2.5 MILLIGRAM(S): 2.5 TABLET, FILM COATED ORAL at 05:39

## 2017-10-11 RX ADMIN — Medication 3 MILLILITER(S): at 23:02

## 2017-10-11 RX ADMIN — APIXABAN 2.5 MILLIGRAM(S): 2.5 TABLET, FILM COATED ORAL at 17:38

## 2017-10-11 RX ADMIN — Medication 1: at 17:42

## 2017-10-11 RX ADMIN — MIRTAZAPINE 15 MILLIGRAM(S): 45 TABLET, ORALLY DISINTEGRATING ORAL at 21:42

## 2017-10-11 RX ADMIN — Medication 3 MILLILITER(S): at 17:37

## 2017-10-11 RX ADMIN — LISINOPRIL 2.5 MILLIGRAM(S): 2.5 TABLET ORAL at 11:54

## 2017-10-11 RX ADMIN — BUDESONIDE AND FORMOTEROL FUMARATE DIHYDRATE 2 PUFF(S): 160; 4.5 AEROSOL RESPIRATORY (INHALATION) at 05:38

## 2017-10-11 RX ADMIN — AMIODARONE HYDROCHLORIDE 100 MILLIGRAM(S): 400 TABLET ORAL at 05:39

## 2017-10-11 RX ADMIN — Medication 6 UNIT(S): at 17:42

## 2017-10-11 RX ADMIN — Medication 81 MILLIGRAM(S): at 11:55

## 2017-10-11 RX ADMIN — ARIPIPRAZOLE 2 MILLIGRAM(S): 15 TABLET ORAL at 10:04

## 2017-10-11 RX ADMIN — POLYETHYLENE GLYCOL 3350 17 GRAM(S): 17 POWDER, FOR SOLUTION ORAL at 18:26

## 2017-10-11 RX ADMIN — SIMVASTATIN 20 MILLIGRAM(S): 20 TABLET, FILM COATED ORAL at 21:42

## 2017-10-11 RX ADMIN — Medication 3 MILLILITER(S): at 11:55

## 2017-10-11 RX ADMIN — Medication 100 MILLIGRAM(S): at 05:39

## 2017-10-11 RX ADMIN — DULOXETINE HYDROCHLORIDE 60 MILLIGRAM(S): 30 CAPSULE, DELAYED RELEASE ORAL at 21:42

## 2017-10-11 RX ADMIN — Medication 6 UNIT(S): at 07:42

## 2017-10-11 RX ADMIN — BUDESONIDE AND FORMOTEROL FUMARATE DIHYDRATE 2 PUFF(S): 160; 4.5 AEROSOL RESPIRATORY (INHALATION) at 17:38

## 2017-10-11 RX ADMIN — Medication 100 MILLIGRAM(S): at 11:54

## 2017-10-11 RX ADMIN — Medication 3 MILLILITER(S): at 05:38

## 2017-10-11 RX ADMIN — Medication 0.5 MILLIGRAM(S): at 18:47

## 2017-10-11 RX ADMIN — SENNA PLUS 2 TABLET(S): 8.6 TABLET ORAL at 21:42

## 2017-10-11 RX ADMIN — INSULIN GLARGINE 13 UNIT(S): 100 INJECTION, SOLUTION SUBCUTANEOUS at 22:20

## 2017-10-11 NOTE — PROGRESS NOTE ADULT - PROBLEM SELECTOR PLAN 1
Likely hemodynamically mediated secondary to CHF exacerbation and afib with RVR. UA bland and patient non-oliguric. Scr mildly increased today. If Scr continues to increase, would discontinue lisinopril 2.5 mg daily. Monitor electrolytes.

## 2017-10-11 NOTE — PROGRESS NOTE ADULT - ATTENDING COMMENTS
[facility] NEPHROLOGY  Rad Mojica M.D.  Les Mckeon D.O.  Selene Conn M.D.  Odessa Rodriguez, MSN, ANP-C    Telephone: [phone]  Facsimile: [phone]    [address] [place] [address]

## 2017-10-11 NOTE — PROGRESS NOTE ADULT - SUBJECTIVE AND OBJECTIVE BOX
[facility] NEPHROLOGY- PROGRESS NOTE    86 year old female h/o HTN and DM, RCC s/p right nephrectomy presents with dyspnea and tachycardia. Nephrology consulted for elevated Scr.    REVIEW OF SYSTEMS:  Gen: no changes in weight  Cards: no chest pain  Resp: + dyspnea resolved  GI: no nausea or vomiting or diarrhea  Vascular: RLE edema chronic as per patient    amoxicillin (Flushing; Vomiting; Nausea)      Hospital Medications: Medications reviewed      VITALS:  T(F): 98.5 (10-11-17 @ 11:45), Max: 99.4 (10-11-17 @ 04:39)  HR: 81 (10-11-17 @ 11:45)  BP: 118/77 (10-11-17 @ 11:45)  RR: 19 (10-11-17 @ 11:45)  SpO2: 96% (10-11-17 @ 11:45)  Wt(kg): --    10-10 @ 07:01  -  10-11 @ 07:00  --------------------------------------------------------  IN: 820 mL / OUT: 700 mL / NET: 120 mL    10-11 @ 07:01  -  10-11 @ 12:33  --------------------------------------------------------  IN: 480 mL / OUT: 300 mL / NET: 180 mL      PHYSICAL EXAM:    Gen: NAD, calm  Cards: Irregularly irregular, +S1/S2, +ZAKI  Resp: CTA B/L  GI: soft, NT/ND, NABS  Vascular: trace RLE edema (chronic as per patient)      LABS:  10-11    138  |  99  |  35<H>  ----------------------------<  121<H>  3.9   |  25  |  1.98<H>    Ca    8.6      11 Oct 2017 07:06  Phos  3.7     10-10  Mg     1.9     10-10    TPro  7.3  /  Alb  3.4  /  TBili  0.4  /  DBili      /  AST  30  /  ALT  18  /  AlkPhos  83  10-10    Creatinine Trend: 1.98 <--, 1.86 <--, 1.96 <--                        12.5   8.52  )-----------( 266      ( 10 Oct 2017 07:43 )             39.2

## 2017-10-11 NOTE — PROGRESS NOTE ADULT - SUBJECTIVE AND OBJECTIVE BOX
Patient is a 86y old  Female who presents with a chief complaint of sob (09 Oct 2017 14:15)      INTERVAL HISTORY: No chest pain, SOB, paralysis, fevers, vomiting    TELEMETRY: af   	  MEDICATIONS:  amiodarone    Tablet 100 milliGRAM(s) Oral daily  lisinopril 2.5 milliGRAM(s) Oral daily  metoprolol succinate  milliGRAM(s) Oral daily        PHYSICAL EXAM:  T(C): 36.8 (10-11-17 @ 12:40), Max: 37.4 (10-11-17 @ 04:39)  HR: 84 (10-11-17 @ 12:40) (76 - 100)  BP: 118/63 (10-11-17 @ 12:40) (97/64 - 130/75)  RR: 18 (10-11-17 @ 12:40) (18 - 20)  SpO2: 95% (10-11-17 @ 12:40) (95% - 97%)  Wt(kg): --  I&O's Summary    10 Oct 2017 07:01  -  11 Oct 2017 07:00  --------------------------------------------------------  IN: 820 mL / OUT: 700 mL / NET: 120 mL    11 Oct 2017 07:01  -  11 Oct 2017 18:44  --------------------------------------------------------  IN: 720 mL / OUT: 400 mL / NET: 320 mL          Appearance: Normal	  HEENT:    PERRL, EOMI	  Lymphatic: No lymphadenopathy  Cardiovascular: Normal S1 S2, No JVD  Respiratory: Lungs clear to auscultation	  Psychiatry: Alert  Gastrointestinal:  Soft, Non-tender, + BS	  Skin: No rashes, No cyanosis  Extremities:  No edema of LE  Vascular: Peripheral pulses palpable  bilaterally      CARDIAC MARKERS:                                  12.5   8.52  )-----------( 266      ( 10 Oct 2017 07:43 )             39.2     10-11    138  |  99  |  35<H>  ----------------------------<  121<H>  3.9   |  25  |  1.98<H>    Ca    8.6      11 Oct 2017 07:06  Phos  3.7     10-10  Mg     1.9     10-10    TPro  7.3  /  Alb  3.4  /  TBili  0.4  /  DBili  x   /  AST  30  /  ALT  18  /  AlkPhos  83  10-10      Labs, imaging and telemetry personally reviewed      ASSESSMENT/PLAN: 	  86 yr old F with COPD, CHF,  A FIB on eliquis, PPM, HTN , HLD,  here  with SOB in acute decompensated systolic heart failure    1. Systolic Heart failure - s/p Lasix 40mg IV BID, appears euvolemic today. hold Lasix today. Continue with bb, ACE, and ASA  - TTE last admission with severe reduced EF, NM stress test with mild ischemia and mostly infarct - cath deferred 2/2 renal function  - I had long discussion with patient today regarding ischemic cardiomyopathy and role of coronary angiogram. She wishes to defer at this time and discuss further with outpt cardiologist Dr Doty    2. AF - AF RVR overnight s/p dig with rate controlled now. Continue with Eliquis for AC and home dose Amio.  - will try to avoid both Dig and Amio long term. Will prefer to increase bb dose as BP tolerates  - increase Toprol to 125 mg daily    3. HTN - continue with home meds now of Toprol and Norvasc and Lisinopril 2.5mg    4. HLD - continue with statin    5. Pulmonary - COPD management per pulm/primary team    6. discharge planning for Thursday if rate control and patient euvolemic. We will likely need to discharge on Lasix 40mg PO daily    Discussed with primary team, Linda Lazcano and floor NP        Waylon Cates DO [facility]  Cardiovascular Medicine  [phone]

## 2017-10-11 NOTE — PROGRESS NOTE ADULT - ASSESSMENT
86 yr old F with COPD on OXYGEN, CHF,  Afib on eliquis, PPM, HTN , HLD, recent dx of Monoclonal paraproteinemia presents with complains of orthopnea x 3 days. Last night, pt was unable to sleep and pressed her medic bracelet to call EMS. Pt was recently discharged from [facility] for ADHF-per family she is currently not on any diuretics. She follows with Dr. Doty and was told to call if she gains 3 pounds and then cardiologist would start her on diuretics. Pt denies SOB on exertion, chest pain, ever, chills, diarrhea, abd pain, dysuria, denies PND, or URI symptoms, denies weight gain. No other associated symptoms     Problem/Plan - 1:  ·  Problem: Acute decompensated heart failure.  Plan: telemonitor  cards fu  lasix if ok by cards  cw home meds  will monitor.     Problem/Plan - 2:  ·  Problem: Atrial fibrillation, unspecified type.  Plan: cw eliquis.     Problem/Plan - 3:  ·  Problem: HTN (Hypertension).  Plan: cw home meds.     Problem/Plan - 4:  ·  Problem: Diabetes.  Plan: monitor FS  basal/bolus.     dc once cleared by cards

## 2017-10-11 NOTE — PROGRESS NOTE ADULT - SUBJECTIVE AND OBJECTIVE BOX
Patient is a 86y old  Female who presents with a chief complaint of sob (09 Oct 2017 14:15)  NAD    INTERVAL HPI/OVERNIGHT EVENTS:  T(C): 36.8 (10-11-17 @ 12:40), Max: 37.4 (10-11-17 @ 04:39)  HR: 84 (10-11-17 @ 12:40) (63 - 100)  BP: 118/63 (10-11-17 @ 12:40) (94/63 - 130/75)  RR: 18 (10-11-17 @ 12:40) (18 - 20)  SpO2: 95% (10-11-17 @ 12:40) (95% - 97%)  Wt(kg): --  I&O's Summary    10 Oct 2017 07:01  -  11 Oct 2017 07:00  --------------------------------------------------------  IN: 820 mL / OUT: 700 mL / NET: 120 mL    11 Oct 2017 07:01  -  11 Oct 2017 13:38  --------------------------------------------------------  IN: 720 mL / OUT: 400 mL / NET: 320 mL        LABS:                        12.5   8.52  )-----------( 266      ( 10 Oct 2017 07:43 )             39.2     10-11    138  |  99  |  35<H>  ----------------------------<  121<H>  3.9   |  25  |  1.98<H>    Ca    8.6      11 Oct 2017 07:06  Phos  3.7     10-10  Mg     1.9     10-10    TPro  7.3  /  Alb  3.4  /  TBili  0.4  /  DBili  x   /  AST  30  /  ALT  18  /  AlkPhos  83  10-10        CAPILLARY BLOOD GLUCOSE  144 (10 Oct 2017 17:01)      POCT Blood Glucose.: 212 mg/dL (11 Oct 2017 11:38)  POCT Blood Glucose.: 134 mg/dL (11 Oct 2017 07:20)  POCT Blood Glucose.: 143 mg/dL (10 Oct 2017 21:09)  POCT Blood Glucose.: 144 mg/dL (10 Oct 2017 17:01)            MEDICATIONS  (STANDING):  ALBUTerol    90 MICROgram(s) HFA Inhaler 1 Puff(s) Inhalation every 4 hours  ALBUTerol/ipratropium for Nebulization 3 milliLiter(s) Nebulizer every 6 hours  amiodarone    Tablet 100 milliGRAM(s) Oral daily  apixaban 2.5 milliGRAM(s) Oral every 12 hours  ARIPiprazole 2 milliGRAM(s) Oral daily  aspirin  chewable 81 milliGRAM(s) Oral daily  buDESOnide 160 MICROgram(s)/formoterol 4.5 MICROgram(s) Inhaler 2 Puff(s) Inhalation two times a day  dextrose 5%. 1000 milliLiter(s) (50 mL/Hr) IV Continuous <Continuous>  dextrose 50% Injectable 12.5 Gram(s) IV Push once  dextrose 50% Injectable 25 Gram(s) IV Push once  dextrose 50% Injectable 25 Gram(s) IV Push once  docusate sodium 100 milliGRAM(s) Oral daily  DULoxetine 60 milliGRAM(s) Oral daily  insulin glargine Injectable (LANTUS) 13 Unit(s) SubCutaneous at bedtime  insulin lispro (HumaLOG) corrective regimen sliding scale   SubCutaneous at bedtime  insulin lispro (HumaLOG) corrective regimen sliding scale   SubCutaneous three times a day before meals  insulin lispro Injectable (HumaLOG) 6 Unit(s) SubCutaneous three times a day before meals  lisinopril 2.5 milliGRAM(s) Oral daily  metoprolol succinate  milliGRAM(s) Oral daily  mirtazapine 15 milliGRAM(s) Oral at bedtime  simvastatin 20 milliGRAM(s) Oral at bedtime  tiotropium 18 MICROgram(s) Capsule 1 Capsule(s) Inhalation daily    MEDICATIONS  (PRN):  clonazePAM Tablet 0.5 milliGRAM(s) Oral two times a day PRN anxiety  dextrose Gel 1 Dose(s) Oral once PRN Blood Glucose LESS THAN 70 milliGRAM(s)/deciliter  glucagon  Injectable 1 milliGRAM(s) IntraMuscular once PRN Glucose LESS THAN 70 milligrams/deciliter          PHYSICAL EXAM:  GENERAL: NAD, well-groomed, well-developed  HEAD:  Atraumatic, Normocephalic  CHEST/LUNG: Clear to percussion bilaterally; No rales, rhonchi, wheezing, or rubs  HEART: Regular rate and rhythm; No murmurs, rubs, or gallops  ABDOMEN: Soft, Nontender, Nondistended; Bowel sounds present  EXTREMITIES:  2+ Peripheral Pulses, No clubbing, cyanosis, or edema  LYMPH: No lymphadenopathy noted  SKIN: No rashes or lesions    Care Discussed with Consultants/Other Providers [ ] YES  [ ] NO

## 2017-10-11 NOTE — PROGRESS NOTE ADULT - SUBJECTIVE AND OBJECTIVE BOX
Patient seen this morning.  No overnight eventS  Vital Signs Last 24 Hrs  T(C): 36.8 (11 Oct 2017 12:40), Max: 37.4 (11 Oct 2017 04:39)  T(F): 98.2 (11 Oct 2017 12:40), Max: 99.4 (11 Oct 2017 04:39)  HR: 84 (11 Oct 2017 12:40) (76 - 100)  BP: 118/63 (11 Oct 2017 12:40) (97/64 - 130/75)  BP(mean): --  RR: 18 (11 Oct 2017 12:40) (18 - 20)  SpO2: 95% (11 Oct 2017 12:40) (95% - 97%)                          12.5   8.52  )-----------( 266      ( 10 Oct 2017 07:43 )             39.2       10-11    138  |  99  |  35<H>  ----------------------------<  121<H>  3.9   |  25  |  1.98<H>    Ca    8.6      11 Oct 2017 07:06  Phos  3.7     10-10  Mg     1.9     10-10    TPro  7.3  /  Alb  3.4  /  TBili  0.4  /  DBili  x   /  AST  30  /  ALT  18  /  AlkPhos  83  10-10    Review of Systems:  Review of Systems: CONSTITUTIONAL: No weakness, fevers or chills  EYES/ENT: No visual changes;  No vertigo or throat pain   NECK: No pain or stiffness  RESPIRATORY: No cough, wheezing, hemoptysis; No shortness of breath  CARDIOVASCULAR: SOB  GASTROINTESTINAL: No abdominal or epigastric pain. No nausea, vomiting, or hematemesis; No diarrhea or constipation. No melena or hematochezia.  GENITOURINARY: No dysuria, frequency or hematuria  NEUROLOGICAL: No numbness or weakness  SKIN: No itching, burning, rashes, or lesions  All other review of systems is negative unless indicated above.	  Physical Exam:   · Constitutional	Well-developed, well nourished	  · Eyes	EOMI; PERRL; no drainage or redness	  · ENMT	No oral lesions; no gross abnormalities	  · Neck	No bruits; no thyromegaly or nodules	  · Breasts	No masses; no nipple discharge	  · Back	No deformity or limitation of movement	  · Respiratory	detailed exam	  · Respiratory Comments	b/l basal crackles	  · Cardiovascular	Regular rate & rhythm, normal S1, S2; no murmurs, gallops or rubs; no S3, S4	  · Gastrointestinal	Soft, non-tender, no hepatosplenomegaly, normal bowel sounds	  · Extremities	No cyanosis, clubbing or edema	    Assessment and Recommendation:   · Assessment		  86 yr old F with COPD, CHF,  A FIB on eliquis, PPM, HTN , HLD,  here  with SOB and chest pressure with decompensated heart failure and ? super imposed PNA. Recently discharged about 2 weeks ago when found to have monoclonal proteins in her blood.  At present there is no sign of end orgsn damage of excesive proteins.Monoclonal band can be either due to MGUS or indolent myeloma. She will need outpt followup with repeat myeloma profile every 4-6 months to follow the trend.    Problem/Recommendation - 1:  Problem: Malignant neoplasm of right kidney, s/p nehrectomy. Recommendation: No signs of recurrence, her surgery was more then 20 years ago. Continue to monitor.    Problem/Recommendation - 2:  ·  Problem: Monoclonal paraproteinemia.  Recommendation: Outpt followup with repeat blood work every 4-6 months.No evidence of end organ damage.     Problem/Recommendation - 3:  ·  Problem: Acute decompensated heart failure.  Recommendation: Cardiology followup.     Problem/Recommendation - 4:  ·  Problem: Pacemaker.

## 2017-10-11 NOTE — PROGRESS NOTE ADULT - SUBJECTIVE AND OBJECTIVE BOX
Chief complaint  Patient is a 86y old  Female who presents with a chief complaint of sob (09 Oct 2017 14:15)   Review of systems  Patient in bed, comfortable, no fever, no hypoglycemia.    Labs and Fingersticks    CAPILLARY BLOOD GLUCOSE  144 (10 Oct 2017 17:01)  147 (10 Oct 2017 12:01)  155 (10 Oct 2017 07:41)  138 (09 Oct 2017 21:05)  94 (09 Oct 2017 16:57)  237 (09 Oct 2017 12:09)  212 (09 Oct 2017 09:31)  201 (09 Oct 2017 08:05)    Anion Gap, Serum: 14 (10-11 @ 07:06)  Anion Gap, Serum: 17 (10-10 @ 07:41)      Calcium, Total Serum: 8.6 (10-11 @ 07:06)  Calcium, Total Serum: 9.4 (10-10 @ 07:41)  Albumin, Serum: 3.4 (10-10 @ 07:41)    Alanine Aminotransferase (ALT/SGPT): 18 (10-10 @ 07:41)  Alkaline Phosphatase, Serum: 83 (10-10 @ 07:41)  Aspartate Aminotransferase (AST/SGOT): 30 (10-10 @ 07:41)        10-11    138  |  99  |  35<H>  ----------------------------<  121<H>  3.9   |  25  |  1.98<H>    Ca    8.6      11 Oct 2017 07:06  Phos  3.7     10-10  Mg     1.9     10-10    TPro  7.3  /  Alb  3.4  /  TBili  0.4  /  DBili  x   /  AST  30  /  ALT  18  /  AlkPhos  83  10-10                        12.5   8.52  )-----------( 266      ( 10 Oct 2017 07:43 )             39.2     Medications  MEDICATIONS  (STANDING):  ALBUTerol    90 MICROgram(s) HFA Inhaler 1 Puff(s) Inhalation every 4 hours  ALBUTerol/ipratropium for Nebulization 3 milliLiter(s) Nebulizer every 6 hours  amiodarone    Tablet 100 milliGRAM(s) Oral daily  apixaban 2.5 milliGRAM(s) Oral every 12 hours  ARIPiprazole 2 milliGRAM(s) Oral daily  aspirin  chewable 81 milliGRAM(s) Oral daily  buDESOnide 160 MICROgram(s)/formoterol 4.5 MICROgram(s) Inhaler 2 Puff(s) Inhalation two times a day  dextrose 5%. 1000 milliLiter(s) (50 mL/Hr) IV Continuous <Continuous>  dextrose 50% Injectable 12.5 Gram(s) IV Push once  dextrose 50% Injectable 25 Gram(s) IV Push once  dextrose 50% Injectable 25 Gram(s) IV Push once  docusate sodium 100 milliGRAM(s) Oral daily  DULoxetine 60 milliGRAM(s) Oral daily  insulin glargine Injectable (LANTUS) 13 Unit(s) SubCutaneous at bedtime  insulin lispro (HumaLOG) corrective regimen sliding scale   SubCutaneous at bedtime  insulin lispro (HumaLOG) corrective regimen sliding scale   SubCutaneous three times a day before meals  insulin lispro Injectable (HumaLOG) 6 Unit(s) SubCutaneous three times a day before meals  lisinopril 2.5 milliGRAM(s) Oral daily  metoprolol succinate  milliGRAM(s) Oral daily  mirtazapine 15 milliGRAM(s) Oral at bedtime  polyethylene glycol 3350 17 Gram(s) Oral daily  senna 2 Tablet(s) Oral at bedtime  simvastatin 20 milliGRAM(s) Oral at bedtime  tiotropium 18 MICROgram(s) Capsule 1 Capsule(s) Inhalation daily      Physical Exam  General: Patient comfortable in bed  Vital Signs Last 12 Hrs  T(F): 98.2 (10-11-17 @ 12:40), Max: 98.5 (10-11-17 @ 11:45)  HR: 84 (10-11-17 @ 12:40) (81 - 84)  BP: 118/63 (10-11-17 @ 12:40) (118/63 - 118/77)  BP(mean): --  RR: 18 (10-11-17 @ 12:40) (18 - 19)  SpO2: 95% (10-11-17 @ 12:40) (95% - 96%)  Neck: No palpable thyroid nodules.  CVS: S1S2, No murmurs  Respiratory: No wheezing, no crepitations  GI: Abdomen soft, bowel sounds positive  Musculoskeletal: Positive edema lower extremities bilaterally  Skin: No skin rashes, no ecchymosis    Diagnostics

## 2017-10-11 NOTE — PROGRESS NOTE ADULT - PROBLEM SELECTOR PLAN 2
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 Baseline appears to be 1.5-1.6. Avoid nephrotoxins.

## 2017-10-11 NOTE — PROGRESS NOTE ADULT - ASSESSMENT
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  HTN: Controlled, On med.  HLD:  On statin, tolerating.  SOB: Continue Tx FU with primary team

## 2017-10-11 NOTE — PROGRESS NOTE ADULT - PROBLEM SELECTOR PLAN 5
Improving. Will discuss with cardiology starting oral lasix (possibly at reduced frequency i.e. every other day). Monitor UO.

## 2017-10-12 VITALS
DIASTOLIC BLOOD PRESSURE: 86 MMHG | HEART RATE: 95 BPM | TEMPERATURE: 98 F | RESPIRATION RATE: 18 BRPM | SYSTOLIC BLOOD PRESSURE: 137 MMHG | OXYGEN SATURATION: 97 %

## 2017-10-12 LAB
ANION GAP SERPL CALC-SCNC: 13 MMOL/L — SIGNIFICANT CHANGE UP (ref 5–17)
BUN SERPL-MCNC: 35 MG/DL — HIGH (ref 7–23)
CALCIUM SERPL-MCNC: 8.9 MG/DL — SIGNIFICANT CHANGE UP (ref 8.4–10.5)
CHLORIDE SERPL-SCNC: 97 MMOL/L — SIGNIFICANT CHANGE UP (ref 96–108)
CO2 SERPL-SCNC: 26 MMOL/L — SIGNIFICANT CHANGE UP (ref 22–31)
CREAT SERPL-MCNC: 1.74 MG/DL — HIGH (ref 0.5–1.3)
GLUCOSE BLDC GLUCOMTR-MCNC: 240 MG/DL — HIGH (ref 70–99)
GLUCOSE BLDC GLUCOMTR-MCNC: 336 MG/DL — HIGH (ref 70–99)
GLUCOSE BLDC GLUCOMTR-MCNC: 81 MG/DL — SIGNIFICANT CHANGE UP (ref 70–99)
GLUCOSE SERPL-MCNC: 129 MG/DL — HIGH (ref 70–99)
POTASSIUM SERPL-MCNC: 4.2 MMOL/L — SIGNIFICANT CHANGE UP (ref 3.5–5.3)
POTASSIUM SERPL-SCNC: 4.2 MMOL/L — SIGNIFICANT CHANGE UP (ref 3.5–5.3)
SODIUM SERPL-SCNC: 136 MMOL/L — SIGNIFICANT CHANGE UP (ref 135–145)

## 2017-10-12 RX ORDER — FUROSEMIDE 40 MG
40 TABLET ORAL EVERY OTHER DAY
Qty: 0 | Refills: 0 | Status: DISCONTINUED | OUTPATIENT
Start: 2017-10-13 | End: 2017-10-12

## 2017-10-12 RX ORDER — FUROSEMIDE 40 MG
1 TABLET ORAL
Qty: 30 | Refills: 0 | OUTPATIENT
Start: 2017-10-12 | End: 2017-11-11

## 2017-10-12 RX ORDER — FUROSEMIDE 40 MG
1 TABLET ORAL
Qty: 0 | Refills: 0 | COMMUNITY
Start: 2017-10-12 | End: 2017-11-11

## 2017-10-12 RX ORDER — ARIPIPRAZOLE 15 MG/1
1.5 TABLET ORAL
Qty: 0 | Refills: 0 | COMMUNITY

## 2017-10-12 RX ORDER — LISINOPRIL 2.5 MG/1
1 TABLET ORAL
Qty: 0 | Refills: 0 | COMMUNITY
Start: 2017-10-12

## 2017-10-12 RX ORDER — BUDESONIDE AND FORMOTEROL FUMARATE DIHYDRATE 160; 4.5 UG/1; UG/1
1 AEROSOL RESPIRATORY (INHALATION)
Qty: 1 | Refills: 0 | OUTPATIENT
Start: 2017-10-12 | End: 2017-11-11

## 2017-10-12 RX ORDER — AMIODARONE HYDROCHLORIDE 400 MG/1
0.5 TABLET ORAL
Qty: 0 | Refills: 0 | COMMUNITY
Start: 2017-10-12

## 2017-10-12 RX ORDER — FUROSEMIDE 40 MG
1 TABLET ORAL
Qty: 15 | Refills: 0 | OUTPATIENT
Start: 2017-10-12 | End: 2017-11-11

## 2017-10-12 RX ORDER — ARIPIPRAZOLE 15 MG/1
1 TABLET ORAL
Qty: 0 | Refills: 0 | COMMUNITY
Start: 2017-10-12

## 2017-10-12 RX ORDER — ASPIRIN/CALCIUM CARB/MAGNESIUM 324 MG
1 TABLET ORAL
Qty: 0 | Refills: 0 | COMMUNITY
Start: 2017-10-12

## 2017-10-12 RX ADMIN — Medication 3 MILLILITER(S): at 11:53

## 2017-10-12 RX ADMIN — Medication 81 MILLIGRAM(S): at 11:53

## 2017-10-12 RX ADMIN — BUDESONIDE AND FORMOTEROL FUMARATE DIHYDRATE 2 PUFF(S): 160; 4.5 AEROSOL RESPIRATORY (INHALATION) at 05:17

## 2017-10-12 RX ADMIN — Medication 10 MILLIGRAM(S): at 09:51

## 2017-10-12 RX ADMIN — Medication 2: at 11:47

## 2017-10-12 RX ADMIN — APIXABAN 2.5 MILLIGRAM(S): 2.5 TABLET, FILM COATED ORAL at 05:17

## 2017-10-12 RX ADMIN — BUDESONIDE AND FORMOTEROL FUMARATE DIHYDRATE 2 PUFF(S): 160; 4.5 AEROSOL RESPIRATORY (INHALATION) at 18:12

## 2017-10-12 RX ADMIN — Medication 4: at 07:33

## 2017-10-12 RX ADMIN — Medication 100 MILLIGRAM(S): at 11:53

## 2017-10-12 RX ADMIN — Medication 3 MILLILITER(S): at 18:12

## 2017-10-12 RX ADMIN — Medication 3 MILLILITER(S): at 05:17

## 2017-10-12 RX ADMIN — APIXABAN 2.5 MILLIGRAM(S): 2.5 TABLET, FILM COATED ORAL at 18:11

## 2017-10-12 RX ADMIN — LISINOPRIL 2.5 MILLIGRAM(S): 2.5 TABLET ORAL at 11:53

## 2017-10-12 RX ADMIN — ARIPIPRAZOLE 2 MILLIGRAM(S): 15 TABLET ORAL at 11:53

## 2017-10-12 RX ADMIN — Medication 6 UNIT(S): at 11:49

## 2017-10-12 RX ADMIN — Medication 6 UNIT(S): at 18:11

## 2017-10-12 RX ADMIN — AMIODARONE HYDROCHLORIDE 100 MILLIGRAM(S): 400 TABLET ORAL at 05:17

## 2017-10-12 RX ADMIN — Medication 6 UNIT(S): at 07:33

## 2017-10-12 RX ADMIN — Medication 100 MILLIGRAM(S): at 05:17

## 2017-10-12 NOTE — PHYSICAL THERAPY INITIAL EVALUATION ADULT - PERTINENT HX OF CURRENT PROBLEM, REHAB EVAL
Pt is an 86 y.o. F with c/o orthopnea x3 days.ast night, pt unable to sleep and pressed her medic bracelet for EMS. Recent d/c from [facility] for ADHF-per family she is currently not on any diuretics.Pt now being tx for ADHF. Chest XRay 10/9: Congestive heart failure, unchanged from the prior examination.

## 2017-10-12 NOTE — PROGRESS NOTE ADULT - SUBJECTIVE AND OBJECTIVE BOX
Patient is a 86y old  Female who presents with a chief complaint of sob (09 Oct 2017 14:15)  NAD    INTERVAL HPI/OVERNIGHT EVENTS:  T(C): 36.7 (10-12-17 @ 04:20), Max: 37 (10-11-17 @ 20:46)  HR: 66 (10-12-17 @ 04:20) (66 - 96)  BP: 114/66 (10-12-17 @ 04:20) (104/50 - 118/77)  RR: 18 (10-12-17 @ 08:28) (18 - 19)  SpO2: 95% (10-12-17 @ 08:28) (92% - 96%)  Wt(kg): --  I&O's Summary    11 Oct 2017 07:01  -  12 Oct 2017 07:00  --------------------------------------------------------  IN: 1080 mL / OUT: 850 mL / NET: 230 mL    12 Oct 2017 07:01  -  12 Oct 2017 10:34  --------------------------------------------------------  IN: 240 mL / OUT: 250 mL / NET: -10 mL        LABS:    10-12    136  |  97  |  35<H>  ----------------------------<  129<H>  4.2   |  26  |  1.74<H>    Ca    8.9      12 Oct 2017 07:20          CAPILLARY BLOOD GLUCOSE      POCT Blood Glucose.: 336 mg/dL (12 Oct 2017 07:18)  POCT Blood Glucose.: 146 mg/dL (11 Oct 2017 21:32)  POCT Blood Glucose.: 154 mg/dL (11 Oct 2017 17:07)  POCT Blood Glucose.: 212 mg/dL (11 Oct 2017 11:38)            MEDICATIONS  (STANDING):  ALBUTerol    90 MICROgram(s) HFA Inhaler 1 Puff(s) Inhalation every 4 hours  ALBUTerol/ipratropium for Nebulization 3 milliLiter(s) Nebulizer every 6 hours  amiodarone    Tablet 100 milliGRAM(s) Oral daily  apixaban 2.5 milliGRAM(s) Oral every 12 hours  ARIPiprazole 2 milliGRAM(s) Oral daily  aspirin  chewable 81 milliGRAM(s) Oral daily  buDESOnide 160 MICROgram(s)/formoterol 4.5 MICROgram(s) Inhaler 2 Puff(s) Inhalation two times a day  dextrose 5%. 1000 milliLiter(s) (50 mL/Hr) IV Continuous <Continuous>  dextrose 50% Injectable 12.5 Gram(s) IV Push once  dextrose 50% Injectable 25 Gram(s) IV Push once  dextrose 50% Injectable 25 Gram(s) IV Push once  docusate sodium 100 milliGRAM(s) Oral daily  DULoxetine 60 milliGRAM(s) Oral daily  insulin glargine Injectable (LANTUS) 13 Unit(s) SubCutaneous at bedtime  insulin lispro (HumaLOG) corrective regimen sliding scale   SubCutaneous at bedtime  insulin lispro (HumaLOG) corrective regimen sliding scale   SubCutaneous three times a day before meals  insulin lispro Injectable (HumaLOG) 6 Unit(s) SubCutaneous three times a day before meals  lisinopril 2.5 milliGRAM(s) Oral daily  metoprolol succinate  milliGRAM(s) Oral daily  mirtazapine 15 milliGRAM(s) Oral at bedtime  polyethylene glycol 3350 17 Gram(s) Oral daily  senna 2 Tablet(s) Oral at bedtime  simvastatin 20 milliGRAM(s) Oral at bedtime  tiotropium 18 MICROgram(s) Capsule 1 Capsule(s) Inhalation daily    MEDICATIONS  (PRN):  clonazePAM Tablet 0.5 milliGRAM(s) Oral two times a day PRN anxiety  dextrose Gel 1 Dose(s) Oral once PRN Blood Glucose LESS THAN 70 milliGRAM(s)/deciliter  glucagon  Injectable 1 milliGRAM(s) IntraMuscular once PRN Glucose LESS THAN 70 milligrams/deciliter          PHYSICAL EXAM:  GENERAL: frail  CHEST/LUNG: Clear to percussion bilaterally; No rales, rhonchi, wheezing, or rubs  HEART: Regular rate and rhythm; No murmurs, rubs, or gallops  ABDOMEN: Soft, Nontender, Nondistended; Bowel sounds present  EXTREMITIES: no edema  LYMPH: No lymphadenopathy noted  SKIN: No rashes or lesions    Care Discussed with Consultants/Other Providers [+ ] YES  [ ] NO

## 2017-10-12 NOTE — PROGRESS NOTE ADULT - SUBJECTIVE AND OBJECTIVE BOX
Patient seen this afternoon, doing better denies any more shortness of breath.  Vital Signs Last 24 Hrs  T(C): 36.8 (12 Oct 2017 13:49), Max: 37 (11 Oct 2017 20:46)  T(F): 98.2 (12 Oct 2017 13:49), Max: 98.6 (11 Oct 2017 20:46)  HR: 67 (12 Oct 2017 13:49) (66 - 96)  BP: 115/69 (12 Oct 2017 13:49) (104/50 - 115/69)  BP(mean): --  RR: 18 (12 Oct 2017 15:36) (18 - 19)  SpO2: 91% (12 Oct 2017 15:36) (91% - 96%)        10-12    136  |  97  |  35<H>  ----------------------------<  129<H>  4.2   |  26  |  1.74<H>    Ca    8.9      12 Oct 2017 07:20      Review of Systems:  Review of Systems: CONSTITUTIONAL: No weakness, fevers or chills  EYES/ENT: No visual changes;  No vertigo or throat pain   NECK: No pain or stiffness  RESPIRATORY: No cough, wheezing, hemoptysis; No shortness of breath  CARDIOVASCULAR: SOB  GASTROINTESTINAL: No abdominal or epigastric pain. No nausea, vomiting, or hematemesis; No diarrhea or constipation. No melena or hematochezia.  GENITOURINARY: No dysuria, frequency or hematuria  NEUROLOGICAL: No numbness or weakness  SKIN: No itching, burning, rashes, or lesions  All other review of systems is negative unless indicated above.	  Physical Exam:   · Constitutional	Well-developed, well nourished	  · Eyes	EOMI; PERRL; no drainage or redness	  · ENMT	No oral lesions; no gross abnormalities	  · Neck	No bruits; no thyromegaly or nodules	  · Breasts	No masses; no nipple discharge	  · Back	No deformity or limitation of movement	  · Respiratory	detailed exam	  · Respiratory Comments	b/l basal crackles	  · Cardiovascular	Regular rate & rhythm, normal S1, S2; no murmurs, gallops or rubs; no S3, S4	  · Gastrointestinal	Soft, non-tender, no hepatosplenomegaly, normal bowel sounds	  · Extremities	No cyanosis, clubbing or edema	    Assessment and Recommendation:   · Assessment		  86 yr old F with COPD, CHF,  A FIB on eliquis, PPM, HTN , HLD,  here  with SOB and chest pressure with decompensated heart failure and ? super imposed PNA. Recently discharged about 2 weeks ago when found to have monoclonal proteins in her blood.  At present there is no sign of end orgsn damage of excesive proteins.Monoclonal band can be either due to MGUS or indolent myeloma. She will need outpt followup with repeat myeloma profile every 4-6 months to follow the trend.  Problem/Recommendation - 1:  Problem: Malignant neoplasm of right kidney, s/p nehrectomy. Recommendation: No signs of recurrence, her surgery was more then 20 years ago. Continue to monitor.    Problem/Recommendation - 2:  ·  Problem: Monoclonal paraproteinemia.  Recommendation: Outpt followup with repeat blood work every 4-6 months.No evidence of end organ damage.     Problem/Recommendation - 3:  ·  Problem: Acute decompensated heart failure.  Recommendation: Cardiology followup.     Problem/Recommendation - 4:  ·  Problem: Pacemaker.

## 2017-10-12 NOTE — PROGRESS NOTE ADULT - ASSESSMENT
Acute decompensated systolic heart failure.  Plan: telemonitor  cards fu  change to po lasix today  cw home meds  will monitor.  pt refusing ischemia workup     Atrial fibrillation, unspecified type.  Plan: cw eliquis.   rate controlled  management as per cards     HTN (Hypertension).  Plan: cw home meds.      Diabetes.  Plan: monitor FS  basal/bolus.     ARUN on CKD. Plan Improved  monitor Cr

## 2017-10-12 NOTE — PROGRESS NOTE ADULT - PROBLEM SELECTOR PLAN 5
Patient appears euvolemic. Would start lasix 40 mg however at reduced frequency (every other day) if ok with cardiology on discharge. Monitor UO.

## 2017-10-12 NOTE — PROGRESS NOTE ADULT - SUBJECTIVE AND OBJECTIVE BOX
[facility] NEPHROLOGY- PROGRESS NOTE    86 year old female h/o HTN and DM, RCC s/p right nephrectomy presents with dyspnea and tachycardia. Nephrology consulted for elevated Scr.    REVIEW OF SYSTEMS:  Gen: no changes in weight  Cards: no chest pain  Resp: + dyspnea resolved  GI: no nausea or vomiting or diarrhea  Vascular: RLE edema chronic as per patient    amoxicillin (Flushing; Vomiting; Nausea)      Hospital Medications: Medications reviewed      VITALS:  T(F): 98.2 (10-12-17 @ 13:49), Max: 98.6 (10-11-17 @ 20:46)  HR: 67 (10-12-17 @ 13:49)  BP: 115/69 (10-12-17 @ 13:49)  RR: 19 (10-12-17 @ 13:49)  SpO2: 96% (10-12-17 @ 13:49)  Wt(kg): --    10-11 @ 07:01  -  10-12 @ 07:00  --------------------------------------------------------  IN: 1080 mL / OUT: 850 mL / NET: 230 mL    10-12 @ 07:01  -  10-12 @ 15:35  --------------------------------------------------------  IN: 480 mL / OUT: 700 mL / NET: -220 mL      PHYSICAL EXAM:    Gen: NAD, calm  Cards: Irregularly irregular, +S1/S2, +ZAKI  Resp: CTA B/L  GI: soft, NT/ND, NABS  Vascular: trace RLE edema (chronic as per patient)      LABS:  10-12    136  |  97  |  35<H>  ----------------------------<  129<H>  4.2   |  26  |  1.74<H>    Ca    8.9      12 Oct 2017 07:20      Creatinine Trend: 1.74 <--, 1.98 <--, 1.86 <--, 1.96 <--

## 2017-10-12 NOTE — PROGRESS NOTE ADULT - SUBJECTIVE AND OBJECTIVE BOX
Chief complaint  Patient is a 86y old  Female who presents with a chief complaint of sob (09 Oct 2017 14:15)   Review of systems  Patient in bed, comfortable, no fever,  seen earlier today, no hypoglycemia.    Labs and Fingersticks    CAPILLARY BLOOD GLUCOSE  144 (10 Oct 2017 17:01)  147 (10 Oct 2017 12:01)  155 (10 Oct 2017 07:41)  138 (09 Oct 2017 21:05)    Anion Gap, Serum: 13 (10-12 @ 07:20)  Anion Gap, Serum: 14 (10-11 @ 07:06)      Calcium, Total Serum: 8.9 (10-12 @ 07:20)  Calcium, Total Serum: 8.6 (10-11 @ 07:06)          10-12    136  |  97  |  35<H>  ----------------------------<  129<H>  4.2   |  26  |  1.74<H>    Ca    8.9      12 Oct 2017 07:20      Medications  MEDICATIONS  (STANDING):  ALBUTerol    90 MICROgram(s) HFA Inhaler 1 Puff(s) Inhalation every 4 hours  ALBUTerol/ipratropium for Nebulization 3 milliLiter(s) Nebulizer every 6 hours  amiodarone    Tablet 100 milliGRAM(s) Oral daily  apixaban 2.5 milliGRAM(s) Oral every 12 hours  ARIPiprazole 2 milliGRAM(s) Oral daily  aspirin  chewable 81 milliGRAM(s) Oral daily  buDESOnide 160 MICROgram(s)/formoterol 4.5 MICROgram(s) Inhaler 2 Puff(s) Inhalation two times a day  dextrose 5%. 1000 milliLiter(s) (50 mL/Hr) IV Continuous <Continuous>  dextrose 50% Injectable 12.5 Gram(s) IV Push once  dextrose 50% Injectable 25 Gram(s) IV Push once  dextrose 50% Injectable 25 Gram(s) IV Push once  docusate sodium 100 milliGRAM(s) Oral daily  DULoxetine 60 milliGRAM(s) Oral daily  insulin glargine Injectable (LANTUS) 13 Unit(s) SubCutaneous at bedtime  insulin lispro (HumaLOG) corrective regimen sliding scale   SubCutaneous at bedtime  insulin lispro (HumaLOG) corrective regimen sliding scale   SubCutaneous three times a day before meals  insulin lispro Injectable (HumaLOG) 6 Unit(s) SubCutaneous three times a day before meals  lisinopril 2.5 milliGRAM(s) Oral daily  metoprolol succinate  milliGRAM(s) Oral daily  mirtazapine 15 milliGRAM(s) Oral at bedtime  polyethylene glycol 3350 17 Gram(s) Oral daily  senna 2 Tablet(s) Oral at bedtime  simvastatin 20 milliGRAM(s) Oral at bedtime  tiotropium 18 MICROgram(s) Capsule 1 Capsule(s) Inhalation daily      Physical Exam  General: Patient comfortable in bed  Vital Signs Last 12 Hrs  T(F): 97.5 (10-12-17 @ 17:23), Max: 98.2 (10-12-17 @ 13:49)  HR: 95 (10-12-17 @ 17:23) (67 - 95)  BP: 137/86 (10-12-17 @ 17:23) (110/75 - 137/86)  BP(mean): --  RR: 18 (10-12-17 @ 17:23) (18 - 19)  SpO2: 97% (10-12-17 @ 17:23) (91% - 97%)  Neck: No palpable thyroid nodules.  CVS: S1S2, No murmurs  Respiratory: No wheezing, no crepitations  GI: Abdomen soft, bowel sounds positive  Musculoskeletal: Positive edema lower extremities bilaterally  Skin: No skin rashes, no ecchymosis    Diagnostics

## 2017-10-12 NOTE — PROGRESS NOTE ADULT - ATTENDING COMMENTS
[facility] NEPHROLOGY  Rad Mojica M.D.  Les Mckeon D.O.  Selene Conn M.D.  Odessa Rodriguez, MSN, ANP-C    Telephone: [phone]  Facsimile: [phone]    [address] [place] [address]

## 2017-10-12 NOTE — PROGRESS NOTE ADULT - SUBJECTIVE AND OBJECTIVE BOX
Patient is a 86y old  Female who presents with a chief complaint of sob (09 Oct 2017 14:15)    INTERVAL HISTORY: No chest pain, SOB, paralysis, fevers, vomiting    TELEMETRY: af  	  MEDICATIONS:  amiodarone    Tablet 100 milliGRAM(s) Oral daily  lisinopril 2.5 milliGRAM(s) Oral daily  metoprolol succinate  milliGRAM(s) Oral daily        PHYSICAL EXAM:  T(C): 36.4 (10-12-17 @ 17:23), Max: 37 (10-11-17 @ 20:46)  HR: 95 (10-12-17 @ 17:23) (66 - 96)  BP: 137/86 (10-12-17 @ 17:23) (104/50 - 137/86)  RR: 18 (10-12-17 @ 17:23) (18 - 19)  SpO2: 97% (10-12-17 @ 17:23) (91% - 97%)  Wt(kg): --  I&O's Summary    11 Oct 2017 07:01  -  12 Oct 2017 07:00  --------------------------------------------------------  IN: 1080 mL / OUT: 850 mL / NET: 230 mL    12 Oct 2017 07:01  -  12 Oct 2017 17:57  --------------------------------------------------------  IN: 480 mL / OUT: 700 mL / NET: -220 mL          Appearance: Normal	  HEENT:    PERRL, EOMI	  Lymphatic: No lymphadenopathy  Cardiovascular: Normal S1 S2, No JVD  Respiratory: Lungs clear to auscultation	  Psychiatry: Alert  Gastrointestinal:  Soft, Non-tender, + BS	  Skin: No rashes, No cyanosis  Extremities:  No edema of LE  Vascular: Peripheral pulses palpable  bilaterally      CARDIAC MARKERS:              10-12    136  |  97  |  35<H>  ----------------------------<  129<H>  4.2   |  26  |  1.74<H>    Ca    8.9      12 Oct 2017 07:20      proBNP:   Lipid Profile:   HgA1c:   TSH:     Labs, imaging and telemetry personally reviewed      ASSESSMENT/PLAN: 	  86 yr old F with COPD, CHF,  A FIB on eliquis, PPM, HTN , HLD,  here  with SOB in acute decompensated systolic heart failure    1. Systolic Heart failure - s/p Lasix 40mg IV BID, agree with renal start Lasix 40mg qod. Continue with bb, ACE, and ASA  - TTE last admission with severe reduced EF, NM stress test with mild ischemia and mostly infarct - cath deferred 2/2 renal function  - I had long discussion with patient today regarding ischemic cardiomyopathy and role of coronary angiogram. She wishes to defer at this time and discuss further with outpt cardiologist Dr Doty    2. AF - AF RVR overnight s/p dig with rate controlled now. Continue with Eliquis for AC and home dose Amio.  - will try to avoid both Dig and Amio long term. Will prefer to increase bb dose as BP tolerates  - increase Toprol to 125 mg daily    3. HTN - continue with home meds now of Toprol and Norvasc and Lisinopril 2.5mg    4. HLD - continue with statin    5. Pulmonary - COPD management per pulm/primary team    6. discharge planning if rate control and patient euvolemic. We will likely need to discharge on Lasix 40mg PO qod      Discussed with primary team.         Waylon Cates DO [facility]  Cardiovascular Medicine  [phone]

## 2017-10-12 NOTE — PROGRESS NOTE ADULT - PROBLEM SELECTOR PLAN 1
Likely hemodynamically mediated secondary to CHF exacerbation and afib with RVR. UA bland and patient non-oliguric. Scr decreased today. Monitor electrolytes.

## 2017-10-12 NOTE — PROGRESS NOTE ADULT - SUBJECTIVE AND OBJECTIVE BOX
PULMONARY PROGRESS NOTE    MARCEL AGOSTO  MRN-[id number]    Patient is a 86y old  Female who presents with a chief complaint of sob (09 Oct 2017 14:15)      HPI:  -breathing comfortably, no complaints    ROS:   -afebrile    MEDICATIONS  (STANDING):  ALBUTerol    90 MICROgram(s) HFA Inhaler 1 Puff(s) Inhalation every 4 hours  ALBUTerol/ipratropium for Nebulization 3 milliLiter(s) Nebulizer every 6 hours  amiodarone    Tablet 100 milliGRAM(s) Oral daily  apixaban 2.5 milliGRAM(s) Oral every 12 hours  ARIPiprazole 2 milliGRAM(s) Oral daily  aspirin  chewable 81 milliGRAM(s) Oral daily  buDESOnide 160 MICROgram(s)/formoterol 4.5 MICROgram(s) Inhaler 2 Puff(s) Inhalation two times a day  dextrose 5%. 1000 milliLiter(s) (50 mL/Hr) IV Continuous <Continuous>  dextrose 50% Injectable 12.5 Gram(s) IV Push once  dextrose 50% Injectable 25 Gram(s) IV Push once  dextrose 50% Injectable 25 Gram(s) IV Push once  docusate sodium 100 milliGRAM(s) Oral daily  DULoxetine 60 milliGRAM(s) Oral daily  insulin glargine Injectable (LANTUS) 13 Unit(s) SubCutaneous at bedtime  insulin lispro (HumaLOG) corrective regimen sliding scale   SubCutaneous at bedtime  insulin lispro (HumaLOG) corrective regimen sliding scale   SubCutaneous three times a day before meals  insulin lispro Injectable (HumaLOG) 6 Unit(s) SubCutaneous three times a day before meals  lisinopril 2.5 milliGRAM(s) Oral daily  metoprolol succinate  milliGRAM(s) Oral daily  mirtazapine 15 milliGRAM(s) Oral at bedtime  polyethylene glycol 3350 17 Gram(s) Oral daily  senna 2 Tablet(s) Oral at bedtime  simvastatin 20 milliGRAM(s) Oral at bedtime  tiotropium 18 MICROgram(s) Capsule 1 Capsule(s) Inhalation daily    MEDICATIONS  (PRN):  clonazePAM Tablet 0.5 milliGRAM(s) Oral two times a day PRN anxiety  dextrose Gel 1 Dose(s) Oral once PRN Blood Glucose LESS THAN 70 milliGRAM(s)/deciliter  glucagon  Injectable 1 milliGRAM(s) IntraMuscular once PRN Glucose LESS THAN 70 milligrams/deciliter        Vital Signs Last 24 Hrs  T(C): 36.7 (12 Oct 2017 04:20), Max: 37 (11 Oct 2017 20:46)  T(F): 98.1 (12 Oct 2017 04:20), Max: 98.6 (11 Oct 2017 20:46)  HR: 69 (12 Oct 2017 11:51) (66 - 96)  BP: 110/75 (12 Oct 2017 11:51) (104/50 - 114/66)  BP(mean): --  RR: 18 (12 Oct 2017 08:28) (18 - 18)  SpO2: 95% (12 Oct 2017 08:28) (92% - 96%)    GENERAL: The patient is awake and alert in no apparent distress.         LABS/IMAGING: reviewed                   < from: Xray Chest 1 View AP/PA (10.09.17 @ 06:10) >  IMPRESSION:    Congestive heart failure, unchanged from theprior examination.    < end of copied text >        PROBLEM LIST:  86y Female with HEALTH ISSUES - PROBLEM Dx:  COPD not in exacerbation  Acute on chronic combined systolic and diastolic heart failure  Acute kidney injury  Monoclonal paraproteinemia  Malignant neoplasm of kidney  HTN (Hypertension)  Atrial fibrillation      RECS:  -COPD: stable on advair at home, cont symbicort while in house.  Cont spiriva and nebs  -CHF: pulm edema on cxr and elevated BNP improved with diuresis, cardiology follow up.  -Incentive candy, OOB to chair  -wean O2   -PT      Joy Abdi MD   [phone]

## 2017-10-19 PROCEDURE — 84300 ASSAY OF URINE SODIUM: CPT

## 2017-10-19 PROCEDURE — 87581 M.PNEUMON DNA AMP PROBE: CPT

## 2017-10-19 PROCEDURE — 84132 ASSAY OF SERUM POTASSIUM: CPT

## 2017-10-19 PROCEDURE — A9500: CPT

## 2017-10-19 PROCEDURE — 87486 CHLMYD PNEUM DNA AMP PROBE: CPT

## 2017-10-19 PROCEDURE — 84165 PROTEIN E-PHORESIS SERUM: CPT

## 2017-10-19 PROCEDURE — 84156 ASSAY OF PROTEIN URINE: CPT

## 2017-10-19 PROCEDURE — 86335 IMMUNFIX E-PHORSIS/URINE/CSF: CPT

## 2017-10-19 PROCEDURE — 93306 TTE W/DOPPLER COMPLETE: CPT

## 2017-10-19 PROCEDURE — 80053 COMPREHEN METABOLIC PANEL: CPT

## 2017-10-19 PROCEDURE — 87040 BLOOD CULTURE FOR BACTERIA: CPT

## 2017-10-19 PROCEDURE — 85610 PROTHROMBIN TIME: CPT

## 2017-10-19 PROCEDURE — 96375 TX/PRO/DX INJ NEW DRUG ADDON: CPT

## 2017-10-19 PROCEDURE — 82553 CREATINE MB FRACTION: CPT

## 2017-10-19 PROCEDURE — 83036 HEMOGLOBIN GLYCOSYLATED A1C: CPT

## 2017-10-19 PROCEDURE — 87340 HEPATITIS B SURFACE AG IA: CPT

## 2017-10-19 PROCEDURE — 82550 ASSAY OF CK (CPK): CPT

## 2017-10-19 PROCEDURE — 84166 PROTEIN E-PHORESIS/URINE/CSF: CPT

## 2017-10-19 PROCEDURE — 83521 IG LIGHT CHAINS FREE EACH: CPT

## 2017-10-19 PROCEDURE — 82330 ASSAY OF CALCIUM: CPT

## 2017-10-19 PROCEDURE — 84484 ASSAY OF TROPONIN QUANT: CPT

## 2017-10-19 PROCEDURE — 82435 ASSAY OF BLOOD CHLORIDE: CPT

## 2017-10-19 PROCEDURE — 87798 DETECT AGENT NOS DNA AMP: CPT

## 2017-10-19 PROCEDURE — 96374 THER/PROPH/DIAG INJ IV PUSH: CPT

## 2017-10-19 PROCEDURE — 82784 ASSAY IGA/IGD/IGG/IGM EACH: CPT

## 2017-10-19 PROCEDURE — 83880 ASSAY OF NATRIURETIC PEPTIDE: CPT

## 2017-10-19 PROCEDURE — 84295 ASSAY OF SERUM SODIUM: CPT

## 2017-10-19 PROCEDURE — 87633 RESP VIRUS 12-25 TARGETS: CPT

## 2017-10-19 PROCEDURE — 82570 ASSAY OF URINE CREATININE: CPT

## 2017-10-19 PROCEDURE — 82947 ASSAY GLUCOSE BLOOD QUANT: CPT

## 2017-10-19 PROCEDURE — 83605 ASSAY OF LACTIC ACID: CPT

## 2017-10-19 PROCEDURE — 86803 HEPATITIS C AB TEST: CPT

## 2017-10-19 PROCEDURE — 93005 ELECTROCARDIOGRAM TRACING: CPT

## 2017-10-19 PROCEDURE — 71045 X-RAY EXAM CHEST 1 VIEW: CPT

## 2017-10-19 PROCEDURE — 85027 COMPLETE CBC AUTOMATED: CPT

## 2017-10-19 PROCEDURE — 87086 URINE CULTURE/COLONY COUNT: CPT

## 2017-10-19 PROCEDURE — 86334 IMMUNOFIX E-PHORESIS SERUM: CPT

## 2017-10-19 PROCEDURE — 94640 AIRWAY INHALATION TREATMENT: CPT

## 2017-10-19 PROCEDURE — A9505: CPT

## 2017-10-19 PROCEDURE — 84155 ASSAY OF PROTEIN SERUM: CPT

## 2017-10-19 PROCEDURE — 99285 EMERGENCY DEPT VISIT HI MDM: CPT | Mod: 25

## 2017-10-19 PROCEDURE — 80048 BASIC METABOLIC PNL TOTAL CA: CPT

## 2017-10-19 PROCEDURE — 93017 CV STRESS TEST TRACING ONLY: CPT

## 2017-10-19 PROCEDURE — 85014 HEMATOCRIT: CPT

## 2017-10-19 PROCEDURE — 85730 THROMBOPLASTIN TIME PARTIAL: CPT

## 2017-10-19 PROCEDURE — 81001 URINALYSIS AUTO W/SCOPE: CPT

## 2017-10-19 PROCEDURE — 97162 PT EVAL MOD COMPLEX 30 MIN: CPT

## 2017-10-19 PROCEDURE — 82803 BLOOD GASES ANY COMBINATION: CPT

## 2017-10-19 PROCEDURE — 78452 HT MUSCLE IMAGE SPECT MULT: CPT

## 2017-10-27 ENCOUNTER — EMERGENCY (EMERGENCY)
Facility: HOSPITAL | Age: 82
LOS: 1 days | End: 2017-10-27
Attending: EMERGENCY MEDICINE | Admitting: EMERGENCY MEDICINE
Payer: MEDICARE

## 2017-10-27 VITALS
RESPIRATION RATE: 17 BRPM | HEART RATE: 60 BPM | SYSTOLIC BLOOD PRESSURE: 128 MMHG | DIASTOLIC BLOOD PRESSURE: 65 MMHG | TEMPERATURE: 99 F | OXYGEN SATURATION: 97 %

## 2017-10-27 LAB
ALBUMIN SERPL ELPH-MCNC: 3.5 G/DL — SIGNIFICANT CHANGE UP (ref 3.3–5)
ALP SERPL-CCNC: 77 U/L — SIGNIFICANT CHANGE UP (ref 40–120)
ALT FLD-CCNC: 13 U/L RC — SIGNIFICANT CHANGE UP (ref 10–45)
ANION GAP SERPL CALC-SCNC: 14 MMOL/L — SIGNIFICANT CHANGE UP (ref 5–17)
APTT BLD: 35.8 SEC — SIGNIFICANT CHANGE UP (ref 27.5–37.4)
AST SERPL-CCNC: 18 U/L — SIGNIFICANT CHANGE UP (ref 10–40)
BASE EXCESS BLDV CALC-SCNC: 3.2 MMOL/L — HIGH (ref -2–2)
BASOPHILS # BLD AUTO: 0.1 K/UL — SIGNIFICANT CHANGE UP (ref 0–0.2)
BASOPHILS NFR BLD AUTO: 0.6 % — SIGNIFICANT CHANGE UP (ref 0–2)
BILIRUB SERPL-MCNC: 0.5 MG/DL — SIGNIFICANT CHANGE UP (ref 0.2–1.2)
BUN SERPL-MCNC: 36 MG/DL — HIGH (ref 7–23)
CA-I SERPL-SCNC: 1.19 MMOL/L — SIGNIFICANT CHANGE UP (ref 1.12–1.3)
CALCIUM SERPL-MCNC: 9.5 MG/DL — SIGNIFICANT CHANGE UP (ref 8.4–10.5)
CHLORIDE BLDV-SCNC: 100 MMOL/L — SIGNIFICANT CHANGE UP (ref 96–108)
CHLORIDE SERPL-SCNC: 96 MMOL/L — SIGNIFICANT CHANGE UP (ref 96–108)
CO2 BLDV-SCNC: 31 MMOL/L — HIGH (ref 22–30)
CO2 SERPL-SCNC: 25 MMOL/L — SIGNIFICANT CHANGE UP (ref 22–31)
CREAT SERPL-MCNC: 1.77 MG/DL — HIGH (ref 0.5–1.3)
EOSINOPHIL # BLD AUTO: 0.1 K/UL — SIGNIFICANT CHANGE UP (ref 0–0.5)
EOSINOPHIL NFR BLD AUTO: 1.6 % — SIGNIFICANT CHANGE UP (ref 0–6)
GAS PNL BLDV: 132 MMOL/L — LOW (ref 136–145)
GAS PNL BLDV: SIGNIFICANT CHANGE UP
GAS PNL BLDV: SIGNIFICANT CHANGE UP
GLUCOSE BLDV-MCNC: 171 MG/DL — HIGH (ref 70–99)
GLUCOSE SERPL-MCNC: 171 MG/DL — HIGH (ref 70–99)
HCO3 BLDV-SCNC: 30 MMOL/L — HIGH (ref 21–29)
HCT VFR BLD CALC: 38.3 % — SIGNIFICANT CHANGE UP (ref 34.5–45)
HCT VFR BLDA CALC: 38 % — LOW (ref 39–50)
HGB BLD CALC-MCNC: 12.3 G/DL — SIGNIFICANT CHANGE UP (ref 11.5–15.5)
HGB BLD-MCNC: 12.4 G/DL — SIGNIFICANT CHANGE UP (ref 11.5–15.5)
INR BLD: 1.31 RATIO — HIGH (ref 0.88–1.16)
LACTATE BLDV-MCNC: 1.7 MMOL/L — SIGNIFICANT CHANGE UP (ref 0.7–2)
LYMPHOCYTES # BLD AUTO: 1.2 K/UL — SIGNIFICANT CHANGE UP (ref 1–3.3)
LYMPHOCYTES # BLD AUTO: 13.8 % — SIGNIFICANT CHANGE UP (ref 13–44)
MCHC RBC-ENTMCNC: 29.6 PG — SIGNIFICANT CHANGE UP (ref 27–34)
MCHC RBC-ENTMCNC: 32.4 GM/DL — SIGNIFICANT CHANGE UP (ref 32–36)
MCV RBC AUTO: 91.5 FL — SIGNIFICANT CHANGE UP (ref 80–100)
MONOCYTES # BLD AUTO: 0.7 K/UL — SIGNIFICANT CHANGE UP (ref 0–0.9)
MONOCYTES NFR BLD AUTO: 8.3 % — SIGNIFICANT CHANGE UP (ref 2–14)
NEUTROPHILS # BLD AUTO: 6.5 K/UL — SIGNIFICANT CHANGE UP (ref 1.8–7.4)
NEUTROPHILS NFR BLD AUTO: 75.7 % — SIGNIFICANT CHANGE UP (ref 43–77)
OTHER CELLS CSF MANUAL: 4 ML/DL — LOW (ref 18–22)
PCO2 BLDV: 56 MMHG — HIGH (ref 35–50)
PH BLDV: 7.34 — LOW (ref 7.35–7.45)
PLATELET # BLD AUTO: 317 K/UL — SIGNIFICANT CHANGE UP (ref 150–400)
PO2 BLDV: 18 MMHG — LOW (ref 25–45)
POTASSIUM BLDV-SCNC: 3.9 MMOL/L — SIGNIFICANT CHANGE UP (ref 3.5–5)
POTASSIUM SERPL-MCNC: 4.3 MMOL/L — SIGNIFICANT CHANGE UP (ref 3.5–5.3)
POTASSIUM SERPL-SCNC: 4.3 MMOL/L — SIGNIFICANT CHANGE UP (ref 3.5–5.3)
PROT SERPL-MCNC: 7.3 G/DL — SIGNIFICANT CHANGE UP (ref 6–8.3)
PROTHROM AB SERPL-ACNC: 14.2 SEC — HIGH (ref 9.8–12.7)
RBC # BLD: 4.19 M/UL — SIGNIFICANT CHANGE UP (ref 3.8–5.2)
RBC # FLD: 13.7 % — SIGNIFICANT CHANGE UP (ref 10.3–14.5)
SAO2 % BLDV: 20 % — LOW (ref 67–88)
SODIUM SERPL-SCNC: 135 MMOL/L — SIGNIFICANT CHANGE UP (ref 135–145)
WBC # BLD: 8.6 K/UL — SIGNIFICANT CHANGE UP (ref 3.8–10.5)
WBC # FLD AUTO: 8.6 K/UL — SIGNIFICANT CHANGE UP (ref 3.8–10.5)

## 2017-10-27 PROCEDURE — 99220: CPT

## 2017-10-27 RX ORDER — AMIODARONE HYDROCHLORIDE 400 MG/1
200 TABLET ORAL DAILY
Qty: 0 | Refills: 0 | Status: DISCONTINUED | OUTPATIENT
Start: 2017-10-27 | End: 2017-10-31

## 2017-10-27 RX ORDER — INSULIN LISPRO 100/ML
4 VIAL (ML) SUBCUTANEOUS
Qty: 0 | Refills: 0 | Status: DISCONTINUED | OUTPATIENT
Start: 2017-10-27 | End: 2017-10-31

## 2017-10-27 RX ORDER — APIXABAN 2.5 MG/1
2.5 TABLET, FILM COATED ORAL EVERY 12 HOURS
Qty: 0 | Refills: 0 | Status: DISCONTINUED | OUTPATIENT
Start: 2017-10-27 | End: 2017-10-31

## 2017-10-27 RX ORDER — BUDESONIDE AND FORMOTEROL FUMARATE DIHYDRATE 160; 4.5 UG/1; UG/1
2 AEROSOL RESPIRATORY (INHALATION)
Qty: 0 | Refills: 0 | Status: DISCONTINUED | OUTPATIENT
Start: 2017-10-27 | End: 2017-10-31

## 2017-10-27 RX ORDER — ACETAMINOPHEN 500 MG
1000 TABLET ORAL ONCE
Qty: 0 | Refills: 0 | Status: COMPLETED | OUTPATIENT
Start: 2017-10-27 | End: 2017-10-27

## 2017-10-27 RX ORDER — FAMOTIDINE 10 MG/ML
20 INJECTION INTRAVENOUS ONCE
Qty: 0 | Refills: 0 | Status: COMPLETED | OUTPATIENT
Start: 2017-10-27 | End: 2017-10-27

## 2017-10-27 RX ORDER — DEXTROSE 50 % IN WATER 50 %
12.5 SYRINGE (ML) INTRAVENOUS ONCE
Qty: 0 | Refills: 0 | Status: DISCONTINUED | OUTPATIENT
Start: 2017-10-27 | End: 2017-10-31

## 2017-10-27 RX ORDER — MIRTAZAPINE 45 MG/1
15 TABLET, ORALLY DISINTEGRATING ORAL DAILY
Qty: 0 | Refills: 0 | Status: DISCONTINUED | OUTPATIENT
Start: 2017-10-27 | End: 2017-10-31

## 2017-10-27 RX ORDER — GLUCAGON INJECTION, SOLUTION 0.5 MG/.1ML
1 INJECTION, SOLUTION SUBCUTANEOUS ONCE
Qty: 0 | Refills: 0 | Status: DISCONTINUED | OUTPATIENT
Start: 2017-10-27 | End: 2017-10-31

## 2017-10-27 RX ORDER — CLONAZEPAM 1 MG
0.5 TABLET ORAL ONCE
Qty: 0 | Refills: 0 | Status: DISCONTINUED | OUTPATIENT
Start: 2017-10-27 | End: 2017-10-27

## 2017-10-27 RX ORDER — DOCUSATE SODIUM 100 MG
100 CAPSULE ORAL AT BEDTIME
Qty: 0 | Refills: 0 | Status: DISCONTINUED | OUTPATIENT
Start: 2017-10-27 | End: 2017-10-31

## 2017-10-27 RX ORDER — SODIUM CHLORIDE 9 MG/ML
1000 INJECTION INTRAMUSCULAR; INTRAVENOUS; SUBCUTANEOUS ONCE
Qty: 0 | Refills: 0 | Status: COMPLETED | OUTPATIENT
Start: 2017-10-27 | End: 2017-10-27

## 2017-10-27 RX ORDER — METOPROLOL TARTRATE 50 MG
100 TABLET ORAL DAILY
Qty: 0 | Refills: 0 | Status: DISCONTINUED | OUTPATIENT
Start: 2017-10-27 | End: 2017-10-31

## 2017-10-27 RX ORDER — DIPHENHYDRAMINE HCL 50 MG
25 CAPSULE ORAL ONCE
Qty: 0 | Refills: 0 | Status: COMPLETED | OUTPATIENT
Start: 2017-10-27 | End: 2017-10-27

## 2017-10-27 RX ORDER — DEXTROSE 50 % IN WATER 50 %
1 SYRINGE (ML) INTRAVENOUS ONCE
Qty: 0 | Refills: 0 | Status: DISCONTINUED | OUTPATIENT
Start: 2017-10-27 | End: 2017-10-31

## 2017-10-27 RX ORDER — DEXTROSE 50 % IN WATER 50 %
25 SYRINGE (ML) INTRAVENOUS ONCE
Qty: 0 | Refills: 0 | Status: DISCONTINUED | OUTPATIENT
Start: 2017-10-27 | End: 2017-10-31

## 2017-10-27 RX ORDER — DULOXETINE HYDROCHLORIDE 30 MG/1
60 CAPSULE, DELAYED RELEASE ORAL DAILY
Qty: 0 | Refills: 0 | Status: DISCONTINUED | OUTPATIENT
Start: 2017-10-27 | End: 2017-10-31

## 2017-10-27 RX ORDER — SODIUM CHLORIDE 9 MG/ML
1000 INJECTION, SOLUTION INTRAVENOUS
Qty: 0 | Refills: 0 | Status: DISCONTINUED | OUTPATIENT
Start: 2017-10-27 | End: 2017-10-31

## 2017-10-27 RX ORDER — ARIPIPRAZOLE 15 MG/1
2 TABLET ORAL DAILY
Qty: 0 | Refills: 0 | Status: DISCONTINUED | OUTPATIENT
Start: 2017-10-27 | End: 2017-10-31

## 2017-10-27 RX ORDER — INSULIN GLARGINE 100 [IU]/ML
12 INJECTION, SOLUTION SUBCUTANEOUS AT BEDTIME
Qty: 0 | Refills: 0 | Status: DISCONTINUED | OUTPATIENT
Start: 2017-10-27 | End: 2017-10-31

## 2017-10-27 RX ORDER — SODIUM CHLORIDE 9 MG/ML
3 INJECTION INTRAMUSCULAR; INTRAVENOUS; SUBCUTANEOUS EVERY 8 HOURS
Qty: 0 | Refills: 0 | Status: DISCONTINUED | OUTPATIENT
Start: 2017-10-27 | End: 2017-10-31

## 2017-10-27 RX ADMIN — DULOXETINE HYDROCHLORIDE 60 MILLIGRAM(S): 30 CAPSULE, DELAYED RELEASE ORAL at 21:58

## 2017-10-27 RX ADMIN — MIRTAZAPINE 15 MILLIGRAM(S): 45 TABLET, ORALLY DISINTEGRATING ORAL at 21:58

## 2017-10-27 RX ADMIN — Medication 0.5 MILLIGRAM(S): at 21:58

## 2017-10-27 RX ADMIN — FAMOTIDINE 20 MILLIGRAM(S): 10 INJECTION INTRAVENOUS at 16:20

## 2017-10-27 RX ADMIN — SODIUM CHLORIDE 1000 MILLILITER(S): 9 INJECTION INTRAMUSCULAR; INTRAVENOUS; SUBCUTANEOUS at 16:21

## 2017-10-27 RX ADMIN — Medication 400 MILLIGRAM(S): at 16:20

## 2017-10-27 RX ADMIN — Medication 4 UNIT(S): at 18:29

## 2017-10-27 RX ADMIN — SODIUM CHLORIDE 3 MILLILITER(S): 9 INJECTION INTRAMUSCULAR; INTRAVENOUS; SUBCUTANEOUS at 21:33

## 2017-10-27 RX ADMIN — Medication 25 MILLIGRAM(S): at 16:21

## 2017-10-27 RX ADMIN — Medication 1000 MILLIGRAM(S): at 17:22

## 2017-10-27 NOTE — ED ADULT NURSE NOTE - CHPI ED SYMPTOMS NEG
no vomiting/no rash/no throat itching/no difficulty swallowing/no nausea/no wheezing/no difficulty breathing/no shortness of breath/no cough

## 2017-10-27 NOTE — ED CDU PROVIDER INITIAL DAY NOTE - PROGRESS NOTE DETAILS
Pt resting comfortably. NAD. No complaints. VSS. requesting Klonopin and pm meds. Daughter at bedside. plan of care discussed.

## 2017-10-27 NOTE — ED ADULT NURSE NOTE - OBJECTIVE STATEMENT
85 yo female with history of DM2, HTN, presents to ed complaining of allergic reaction. upon arrival to ed upper lip swollen and red. as per pt "I took lisinopril this morning at 08:30 that I recently started on 10/9". pt states "I have had a stiff neck for three days, but my daughter noticed my lip swelling today". no redness/swelling noted to throat. no increased wob/nasal flaring/costal retractions. easy work of breathing. pt appears to be in no respiratory distress. airway patent, speech clear. breath sounds clear and equal bilaterally. no difficulty swallowing. pt's son states they went to urgent care pt was given 20mg of prednisone and was sent to ed for further eval. full rom in neck. pt denies chest pain/sob/fever/chills/diaphoresis/numbness/tingling/n/v.

## 2017-10-27 NOTE — ED CDU PROVIDER DISPOSITION NOTE - PLAN OF CARE
Follow up with your PMD within 48-72 hrs. Show copies of your reports given to you. Take all of your medications as previously prescribed EXCEPT for lisinopril. STOP lisinopril and follow up with your doctor.   Return for worsening swelling of the lips, or tongue, short of breath, difficulty swallowing or any other concerns. 1.  Follow up with your PMD within 48-72 hrs. Show copies of your reports given to you.   2.  Take all of your medications as previously prescribed EXCEPT for lisinopril. STOP lisinopril and follow up with your doctor.   3.  Take Prednisone 40mgs daily x 3 days.  Take Bendaryl 25mgs every 4 hrs as needed for worsening swelling  4.  Return for worsening swelling of the lips, or tongue, short of breath, difficulty swallowing or any other concerns.

## 2017-10-27 NOTE — ED CDU PROVIDER DISPOSITION NOTE - CLINICAL COURSE
86y Female PMH DM2, HTN complaining of allergic reaction. Took lisinopril this morning at 8:30am, recently started from last admission to hospital on 10/9, started to have left upper lip swelling noticed at 11:30am by son. Given Prednisone, benadryl and pepcid and observed in CDU without any events or worsening or symx. Pt and family instructed to discontinue use of lisinopril and follow up with PMD for further management of BP. Stable vitals during the stay and at the time of the discharge.

## 2017-10-27 NOTE — ED CDU PROVIDER INITIAL DAY NOTE - ATTENDING CONTRIBUTION TO CARE
attending Zeke: 86yF h/o DM2, HTN p/w angioedema of upper lip, ?2/2 lisinopril. No involvement of posterior oropharynx. Last dose of lisinopril 8AM. Given prednisone in urgent care, benadryl and pepcid in ED. VSS. Plan for CDU stay for vital signs q4h, airway observation, benadryl prn, frequent re-evaluations.

## 2017-10-27 NOTE — ED PROVIDER NOTE - ATTENDING CONTRIBUTION TO CARE
attending Zeke: 86yF h/o DM2, HTN presents with progressive swelling of upper lip since this AM. Took lisinopril this morning at 8:30am, recently started after last hospital admission on 10/9. Swelling initially on left side, now b/l upper lip. Denies swelling of tongue, difficulty swallowing or breathing, rash. On exam, VSS, well-appearing, +symmetric swelling of upper lip, no involvement of posterior oropharynx, no wheezing, no rash. Angioedema likely 2/2 ACE inhibitor. Given prednisone in urgent care prior to arrival. Will given benadryl, pepcid, plan for CDU for airway observation.

## 2017-10-27 NOTE — ED PROVIDER NOTE - PMH
Arteriosclerotic Heart Disease (ASHD)    Atrial fibrillation, unspecified type    Cigarette Smoker    Diabetes    HTN (Hypertension)    Overweight    Pacemaker    PVD (Peripheral Vascular Disease)

## 2017-10-27 NOTE — ED PROVIDER NOTE - PHYSICAL EXAMINATION
PE: CONSTITUTIONAL: Well appearing, well nourished, in no apparent distress. ENMT: Airway patent, nasal mucosa clear, mouth with normal mucosa, + upper lip edema, oropharynx clear. HEAD: NCAT EYES: PERRL, EOMI bilaterally CARDIAC: RRR, no m/r/g, no pedal edema RESPIRATORY: CTA bilaterally, no adventitious sounds GI: Abdomen non-distended, non-tender MSK: Spine appears normal, range of motion is not limited, no muscle/joint tenderness NEURO: CNII-XII grossly intact, 5/5 strength, full sensation all extremities, gait intact SKIN: Skin tone normal in color, warm and dry. No evidence of rash.

## 2017-10-27 NOTE — ED PROVIDER NOTE - OBJECTIVE STATEMENT
86y Female PMH DM2, HTN complaining of allergic reaction. Took lisinopril this morning, recently started, started to have left lip swelling. No airway compromise, no issues with swallowing. Went to urgent care, given 20mg of prednisone, sent here for further ED evaluation. 86y Female PMH DM2, HTN complaining of allergic reaction. Took lisinopril this morning at 8:30am, recently started from last admission to hospital on 10/9, started to have left lip swelling. No airway compromise, no issues with swallowing. Went to urgent care, given 20mg of prednisone, sent here for further ED evaluation. Appearing well otherwise. 86y Female PMH DM2, HTN complaining of allergic reaction. Took lisinopril this morning at 8:30am, recently started from last admission to hospital on 10/9, started to have left upper lip swelling noticed at 11:30am by son, Now swelling of entire upper lip. No swelling of upper lip or oropharynx. No airway compromise, no issues with swallowing. Went to urgent care, given 20mg of prednisone, sent here for further ED evaluation. Appearing well otherwise.

## 2017-10-28 VITALS
DIASTOLIC BLOOD PRESSURE: 87 MMHG | SYSTOLIC BLOOD PRESSURE: 130 MMHG | OXYGEN SATURATION: 100 % | HEART RATE: 64 BPM | RESPIRATION RATE: 17 BRPM | TEMPERATURE: 98 F

## 2017-10-28 PROCEDURE — 82962 GLUCOSE BLOOD TEST: CPT

## 2017-10-28 PROCEDURE — 80053 COMPREHEN METABOLIC PANEL: CPT

## 2017-10-28 PROCEDURE — 82435 ASSAY OF BLOOD CHLORIDE: CPT

## 2017-10-28 PROCEDURE — 82947 ASSAY GLUCOSE BLOOD QUANT: CPT

## 2017-10-28 PROCEDURE — 85014 HEMATOCRIT: CPT

## 2017-10-28 PROCEDURE — 82330 ASSAY OF CALCIUM: CPT

## 2017-10-28 PROCEDURE — G0378: CPT

## 2017-10-28 PROCEDURE — 84295 ASSAY OF SERUM SODIUM: CPT

## 2017-10-28 PROCEDURE — 94640 AIRWAY INHALATION TREATMENT: CPT

## 2017-10-28 PROCEDURE — 83605 ASSAY OF LACTIC ACID: CPT

## 2017-10-28 PROCEDURE — 96375 TX/PRO/DX INJ NEW DRUG ADDON: CPT

## 2017-10-28 PROCEDURE — 85027 COMPLETE CBC AUTOMATED: CPT

## 2017-10-28 PROCEDURE — 82803 BLOOD GASES ANY COMBINATION: CPT

## 2017-10-28 PROCEDURE — 99217: CPT

## 2017-10-28 PROCEDURE — 96372 THER/PROPH/DIAG INJ SC/IM: CPT | Mod: XU

## 2017-10-28 PROCEDURE — 96374 THER/PROPH/DIAG INJ IV PUSH: CPT

## 2017-10-28 PROCEDURE — 84132 ASSAY OF SERUM POTASSIUM: CPT

## 2017-10-28 PROCEDURE — 85730 THROMBOPLASTIN TIME PARTIAL: CPT

## 2017-10-28 PROCEDURE — 99284 EMERGENCY DEPT VISIT MOD MDM: CPT | Mod: 25

## 2017-10-28 PROCEDURE — 85610 PROTHROMBIN TIME: CPT

## 2017-10-28 RX ADMIN — APIXABAN 2.5 MILLIGRAM(S): 2.5 TABLET, FILM COATED ORAL at 08:35

## 2017-10-28 RX ADMIN — Medication 100 MILLIGRAM(S): at 08:42

## 2017-10-28 RX ADMIN — AMIODARONE HYDROCHLORIDE 200 MILLIGRAM(S): 400 TABLET ORAL at 08:35

## 2017-10-28 RX ADMIN — Medication 40 MILLIGRAM(S): at 08:41

## 2017-10-28 RX ADMIN — Medication 100 MILLIGRAM(S): at 08:35

## 2017-10-28 RX ADMIN — SODIUM CHLORIDE 3 MILLILITER(S): 9 INJECTION INTRAMUSCULAR; INTRAVENOUS; SUBCUTANEOUS at 05:31

## 2017-10-28 RX ADMIN — BUDESONIDE AND FORMOTEROL FUMARATE DIHYDRATE 2 PUFF(S): 160; 4.5 AEROSOL RESPIRATORY (INHALATION) at 08:35

## 2017-10-28 NOTE — ED CDU PROVIDER SUBSEQUENT DAY NOTE - HISTORY
No interval changes since initial CDU provider note. Pt feels well without complaint. NAD VSS. no events on tele

## 2017-10-28 NOTE — ED CDU PROVIDER SUBSEQUENT DAY NOTE - MEDICAL DECISION MAKING DETAILS
86yF h/o DM2, HTN presents with progressive swelling of upper lip since this AM. Took lisinopril this morning at 8:30am, recently started after last hospital admission on 10/9. Swelling initially on left side, now b/l upper lip. Denies swelling of tongue, difficulty swallowing or breathing, rash. On exam, VSS, well-appearing, +symmetric swelling of upper lip, no involvement of posterior oropharynx, no wheezing, no rash. Angioedema likely 2/2 ACE inhibitor. Given prednisone in urgent care prior to arrival. Given benadryl, pepcid, plan for CDU for airway observation.

## 2017-10-28 NOTE — ED CDU PROVIDER SUBSEQUENT DAY NOTE - PROGRESS NOTE DETAILS
Pt sleeping. NAD. No complaints. VSS. Continue to monitor. -MARYLIN Salas Patient seen at bedside in NAD.  VSS.  Patient resting comfortably without complaints. Patient denies difficulty breathing/swallowing.  On exam, upper lip swelling has resolved.  Uvula midline.  -Rad Johnston PA-C Attending MD Wallace:  I have personally performed a face to face diagnostic evaluation on this patient.  I have reviewed the ACP note and agree with the history, exam, and plan of care, except as noted.  Pt is an 87 y/o female who presented last night with upper lip swelling 2 weeks s/p starting lisinopril. No airway compromise at that time. Pt tx with prednisone with significant improvement in angioedema. Pt without SOB/Stridor/drooling. Tolerating secretions, tolerating PO. VSS. Pt feels well and appears stable for d/c. Advised to take prednisone for 3 additional days and to f/u with PMD. Strict angioedema/alelrgic rxn return precautions given. Knows to return to the ED sooner for worsening/concerning sx.

## 2017-10-28 NOTE — ED ADULT NURSE REASSESSMENT NOTE - NS ED NURSE REASSESS COMMENT FT1
Pt asleep on stretcher, no complaints, VSS, no distress noted, close monitoring continues. Safety and comfort maintained.
Pt declined 6am medications, stating " I'll take them with breakfast".
report taken from  Rhiannon OLEA
Received pt from EMMIE Carr, received pt alert and responsive, oriented x4, denies any respiratory distress, SOB, or difficulty breathing. Pt transferred to CDU for observation for angioedema, pt denies any respiratory distress, edema noted to upper lip, will continue to closely monitor pt overnight. IV in place, patent and free of signs of infiltration, pt denies chest pain or palpitations, V/S stable, pt afebrile, pt denies pain at this time. Pt educated on unit and unit rules, instructed patient to notify RN of any needed assistance, Pt verbalizes understanding, Call bell placed within reach. Safety maintained. Will continue to monitor.

## 2017-11-14 ENCOUNTER — APPOINTMENT (OUTPATIENT)
Dept: CV DIAGNOSITCS | Facility: HOSPITAL | Age: 82
End: 2017-11-14

## 2017-12-12 ENCOUNTER — NON-APPOINTMENT (OUTPATIENT)
Age: 82
End: 2017-12-12

## 2017-12-12 ENCOUNTER — APPOINTMENT (OUTPATIENT)
Dept: ELECTROPHYSIOLOGY | Facility: CLINIC | Age: 82
End: 2017-12-12
Payer: MEDICARE

## 2017-12-12 VITALS
HEART RATE: 60 BPM | DIASTOLIC BLOOD PRESSURE: 76 MMHG | HEIGHT: 64 IN | WEIGHT: 152 LBS | SYSTOLIC BLOOD PRESSURE: 127 MMHG | BODY MASS INDEX: 25.95 KG/M2 | OXYGEN SATURATION: 95 %

## 2017-12-12 DIAGNOSIS — I50.22 CHRONIC SYSTOLIC (CONGESTIVE) HEART FAILURE: ICD-10-CM

## 2017-12-12 PROCEDURE — 99214 OFFICE O/P EST MOD 30 MIN: CPT

## 2017-12-12 PROCEDURE — 93280 PM DEVICE PROGR EVAL DUAL: CPT

## 2017-12-12 RX ORDER — FUROSEMIDE 40 MG/1
40 TABLET ORAL
Refills: 0 | Status: ACTIVE | COMMUNITY

## 2017-12-12 RX ORDER — FLUTICASONE PROPIONATE AND SALMETEROL 50; 250 UG/1; UG/1
250-50 POWDER RESPIRATORY (INHALATION)
Qty: 60 | Refills: 0 | Status: ACTIVE | COMMUNITY
Start: 2017-03-02

## 2017-12-19 PROCEDURE — 99285 EMERGENCY DEPT VISIT HI MDM: CPT | Mod: 25

## 2017-12-19 PROCEDURE — 80053 COMPREHEN METABOLIC PANEL: CPT

## 2017-12-19 PROCEDURE — 84484 ASSAY OF TROPONIN QUANT: CPT

## 2017-12-19 PROCEDURE — 83880 ASSAY OF NATRIURETIC PEPTIDE: CPT

## 2017-12-19 PROCEDURE — 84100 ASSAY OF PHOSPHORUS: CPT

## 2017-12-19 PROCEDURE — 81001 URINALYSIS AUTO W/SCOPE: CPT

## 2017-12-19 PROCEDURE — 96374 THER/PROPH/DIAG INJ IV PUSH: CPT

## 2017-12-19 PROCEDURE — 82962 GLUCOSE BLOOD TEST: CPT

## 2017-12-19 PROCEDURE — 93005 ELECTROCARDIOGRAM TRACING: CPT

## 2017-12-19 PROCEDURE — 83735 ASSAY OF MAGNESIUM: CPT

## 2017-12-19 PROCEDURE — 94640 AIRWAY INHALATION TREATMENT: CPT

## 2017-12-19 PROCEDURE — 80048 BASIC METABOLIC PNL TOTAL CA: CPT

## 2017-12-19 PROCEDURE — 82550 ASSAY OF CK (CPK): CPT

## 2017-12-19 PROCEDURE — 71045 X-RAY EXAM CHEST 1 VIEW: CPT

## 2017-12-19 PROCEDURE — 82553 CREATINE MB FRACTION: CPT

## 2017-12-19 PROCEDURE — 85027 COMPLETE CBC AUTOMATED: CPT

## 2017-12-22 ENCOUNTER — INPATIENT (INPATIENT)
Facility: HOSPITAL | Age: 82
LOS: 2 days | Discharge: HOME HEALTH | DRG: 193 | End: 2017-12-25
Attending: INTERNAL MEDICINE | Admitting: INTERNAL MEDICINE
Payer: MEDICARE

## 2017-12-22 VITALS
RESPIRATION RATE: 18 BRPM | DIASTOLIC BLOOD PRESSURE: 87 MMHG | HEART RATE: 68 BPM | TEMPERATURE: 99 F | SYSTOLIC BLOOD PRESSURE: 150 MMHG | OXYGEN SATURATION: 85 %

## 2017-12-22 LAB
ALBUMIN SERPL ELPH-MCNC: 3.4 G/DL — SIGNIFICANT CHANGE UP (ref 3.3–5)
ALP SERPL-CCNC: 94 U/L — SIGNIFICANT CHANGE UP (ref 40–120)
ALT FLD-CCNC: 22 U/L RC — SIGNIFICANT CHANGE UP (ref 10–45)
ANION GAP SERPL CALC-SCNC: 16 MMOL/L — SIGNIFICANT CHANGE UP (ref 5–17)
APTT BLD: 27.9 SEC — SIGNIFICANT CHANGE UP (ref 27.5–37.4)
AST SERPL-CCNC: 42 U/L — HIGH (ref 10–40)
BASOPHILS # BLD AUTO: 0.1 K/UL — SIGNIFICANT CHANGE UP (ref 0–0.2)
BASOPHILS NFR BLD AUTO: 0.5 % — SIGNIFICANT CHANGE UP (ref 0–2)
BILIRUB SERPL-MCNC: 0.3 MG/DL — SIGNIFICANT CHANGE UP (ref 0.2–1.2)
BUN SERPL-MCNC: 48 MG/DL — HIGH (ref 7–23)
CALCIUM SERPL-MCNC: 8.7 MG/DL — SIGNIFICANT CHANGE UP (ref 8.4–10.5)
CHLORIDE SERPL-SCNC: 96 MMOL/L — SIGNIFICANT CHANGE UP (ref 96–108)
CK MB CFR SERPL CALC: 2.1 NG/ML — SIGNIFICANT CHANGE UP (ref 0–3.8)
CO2 SERPL-SCNC: 22 MMOL/L — SIGNIFICANT CHANGE UP (ref 22–31)
CREAT SERPL-MCNC: 2.06 MG/DL — HIGH (ref 0.5–1.3)
EOSINOPHIL # BLD AUTO: 0.3 K/UL — SIGNIFICANT CHANGE UP (ref 0–0.5)
EOSINOPHIL NFR BLD AUTO: 2.5 % — SIGNIFICANT CHANGE UP (ref 0–6)
GAS PNL BLDV: SIGNIFICANT CHANGE UP
GLUCOSE SERPL-MCNC: 186 MG/DL — HIGH (ref 70–99)
HCT VFR BLD CALC: 37.3 % — SIGNIFICANT CHANGE UP (ref 34.5–45)
HGB BLD-MCNC: 11.8 G/DL — SIGNIFICANT CHANGE UP (ref 11.5–15.5)
INR BLD: 1.29 RATIO — HIGH (ref 0.88–1.16)
LYMPHOCYTES # BLD AUTO: 1.6 K/UL — SIGNIFICANT CHANGE UP (ref 1–3.3)
LYMPHOCYTES # BLD AUTO: 12.4 % — LOW (ref 13–44)
MCHC RBC-ENTMCNC: 29.2 PG — SIGNIFICANT CHANGE UP (ref 27–34)
MCHC RBC-ENTMCNC: 31.6 GM/DL — LOW (ref 32–36)
MCV RBC AUTO: 92.5 FL — SIGNIFICANT CHANGE UP (ref 80–100)
MONOCYTES # BLD AUTO: 1.1 K/UL — HIGH (ref 0–0.9)
MONOCYTES NFR BLD AUTO: 8.6 % — SIGNIFICANT CHANGE UP (ref 2–14)
NEUTROPHILS # BLD AUTO: 9.6 K/UL — HIGH (ref 1.8–7.4)
NEUTROPHILS NFR BLD AUTO: 75.9 % — SIGNIFICANT CHANGE UP (ref 43–77)
NT-PROBNP SERPL-SCNC: HIGH PG/ML (ref 0–300)
PLATELET # BLD AUTO: 377 K/UL — SIGNIFICANT CHANGE UP (ref 150–400)
POTASSIUM SERPL-MCNC: 4.7 MMOL/L — SIGNIFICANT CHANGE UP (ref 3.5–5.3)
POTASSIUM SERPL-SCNC: 4.7 MMOL/L — SIGNIFICANT CHANGE UP (ref 3.5–5.3)
PROT SERPL-MCNC: 7.5 G/DL — SIGNIFICANT CHANGE UP (ref 6–8.3)
PROTHROM AB SERPL-ACNC: 14.1 SEC — HIGH (ref 9.8–12.7)
RBC # BLD: 4.03 M/UL — SIGNIFICANT CHANGE UP (ref 3.8–5.2)
RBC # FLD: 14 % — SIGNIFICANT CHANGE UP (ref 10.3–14.5)
SODIUM SERPL-SCNC: 134 MMOL/L — LOW (ref 135–145)
TROPONIN T SERPL-MCNC: 0.02 NG/ML — SIGNIFICANT CHANGE UP (ref 0–0.06)
WBC # BLD: 12.7 K/UL — HIGH (ref 3.8–10.5)
WBC # FLD AUTO: 12.7 K/UL — HIGH (ref 3.8–10.5)

## 2017-12-22 PROCEDURE — 71010: CPT | Mod: 26

## 2017-12-22 PROCEDURE — 99291 CRITICAL CARE FIRST HOUR: CPT | Mod: 25

## 2017-12-22 PROCEDURE — 93010 ELECTROCARDIOGRAM REPORT: CPT

## 2017-12-22 RX ORDER — FUROSEMIDE 40 MG
40 TABLET ORAL ONCE
Qty: 0 | Refills: 0 | Status: COMPLETED | OUTPATIENT
Start: 2017-12-22 | End: 2017-12-22

## 2017-12-22 RX ORDER — AZITHROMYCIN 500 MG/1
500 TABLET, FILM COATED ORAL ONCE
Qty: 0 | Refills: 0 | Status: COMPLETED | OUTPATIENT
Start: 2017-12-22 | End: 2017-12-22

## 2017-12-22 RX ORDER — CEFEPIME 1 G/1
1000 INJECTION, POWDER, FOR SOLUTION INTRAMUSCULAR; INTRAVENOUS ONCE
Qty: 0 | Refills: 0 | Status: COMPLETED | OUTPATIENT
Start: 2017-12-22 | End: 2017-12-22

## 2017-12-22 RX ORDER — IPRATROPIUM/ALBUTEROL SULFATE 18-103MCG
3 AEROSOL WITH ADAPTER (GRAM) INHALATION ONCE
Qty: 0 | Refills: 0 | Status: COMPLETED | OUTPATIENT
Start: 2017-12-22 | End: 2017-12-22

## 2017-12-22 RX ADMIN — CEFEPIME 100 MILLIGRAM(S): 1 INJECTION, POWDER, FOR SOLUTION INTRAMUSCULAR; INTRAVENOUS at 22:27

## 2017-12-22 RX ADMIN — AZITHROMYCIN 250 MILLIGRAM(S): 500 TABLET, FILM COATED ORAL at 23:07

## 2017-12-22 RX ADMIN — Medication 3 MILLILITER(S): at 22:14

## 2017-12-22 RX ADMIN — Medication 60 MILLIGRAM(S): at 22:27

## 2017-12-22 RX ADMIN — Medication 40 MILLIGRAM(S): at 22:14

## 2017-12-22 NOTE — ED PROVIDER NOTE - ATTENDING CONTRIBUTION TO CARE
Attending MD Rosenberg:  I personally have seen and examined this patient.  Resident note reviewed and agree on plan of care and except where noted.  See HPI, PE, and MDM for details.     86F with CHF, ?COPD presenting with dyspnea x 1 day, arrives tachypneic to 40s, O2 sat high 80s on NRB, ddx includes acute CHF, PNA, COPD exacerbation, noninvasive ventilation started immediately, POCUS with b/l B lines, will start IV diuresis, labs,

## 2017-12-22 NOTE — ED PROVIDER NOTE - PROGRESS NOTE DETAILS
Jonathan Weil, PGY1 - Pulse ox stable >95% on bipap, pt tolerating well, awake and alert. Still tachypneic, hypercarbic acidosis noted. Will continue bipap, +steroids/nebs for possible COPD component, abx given for possible RLL pna on cxr. Jonathan Weil, PGY1 - Spoke w/ Dr. Young Doty (cardiology) who recommends admission to hospitalist, will follow inpatient. Jonathan Weil, PGY1 - respiratory rate improving, stable on bipap. Jonathan Weil, PGY1 - Repeat VBG w/ decreased CO2 56 --> 52, acidosis mildly improved 7.29 --> 7.31 Jonathan Weil, PGY1 - Repeat VBG w/ decreased CO2 56 --> 52, acidosis mildly improved 7.29 --> 7.31    Attending Statement: Agree with the above.  Comfortable on 2L NC, RR 14-18, SaO2 92-98.  --BMM

## 2017-12-22 NOTE — ED ADULT NURSE NOTE - OBJECTIVE STATEMENT
85 y/o F, A&Ox4, enters ED by EMS w/ SOB. 87 y/o F, A&Ox4, enters ED by EMS w/ SOB. Hx. of COPD, CHF, CAD and has a pacemaker. As per family, pt. developed SOB yesterday. No fever, but chills. As per pt., SOB is worse when laying down. Pt. presents tachypneic and on a nonrebreather. Pt.'s O2 sat is 85% on RA. Pt. immediately placed on bipap as per MD's orders and O2 improved to 100%. No chest pain/heart palpitations. EKG done. Pt. placed on CM - paced rhythm. Skin warm, dry and intact. Family at bedside. Safety maintained.

## 2017-12-22 NOTE — ED PROVIDER NOTE - MEDICAL DECISION MAKING DETAILS
Attending MD Rosenberg: 86F with 86F with CHF, ?COPD presenting with dyspnea x 1 day, arrives tachypneic to 40s, O2 sat high 80s on NRB, ddx includes acute CHF, PNA, COPD exacerbation, noninvasive ventilation started immediately, POCUS with b/l B lines, will start IV diuresis, labs

## 2017-12-22 NOTE — ED PROVIDER NOTE - OBJECTIVE STATEMENT
86F hx CHF/CAD/COPD/pacemaker BIBA w/ 1 day of dyspnea. Progressively worsening, +orthopnea, worse when lying down. No fever/cough/chest pain. No known relieving factors.

## 2017-12-22 NOTE — ED PROVIDER NOTE - PHYSICAL EXAMINATION
Attending MD Rosenberg: A & O x 3, ill appearing, moderate to severe resp distress, EOMI b/l, PERRL b/l; lungs bibasilar rales, diminished BS b/l, heart with reg rhythm without murmur; abdomen soft NTND; extremities with no edema; affect appropriate. neuro exam non focal with no motor or sensory deficits.

## 2017-12-23 DIAGNOSIS — F41.8 OTHER SPECIFIED ANXIETY DISORDERS: ICD-10-CM

## 2017-12-23 DIAGNOSIS — I25.10 ATHEROSCLEROTIC HEART DISEASE OF NATIVE CORONARY ARTERY WITHOUT ANGINA PECTORIS: ICD-10-CM

## 2017-12-23 DIAGNOSIS — E11.9 TYPE 2 DIABETES MELLITUS WITHOUT COMPLICATIONS: ICD-10-CM

## 2017-12-23 DIAGNOSIS — I50.20 UNSPECIFIED SYSTOLIC (CONGESTIVE) HEART FAILURE: ICD-10-CM

## 2017-12-23 DIAGNOSIS — J96.91 RESPIRATORY FAILURE, UNSPECIFIED WITH HYPOXIA: ICD-10-CM

## 2017-12-23 DIAGNOSIS — I10 ESSENTIAL (PRIMARY) HYPERTENSION: ICD-10-CM

## 2017-12-23 DIAGNOSIS — J12.1 RESPIRATORY SYNCYTIAL VIRUS PNEUMONIA: ICD-10-CM

## 2017-12-23 DIAGNOSIS — J44.9 CHRONIC OBSTRUCTIVE PULMONARY DISEASE, UNSPECIFIED: ICD-10-CM

## 2017-12-23 DIAGNOSIS — F41.9 ANXIETY DISORDER, UNSPECIFIED: ICD-10-CM

## 2017-12-23 DIAGNOSIS — N17.9 ACUTE KIDNEY FAILURE, UNSPECIFIED: ICD-10-CM

## 2017-12-23 DIAGNOSIS — I48.91 UNSPECIFIED ATRIAL FIBRILLATION: ICD-10-CM

## 2017-12-23 DIAGNOSIS — Z71.89 OTHER SPECIFIED COUNSELING: ICD-10-CM

## 2017-12-23 DIAGNOSIS — F32.9 MAJOR DEPRESSIVE DISORDER, SINGLE EPISODE, UNSPECIFIED: ICD-10-CM

## 2017-12-23 DIAGNOSIS — Z29.9 ENCOUNTER FOR PROPHYLACTIC MEASURES, UNSPECIFIED: ICD-10-CM

## 2017-12-23 LAB
ANION GAP SERPL CALC-SCNC: 14 MMOL/L — SIGNIFICANT CHANGE UP (ref 5–17)
APPEARANCE UR: CLEAR — SIGNIFICANT CHANGE UP
BASOPHILS # BLD AUTO: 0 K/UL — SIGNIFICANT CHANGE UP (ref 0–0.2)
BILIRUB UR-MCNC: NEGATIVE — SIGNIFICANT CHANGE UP
BLD GP AB SCN SERPL QL: NEGATIVE — SIGNIFICANT CHANGE UP
BUN SERPL-MCNC: 41 MG/DL — HIGH (ref 7–23)
CALCIUM SERPL-MCNC: 8.3 MG/DL — LOW (ref 8.4–10.5)
CHLORIDE SERPL-SCNC: 97 MMOL/L — SIGNIFICANT CHANGE UP (ref 96–108)
CHOLEST SERPL-MCNC: 90 MG/DL — SIGNIFICANT CHANGE UP (ref 10–199)
CK MB CFR SERPL CALC: 1.6 NG/ML — SIGNIFICANT CHANGE UP (ref 0–3.8)
CK SERPL-CCNC: 53 U/L — SIGNIFICANT CHANGE UP (ref 25–170)
CO2 SERPL-SCNC: 23 MMOL/L — SIGNIFICANT CHANGE UP (ref 22–31)
COLOR SPEC: SIGNIFICANT CHANGE UP
CREAT SERPL-MCNC: 1.95 MG/DL — HIGH (ref 0.5–1.3)
DIFF PNL FLD: NEGATIVE — SIGNIFICANT CHANGE UP
EOSINOPHIL # BLD AUTO: 0 K/UL — SIGNIFICANT CHANGE UP (ref 0–0.5)
GAS PNL BLDV: SIGNIFICANT CHANGE UP
GAS PNL BLDV: SIGNIFICANT CHANGE UP
GLUCOSE BLDC GLUCOMTR-MCNC: 209 MG/DL — HIGH (ref 70–99)
GLUCOSE BLDC GLUCOMTR-MCNC: 215 MG/DL — HIGH (ref 70–99)
GLUCOSE BLDC GLUCOMTR-MCNC: 279 MG/DL — HIGH (ref 70–99)
GLUCOSE BLDC GLUCOMTR-MCNC: 376 MG/DL — HIGH (ref 70–99)
GLUCOSE BLDC GLUCOMTR-MCNC: 465 MG/DL — CRITICAL HIGH (ref 70–99)
GLUCOSE SERPL-MCNC: 204 MG/DL — HIGH (ref 70–99)
GLUCOSE UR QL: 50
GRAM STN FLD: SIGNIFICANT CHANGE UP
HBA1C BLD-MCNC: 7.4 % — HIGH (ref 4–5.6)
HCT VFR BLD CALC: 33.6 % — LOW (ref 34.5–45)
HDLC SERPL-MCNC: 40 MG/DL — SIGNIFICANT CHANGE UP (ref 40–125)
HGB BLD-MCNC: 10.8 G/DL — LOW (ref 11.5–15.5)
KETONES UR-MCNC: NEGATIVE — SIGNIFICANT CHANGE UP
LEUKOCYTE ESTERASE UR-ACNC: ABNORMAL
LIPID PNL WITH DIRECT LDL SERPL: 37 MG/DL — SIGNIFICANT CHANGE UP
LYMPHOCYTES # BLD AUTO: 0.5 K/UL — LOW (ref 1–3.3)
LYMPHOCYTES # BLD AUTO: 5 % — LOW (ref 13–44)
MAGNESIUM SERPL-MCNC: 1.7 MG/DL — SIGNIFICANT CHANGE UP (ref 1.6–2.6)
MCHC RBC-ENTMCNC: 29.1 PG — SIGNIFICANT CHANGE UP (ref 27–34)
MCHC RBC-ENTMCNC: 32.1 GM/DL — SIGNIFICANT CHANGE UP (ref 32–36)
MCV RBC AUTO: 90.7 FL — SIGNIFICANT CHANGE UP (ref 80–100)
MONOCYTES # BLD AUTO: 0.7 K/UL — SIGNIFICANT CHANGE UP (ref 0–0.9)
MONOCYTES NFR BLD AUTO: 7 % — SIGNIFICANT CHANGE UP (ref 2–14)
NEUTROPHILS # BLD AUTO: 16.5 K/UL — HIGH (ref 1.8–7.4)
NEUTROPHILS NFR BLD AUTO: 87 % — HIGH (ref 43–77)
NITRITE UR-MCNC: NEGATIVE — SIGNIFICANT CHANGE UP
PH UR: 6 — SIGNIFICANT CHANGE UP (ref 5–8)
PHOSPHATE SERPL-MCNC: 3.1 MG/DL — SIGNIFICANT CHANGE UP (ref 2.5–4.5)
PLATELET # BLD AUTO: 304 K/UL — SIGNIFICANT CHANGE UP (ref 150–400)
POTASSIUM SERPL-MCNC: 3.4 MMOL/L — LOW (ref 3.5–5.3)
POTASSIUM SERPL-SCNC: 3.4 MMOL/L — LOW (ref 3.5–5.3)
PROT UR-MCNC: 30 MG/DL
RAPID RVP RESULT: DETECTED
RBC # BLD: 3.71 M/UL — LOW (ref 3.8–5.2)
RBC # FLD: 13.8 % — SIGNIFICANT CHANGE UP (ref 10.3–14.5)
RH IG SCN BLD-IMP: POSITIVE — SIGNIFICANT CHANGE UP
RSV RNA SPEC QL NAA+PROBE: DETECTED
SODIUM SERPL-SCNC: 134 MMOL/L — LOW (ref 135–145)
SP GR SPEC: 1.01 — SIGNIFICANT CHANGE UP (ref 1.01–1.02)
SPECIMEN SOURCE: SIGNIFICANT CHANGE UP
TOTAL CHOLESTEROL/HDL RATIO MEASUREMENT: 2.3 RATIO — LOW (ref 3.3–7.1)
TRIGL SERPL-MCNC: 64 MG/DL — SIGNIFICANT CHANGE UP (ref 10–149)
TROPONIN T SERPL-MCNC: 0.05 NG/ML — SIGNIFICANT CHANGE UP (ref 0–0.06)
TSH SERPL-MCNC: 1.24 UIU/ML — SIGNIFICANT CHANGE UP (ref 0.27–4.2)
UROBILINOGEN FLD QL: NEGATIVE — SIGNIFICANT CHANGE UP
WBC # BLD: 17.3 K/UL — HIGH (ref 3.8–10.5)
WBC # FLD AUTO: 17.3 K/UL — HIGH (ref 3.8–10.5)

## 2017-12-23 PROCEDURE — 99223 1ST HOSP IP/OBS HIGH 75: CPT | Mod: GC

## 2017-12-23 PROCEDURE — 99497 ADVNCD CARE PLAN 30 MIN: CPT | Mod: 25

## 2017-12-23 PROCEDURE — 12345: CPT | Mod: GC,NC

## 2017-12-23 RX ORDER — FUROSEMIDE 40 MG
40 TABLET ORAL DAILY
Qty: 0 | Refills: 0 | Status: DISCONTINUED | OUTPATIENT
Start: 2017-12-23 | End: 2017-12-23

## 2017-12-23 RX ORDER — INSULIN GLARGINE 100 [IU]/ML
12 INJECTION, SOLUTION SUBCUTANEOUS EVERY MORNING
Qty: 0 | Refills: 0 | Status: DISCONTINUED | OUTPATIENT
Start: 2017-12-23 | End: 2017-12-25

## 2017-12-23 RX ORDER — APIXABAN 2.5 MG/1
2.5 TABLET, FILM COATED ORAL EVERY 12 HOURS
Qty: 0 | Refills: 0 | Status: DISCONTINUED | OUTPATIENT
Start: 2017-12-23 | End: 2017-12-25

## 2017-12-23 RX ORDER — DULOXETINE HYDROCHLORIDE 30 MG/1
60 CAPSULE, DELAYED RELEASE ORAL DAILY
Qty: 0 | Refills: 0 | Status: DISCONTINUED | OUTPATIENT
Start: 2017-12-23 | End: 2017-12-23

## 2017-12-23 RX ORDER — MIRTAZAPINE 45 MG/1
30 TABLET, ORALLY DISINTEGRATING ORAL AT BEDTIME
Qty: 0 | Refills: 0 | Status: DISCONTINUED | OUTPATIENT
Start: 2017-12-23 | End: 2017-12-25

## 2017-12-23 RX ORDER — ASPIRIN/CALCIUM CARB/MAGNESIUM 324 MG
81 TABLET ORAL DAILY
Qty: 0 | Refills: 0 | Status: DISCONTINUED | OUTPATIENT
Start: 2017-12-23 | End: 2017-12-25

## 2017-12-23 RX ORDER — BUDESONIDE AND FORMOTEROL FUMARATE DIHYDRATE 160; 4.5 UG/1; UG/1
2 AEROSOL RESPIRATORY (INHALATION)
Qty: 0 | Refills: 0 | Status: DISCONTINUED | OUTPATIENT
Start: 2017-12-23 | End: 2017-12-25

## 2017-12-23 RX ORDER — DOCUSATE SODIUM 100 MG
100 CAPSULE ORAL THREE TIMES A DAY
Qty: 0 | Refills: 0 | Status: DISCONTINUED | OUTPATIENT
Start: 2017-12-23 | End: 2017-12-25

## 2017-12-23 RX ORDER — AMIODARONE HYDROCHLORIDE 400 MG/1
200 TABLET ORAL DAILY
Qty: 0 | Refills: 0 | Status: DISCONTINUED | OUTPATIENT
Start: 2017-12-23 | End: 2017-12-25

## 2017-12-23 RX ORDER — ACETAMINOPHEN 500 MG
650 TABLET ORAL EVERY 6 HOURS
Qty: 0 | Refills: 0 | Status: DISCONTINUED | OUTPATIENT
Start: 2017-12-23 | End: 2017-12-25

## 2017-12-23 RX ORDER — CLONAZEPAM 1 MG
0.5 TABLET ORAL AT BEDTIME
Qty: 0 | Refills: 0 | Status: DISCONTINUED | OUTPATIENT
Start: 2017-12-23 | End: 2017-12-25

## 2017-12-23 RX ORDER — INSULIN LISPRO 100/ML
VIAL (ML) SUBCUTANEOUS AT BEDTIME
Qty: 0 | Refills: 0 | Status: DISCONTINUED | OUTPATIENT
Start: 2017-12-23 | End: 2017-12-25

## 2017-12-23 RX ORDER — INSULIN GLARGINE 100 [IU]/ML
12 INJECTION, SOLUTION SUBCUTANEOUS AT BEDTIME
Qty: 0 | Refills: 0 | Status: DISCONTINUED | OUTPATIENT
Start: 2017-12-23 | End: 2017-12-23

## 2017-12-23 RX ORDER — FUROSEMIDE 40 MG
40 TABLET ORAL DAILY
Qty: 0 | Refills: 0 | Status: DISCONTINUED | OUTPATIENT
Start: 2017-12-23 | End: 2017-12-25

## 2017-12-23 RX ORDER — INSULIN LISPRO 100/ML
VIAL (ML) SUBCUTANEOUS
Qty: 0 | Refills: 0 | Status: DISCONTINUED | OUTPATIENT
Start: 2017-12-23 | End: 2017-12-23

## 2017-12-23 RX ORDER — INSULIN LISPRO 100/ML
4 VIAL (ML) SUBCUTANEOUS
Qty: 0 | Refills: 0 | Status: DISCONTINUED | OUTPATIENT
Start: 2017-12-23 | End: 2017-12-25

## 2017-12-23 RX ORDER — MIRTAZAPINE 45 MG/1
15 TABLET, ORALLY DISINTEGRATING ORAL AT BEDTIME
Qty: 0 | Refills: 0 | Status: DISCONTINUED | OUTPATIENT
Start: 2017-12-23 | End: 2017-12-23

## 2017-12-23 RX ORDER — POTASSIUM CHLORIDE 20 MEQ
40 PACKET (EA) ORAL ONCE
Qty: 0 | Refills: 0 | Status: COMPLETED | OUTPATIENT
Start: 2017-12-23 | End: 2017-12-23

## 2017-12-23 RX ORDER — SIMVASTATIN 20 MG/1
20 TABLET, FILM COATED ORAL AT BEDTIME
Qty: 0 | Refills: 0 | Status: DISCONTINUED | OUTPATIENT
Start: 2017-12-23 | End: 2017-12-25

## 2017-12-23 RX ORDER — IPRATROPIUM/ALBUTEROL SULFATE 18-103MCG
3 AEROSOL WITH ADAPTER (GRAM) INHALATION EVERY 6 HOURS
Qty: 0 | Refills: 0 | Status: DISCONTINUED | OUTPATIENT
Start: 2017-12-23 | End: 2017-12-25

## 2017-12-23 RX ORDER — MAGNESIUM SULFATE 500 MG/ML
2 VIAL (ML) INJECTION ONCE
Qty: 0 | Refills: 0 | Status: COMPLETED | OUTPATIENT
Start: 2017-12-23 | End: 2017-12-23

## 2017-12-23 RX ORDER — INSULIN LISPRO 100/ML
VIAL (ML) SUBCUTANEOUS AT BEDTIME
Qty: 0 | Refills: 0 | Status: DISCONTINUED | OUTPATIENT
Start: 2017-12-23 | End: 2017-12-23

## 2017-12-23 RX ORDER — METOPROLOL TARTRATE 50 MG
100 TABLET ORAL DAILY
Qty: 0 | Refills: 0 | Status: DISCONTINUED | OUTPATIENT
Start: 2017-12-23 | End: 2017-12-25

## 2017-12-23 RX ORDER — ARIPIPRAZOLE 15 MG/1
2 TABLET ORAL DAILY
Qty: 0 | Refills: 0 | Status: DISCONTINUED | OUTPATIENT
Start: 2017-12-23 | End: 2017-12-24

## 2017-12-23 RX ORDER — DULOXETINE HYDROCHLORIDE 30 MG/1
90 CAPSULE, DELAYED RELEASE ORAL AT BEDTIME
Qty: 0 | Refills: 0 | Status: DISCONTINUED | OUTPATIENT
Start: 2017-12-23 | End: 2017-12-25

## 2017-12-23 RX ORDER — CEFTRIAXONE 500 MG/1
1 INJECTION, POWDER, FOR SOLUTION INTRAMUSCULAR; INTRAVENOUS EVERY 24 HOURS
Qty: 0 | Refills: 0 | Status: DISCONTINUED | OUTPATIENT
Start: 2017-12-23 | End: 2017-12-25

## 2017-12-23 RX ORDER — AZITHROMYCIN 500 MG/1
500 TABLET, FILM COATED ORAL EVERY 24 HOURS
Qty: 0 | Refills: 0 | Status: DISCONTINUED | OUTPATIENT
Start: 2017-12-23 | End: 2017-12-23

## 2017-12-23 RX ORDER — LISINOPRIL 2.5 MG/1
2.5 TABLET ORAL DAILY
Qty: 0 | Refills: 0 | Status: DISCONTINUED | OUTPATIENT
Start: 2017-12-23 | End: 2017-12-23

## 2017-12-23 RX ORDER — INSULIN LISPRO 100/ML
VIAL (ML) SUBCUTANEOUS
Qty: 0 | Refills: 0 | Status: DISCONTINUED | OUTPATIENT
Start: 2017-12-23 | End: 2017-12-25

## 2017-12-23 RX ADMIN — Medication 3 MILLILITER(S): at 05:15

## 2017-12-23 RX ADMIN — Medication 5: at 12:16

## 2017-12-23 RX ADMIN — ARIPIPRAZOLE 2 MILLIGRAM(S): 15 TABLET ORAL at 12:17

## 2017-12-23 RX ADMIN — Medication 40 MILLIEQUIVALENT(S): at 04:41

## 2017-12-23 RX ADMIN — Medication 100 MILLIGRAM(S): at 22:35

## 2017-12-23 RX ADMIN — BUDESONIDE AND FORMOTEROL FUMARATE DIHYDRATE 2 PUFF(S): 160; 4.5 AEROSOL RESPIRATORY (INHALATION) at 05:49

## 2017-12-23 RX ADMIN — Medication 81 MILLIGRAM(S): at 12:17

## 2017-12-23 RX ADMIN — Medication 2: at 09:15

## 2017-12-23 RX ADMIN — Medication 3 MILLILITER(S): at 12:17

## 2017-12-23 RX ADMIN — Medication 0.5 MILLIGRAM(S): at 20:51

## 2017-12-23 RX ADMIN — APIXABAN 2.5 MILLIGRAM(S): 2.5 TABLET, FILM COATED ORAL at 20:37

## 2017-12-23 RX ADMIN — Medication 50 GRAM(S): at 04:40

## 2017-12-23 RX ADMIN — Medication 3 MILLILITER(S): at 17:49

## 2017-12-23 RX ADMIN — BUDESONIDE AND FORMOTEROL FUMARATE DIHYDRATE 2 PUFF(S): 160; 4.5 AEROSOL RESPIRATORY (INHALATION) at 17:50

## 2017-12-23 RX ADMIN — DULOXETINE HYDROCHLORIDE 90 MILLIGRAM(S): 30 CAPSULE, DELAYED RELEASE ORAL at 22:34

## 2017-12-23 RX ADMIN — Medication 100 MILLIGRAM(S): at 12:49

## 2017-12-23 RX ADMIN — SIMVASTATIN 20 MILLIGRAM(S): 20 TABLET, FILM COATED ORAL at 22:34

## 2017-12-23 RX ADMIN — Medication 4 UNIT(S): at 17:48

## 2017-12-23 RX ADMIN — Medication 4 UNIT(S): at 09:15

## 2017-12-23 RX ADMIN — AMIODARONE HYDROCHLORIDE 200 MILLIGRAM(S): 400 TABLET ORAL at 05:16

## 2017-12-23 RX ADMIN — Medication 40 MILLIGRAM(S): at 05:16

## 2017-12-23 RX ADMIN — Medication 100 MILLIGRAM(S): at 05:16

## 2017-12-23 RX ADMIN — Medication 4 UNIT(S): at 12:17

## 2017-12-23 RX ADMIN — APIXABAN 2.5 MILLIGRAM(S): 2.5 TABLET, FILM COATED ORAL at 05:16

## 2017-12-23 RX ADMIN — DULOXETINE HYDROCHLORIDE 60 MILLIGRAM(S): 30 CAPSULE, DELAYED RELEASE ORAL at 12:17

## 2017-12-23 RX ADMIN — MIRTAZAPINE 30 MILLIGRAM(S): 45 TABLET, ORALLY DISINTEGRATING ORAL at 22:34

## 2017-12-23 RX ADMIN — Medication 3 MILLILITER(S): at 23:00

## 2017-12-23 RX ADMIN — CEFTRIAXONE 100 GRAM(S): 500 INJECTION, POWDER, FOR SOLUTION INTRAMUSCULAR; INTRAVENOUS at 20:51

## 2017-12-23 RX ADMIN — Medication 6: at 17:48

## 2017-12-23 NOTE — PROGRESS NOTE ADULT - PROBLEM SELECTOR PLAN 1
- Differential include: viral pneumonia vs bacterial CAP vs cardiogenic pulmonary edema vs COPD exacerbation   - For pneumonia, RSV +, urine legionella pending, CXR does not show focal consolidation, pending blood and sputum culture, continue with empiric Ceftriaxone and Azithromycin (12/23-)  - For cardiogenic pulmonary edema, BNP elevated, previous EF shows low EF; plan to continue with maintenance Furosemide 40 mg PO QD and if symptoms persist, can plan for repeat TTE  - For COPD exacerbation, cotninue with steroid, duonebs and BiPAP PRN  - Continue with PRN BiPAP support, continue to deescalate oxygenation requirement as patient can tolerate - Most likely viral pneumonia, less likely cardiogenic pulmonary edema vs COPD exacerbation   - For pneumonia, RSV +, urine legionella pending, CXR does not show focal consolidation, pending blood and sputum culture, continue with empiric Ceftriaxone (12/22-) and Azithromycin (12/22-12/23)  - For cardiogenic pulmonary edema, BNP elevated, previous EF shows low EF; plan to continue with maintenance Furosemide 40 mg PO QD and if symptoms persist, can plan for repeat TTE  - For COPD exacerbation, continue with steroid, Duonebs and BiPAP PRN  - Continue with PRN BiPAP support, continue to deescalate oxygenation requirement as patient can tolerate

## 2017-12-23 NOTE — H&P ADULT - ATTENDING COMMENTS
Pt seen and examined at bedside.  I have precepted this case with house staff and agree with resident note above and have edited it where appropriate.   This patient was assigned to me by the hospitalist in charge; my involvement in this case has consisted of the initial history, physical, chart review, and management plan.  This patient was previously unknown to me.

## 2017-12-23 NOTE — PROGRESS NOTE ADULT - ASSESSMENT
Patient is a 86 year old woman with history of paroxysmal atrial fibrillation (on Eliquis, s/p PPM placement ~ 4 years ago), COPD (on 2L home O2), RCC s/p R nephrectomy, CKD stage 3, HTN, DM, HFrEF (EF 35% 09/2017), CAD, arthritis, depression, and anxiety presented with palpitations and hypoxic and hypercapnic respiratory failure in the setting of viral and secondary community acquire pneumonia vs cardiogenic edema vs COPD exacerbation. Patient is a 86 year old woman with history of paroxysmal atrial fibrillation (on Eliquis, s/p PPM placement ~ 4 years ago), COPD (on 2L home O2), RCC s/p R nephrectomy, CKD stage 3, HTN, DM, HFrEF (EF 35% 09/2017), CAD, arthritis, depression, and anxiety presented with palpitations and hypoxic and hypercapnic respiratory failure in the setting of RSV pneumonia.

## 2017-12-23 NOTE — H&P ADULT - NSHPLABSRESULTS_GEN_ALL_CORE
Labs personally reviewed and interpreted. Notable for mildly elevated WBC 12.7 (from 8.6), Hb stable at 11.8, Plt 377. Na 134. Creatinine 2.06 (baseline 1.6) AST 42. Cardiac enzymes negative x1. BNP 54086. Lactate 2.0 --> 1.3. pH 7.29 --> 7.31, and pCO2 56 --> 52 after 2 hours BiPAP    CXR personally reviewed and interpreted. Notable for hazy RLL opacity previously seen on previous CXR but more prominent and consolidated.    EKG personally reviewed. Rate paced, normal axis. Rate 60, LBBB unchanged.  - unchanged 1st degree block. QTc 466. No pathological Q waves or ST changes. Labs personally reviewed and interpreted. Notable for mildly elevated WBC 12.7 (from 8.6), Hb stable at 11.8, Plt 377. Na 134. Creatinine 2.06 (baseline 1.6) AST 42. Cardiac enzymes negative x1. BNP 38612. Lactate 2.0 --> 1.3. pH 7.29 --> 7.31, and pCO2 56 --> 52 after 2 hours BiPAP    CXR personally reviewed and interpreted. Notable for hazy RLL opacity previously seen on previous CXR but more prominent and consolidated.    EKG personally reviewed. Rate paced, L axis dev. Rate 60, known LBBB unchanged.  - unchanged 1st degree block. QTc 466. No pathological Q waves or ST changes.

## 2017-12-23 NOTE — H&P ADULT - PROBLEM SELECTOR PLAN 5
- C/w ASA and statin - Last TTE EF 35% with segmental disease. No history of stent. Patient had stress test showing signs of ischemia but did not have a cath 2/2 ckd. Unlikely symptoms are 2/2 ischemia based on history and physical exam.  -Despite elevated BNP, on exam patient with not signficant LE edema, no JVD; BNP is lower than prior admission for HF and in setting of CKD may be her baseline. Dry mucous membranes.  At this time, c/w maintenance Po lasix 40 qd and treat alternate respiratory etiologies of her dyspnea as above.   - Would c/w PO 40 Lasix QD  - Would recheck TTE  - strict I/Os, daily weights - Paroxysmal, likely exacerbated in the setting of acute infection  - Would continue with rate control with Metoprolol 100 QD  - Unlikely symptoms are caused by ischemia, as the patient has other etiologies for her symptoms, initial cardiac enzymes negative, unchanged EKG without overt ischemia.   - Would trend cardiac enzymes x1 more  - recheck TTE  - Check TSH

## 2017-12-23 NOTE — H&P ADULT - PMH
Anxiety    Arteriosclerotic Heart Disease (ASHD)    Atrial fibrillation, unspecified type    COPD (chronic obstructive pulmonary disease)    Depression    Diabetes    HTN (Hypertension)    Overweight    Pacemaker    PVD (Peripheral Vascular Disease)

## 2017-12-23 NOTE — ED ADULT NURSE REASSESSMENT NOTE - NS ED NURSE REASSESS COMMENT FT1
No acute distress maintained on BIPAP o2 sat 99. VBG drawn and sent. Wet lozenge applied to lips. Updated on plan of care.

## 2017-12-23 NOTE — H&P ADULT - PROBLEM SELECTOR PLAN 6
- Mild. Likely cardiogenic, exacerbated by acute infection  - Will monitor with improvement with antibiotics, possibly judicious fluids - Mild. Likely cardiogenic, exacerbated by acute infection  - Will monitor with improvement with antibiotics and diuretics  - Avoid Lisinopril in ARUN  - Strict I/Os - Mild. DDx includes but not limited to acute infection/prerenal vs. cardiogenic.  - Will monitor with improvement with antibiotics and maintenance diuretics  - hold Lisinopril in ARUN  - Strict I/Os - Last TTE EF 35% with segmental disease. No history of stent. Patient had stress test showing signs of ischemia but did not have a cath 2/2 ckd. Unlikely symptoms are 2/2 ischemia based on history and physical exam.  -Despite elevated BNP, on exam patient with not signficant LE edema, no JVD; BNP is lower than prior admission for HF and in setting of CKD may be her baseline. Dry mucous membranes.  At this time, c/w maintenance Po lasix 40 qd and treat alternate respiratory etiologies of her dyspnea as above.   - Would c/w PO 40 Lasix QD  - Would recheck TTE  - strict I/Os, daily weights

## 2017-12-23 NOTE — H&P ADULT - NSHPPHYSICALEXAM_GEN_ALL_CORE
GENERAL: No acute distress, on BiPAP  HEENT: PERRL, EOMI, MM dry, no oropharyngeal lesions  NECK: supple, no stiffness, no JVD, no thyromegaly  PULM: respirations non-labored, bilateral lower lobe rales, R>L. No wheezes.  CV: regular rate and rhythm, no murmurs, gallops, or rubs  GI: abdomen soft, nontender, nondistended, no masses felt, normal bowel sounds  MSK: strength 5/5 bilateral upper/lower extremities. No joint swelling, erythema, or warmth.  LYMPH: no anterior cervical, posterior cervical, supraclavicular, or inguinal lymphadenopathy  NEURO: A&Ox3, no tremors, sensation intact  SKIN: scattered purpurae across lower extremities. No edema ED VS: Temp 98.7, HR 68, /87, RR 31 --> 24, 85% on RA --> 99% on BiPAP    GENERAL: No acute distress, on BiPAP  HEENT: PERRL, EOMI, MM dry, no oropharyngeal lesions  NECK: supple, no stiffness, no JVD, no thyromegaly  PULM: respirations non-labored, bilateral lower lobe rales, R>L. No wheezes.  CV: regular rate and rhythm, no murmurs, gallops, or rubs  GI: abdomen soft, nontender, nondistended, no masses felt, normal bowel sounds  MSK: strength 5/5 bilateral upper/lower extremities. No joint swelling, erythema, or warmth.  LYMPH: no anterior cervical, posterior cervical, supraclavicular, or inguinal lymphadenopathy  NEURO: A&Ox3, no tremors, sensation intact  SKIN: scattered purpurae across lower extremities. No edema ED VS: Temp 98.7, HR 68, /87, RR 31 --> 24, 85% on RA --> 99% on BiPAP    GENERAL: No acute distress, on BiPAP  HEENT: PERRL, EOMI, MM dry, no oropharyngeal lesions  NECK: supple, no stiffness, no JVD, no thyromegaly  Lungs: respirations non-labored, bilateral lower lobe rales, R>L. No overt wheezes.  Heart: regular rate and rhythm, no murmurs, gallops, or rubs  Abd: abdomen soft, nontender, nondistended, no masses felt, normal bowel sounds  MSK: rom intact. No joint swelling, erythema, or warmth.  LYMPH: no anterior cervical, posterior cervical, supraclavicular, or inguinal lymphadenopathy  NEURO: A&Ox3, no tremors, sensation intact strength intact b/l.   SKIN: scattered purpurae across lower extremities. No edema

## 2017-12-23 NOTE — H&P ADULT - PROBLEM SELECTOR PROBLEM 4
Heart failure with reduced ejection fraction COPD (chronic obstructive pulmonary disease) Acute kidney injury superimposed on CKD

## 2017-12-23 NOTE — H&P ADULT - PROBLEM SELECTOR PLAN 10
- DVT: SQH  - Diet: DASH/diabetic    11) HTN  - C/w Lisinopril, even in the setting of mild ARUN on CKD - DVT: Eliquis  - Diet: DASH/diabetic    11) HTN  - C/w Lisinopril, even in the setting of mild ARUN on CKD -16 minutes spent clarifying advanced directives with patient  at bedside.  -At this time, patient demonstrates capacity with insight into condition, and chooses to be DNR/DNI  -We had an extensive discussion regarding options for advanced directives. She understands the options and wishes to not have aggressive interventions in a code situation.  She understands by choosing this, she would pass away naturally if her heart were to stop or her breathing were to stop without CPR or intubation done by her treating physicians. She understands that this decision can be changed in the future if she wishes.  -All questions answered at bedside.   -ER RN at bedside witnessed our conversation.

## 2017-12-23 NOTE — H&P ADULT - NSHPREVIEWOFSYSTEMS_GEN_ALL_CORE
Gen: + chills, weight loss. Negative for fevers, anorexia, weight gain, malaise, fatigue  Eyes: no blurred vision  ENT: no tinnitus, vertigo, or difficulty hearing  Resp: + dyspnea, orthopnea. No wheezing, pleuritic chest pain, hemoptysis  CV: + dyspnea on exertion, palpitations. No chest pain  GI: no nausea, vomiting, abdominal pain, diarrhea, constipation, melena, or hematochezia  : no dysuria, hematuria, discharge, or incontinence  MSK: + chronic arthralgias. No joint stiffness, joint swelling, or myalgias  Neuro: no focal deficits, confusion, weakness, dizziness, tremors, or seizures  Skin: no rash, lesions, or edema Gen: + chills, weight loss. Negative for fevers, anorexia, weight gain, malaise, fatigue  Eyes: no blurred vision  ENT: no tinnitus, vertigo, or difficulty hearing  Resp: + dyspnea, orthopnea. No wheezing, pleuritic chest pain, hemoptysis  CV: + dyspnea on exertion, palpitations. No chest pain  GI: no nausea, vomiting, abdominal pain, diarrhea, constipation, melena, or hematochezia  : no dysuria, hematuria, discharge, or incontinence  MSK: + chronic arthralgias. No joint stiffness, joint swelling, or myalgias  Neuro: no focal deficits, confusion, weakness, dizziness, tremors, or seizures  Skin: no rash, lesions, or edema  Allergic; no rash or itching

## 2017-12-23 NOTE — H&P ADULT - PROBLEM SELECTOR PLAN 1
- Likely 2/2 viral and possibly bacterial PNA. With mild hypercapnea and mild hypoxia  - BNP elevated, patient endorsing palpitations likely 2/2 atrial fibrillation in the setting of acute infection  - Improved with BiPAP, would get repeat ABG off BiPAP  - Would continue with Azithromycin and Ceftriaxone, avoid Vancomycin or Zosyn at this time. Check urine legionella, phos. F/u blood and urine cx.  - Would avoid Lasix at this time, as patient appears hypovolemic  - Recheck TTE - Likely primarily 2/2 viral and possibly bacterial PNA. With mild hypercapnea and mild hypoxia. Likely multifactorial and be 2/2 COPD exacerbation.  - BNP elevated, patient endorsing palpitations likely 2/2 atrial fibrillation in the setting of acute infection  - Improved with BiPAP, would get repeat ABG off BiPAP  - Would continue with Azithromycin and Ceftriaxone, avoid Vancomycin or Zosyn at this time. Check urine legionella, phos. F/u blood and urine cx.  - In the setting of heart failure, would continue with Lasix 40 PO QD at this time  - Recheck TTE -resp distress with infilrate on R lung with +RVP for RSV  -Given hypoxic/hypercapnic resp failure with leukocytosis, will cover with community acquired pneumonia for now  -follow blood cultures, check sputum cultures  -check urine legionella  -if patient does not improve appropriately consider CT chest to evaluate infiltrate and if further decompensates,broad abx as necessary.  For now recent hospitalization ~3 months ago and no evidence of abscess, will c/w ctx/azithro pending cultures.   -bipap qhs/prn; pt tolerated few hours off bipap and was requiring 4-5 L O2 with sats 88%-92%

## 2017-12-23 NOTE — H&P ADULT - ASSESSMENT
87 yo F with a PMH paroxysmal atrial fibrillation (on Eliquis, s/p PPM placement ~ 4 years ago), COPD (on 2L home O2), RCC s/p R nephrectomy, CKD3, HTN, DM, HFrEF (EF 35% 09/2017), CAD, arthritis, depression, and anxiety who was BIBEMS with palpitations and SOB.

## 2017-12-23 NOTE — H&P ADULT - PROBLEM SELECTOR PLAN 4
- Last TTE EF 35% sith segmental disease. No history of stent. Patient had stress test showing signs of ischemia but did not have a cath 2/2 . Unlikely symptoms are 2/2 ischemia based on history and physical exam.  - Would recheck TTE - Last TTE EF 35% with segmental disease. No history of stent. Patient had stress test showing signs of ischemia but did not have a cath 2/2 . Unlikely symptoms are 2/2 ischemia based on history and physical exam.  - In the setting of rales bilaterally and mildly elevated BNP for age and ARUN, likely has mild HF exacerbation  - Would c/w PO 40 Lasix QD  - Would recheck TTE  - strict I/Os, daily weights - C/w Danni Ding atc  - Would also add Prednisone 40 QD x 5 days for mild copd exac - Mild. DDx includes but not limited to acute infection/prerenal vs. cardiogenic.  - Will monitor with improvement with antibiotics and maintenance diuretics  - hold Lisinopril in ARUN  - Strict I/Os

## 2017-12-23 NOTE — PROGRESS NOTE ADULT - PROBLEM SELECTOR PLAN 8
- DVT prophylaxis: Apixaban  - GI prophylaxis: not indicated  - Diet: DASH/diabetic  - GOC: DNR/DNI    Kiya Bazan PGY-1  Internal Medicine HS Team 3  [phone])

## 2017-12-23 NOTE — CONSULT NOTE ADULT - SUBJECTIVE AND OBJECTIVE BOX
87 yo F well-known to me.  History of chronic systolic HF, LBBB, PPM, PAF, CKD, COPD, anxiety.  Status post mult recent hospitalizations for decompensated HF, found to have reduced LVEF with perfusion defects on stress myocardial perfusion imaging consistent with infarct and carmella-infarct ischemia.  Cardiac cath was deferred due to lack of angina, neg cardiac serum biomarkers on presentation, renal insufficiency, and pt declination.  She reports 2 days of productive cough, and 1 day of increased dyspnea and presented to ER.  Tx with diuretic, antibiotic, O2, BIPAP, and was found to have + RSV on resp viral panel, and possible RLL pneumonia.  She reports feeling better quickly in ER and currently has no complaints.    HISTORY OF PRESENT ILLNESS:    PAST MEDICAL & SURGICAL HISTORY:  COPD (chronic obstructive pulmonary disease)  Depression  Anxiety  Atrial fibrillation, unspecified type  Pacemaker  Overweight  PVD (Peripheral Vascular Disease)  Arteriosclerotic Heart Disease (ASHD)  HTN (Hypertension)  Diabetes  S/P carpal tunnel release  S/P left cataract extraction  s/p colon resection  S/P Nephrectomy: right  Hip Replacement  History of Total Knee Replacement  S/P Hernia Surgery  S/P Cholecystectomy          MEDICATIONS:  MEDICATIONS  (STANDING):  ALBUTerol/ipratropium for Nebulization 3 milliLiter(s) Nebulizer every 6 hours  amiodarone    Tablet 200 milliGRAM(s) Oral daily  apixaban 2.5 milliGRAM(s) Oral every 12 hours  ARIPiprazole 2 milliGRAM(s) Oral daily  aspirin  chewable 81 milliGRAM(s) Oral daily  buDESOnide 160 MICROgram(s)/formoterol 4.5 MICROgram(s) Inhaler 2 Puff(s) Inhalation two times a day  cefTRIAXone   IVPB 1 Gram(s) IV Intermittent every 24 hours  docusate sodium 100 milliGRAM(s) Oral three times a day  DULoxetine 60 milliGRAM(s) Oral daily  furosemide    Tablet 40 milliGRAM(s) Oral daily  insulin glargine Injectable (LANTUS) 12 Unit(s) SubCutaneous at bedtime  insulin lispro (HumaLOG) corrective regimen sliding scale   SubCutaneous three times a day before meals  insulin lispro (HumaLOG) corrective regimen sliding scale   SubCutaneous at bedtime  insulin lispro Injectable (HumaLOG) 4 Unit(s) SubCutaneous three times a day before meals  metoprolol succinate  milliGRAM(s) Oral daily  mirtazapine 15 milliGRAM(s) Oral at bedtime  predniSONE   Tablet 40 milliGRAM(s) Oral daily  simvastatin 20 milliGRAM(s) Oral at bedtime    MEDICATIONS  (PRN):  acetaminophen   Tablet 650 milliGRAM(s) Oral every 6 hours PRN For Temp greater than 38 C (100.4 F)  clonazePAM Tablet 0.5 milliGRAM(s) Oral at bedtime PRN Anxiety      FAM HISTORY  FAMILY HISTORY:  Family history of pancreatic cancer (Sibling)      SOCIAL HISTORY:    Social History:    Substance Use (street drugs): (  ) never used  (  ) other:  Tobacco Usage:  (   ) never smoked   (  X ) former smoker   (   ) current smoker  (     ) pack years  (        ) last cigarette date  Alcohol Usage:    (     ) Advanced Directives: (     ) None    (      ) DNR    (     ) DNI    (     ) Health Care Proxy:     ALLERGIES:  Allergies    amoxicillin (Flushing; Vomiting; Nausea)  lisinopril (Angioedema)    PHYSICAL EXAM:  T(C): 36.8 (12-23-17 @ 07:20), Max: 37.1 (12-22-17 @ 21:03)  HR: 60 (12-23-17 @ 09:20) (59 - 68)  BP: 105/55 (12-23-17 @ 07:20) (105/55 - 152/86)  RR: 20 (12-23-17 @ 09:20) (18 - 31)  SpO2: 96% (12-23-17 @ 09:20) (85% - 99%)  Wt(kg): --  I&O's Summary      Appearance:  Comf, NAD  HEENT:   Normal oral mucosa, PERRL, EOMI	  Lymphatic: No lymphadenopathy  Cardiovascular: Normal S1 S2, Murmur:  I/VI ZAKI at base, no S3  Neck: JVP normal  Respiratory: Lungs - + RLL crackles  Gastrointestinal:  Soft, Non-tender, + BS	  Skin: No rashes, No ecchymoses, No cyanosis	  Neurologic: Non-focal, awake , alert , oriented   Extremities: Normal range of motion, No clubbing, cyanosis or edema  Vascular: Peripheral pulses palpable 2+ bilaterally    TELEMETRY: 	  SR  ECG:  	Apaced, LBBB    RADIOL:  < from: Xray Chest 1 View AP- PORTABLE-Urgent (12.22.17 @ 21:53) >  EXAM:  CHEST-PORTABLE URGENT                            PROCEDURE DATE:  12/22/2017            INTERPRETATION:  CLINICAL INFORMATION: dyspnea.    EXAM: Single view chest radiograph.    COMPARISON:Chest radiograph 10/9/2017    FINDINGS:   Left pacer.  The cardiac silhouette cannot be accurately evaluated in this projection.    Hazy right lung opacities and blurring of the vascular margins consistent   with asymmetric pulmonary edema versus pneumonia.    No acute osseous abnormalities.      IMPRESSION:   Hazy right lower lung opacities may represent asymmetric edema vs.   pneumonia      < end of copied text >      CARDIAC MARKERS:  Troponin T, Serum: 0.05 ng/mL (12-23 @ 03:30)  Troponin T, Serum: 0.02 ng/mL (12-22 @ 21:52)    ECHO:    < from: Transthoracic Echocardiogram (09.18.17 @ 17:36) >  Conclusions:  1. Mitral annular calcification. Mild-moderate mitral  regurgitation.  2. Normal trileaflet aortic valve. Mild aortic  regurgitation.  3. Eccentric left ventricular hypertrophy (dilated left  ventricle with normal relative wall thickness).  4. Moderate-severe segmental left ventricular systolic  dysfunction. The mid to distal inferior wall, mid to distal  septal wall and apex are hypokinetic.  5. Mild diastolic dysfunction (Stage I).  6. Normal right ventricular size and function.  ------------------------------------------------------------------------  Confirmed on  9/18/2017 - 18:07:13 by Kevin Villegas MD  ------------------------------------------------------------------------    < end of copied text >                                10.8   17.3  )-----------( 304      ( 23 Dec 2017 03:30 )             33.6     12-23    134<L>  |  97  |  41<H>  ----------------------------<  204<H>  3.4<L>   |  23  |  1.95<H>    Ca    8.3<L>      23 Dec 2017 03:30  Phos  3.1     12-23  Mg     1.7     12-23    TPro  7.5  /  Alb  3.4  /  TBili  0.3  /  DBili  x   /  AST  42<H>  /  ALT  22  /  AlkPhos  94  12-22    Alanine Aminotransferase (ALT/SGPT): 22 U/L RC (12-22 @ 21:52)    proBNP: Serum Pro-Brain Natriuretic Peptide: 07935 pg/mL (12-22 @ 21:52)    Lipid Profile:   HgA1c: Hemoglobin A1C, Whole Blood: 7.4 % (12-23 @ 07:20)    TSH: Thyroid Stimulating Hormone, Serum: 1.24 uIU/mL (12-23 @ 07:20)

## 2017-12-23 NOTE — H&P ADULT - PROBLEM SELECTOR PLAN 7
- FS QAC/HS  - C/w Lantus 12 and Humalog 4  - SSI  - A1C - with hyperglycemia, ckd as complications, on long term insulin.   - FS QAC/HS  - C/w Lantus 12 and Humalog 4  - SSI  - A1C

## 2017-12-23 NOTE — PROGRESS NOTE ADULT - PROBLEM SELECTOR PLAN 4
- Last TTE 09/2017 shows  EF 35% with segmental disease. No history of stent. Patient had stress test showing signs of ischemia but did not have a cath 2/2 ckd. Unlikely symptoms are 2/2 ischemia based on history and physical exam.  -Despite elevated BNP, on exam patient with not signficant LE edema, no JVD; BNP is lower than prior admission for HF and in setting of CKD may be her baseline. Dry mucous membranes.  At this time, c/w maintenance Po lasix 40 qd and treat alternate respiratory etiologies of her dyspnea as above.   - Would c/w PO 40 Lasix QD  - Would recheck TTE  - strict I/Os, daily weights - Last TTE 09/2017 shows  EF 35% with segmental disease. No history of stent. Patient had stress test showing signs of ischemia but did not have a cath  - Elevated BNP likely in the setting of CKD, patient is euvolemic on exam   - Continue with maintenance Po lasix 40 qd and treat alternate respiratory etiologies of her dyspnea as above

## 2017-12-23 NOTE — CONSULT NOTE ADULT - ASSESSMENT
Dyspnea - multifactorial in etiology, in setting of acute viral illness  Chronic systolic HF  COPD  CKD  PAF  PPM    Rec:  Agree with management per Medicine team  Agree pt does not appear fluid overloaded, would continue on Furosemide 40 mg QD (pre-adm dose)  Contin amio, beta blocker, Eliquis  Did not tolerate ACEI due to angioedema, and ARB has not been used due to renal insufficiency and marginal BP - would contin to keep off.  Outpatient follow up as scheduled  Please call if any questions or change in condition.

## 2017-12-23 NOTE — H&P ADULT - PROBLEM SELECTOR PLAN 3
- C/w Danni DingN - C/w Danni Ding PRN  - Would also add Prednisone 40 QD - Paroxysmal, likely exacerbated in the setting of acute infection  - Would continue with rate control with Metoprolol 100 QD  - Unlikely symptoms are caused by ischemia, as the patient has other etiologies for her symptoms, initial cardiac enzymes negative, unchanged EKG without overt ischemia.   - Would trend cardiac enzymes x1 more  - recheck TTE  - Check TSH -with acute exacerbation, likely mild, supported by new hypercapnia with respiratory acidosis on vbg, hx of wheezing, hx of clear/white sputum, and improvement s/p steroids/nebs/bipap.   -C/w Danni Ding atc  - Would also add Prednisone 40 QD x 5 days for mild copd exac
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
## 2017-12-23 NOTE — H&P ADULT - PROBLEM SELECTOR PLAN 9
- C/w Remeron and Cymbalta - DVT: Eliquis  - Diet: DASH/diabetic    11) HTN  - C/w Lisinopril, even in the setting of mild ARUN on CKD -16 minutes spent clarifying advanced directives with patient  at bedside.  -At this time, patient demonstrates capacity with insight into condition, and chooses to be DNR/DNI  -We had an extensive discussion regarding options for advanced directives. She understands the options and wishes to not have aggressive interventions in a code situation.  She understands by choosing this, she would pass away naturally if her heart were to stop or her breathing were to stop without CPR or intubation done by her treating physicians. She understands that this decision can be changed in the future if she wishes.  -All questions answered at bedside.   -ER RN at bedside witnessed our conversation.

## 2017-12-23 NOTE — H&P ADULT - HISTORY OF PRESENT ILLNESS
85 yo F with a PMH paroxysmal atrial fibrillation (on Eliquis, s/p PPM placement ~ 4 years ago), COPD (on 2L home O2), RCC s/p R nephrectomy, CKD3, HTN, DM, HFrEF (EF 35% 09/2017), CAD, arthritis, depression, and anxiety who was BIBEMS with palpitations and SOB. She stated the SOB started at rest 4 hours prior to admission. She also noted palpitations for one hour as well. She denied chest pain. She has noted a slimy cough for the past 4-5 days, along with some nasal/sinus congestion. No recent travel or known sick contacts. No fevers but noticed chills today. She stated the last time she felt palpitations was about 1.5 months ago, when she was admitted to the hospital That admission was thought to be 2/2 CHF exacerbation. She feels like her leg edema has decreased. She has been on Lasix 40 mg, she states it is every day, although outpatient records in [employer] suggest it is every other day. She denies MCDONALD, but notes orthopnea. She is on 2L NC at home, but she only uses it at night, and does not use it on exertion. Her daughter manages her medications. She has been on Amiodarone 200 QD and Per [employer], her cardiologist was interested in decreasing her Amiodarone to 100, but decided against it. Her PPM was last interrogated in September 2017 and noted ___    In the ED, she was given Cefepime 1g x1, Solumedrol 60 IV x1, Azithromycin 500 x1, Duonebs x1, and 40 IV Lasix x1. 87 yo F with a PMH paroxysmal atrial fibrillation (on Eliquis, s/p PPM placement ~ 4 years ago), COPD (on 2L home O2), RCC s/p R nephrectomy, CKD3, HTN, DM, HFrEF (EF 35% 09/2017), CAD, arthritis, depression, and anxiety who was BIBEMS with palpitations and SOB. She stated the SOB started at rest 4 hours prior to admission. She also noted palpitations for one hour as well. She denied chest pain. She has noted a slimy cough for the past 4-5 days, along with some nasal/sinus congestion. No recent travel or known sick contacts. No fevers but noticed chills today. She stated the last time she felt palpitations was about 1.5 months ago, when she was admitted to the hospital That admission was thought to be 2/2 CHF exacerbation. She feels like her leg edema has decreased. She has been on Lasix 40 mg, she states it is every day, although outpatient records in [employer] suggest it is every other day. She denies MCDONALD, but notes orthopnea. She is on 2L NC at home, but she only uses it at night, and does not use it on exertion. Her daughter manages her medications. She has been on Amiodarone 200 QD and Per [employer], her cardiologist was interested in decreasing her Amiodarone to 100, but decided against it. Her PPM was last interrogated on 12/12/17 and noted 14 PVC since September but no atrial arrhythmias.    In the ED, she was given Cefepime 1g x1, Solumedrol 60 IV x1, Azithromycin 500 x1, Duonebs x1, and 40 IV Lasix x1. 85 yo F with a PMH paroxysmal atrial fibrillation (on Eliquis, s/p PPM placement ~ 4 years ago), COPD (on 2L home O2), RCC s/p R nephrectomy, CKD3, HTN, DM, HFrEF (EF 35% 09/2017), CAD, arthritis, depression, and anxiety who was BIBEMS with palpitations and SOB. She stated the SOB started at rest 4 hours prior to admission. She also noted palpitations for one hour as well. She denied chest pain. She has noted a slimy cough for the past 4-5 days with possible white/clear production, along with some nasal/sinus congestion. When asked, she states she does think she was wheezing.  No recent travel or known sick contacts. No fevers but noticed chills today. She stated the last time she felt palpitations was about 1.5 months ago, when she was admitted to the hospital That admission was thought to be 2/2 CHF exacerbation. She feels like her leg edema has decreased. She has been on Lasix 40 mg, she states it is every day, although outpatient records in [employer] suggest it is every other day. She denies MCDONALD, but notes orthopnea. She is on 2L NC at home, but she only uses it at night, and does not use it on exertion. Her daughter manages her medications. She has been on Amiodarone 200 QD and Per [employer], her cardiologist was interested in decreasing her Amiodarone to 100, but decided against it. Her PPM was last interrogated on 12/12/17 and noted 14 PVC since September but no atrial arrhythmias.    In the ED, she was given Cefepime 1g x1, Solumedrol 60 IV x1, Azithromycin 500 x1, Duonebs x1, and 40 IV Lasix x1.

## 2017-12-23 NOTE — PROGRESS NOTE ADULT - PROBLEM SELECTOR PLAN 2
- Mild ARUN on CKD stage 3, differential include: prerenal vs cardiogenic   - Will monitor with improvement with antibiotics and maintenance diuretics  - Hold Lisinopril in ARUN and strict I/Os

## 2017-12-23 NOTE — H&P ADULT - PROBLEM SELECTOR PLAN 2
- Paroxysmal, likely exacerbated in the setting of acute infection  - Would continue with rate control with Metoprolol 100 QD  - Unlikely symptoms are caused by ischemia, as the patient has other etiologies for her symptoms  - Would trend cardiac enyzmes x1 more  - recheck TTE - Paroxysmal, likely exacerbated in the setting of acute infection  - Would continue with rate control with Metoprolol 100 QD  - Unlikely symptoms are caused by ischemia, as the patient has other etiologies for her symptoms  - Would trend cardiac enyzmes x1 more  - recheck TTE  - Check TSH - Paroxysmal, likely exacerbated in the setting of acute infection  - Would continue with rate control with Metoprolol 100 QD  - Unlikely symptoms are caused by ischemia, as the patient has other etiologies for her symptoms  - Would trend cardiac enzymes x1 more  - recheck TTE  - Check TSH - Likely primarily 2/2 viral pna, cannot r/o superimposed bacterial PNA. With mild hypercapnea and mild hypoxia. Likely multifactorial and be 2/2 COPD exacerbation in setting of viral URI +/- pna, less likely floridly decompensated heart failure.  - BNP elevated, patient endorsing palpitations likely 2/2 atrial fibrillation in the setting of acute infection  - Improved with BiPAP, would get repeat ABG off BiPAP  -proceed with infectious disease w/u as above.  - In the setting of heart failure, would continue with maintenance Lasix 40 PO QD at this time  - Recheck TTE

## 2017-12-23 NOTE — H&P ADULT - PROBLEM SELECTOR PLAN 8
- Hold off Klonopin for now - C/w Klonopin 0.5 QHS PRN to avoid withdrawal, but would want to titrate off in elderly -ISTOP Reference #: [id number]: stable dosing of klonopin; it appears per dosing that she has been downtitrated from 2-3x a day to qd prn.   - C/w Klonopin 0.5 QHS PRN to avoid withdrawal, but would want to titrate off in elderly  -c/w abilify, cymbalta, remeron.

## 2017-12-24 ENCOUNTER — TRANSCRIPTION ENCOUNTER (OUTPATIENT)
Age: 82
End: 2017-12-24

## 2017-12-24 DIAGNOSIS — D64.9 ANEMIA, UNSPECIFIED: ICD-10-CM

## 2017-12-24 LAB
ANION GAP SERPL CALC-SCNC: 15 MMOL/L — SIGNIFICANT CHANGE UP (ref 5–17)
BASOPHILS # BLD AUTO: 0.02 K/UL — SIGNIFICANT CHANGE UP (ref 0–0.2)
BASOPHILS NFR BLD AUTO: 0.1 % — SIGNIFICANT CHANGE UP (ref 0–2)
BUN SERPL-MCNC: 47 MG/DL — HIGH (ref 7–23)
CALCIUM SERPL-MCNC: 8.8 MG/DL — SIGNIFICANT CHANGE UP (ref 8.4–10.5)
CHLORIDE SERPL-SCNC: 99 MMOL/L — SIGNIFICANT CHANGE UP (ref 96–108)
CO2 SERPL-SCNC: 21 MMOL/L — LOW (ref 22–31)
CREAT SERPL-MCNC: 1.85 MG/DL — HIGH (ref 0.5–1.3)
EOSINOPHIL # BLD AUTO: 0.02 K/UL — SIGNIFICANT CHANGE UP (ref 0–0.5)
EOSINOPHIL NFR BLD AUTO: 0.1 % — SIGNIFICANT CHANGE UP (ref 0–6)
GLUCOSE BLDC GLUCOMTR-MCNC: 150 MG/DL — HIGH (ref 70–99)
GLUCOSE BLDC GLUCOMTR-MCNC: 189 MG/DL — HIGH (ref 70–99)
GLUCOSE BLDC GLUCOMTR-MCNC: 344 MG/DL — HIGH (ref 70–99)
GLUCOSE BLDC GLUCOMTR-MCNC: 78 MG/DL — SIGNIFICANT CHANGE UP (ref 70–99)
GLUCOSE SERPL-MCNC: 159 MG/DL — HIGH (ref 70–99)
HCT VFR BLD CALC: 29.5 % — LOW (ref 34.5–45)
HCT VFR BLD CALC: 34 % — LOW (ref 34.5–45)
HGB BLD-MCNC: 10.5 G/DL — LOW (ref 11.5–15.5)
HGB BLD-MCNC: 9.3 G/DL — LOW (ref 11.5–15.5)
IMM GRANULOCYTES NFR BLD AUTO: 0.4 % — SIGNIFICANT CHANGE UP (ref 0–1.5)
LYMPHOCYTES # BLD AUTO: 1.2 K/UL — SIGNIFICANT CHANGE UP (ref 1–3.3)
LYMPHOCYTES # BLD AUTO: 7.1 % — LOW (ref 13–44)
MAGNESIUM SERPL-MCNC: 2 MG/DL — SIGNIFICANT CHANGE UP (ref 1.6–2.6)
MCHC RBC-ENTMCNC: 27.6 PG — SIGNIFICANT CHANGE UP (ref 27–34)
MCHC RBC-ENTMCNC: 28.6 PG — SIGNIFICANT CHANGE UP (ref 27–34)
MCHC RBC-ENTMCNC: 31 GM/DL — LOW (ref 32–36)
MCHC RBC-ENTMCNC: 31.5 GM/DL — LOW (ref 32–36)
MCV RBC AUTO: 87.5 FL — SIGNIFICANT CHANGE UP (ref 80–100)
MCV RBC AUTO: 92.2 FL — SIGNIFICANT CHANGE UP (ref 80–100)
MONOCYTES # BLD AUTO: 1.11 K/UL — HIGH (ref 0–0.9)
MONOCYTES NFR BLD AUTO: 6.6 % — SIGNIFICANT CHANGE UP (ref 2–14)
NEUTROPHILS # BLD AUTO: 14.39 K/UL — HIGH (ref 1.8–7.4)
NEUTROPHILS NFR BLD AUTO: 85.7 % — HIGH (ref 43–77)
PHOSPHATE SERPL-MCNC: 3.2 MG/DL — SIGNIFICANT CHANGE UP (ref 2.5–4.5)
PLATELET # BLD AUTO: 307 K/UL — SIGNIFICANT CHANGE UP (ref 150–400)
PLATELET # BLD AUTO: 316 K/UL — SIGNIFICANT CHANGE UP (ref 150–400)
POTASSIUM SERPL-MCNC: 3.6 MMOL/L — SIGNIFICANT CHANGE UP (ref 3.5–5.3)
POTASSIUM SERPL-SCNC: 3.6 MMOL/L — SIGNIFICANT CHANGE UP (ref 3.5–5.3)
RBC # BLD: 3.37 M/UL — LOW (ref 3.8–5.2)
RBC # BLD: 3.68 M/UL — LOW (ref 3.8–5.2)
RBC # FLD: 13.9 % — SIGNIFICANT CHANGE UP (ref 10.3–14.5)
RBC # FLD: 15.9 % — HIGH (ref 10.3–14.5)
SODIUM SERPL-SCNC: 135 MMOL/L — SIGNIFICANT CHANGE UP (ref 135–145)
WBC # BLD: 15.3 K/UL — HIGH (ref 3.8–10.5)
WBC # BLD: 16.8 K/UL — HIGH (ref 3.8–10.5)
WBC # FLD AUTO: 15.3 K/UL — HIGH (ref 3.8–10.5)
WBC # FLD AUTO: 16.8 K/UL — HIGH (ref 3.8–10.5)

## 2017-12-24 PROCEDURE — 99233 SBSQ HOSP IP/OBS HIGH 50: CPT | Mod: GC

## 2017-12-24 RX ORDER — ARIPIPRAZOLE 15 MG/1
5 TABLET ORAL DAILY
Qty: 0 | Refills: 0 | Status: DISCONTINUED | OUTPATIENT
Start: 2017-12-24 | End: 2017-12-25

## 2017-12-24 RX ADMIN — Medication 3 MILLILITER(S): at 11:12

## 2017-12-24 RX ADMIN — APIXABAN 2.5 MILLIGRAM(S): 2.5 TABLET, FILM COATED ORAL at 05:30

## 2017-12-24 RX ADMIN — Medication 100 MILLIGRAM(S): at 13:21

## 2017-12-24 RX ADMIN — Medication 40 MILLIGRAM(S): at 05:29

## 2017-12-24 RX ADMIN — ARIPIPRAZOLE 2 MILLIGRAM(S): 15 TABLET ORAL at 11:12

## 2017-12-24 RX ADMIN — DULOXETINE HYDROCHLORIDE 90 MILLIGRAM(S): 30 CAPSULE, DELAYED RELEASE ORAL at 21:15

## 2017-12-24 RX ADMIN — Medication 2: at 08:14

## 2017-12-24 RX ADMIN — AMIODARONE HYDROCHLORIDE 200 MILLIGRAM(S): 400 TABLET ORAL at 05:29

## 2017-12-24 RX ADMIN — Medication 4 UNIT(S): at 11:46

## 2017-12-24 RX ADMIN — MIRTAZAPINE 30 MILLIGRAM(S): 45 TABLET, ORALLY DISINTEGRATING ORAL at 21:14

## 2017-12-24 RX ADMIN — Medication 3 MILLILITER(S): at 17:09

## 2017-12-24 RX ADMIN — Medication 0.5 MILLIGRAM(S): at 21:14

## 2017-12-24 RX ADMIN — Medication 4 UNIT(S): at 17:08

## 2017-12-24 RX ADMIN — Medication 3 MILLILITER(S): at 05:29

## 2017-12-24 RX ADMIN — Medication 100 MILLIGRAM(S): at 21:14

## 2017-12-24 RX ADMIN — SIMVASTATIN 20 MILLIGRAM(S): 20 TABLET, FILM COATED ORAL at 21:14

## 2017-12-24 RX ADMIN — Medication 100 MILLIGRAM(S): at 05:29

## 2017-12-24 RX ADMIN — Medication 100 MILLIGRAM(S): at 05:30

## 2017-12-24 RX ADMIN — Medication 8: at 11:45

## 2017-12-24 RX ADMIN — INSULIN GLARGINE 12 UNIT(S): 100 INJECTION, SOLUTION SUBCUTANEOUS at 08:44

## 2017-12-24 RX ADMIN — BUDESONIDE AND FORMOTEROL FUMARATE DIHYDRATE 2 PUFF(S): 160; 4.5 AEROSOL RESPIRATORY (INHALATION) at 17:09

## 2017-12-24 RX ADMIN — BUDESONIDE AND FORMOTEROL FUMARATE DIHYDRATE 2 PUFF(S): 160; 4.5 AEROSOL RESPIRATORY (INHALATION) at 05:30

## 2017-12-24 RX ADMIN — Medication 4 UNIT(S): at 08:15

## 2017-12-24 RX ADMIN — CEFTRIAXONE 100 GRAM(S): 500 INJECTION, POWDER, FOR SOLUTION INTRAMUSCULAR; INTRAVENOUS at 21:15

## 2017-12-24 RX ADMIN — APIXABAN 2.5 MILLIGRAM(S): 2.5 TABLET, FILM COATED ORAL at 17:08

## 2017-12-24 RX ADMIN — Medication 81 MILLIGRAM(S): at 11:12

## 2017-12-24 RX ADMIN — Medication 40 MILLIGRAM(S): at 06:16

## 2017-12-24 NOTE — PROGRESS NOTE ADULT - PROBLEM SELECTOR PLAN 8
- DVT prophylaxis: Apixaban  - GI prophylaxis: not indicated  - Diet: DASH/diabetic  - GOC: DNR/DNI H/H downtrended from admission, and baseline ~12 from two months ago.  She denies melena and BRBPR, no evidence of acute bleeding.   - c/w monitoring CBC closely  - High bleeding risk on DOAC and ASA

## 2017-12-24 NOTE — PROGRESS NOTE ADULT - ASSESSMENT
Patient is a 86 year old woman with history of paroxysmal atrial fibrillation (on Eliquis, s/p PPM placement ~ 4 years ago), COPD (on 2L home O2), RCC s/p R nephrectomy, CKD stage 3, HTN, DM, HFrEF (EF 35% 09/2017), CAD, arthritis, depression, and anxiety presented with palpitations and hypoxic and hypercapnic respiratory failure in the setting of RSV pneumonia.

## 2017-12-24 NOTE — PROGRESS NOTE ADULT - PROBLEM SELECTOR PLAN 2
- Mild ARUN on CKD stage 3, differential include: prerenal vs cardiogenic   - Improving Cr  - Will monitor with improvement with antibiotics and maintenance diuretics  - Hold Lisinopril in ARUN and strict I/Os - ARUN resolved, Cr downtrended to baseline.  - Improving Cr  - Will monitor with improvement with antibiotics and maintenance diuretics

## 2017-12-24 NOTE — DISCHARGE NOTE ADULT - CARE PROVIDER_API CALL
Kendall Mon), Cardiac Electrophysiology; Cardiology  [address] [place] [address]  Phone: [phone]  Fax: [phone] Kendall Mon), Cardiac Electrophysiology; Cardiology  [address] [place] [address]  Phone: [phone]  Fax: [phone]    Young Doty), Cardiovascular Disease; Internal Medicine  [address] [place] [id number]  Phone: [phone]  Fax: [phone]

## 2017-12-24 NOTE — DISCHARGE NOTE ADULT - PLAN OF CARE
Improvement in respiratory distress You were found to have RSV pneumonia which likely caused you to have respiratory distress. RSV is a virus that causes acute respiratory distress, especially in addition to your COPD. Please continue with prednisone for another 2 days and continue with Ceftin for additional 3 days. Please follow up with Dr. Mon in his clinic 2-3 week for evaluation of Afib and heart failure. You may benefit from a repeat ECHO. Please continue taking your home dose of Lantus and Lispro.

## 2017-12-24 NOTE — DISCHARGE NOTE ADULT - MEDICATION SUMMARY - MEDICATIONS TO STOP TAKING
I will STOP taking the medications listed below when I get home from the hospital:    Advair Diskus 250 mcg-50 mcg inhalation powder  -- 1 puff(s) inhaled 2 times a day

## 2017-12-24 NOTE — PHYSICAL THERAPY INITIAL EVALUATION ADULT - PERTINENT HX OF CURRENT PROBLEM, REHAB EVAL
as per chart review: PMH paroxysmal atrial fibrillation (on Eliquis, s/p PPM placement ~ 4 years ago), COPD (on 2L home O2), RCC s/p R nephrectomy, CKD3, HTN, DM, HFrEF (EF 35% 09/2017), CAD, arthritis, depression, and anxiety who was BIBEMS with palpitations and SOB. Admitted with palpitations and hypoxic and hypercapnic respiratory failure in the setting of RSV pneumonia.

## 2017-12-24 NOTE — DISCHARGE NOTE ADULT - CARE PLAN
Principal Discharge DX:	RSV (respiratory syncytial virus pneumonia)  Goal:	Improvement in respiratory distress  Instructions for follow-up, activity and diet:	You were found to have RSV pneumonia which likely caused you to have respiratory distress. RSV is a virus that causes acute respiratory distress, especially in addition to your COPD. Please continue with prednisone for another 2 days and continue with Ceftin for additional 3 days.  Secondary Diagnosis:	Heart failure with reduced ejection fraction  Instructions for follow-up, activity and diet:	Please follow up with Dr. Mon in his clinic 2-3 week for evaluation of Afib and heart failure. You may benefit from a repeat ECHO.  Secondary Diagnosis:	Diabetes mellitus  Instructions for follow-up, activity and diet:	Please continue taking your home dose of Lantus and Lispro.

## 2017-12-24 NOTE — DISCHARGE NOTE ADULT - MEDICATION SUMMARY - MEDICATIONS TO TAKE
I will START or STAY ON the medications listed below when I get home from the hospital:    predniSONE 20 mg oral tablet  -- 2 tab(s) by mouth once a day  -- Indication: For COPD (chronic obstructive pulmonary disease)    aspirin 81 mg oral tablet, chewable  -- 1 tab(s) by mouth once a day  -- Indication: For CAD    amiodarone 200 mg oral tablet  -- 1 tab(s) by mouth once a day  -- Indication: For Atrial fibrillation    Eliquis 2.5 mg oral tablet  -- 1 tab(s) by mouth 2 times a day  -- Indication: For Atrial fibrillation    KlonoPIN 0.5 mg oral tablet  -- 1 tablet by mouth 1-2 times daily, As Needed  -- Indication: For Anxiety    Remeron 15 mg oral tablet  -- 1 tab(s) by mouth once a day (at bedtime)  -- Indication: For Anxiety    Cymbalta 60 mg oral delayed release capsule  -- 1 cap(s) by mouth once a day  -- Indication: For Anxiety and depression    HumaLOG KwikPen 100 units/mL subcutaneous solution  -- 4 unit(s) subcutaneous 3 times a day  -- Indication: For Diabetes mellitus    Toujeo SoloStar 300 units/mL subcutaneous solution  -- 12 unit(s) subcutaneous once a day  -- Indication: For Diabetes mellitus    simvastatin 20 mg oral tablet  -- 1 tab(s) by mouth once a day (at bedtime)  -- Indication: For CAD    ARIPiprazole 2 mg oral tablet  -- 1 tab(s) by mouth once a day  -- Indication: For Anxiety and depression    Toprol- mg oral tablet, extended release  -- 1 tab(s) by mouth once a day  -- Indication: For Atrial fibrillation    Advair Diskus 250 mcg-50 mcg inhalation powder  -- 1 puff(s) inhaled 2 times a day  -- Indication: For COPD (chronic obstructive pulmonary disease)    cefuroxime 500 mg oral tablet  -- 1 tab(s) by mouth 2 times a day   -- Finish all this medication unless otherwise directed by prescriber.  Medication should be taken with plenty of water.  Take with food or milk.    -- Indication: For COPD (chronic obstructive pulmonary disease)    Lasix 40 mg oral tablet  -- 1 tab(s) by mouth once a day  -- Avoid prolonged or excessive exposure to direct and/or artificial sunlight while taking this medication.  It is very important that you take or use this exactly as directed.  Do not skip doses or discontinue unless directed by your doctor.  It may be advisable to drink a full glass orange juice or eat a banana daily while taking this medication.    -- Indication: For Heart failure with reduced ejection fraction    Colace 100 mg oral capsule  -- 4 cap(s) by mouth once a day (at night)  -- Indication: For Prophylactic measure

## 2017-12-24 NOTE — DISCHARGE NOTE ADULT - PATIENT PORTAL LINK FT
“You can access the Follow[facility] Patient Portal, offered by [facility], by registering with the following website: [email or web address]”

## 2017-12-24 NOTE — PROGRESS NOTE ADULT - SUBJECTIVE AND OBJECTIVE BOX
Jad Jocelynn - PGY3  [phone] / [id number]    Patient is a 86y old  Female who presents with a chief complaint of SOB and palpitations (23 Dec 2017 02:27)      SUBJECTIVE / OVERNIGHT EVENTS:  No events over night  Pt denies any SOB, weakness, lightheadedness  Pt is eating/drinking well  Pt is oob to chair    MEDICATIONS  (STANDING):  ALBUTerol/ipratropium for Nebulization 3 milliLiter(s) Nebulizer every 6 hours  amiodarone    Tablet 200 milliGRAM(s) Oral daily  apixaban 2.5 milliGRAM(s) Oral every 12 hours  ARIPiprazole 2 milliGRAM(s) Oral daily  aspirin  chewable 81 milliGRAM(s) Oral daily  buDESOnide 160 MICROgram(s)/formoterol 4.5 MICROgram(s) Inhaler 2 Puff(s) Inhalation two times a day  cefTRIAXone   IVPB 1 Gram(s) IV Intermittent every 24 hours  docusate sodium 100 milliGRAM(s) Oral three times a day  DULoxetine 90 milliGRAM(s) Oral at bedtime  furosemide    Tablet 40 milliGRAM(s) Oral daily  insulin glargine Injectable (LANTUS) 12 Unit(s) SubCutaneous every morning  insulin lispro (HumaLOG) corrective regimen sliding scale   SubCutaneous three times a day before meals  insulin lispro (HumaLOG) corrective regimen sliding scale   SubCutaneous at bedtime  insulin lispro Injectable (HumaLOG) 4 Unit(s) SubCutaneous three times a day before meals  metoprolol succinate  milliGRAM(s) Oral daily  mirtazapine 30 milliGRAM(s) Oral at bedtime  predniSONE   Tablet 40 milliGRAM(s) Oral daily  simvastatin 20 milliGRAM(s) Oral at bedtime    MEDICATIONS  (PRN):  acetaminophen   Tablet 650 milliGRAM(s) Oral every 6 hours PRN For Temp greater than 38 C (100.4 F)  clonazePAM Tablet 0.5 milliGRAM(s) Oral at bedtime PRN Anxiety        CAPILLARY BLOOD GLUCOSE      POCT Blood Glucose.: 344 mg/dL (24 Dec 2017 11:06)  POCT Blood Glucose.: 189 mg/dL (24 Dec 2017 07:08)  POCT Blood Glucose.: 209 mg/dL (23 Dec 2017 20:58)  POCT Blood Glucose.: 279 mg/dL (23 Dec 2017 16:42)    I&O's Summary    23 Dec 2017 07:01  -  24 Dec 2017 07:00  --------------------------------------------------------  IN: 600 mL / OUT: 200 mL / NET: 400 mL    24 Dec 2017 07:  -  24 Dec 2017 14:23  --------------------------------------------------------  IN: 0 mL / OUT: 1675 mL / NET: -1675 mL        PHYSICAL EXAM:  GENERAL: NAD, well-developed  EYES: EOMI, PERRLA, conjunctiva and sclera clear  NECK: Supple, No JVD  CHEST/LUNG: Coarse breath sounds on exam; No wheezing  HEART: Regular rate and rhythm; No murmurs, rubs, or gallops  ABDOMEN: Soft, Nontender, Nondistended; Bowel sounds present  EXTREMITIES:  2+ Peripheral Pulses, No clubbing, cyanosis, or edema  PSYCH: AAOx3  NEUROLOGY: non-focal  SKIN: No rashes or lesions    LABS:                          9.3    16.80 )-----------( 307      ( 24 Dec 2017 08:28 )             29.5     12-    135  |  99  |  47<H>  ----------------------------<  159<H>  3.6   |  21<L>  |  1.85<H>    Ca    8.8      24 Dec 2017 08:43  Phos  3.2     12-  Mg     2.0     12-    TPro  7.5  /  Alb  3.4  /  TBili  0.3  /  DBili  x   /  AST  42<H>  /  ALT  22  /  AlkPhos  94  12-    PT/INR - ( 22 Dec 2017 21:52 )   PT: 14.1 sec;   INR: 1.29 ratio         PTT - ( 22 Dec 2017 21:52 )  PTT:27.9 sec  CARDIAC MARKERS ( 23 Dec 2017 03:30 )  x     / 0.05 ng/mL / 53 U/L / x     / 1.6 ng/mL  CARDIAC MARKERS ( 22 Dec 2017 21:52 )  x     / 0.02 ng/mL / x     / x     / 2.1 ng/mL      Urinalysis Basic - ( 23 Dec 2017 14:01 )    Color: PL Yellow / Appearance: Clear / S.010 / pH: x  Gluc: x / Ketone: Negative  / Bili: Negative / Urobili: Negative   Blood: x / Protein: 30 mg/dL / Nitrite: Negative   Leuk Esterase: Trace / RBC: 0-2 /HPF / WBC 5-10 /HPF   Sq Epi: x / Non Sq Epi: Occasional /HPF / Bacteria: x        RADIOLOGY & ADDITIONAL TESTS:    Imaging Personally Reviewed:    Consultant(s) Notes Reviewed:      Care Discussed with Consultants/Other Providers: Jad Cabezas - PGY3  [phone] / [phone]    Patient is a 86y old  Female who presents with a chief complaint of SOB and palpitations (23 Dec 2017 02:27)      SUBJECTIVE / OVERNIGHT EVENTS:  No events over night  Pt denies any SOB, weakness, lightheadedness  Pt is eating/drinking well  Pt is oob to chair    ROS: Denies SOB, Chest pain, cough improving, abdominal pain, N/V/D, LE edema    MEDICATIONS  (STANDING):  ALBUTerol/ipratropium for Nebulization 3 milliLiter(s) Nebulizer every 6 hours  amiodarone    Tablet 200 milliGRAM(s) Oral daily  apixaban 2.5 milliGRAM(s) Oral every 12 hours  ARIPiprazole 2 milliGRAM(s) Oral daily  aspirin  chewable 81 milliGRAM(s) Oral daily  buDESOnide 160 MICROgram(s)/formoterol 4.5 MICROgram(s) Inhaler 2 Puff(s) Inhalation two times a day  cefTRIAXone   IVPB 1 Gram(s) IV Intermittent every 24 hours  docusate sodium 100 milliGRAM(s) Oral three times a day  DULoxetine 90 milliGRAM(s) Oral at bedtime  furosemide    Tablet 40 milliGRAM(s) Oral daily  insulin glargine Injectable (LANTUS) 12 Unit(s) SubCutaneous every morning  insulin lispro (HumaLOG) corrective regimen sliding scale   SubCutaneous three times a day before meals  insulin lispro (HumaLOG) corrective regimen sliding scale   SubCutaneous at bedtime  insulin lispro Injectable (HumaLOG) 4 Unit(s) SubCutaneous three times a day before meals  metoprolol succinate  milliGRAM(s) Oral daily  mirtazapine 30 milliGRAM(s) Oral at bedtime  predniSONE   Tablet 40 milliGRAM(s) Oral daily  simvastatin 20 milliGRAM(s) Oral at bedtime    MEDICATIONS  (PRN):  acetaminophen   Tablet 650 milliGRAM(s) Oral every 6 hours PRN For Temp greater than 38 C (100.4 F)  clonazePAM Tablet 0.5 milliGRAM(s) Oral at bedtime PRN Anxiety        CAPILLARY BLOOD GLUCOSE      POCT Blood Glucose.: 344 mg/dL (24 Dec 2017 11:06)  POCT Blood Glucose.: 189 mg/dL (24 Dec 2017 07:08)  POCT Blood Glucose.: 209 mg/dL (23 Dec 2017 20:58)  POCT Blood Glucose.: 279 mg/dL (23 Dec 2017 16:42)    I&O's Summary    23 Dec 2017 07:  -  24 Dec 2017 07:00  --------------------------------------------------------  IN: 600 mL / OUT: 200 mL / NET: 400 mL    24 Dec 2017 07:  -  24 Dec 2017 14:23  --------------------------------------------------------  IN: 0 mL / OUT: 1675 mL / NET: -1675 mL        PHYSICAL EXAM:  GENERAL: NAD, well-developed  EYES: EOMI, PERRLA, conjunctiva and sclera clear  NECK: Supple, No JVD  CHEST/LUNG: Coarse breath sounds on exam; No wheezing  HEART: Regular rate and rhythm; No murmurs, rubs, or gallops  ABDOMEN: Soft, Nontender, Nondistended; Bowel sounds present  EXTREMITIES:  2+ Peripheral Pulses, No clubbing, cyanosis, or edema  PSYCH: AAOx3  NEUROLOGY: non-focal  SKIN: No rashes or lesions    LABS:                          9.3    16.80 )-----------( 307      ( 24 Dec 2017 08:28 )             29.5     12-    135  |  99  |  47<H>  ----------------------------<  159<H>  3.6   |  21<L>  |  1.85<H>    Ca    8.8      24 Dec 2017 08:43  Phos  3.2     12-24  Mg     2.0     12-24    TPro  7.5  /  Alb  3.4  /  TBili  0.3  /  DBili  x   /  AST  42<H>  /  ALT  22  /  AlkPhos  94  12-22    PT/INR - ( 22 Dec 2017 21:52 )   PT: 14.1 sec;   INR: 1.29 ratio         PTT - ( 22 Dec 2017 21:52 )  PTT:27.9 sec  CARDIAC MARKERS ( 23 Dec 2017 03:30 )  x     / 0.05 ng/mL / 53 U/L / x     / 1.6 ng/mL  CARDIAC MARKERS ( 22 Dec 2017 21:52 )  x     / 0.02 ng/mL / x     / x     / 2.1 ng/mL      Urinalysis Basic - ( 23 Dec 2017 14:01 )    Color: PL Yellow / Appearance: Clear / S.010 / pH: x  Gluc: x / Ketone: Negative  / Bili: Negative / Urobili: Negative   Blood: x / Protein: 30 mg/dL / Nitrite: Negative   Leuk Esterase: Trace / RBC: 0-2 /HPF / WBC 5-10 /HPF   Sq Epi: x / Non Sq Epi: Occasional /HPF / Bacteria: x        RADIOLOGY & ADDITIONAL TESTS:    Imaging Personally Reviewed:    Consultant(s) Notes Reviewed:  [x] - cardiology     Care Discussed with Consultants/Other Providers:

## 2017-12-24 NOTE — DISCHARGE NOTE ADULT - CARE PROVIDERS DIRECT ADDRESSES
,[email or web address]

## 2017-12-24 NOTE — PROGRESS NOTE ADULT - PROBLEM SELECTOR PLAN 1
- Most likely viral pneumonia, less likely cardiogenic pulmonary edema vs COPD exacerbation   - For pneumonia, RSV +, urine legionella pending, CXR does not show focal consolidation, pending blood and sputum culture, continue with empiric Ceftriaxone (12/22-)  - Continue with steroid, Duonebs and BiPAP PRN  - Continue with PRN BiPAP support, continue to deescalate oxygenation requirement as patient can tolerate - Most likely viral pneumonia, less likely cardiogenic pulmonary edema vs COPD exacerbation   - ?RLL consolidation, will complete 5 day course of cephalosporin  - Continue with steroid, Duonebs and BiPAP PRN  - Continue with PRN BiPAP support, continue to deescalate oxygenation requirement as patient can tolerate

## 2017-12-24 NOTE — PROGRESS NOTE ADULT - PROBLEM SELECTOR PLAN 4
- Last TTE 09/2017 shows  EF 35% with segmental disease. No history of stent. Patient had stress test showing signs of ischemia but did not have a cath  - Elevated BNP likely in the setting of CKD, patient is euvolemic on exam   - Continue with maintenance Po lasix 40 qd and treat alternate respiratory etiologies of her dyspnea as above - Last TTE 09/2017 shows  EF 35% with segmental disease. No history of stent. Patient had stress test showing signs of ischemia but did not have a cath  - Elevated BNP likely in the setting of CKD, patient is euvolemic on exam   - Continue with maintenance Po lasix 40 qd and treat alternate respiratory etiologies of her dyspnea as above  - Cardiology f/u appreciated

## 2017-12-25 VITALS
OXYGEN SATURATION: 95 % | TEMPERATURE: 98 F | RESPIRATION RATE: 14 BRPM | DIASTOLIC BLOOD PRESSURE: 80 MMHG | HEART RATE: 80 BPM | SYSTOLIC BLOOD PRESSURE: 157 MMHG

## 2017-12-25 LAB
CULTURE RESULTS: SIGNIFICANT CHANGE UP
GLUCOSE BLDC GLUCOMTR-MCNC: 221 MG/DL — HIGH (ref 70–99)
SPECIMEN SOURCE: SIGNIFICANT CHANGE UP

## 2017-12-25 PROCEDURE — 87633 RESP VIRUS 12-25 TARGETS: CPT

## 2017-12-25 PROCEDURE — 94660 CPAP INITIATION&MGMT: CPT

## 2017-12-25 PROCEDURE — 83036 HEMOGLOBIN GLYCOSYLATED A1C: CPT

## 2017-12-25 PROCEDURE — 86922 COMPATIBILITY TEST ANTIGLOB: CPT

## 2017-12-25 PROCEDURE — 84295 ASSAY OF SERUM SODIUM: CPT

## 2017-12-25 PROCEDURE — 84132 ASSAY OF SERUM POTASSIUM: CPT

## 2017-12-25 PROCEDURE — 87040 BLOOD CULTURE FOR BACTERIA: CPT

## 2017-12-25 PROCEDURE — 93005 ELECTROCARDIOGRAM TRACING: CPT

## 2017-12-25 PROCEDURE — 82435 ASSAY OF BLOOD CHLORIDE: CPT

## 2017-12-25 PROCEDURE — 86900 BLOOD TYPING SEROLOGIC ABO: CPT

## 2017-12-25 PROCEDURE — 82947 ASSAY GLUCOSE BLOOD QUANT: CPT

## 2017-12-25 PROCEDURE — 80048 BASIC METABOLIC PNL TOTAL CA: CPT

## 2017-12-25 PROCEDURE — 83735 ASSAY OF MAGNESIUM: CPT

## 2017-12-25 PROCEDURE — 87798 DETECT AGENT NOS DNA AMP: CPT

## 2017-12-25 PROCEDURE — 82330 ASSAY OF CALCIUM: CPT

## 2017-12-25 PROCEDURE — 94640 AIRWAY INHALATION TREATMENT: CPT

## 2017-12-25 PROCEDURE — 99239 HOSP IP/OBS DSCHRG MGMT >30: CPT

## 2017-12-25 PROCEDURE — 84484 ASSAY OF TROPONIN QUANT: CPT

## 2017-12-25 PROCEDURE — 96375 TX/PRO/DX INJ NEW DRUG ADDON: CPT

## 2017-12-25 PROCEDURE — 83880 ASSAY OF NATRIURETIC PEPTIDE: CPT

## 2017-12-25 PROCEDURE — 97161 PT EVAL LOW COMPLEX 20 MIN: CPT

## 2017-12-25 PROCEDURE — 87486 CHLMYD PNEUM DNA AMP PROBE: CPT

## 2017-12-25 PROCEDURE — 82803 BLOOD GASES ANY COMBINATION: CPT

## 2017-12-25 PROCEDURE — 87070 CULTURE OTHR SPECIMN AEROBIC: CPT

## 2017-12-25 PROCEDURE — 86850 RBC ANTIBODY SCREEN: CPT

## 2017-12-25 PROCEDURE — 71045 X-RAY EXAM CHEST 1 VIEW: CPT

## 2017-12-25 PROCEDURE — 81001 URINALYSIS AUTO W/SCOPE: CPT

## 2017-12-25 PROCEDURE — 99291 CRITICAL CARE FIRST HOUR: CPT | Mod: 25

## 2017-12-25 PROCEDURE — 85610 PROTHROMBIN TIME: CPT

## 2017-12-25 PROCEDURE — 85730 THROMBOPLASTIN TIME PARTIAL: CPT

## 2017-12-25 PROCEDURE — 83605 ASSAY OF LACTIC ACID: CPT

## 2017-12-25 PROCEDURE — 80061 LIPID PANEL: CPT

## 2017-12-25 PROCEDURE — 84100 ASSAY OF PHOSPHORUS: CPT

## 2017-12-25 PROCEDURE — 85014 HEMATOCRIT: CPT

## 2017-12-25 PROCEDURE — 82550 ASSAY OF CK (CPK): CPT

## 2017-12-25 PROCEDURE — 82962 GLUCOSE BLOOD TEST: CPT

## 2017-12-25 PROCEDURE — 86901 BLOOD TYPING SEROLOGIC RH(D): CPT

## 2017-12-25 PROCEDURE — 85027 COMPLETE CBC AUTOMATED: CPT

## 2017-12-25 PROCEDURE — 82553 CREATINE MB FRACTION: CPT

## 2017-12-25 PROCEDURE — 96374 THER/PROPH/DIAG INJ IV PUSH: CPT

## 2017-12-25 PROCEDURE — 84443 ASSAY THYROID STIM HORMONE: CPT

## 2017-12-25 PROCEDURE — 87581 M.PNEUMON DNA AMP PROBE: CPT

## 2017-12-25 PROCEDURE — 80053 COMPREHEN METABOLIC PANEL: CPT

## 2017-12-25 RX ORDER — CLONAZEPAM 1 MG
0.5 TABLET ORAL ONCE
Qty: 0 | Refills: 0 | Status: DISCONTINUED | OUTPATIENT
Start: 2017-12-25 | End: 2017-12-25

## 2017-12-25 RX ORDER — CEFUROXIME AXETIL 250 MG
500 TABLET ORAL EVERY 12 HOURS
Qty: 0 | Refills: 0 | Status: DISCONTINUED | OUTPATIENT
Start: 2017-12-25 | End: 2017-12-25

## 2017-12-25 RX ORDER — CEFUROXIME AXETIL 250 MG
1 TABLET ORAL
Qty: 10 | Refills: 0 | OUTPATIENT
Start: 2017-12-25 | End: 2017-12-29

## 2017-12-25 RX ADMIN — Medication 100 MILLIGRAM(S): at 05:42

## 2017-12-25 RX ADMIN — Medication 4 UNIT(S): at 08:04

## 2017-12-25 RX ADMIN — INSULIN GLARGINE 12 UNIT(S): 100 INJECTION, SOLUTION SUBCUTANEOUS at 08:06

## 2017-12-25 RX ADMIN — Medication 3 MILLILITER(S): at 00:17

## 2017-12-25 RX ADMIN — Medication 81 MILLIGRAM(S): at 11:05

## 2017-12-25 RX ADMIN — Medication 500 MILLIGRAM(S): at 12:35

## 2017-12-25 RX ADMIN — Medication 40 MILLIGRAM(S): at 05:42

## 2017-12-25 RX ADMIN — Medication 4: at 08:04

## 2017-12-25 RX ADMIN — Medication 0.5 MILLIGRAM(S): at 09:40

## 2017-12-25 RX ADMIN — AMIODARONE HYDROCHLORIDE 200 MILLIGRAM(S): 400 TABLET ORAL at 05:42

## 2017-12-25 RX ADMIN — BUDESONIDE AND FORMOTEROL FUMARATE DIHYDRATE 2 PUFF(S): 160; 4.5 AEROSOL RESPIRATORY (INHALATION) at 05:42

## 2017-12-25 RX ADMIN — ARIPIPRAZOLE 5 MILLIGRAM(S): 15 TABLET ORAL at 11:05

## 2017-12-25 RX ADMIN — Medication 3 MILLILITER(S): at 05:42

## 2017-12-25 RX ADMIN — APIXABAN 2.5 MILLIGRAM(S): 2.5 TABLET, FILM COATED ORAL at 05:42

## 2017-12-25 NOTE — PROGRESS NOTE ADULT - ATTENDING COMMENTS
Patient seen and examined.  She has hypoxic respiratory failure likely in the setting of RSV pneumonia, with ?superimposed bacterial infection.  For now, will c/w antibiotics pending blood cultures.  Can consider deescalation.    Agree with cardiology, patient appears euvolemic, unlikely CHF exacerbation.  Will c/w home dose of diuretics and monitor volume status.    While patient has home O2, she normally only uses 2 L at night.  Currently requiring 3 L NC at rest.  Will c/w monitoring for clinical improvement prior to discharge.  PT consult is pending.    Case d/w patient, son, and daughter in law at bedside.  d/w team resident, Dr. Solomon.    Rufino Monge M.D.  Hospitalist  Pager: [phone]
Patient seen and examined.  She is comfortably breathing without oxygen this morning.  Plan is to D/C home today with her son picking her up.  She has portable O2 at home if needed, but is currently not hypoxic.  Her symptoms have improved.  Will complete 5 day course for CAP tomorrow and short 5 day prednisone course.  Plan d/w daughter and son in detail.  d/w CM, home care services set up.    Discharge time 35 minutes.    Rufino Monge M.D.  Hospitalist  Pager: [phone]
Patient seen and examined.  She is improving rapidly.  She was able to ambulate with assistance this afternoon with PT without oxygen.  She normally uses 2L NC at night at home.  Patient lives at home alone and has a HHA.  Unable to be reinstated until Tuesday or Wednesday at the earliest.  Discussed with patient's daughter, Lulú, who lives nearby.  She is currently ill and cannot care for her mother tomorrow.  Will need to discuss with family further re: dispo, as patient at this time not safe to go home by herself.      Trending CBC, slow downtrend in H/H.  There is no evidence of bleeding, however, on history or exam.    Plan d/w patient, her daughter (Lulú), and team resident (Dr. Taylor).    Rufino Monge M.D.  Hospitalist  Pager: [phone]

## 2017-12-25 NOTE — PROGRESS NOTE ADULT - PROBLEM SELECTOR PROBLEM 1
Respiratory failure with hypoxia and hypercapnia

## 2017-12-25 NOTE — PROGRESS NOTE ADULT - PROBLEM SELECTOR PLAN 4
- Last TTE 09/2017 shows  EF 35% with segmental disease. No history of stent. Patient had stress test showing signs of ischemia but did not have a cath  - Elevated BNP likely in the setting of CKD, patient is euvolemic on exam   - Continue with maintenance Po lasix 40 qd and treat alternate respiratory etiologies of her dyspnea as above  - Cardiology f/u appreciated

## 2017-12-25 NOTE — PROGRESS NOTE ADULT - PROBLEM SELECTOR PLAN 6
- Continue with Clonazepam 0.5 QHS PRN to avoid withdrawal, but would want to titrate off in elderly  - Continue with Abilify, Cymbalta, Remeron
-ISTOP Reference #: [id number]: stable dosing of Clonazepam; it appears per dosing that she has been downtitrated from 2-3x a day to qd prn  - Continue with Clonazepam 0.5 QHS PRN to avoid withdrawal, but would want to titrate off in elderly  - Continue with AbilifGénesis timalta, Remeron
- Continue with Clonazepam 0.5 QHS PRN to avoid withdrawal, but would want to titrate off in elderly  - Continue with Abilify, Cymbalta, Remeron

## 2017-12-25 NOTE — PROGRESS NOTE ADULT - PROBLEM SELECTOR PLAN 8
- H/H downtrended from admission, and baseline ~12 from two months ago.  She denies melena and BRBPR, no evidence of acute bleeding.   - Continue to monitor CBC closely  - High bleeding risk on DOAC and ASA - H/H downtrended from admission, and baseline ~12 from two months ago.  She denies melena and BRBPR, no evidence of acute bleeding.   - Repeat Hgb last night 10.5, no evidence of continued decline

## 2017-12-25 NOTE — PROGRESS NOTE ADULT - PROBLEM SELECTOR PLAN 3
- Paroxysmal, likely exacerbated in the setting of acute infection unlikely to be cardiogenic in etiology as Trop negative x2 and EKG shows no acute ST changes  - Would continue with rate control with Metoprolol 100 QD and continue with Apixaban 2.5 mg BID for CHADVaSC 5 (CHF, HTN, Age, DM) and 2.5 mg instead of 5 mg due to older age

## 2017-12-25 NOTE — PROGRESS NOTE ADULT - PROBLEM SELECTOR PLAN 7
- Continue with home medication Lisinopril, ARUN improving

## 2017-12-25 NOTE — PROGRESS NOTE ADULT - SUBJECTIVE AND OBJECTIVE BOX
CHIEF COMPLAINT: SOB and palpitations    Interval Events: No acute event overnight. Patient is seen and examined at the bedside this morning. Patient denies SOB, Chest pain, cough improving, abdominal pain, N/V/D, LE edema.    REVIEW OF SYSTEMS:  Constitutional: No fever, or chills. No recent weight loss or weight gain.   HEENT: No dry eyes or eye irritation. No postnasal drip or nasal congestion.  CV: No chest pain, or palpitations. No orthopnea.   Resp: No cough, or sputum production. No shortness of breath or dyspnea on exertion.   GI: No nausea or vomiting. No diarrhea or constipation. No abdominal pain.   : No dysuria, no nocturia or increased urinary frequency.  Musculoskeletal: No back pain, no myalgias  Skin: No rash or itchiness.   Neurological: No headache or dizziness. No syncope, no weakness, numbness.  Psychiatric: Denies depressed mood.   Endocrine: No cold or heat intolerance. No dry skin.  Hematologic/Lymphatic: No anemia or bleeding problem.     OBJECTIVE:  Vital Signs Last 24 Hrs  T(C): 36.8 (25 Dec 2017 03:59), Max: 37 (24 Dec 2017 11:09)  T(F): 98.2 (25 Dec 2017 03:59), Max: 98.6 (24 Dec 2017 11:09)  HR: 62 (25 Dec 2017 03:59) (62 - 63)  BP: 146/68 (25 Dec 2017 03:59) (120/67 - 146/68)  BP(mean): --  RR: 18 (25 Dec 2017 03:59) (18 - 18)  SpO2: 91% (25 Dec 2017 03:59) (91% - 100%)     @ 07:01  -   @ 07:00  --------------------------------------------------------  IN: 410 mL / OUT: 2725 mL / NET: -2315 mL      CAPILLARY BLOOD GLUCOSE      POCT Blood Glucose.: 150 mg/dL (24 Dec 2017 20:49)      PHYSICAL EXAM:  GENERAL: NAD, well-developed  EYES: EOMI, PERRLA, conjunctiva and sclera clear  NECK: Supple, No JVD  CHEST/LUNG: Coarse breath sounds on exam; No wheezing  HEART: Regular rate and rhythm; No murmurs, rubs, or gallops  ABDOMEN: Soft, Nontender, Nondistended; Bowel sounds present  EXTREMITIES:  2+ Peripheral Pulses, No clubbing, cyanosis, or edema  PSYCH: AAOx3  NEUROLOGY: non-focal  SKIN: No rashes or lesions    HOSPITAL MEDICATIONS:  apixaban 2.5 milliGRAM(s) Oral every 12 hours  aspirin  chewable 81 milliGRAM(s) Oral daily    cefTRIAXone   IVPB 1 Gram(s) IV Intermittent every 24 hours    amiodarone    Tablet 200 milliGRAM(s) Oral daily  furosemide    Tablet 40 milliGRAM(s) Oral daily  metoprolol succinate  milliGRAM(s) Oral daily    insulin glargine Injectable (LANTUS) 12 Unit(s) SubCutaneous every morning  insulin lispro (HumaLOG) corrective regimen sliding scale   SubCutaneous three times a day before meals  insulin lispro (HumaLOG) corrective regimen sliding scale   SubCutaneous at bedtime  insulin lispro Injectable (HumaLOG) 4 Unit(s) SubCutaneous three times a day before meals  predniSONE   Tablet 40 milliGRAM(s) Oral daily  simvastatin 20 milliGRAM(s) Oral at bedtime    ALBUTerol/ipratropium for Nebulization 3 milliLiter(s) Nebulizer every 6 hours  buDESOnide 160 MICROgram(s)/formoterol 4.5 MICROgram(s) Inhaler 2 Puff(s) Inhalation two times a day    acetaminophen   Tablet 650 milliGRAM(s) Oral every 6 hours PRN  ARIPiprazole 5 milliGRAM(s) Oral daily  clonazePAM Tablet 0.5 milliGRAM(s) Oral at bedtime PRN  DULoxetine 90 milliGRAM(s) Oral at bedtime  mirtazapine 30 milliGRAM(s) Oral at bedtime    docusate sodium 100 milliGRAM(s) Oral three times a day                  LABS:                        10.5   15.3  )-----------( 316      ( 24 Dec 2017 17:52 )             34.0     Hgb Trend: 10.5<--, 9.3<--, 10.8<--, 11.8<--  12-24    135  |  99  |  47<H>  ----------------------------<  159<H>  3.6   |  21<L>  |  1.85<H>    Ca    8.8      24 Dec 2017 08:43  Phos  3.2     12-24  Mg     2.0     12-24      Creatinine Trend: 1.85<--, 1.95<--, 2.06<--    Urinalysis Basic - ( 23 Dec 2017 14:01 )    Color: PL Yellow / Appearance: Clear / S.010 / pH: x  Gluc: x / Ketone: Negative  / Bili: Negative / Urobili: Negative   Blood: x / Protein: 30 mg/dL / Nitrite: Negative   Leuk Esterase: Trace / RBC: 0-2 /HPF / WBC 5-10 /HPF   Sq Epi: x / Non Sq Epi: Occasional /HPF / Bacteria: x CHIEF COMPLAINT: SOB and palpitations    Interval Events: No acute event overnight. Patient is seen and examined at the bedside this morning. Patient denies SOB, Chest pain, cough improving, abdominal pain, N/V/D, LE edema.    REVIEW OF SYSTEMS:  Constitutional: No fever, or chills. No recent weight loss or weight gain.   HEENT: No dry eyes or eye irritation. No postnasal drip or nasal congestion.  CV: No chest pain, or palpitations. No orthopnea.   Resp: No cough, or sputum production. No shortness of breath or dyspnea on exertion.   GI: No nausea or vomiting. No diarrhea or constipation. No abdominal pain.   : No dysuria, no nocturia or increased urinary frequency.  Musculoskeletal: No back pain, no myalgias  Skin: No rash or itchiness.   Neurological: No headache or dizziness. No syncope, no weakness, numbness.  Psychiatric: Denies depressed mood.   Endocrine: No cold or heat intolerance. No dry skin.  Hematologic/Lymphatic: No anemia or bleeding problem.     OBJECTIVE:  Vital Signs Last 24 Hrs  T(C): 36.8 (25 Dec 2017 03:59), Max: 37 (24 Dec 2017 11:09)  T(F): 98.2 (25 Dec 2017 03:59), Max: 98.6 (24 Dec 2017 11:09)  HR: 62 (25 Dec 2017 03:59) (62 - 63)  BP: 146/68 (25 Dec 2017 03:59) (120/67 - 146/68)  BP(mean): --  RR: 18 (25 Dec 2017 03:59) (18 - 18)  SpO2: 91% (25 Dec 2017 03:59) (91% - 100%)     @ 07:01  -   @ 07:00  --------------------------------------------------------  IN: 410 mL / OUT: 2725 mL / NET: -2315 mL      CAPILLARY BLOOD GLUCOSE      POCT Blood Glucose.: 150 mg/dL (24 Dec 2017 20:49)      PHYSICAL EXAM:  GENERAL: NAD, well-developed  EYES: EOMI, PERRLA, conjunctiva and sclera clear  NECK: Supple, No JVD  CHEST/LUNG: Coarse breath sounds on exam; No wheezing  HEART: Regular rate and rhythm; No murmurs, rubs, or gallops  ABDOMEN: Soft, Nontender, Nondistended; Bowel sounds present  EXTREMITIES:  2+ Peripheral Pulses, No clubbing, cyanosis, or edema  PSYCH: AAOx3  NEUROLOGY: non-focal  SKIN: No rashes or lesions    HOSPITAL MEDICATIONS:  apixaban 2.5 milliGRAM(s) Oral every 12 hours  aspirin  chewable 81 milliGRAM(s) Oral daily    cefTRIAXone   IVPB 1 Gram(s) IV Intermittent every 24 hours    amiodarone    Tablet 200 milliGRAM(s) Oral daily  furosemide    Tablet 40 milliGRAM(s) Oral daily  metoprolol succinate  milliGRAM(s) Oral daily    insulin glargine Injectable (LANTUS) 12 Unit(s) SubCutaneous every morning  insulin lispro (HumaLOG) corrective regimen sliding scale   SubCutaneous three times a day before meals  insulin lispro (HumaLOG) corrective regimen sliding scale   SubCutaneous at bedtime  insulin lispro Injectable (HumaLOG) 4 Unit(s) SubCutaneous three times a day before meals  predniSONE   Tablet 40 milliGRAM(s) Oral daily  simvastatin 20 milliGRAM(s) Oral at bedtime    ALBUTerol/ipratropium for Nebulization 3 milliLiter(s) Nebulizer every 6 hours  buDESOnide 160 MICROgram(s)/formoterol 4.5 MICROgram(s) Inhaler 2 Puff(s) Inhalation two times a day    acetaminophen   Tablet 650 milliGRAM(s) Oral every 6 hours PRN  ARIPiprazole 5 milliGRAM(s) Oral daily  clonazePAM Tablet 0.5 milliGRAM(s) Oral at bedtime PRN  DULoxetine 90 milliGRAM(s) Oral at bedtime  mirtazapine 30 milliGRAM(s) Oral at bedtime    docusate sodium 100 milliGRAM(s) Oral three times a day            LABS:                        10.5   15.3  )-----------( 316      ( 24 Dec 2017 17:52 )             34.0     Hgb Trend: 10.5<--, 9.3<--, 10.8<--, 11.8<--  12-24    135  |  99  |  47<H>  ----------------------------<  159<H>  3.6   |  21<L>  |  1.85<H>    Ca    8.8      24 Dec 2017 08:43  Phos  3.2     12-24  Mg     2.0     12-24      Creatinine Trend: 1.85<--, 1.95<--, 2.06<--    Urinalysis Basic - ( 23 Dec 2017 14:01 )    Color: PL Yellow / Appearance: Clear / S.010 / pH: x  Gluc: x / Ketone: Negative  / Bili: Negative / Urobili: Negative   Blood: x / Protein: 30 mg/dL / Nitrite: Negative   Leuk Esterase: Trace / RBC: 0-2 /HPF / WBC 5-10 /HPF   Sq Epi: x / Non Sq Epi: Occasional /HPF / Bacteria: x

## 2017-12-25 NOTE — PROGRESS NOTE ADULT - PROBLEM SELECTOR PLAN 2
- ARUN resolved, Cr downtrended to baseline.  - Improving Cr  - Will monitor with improvement with antibiotics and maintenance diuretics

## 2017-12-25 NOTE — PROGRESS NOTE ADULT - PROBLEM SELECTOR PROBLEM 2
Acute kidney injury superimposed on CKD

## 2017-12-25 NOTE — PROGRESS NOTE ADULT - PROBLEM SELECTOR PLAN 1
- Most likely viral pneumonia, less likely cardiogenic pulmonary edema vs COPD exacerbation   - ?RLL consolidation, will complete 5 day course of cephalosporin  - Continue with steroid, Duonebs and BiPAP PRN  - Continue with PRN BiPAP support, continue to deescalate oxygenation requirement as patient can tolerate (patient normally uses 2L NC at night at home) - Most likely viral pneumonia, less likely cardiogenic pulmonary edema vs COPD exacerbation   - ?RLL consolidation, completed 2 days of Ceftriaxone, plan for additional 3 days of Ceftin to complete a 5 day course for pneumonia  - Continue with steroid, Duonebs and BiPAP PRN  - Continue with PRN BiPAP support, continue to deescalate oxygenation requirement as patient can tolerate (patient normally uses 2L NC at night at home)

## 2017-12-25 NOTE — PROGRESS NOTE ADULT - PROBLEM SELECTOR PLAN 5
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

## 2017-12-28 LAB
CULTURE RESULTS: SIGNIFICANT CHANGE UP
CULTURE RESULTS: SIGNIFICANT CHANGE UP
SPECIMEN SOURCE: SIGNIFICANT CHANGE UP
SPECIMEN SOURCE: SIGNIFICANT CHANGE UP

## 2018-01-01 NOTE — PHYSICAL THERAPY INITIAL EVALUATION ADULT - CRITERIA FOR SKILLED THERAPEUTIC INTERVENTIONS
impairments found
1. I was told the name of the doctor(s) who took care of my child while in the hospital.    2. I have been told about any important findings on my child's plan of care.    3. The doctor clearly explained my child's diagnosis and other possible diagnoses that were considered.    4. My child's doctor explained all the tests that were done and their results (if available). I understand that some of the test results may not be ready before we go home and I was told how I can get these results. I understand that a summary of my child's hospitalization and important test results will be shared with my child's outpatient doctor.    5. My child's doctor talked to me about what I need to do when we go home.    6. I understand what signs and symptoms to watch for. I understand what symptoms I would need to call my doctor for and/or return to the hospital.    7. I have the phone number to call the hospital for results and/or questions after I leave the hospital.

## 2018-02-13 ENCOUNTER — NON-APPOINTMENT (OUTPATIENT)
Age: 83
End: 2018-02-13

## 2018-02-13 ENCOUNTER — APPOINTMENT (OUTPATIENT)
Dept: ELECTROPHYSIOLOGY | Facility: CLINIC | Age: 83
End: 2018-02-13
Payer: MEDICARE

## 2018-02-13 VITALS
OXYGEN SATURATION: 95 % | WEIGHT: 150 LBS | DIASTOLIC BLOOD PRESSURE: 88 MMHG | BODY MASS INDEX: 25.61 KG/M2 | HEIGHT: 64 IN | SYSTOLIC BLOOD PRESSURE: 144 MMHG | HEART RATE: 60 BPM

## 2018-02-13 DIAGNOSIS — I48.0 PAROXYSMAL ATRIAL FIBRILLATION: ICD-10-CM

## 2018-02-13 PROCEDURE — 93280 PM DEVICE PROGR EVAL DUAL: CPT

## 2018-02-13 PROCEDURE — 99214 OFFICE O/P EST MOD 30 MIN: CPT

## 2018-02-13 RX ORDER — LISINOPRIL 2.5 MG/1
2.5 TABLET ORAL
Qty: 30 | Refills: 0 | Status: ACTIVE | COMMUNITY

## 2018-02-13 RX ORDER — ARIPIPRAZOLE 5 MG/1
5 TABLET ORAL DAILY
Refills: 0 | Status: ACTIVE | COMMUNITY
Start: 2017-11-27

## 2018-02-13 RX ORDER — ASPIRIN 81 MG/1
81 TABLET, CHEWABLE ORAL
Refills: 0 | Status: ACTIVE | COMMUNITY

## 2018-02-21 NOTE — PHYSICAL THERAPY INITIAL EVALUATION ADULT - WEIGHT-BEARING RESTRICTIONS: GAIT, REHAB EVAL
Patient presents to the clinic today for a wcc.    Loyda Mckenna LPN.................. 2/21/2018 3:55 PM   
weight-bearing as tolerated

## 2018-03-19 NOTE — ED PROVIDER NOTE - GENITOURINARY [-], MLM
Chief Complaint   Patient presents with    Depo     Pt here for depo injection, presents in stable condition without complaints. Administered depo vaccine in left deltoid per MARY ALICE Ford NP's order. Information sheet/depo calendar given to patient. Patient tolerated the procedure without difficulty. Phuong Gill 47 #[address]-2 Lot# [id number] Exp 3/31/2021. no difficulty urinating

## 2018-06-08 ENCOUNTER — INPATIENT (INPATIENT)
Facility: HOSPITAL | Age: 83
LOS: 5 days | Discharge: HOME HEALTH | DRG: 85 | End: 2018-06-14
Attending: INTERNAL MEDICINE | Admitting: INTERNAL MEDICINE
Payer: MEDICARE

## 2018-06-08 VITALS
HEART RATE: 60 BPM | RESPIRATION RATE: 16 BRPM | TEMPERATURE: 98 F | DIASTOLIC BLOOD PRESSURE: 82 MMHG | OXYGEN SATURATION: 98 % | SYSTOLIC BLOOD PRESSURE: 162 MMHG

## 2018-06-08 DIAGNOSIS — S06.6X0A TRAUMATIC SUBARACHNOID HEMORRHAGE WITHOUT LOSS OF CONSCIOUSNESS, INITIAL ENCOUNTER: ICD-10-CM

## 2018-06-08 DIAGNOSIS — N18.3 CHRONIC KIDNEY DISEASE, STAGE 3 (MODERATE): ICD-10-CM

## 2018-06-08 DIAGNOSIS — R55 SYNCOPE AND COLLAPSE: ICD-10-CM

## 2018-06-08 DIAGNOSIS — S02.32XB: ICD-10-CM

## 2018-06-08 DIAGNOSIS — E11.9 TYPE 2 DIABETES MELLITUS WITHOUT COMPLICATIONS: ICD-10-CM

## 2018-06-08 DIAGNOSIS — I10 ESSENTIAL (PRIMARY) HYPERTENSION: ICD-10-CM

## 2018-06-08 DIAGNOSIS — I48.91 UNSPECIFIED ATRIAL FIBRILLATION: ICD-10-CM

## 2018-06-08 LAB
ALBUMIN SERPL ELPH-MCNC: 3.4 G/DL — SIGNIFICANT CHANGE UP (ref 3.3–5)
ALP SERPL-CCNC: 98 U/L — SIGNIFICANT CHANGE UP (ref 40–120)
ALT FLD-CCNC: 31 U/L — SIGNIFICANT CHANGE UP (ref 10–45)
ANION GAP SERPL CALC-SCNC: 13 MMOL/L — SIGNIFICANT CHANGE UP (ref 5–17)
APPEARANCE UR: CLEAR — SIGNIFICANT CHANGE UP
APTT BLD: 31.2 SEC — SIGNIFICANT CHANGE UP (ref 27.5–37.4)
AST SERPL-CCNC: 39 U/L — SIGNIFICANT CHANGE UP (ref 10–40)
BASE EXCESS BLDV CALC-SCNC: 1.2 MMOL/L — SIGNIFICANT CHANGE UP (ref -2–2)
BASOPHILS # BLD AUTO: 0.1 K/UL — SIGNIFICANT CHANGE UP (ref 0–0.2)
BASOPHILS NFR BLD AUTO: 0.8 % — SIGNIFICANT CHANGE UP (ref 0–2)
BILIRUB SERPL-MCNC: 0.3 MG/DL — SIGNIFICANT CHANGE UP (ref 0.2–1.2)
BILIRUB UR-MCNC: NEGATIVE — SIGNIFICANT CHANGE UP
BLD GP AB SCN SERPL QL: NEGATIVE — SIGNIFICANT CHANGE UP
BUN SERPL-MCNC: 54 MG/DL — HIGH (ref 7–23)
CA-I SERPL-SCNC: 1.18 MMOL/L — SIGNIFICANT CHANGE UP (ref 1.12–1.3)
CALCIUM SERPL-MCNC: 9.2 MG/DL — SIGNIFICANT CHANGE UP (ref 8.4–10.5)
CHLORIDE BLDV-SCNC: 100 MMOL/L — SIGNIFICANT CHANGE UP (ref 96–108)
CHLORIDE SERPL-SCNC: 93 MMOL/L — LOW (ref 96–108)
CO2 BLDV-SCNC: 28 MMOL/L — SIGNIFICANT CHANGE UP (ref 22–30)
CO2 SERPL-SCNC: 25 MMOL/L — SIGNIFICANT CHANGE UP (ref 22–31)
COLOR SPEC: YELLOW — SIGNIFICANT CHANGE UP
CREAT SERPL-MCNC: 2.43 MG/DL — HIGH (ref 0.5–1.3)
DIFF PNL FLD: NEGATIVE — SIGNIFICANT CHANGE UP
EOSINOPHIL # BLD AUTO: 0.5 K/UL — SIGNIFICANT CHANGE UP (ref 0–0.5)
EOSINOPHIL NFR BLD AUTO: 5.6 % — SIGNIFICANT CHANGE UP (ref 0–6)
EPI CELLS # UR: SIGNIFICANT CHANGE UP /HPF
GAS PNL BLDV: 134 MMOL/L — LOW (ref 136–145)
GAS PNL BLDV: SIGNIFICANT CHANGE UP
GLUCOSE BLDC GLUCOMTR-MCNC: 226 MG/DL — HIGH (ref 70–99)
GLUCOSE BLDC GLUCOMTR-MCNC: 256 MG/DL — HIGH (ref 70–99)
GLUCOSE BLDV-MCNC: 163 MG/DL — HIGH (ref 70–99)
GLUCOSE SERPL-MCNC: 325 MG/DL — HIGH (ref 70–99)
GLUCOSE UR QL: 100 MG/DL
HCO3 BLDV-SCNC: 27 MMOL/L — SIGNIFICANT CHANGE UP (ref 21–29)
HCT VFR BLD CALC: 33.3 % — LOW (ref 34.5–45)
HCT VFR BLD CALC: 39.2 % — SIGNIFICANT CHANGE UP (ref 34.5–45)
HCT VFR BLDA CALC: 32 % — LOW (ref 39–50)
HGB BLD CALC-MCNC: 10.3 G/DL — LOW (ref 11.5–15.5)
HGB BLD-MCNC: 10.8 G/DL — LOW (ref 11.5–15.5)
HGB BLD-MCNC: 12.7 G/DL — SIGNIFICANT CHANGE UP (ref 11.5–15.5)
HOROWITZ INDEX BLDV+IHG-RTO: SIGNIFICANT CHANGE UP
INR BLD: 1.13 RATIO — SIGNIFICANT CHANGE UP (ref 0.88–1.16)
KETONES UR-MCNC: NEGATIVE — SIGNIFICANT CHANGE UP
LACTATE BLDV-MCNC: 1.7 MMOL/L — SIGNIFICANT CHANGE UP (ref 0.7–2)
LEUKOCYTE ESTERASE UR-ACNC: NEGATIVE — SIGNIFICANT CHANGE UP
LYMPHOCYTES # BLD AUTO: 1 K/UL — SIGNIFICANT CHANGE UP (ref 1–3.3)
LYMPHOCYTES # BLD AUTO: 10.9 % — LOW (ref 13–44)
MCHC RBC-ENTMCNC: 29.7 PG — SIGNIFICANT CHANGE UP (ref 27–34)
MCHC RBC-ENTMCNC: 30 PG — SIGNIFICANT CHANGE UP (ref 27–34)
MCHC RBC-ENTMCNC: 32.4 GM/DL — SIGNIFICANT CHANGE UP (ref 32–36)
MCHC RBC-ENTMCNC: 32.4 GM/DL — SIGNIFICANT CHANGE UP (ref 32–36)
MCV RBC AUTO: 91.8 FL — SIGNIFICANT CHANGE UP (ref 80–100)
MCV RBC AUTO: 92.4 FL — SIGNIFICANT CHANGE UP (ref 80–100)
MONOCYTES # BLD AUTO: 0.9 K/UL — SIGNIFICANT CHANGE UP (ref 0–0.9)
MONOCYTES NFR BLD AUTO: 9.7 % — SIGNIFICANT CHANGE UP (ref 2–14)
NEUTROPHILS # BLD AUTO: 6.7 K/UL — SIGNIFICANT CHANGE UP (ref 1.8–7.4)
NEUTROPHILS NFR BLD AUTO: 72.9 % — SIGNIFICANT CHANGE UP (ref 43–77)
NITRITE UR-MCNC: NEGATIVE — SIGNIFICANT CHANGE UP
PCO2 BLDV: 51 MMHG — HIGH (ref 35–50)
PH BLDV: 7.34 — LOW (ref 7.35–7.45)
PH UR: 6 — SIGNIFICANT CHANGE UP (ref 5–8)
PLATELET # BLD AUTO: 258 K/UL — SIGNIFICANT CHANGE UP (ref 150–400)
PLATELET # BLD AUTO: 260 K/UL — SIGNIFICANT CHANGE UP (ref 150–400)
PO2 BLDV: 23 MMHG — LOW (ref 25–45)
POTASSIUM BLDV-SCNC: 3.9 MMOL/L — SIGNIFICANT CHANGE UP (ref 3.5–5.3)
POTASSIUM SERPL-MCNC: 3.9 MMOL/L — SIGNIFICANT CHANGE UP (ref 3.5–5.3)
POTASSIUM SERPL-SCNC: 3.9 MMOL/L — SIGNIFICANT CHANGE UP (ref 3.5–5.3)
PROT SERPL-MCNC: 7.2 G/DL — SIGNIFICANT CHANGE UP (ref 6–8.3)
PROT UR-MCNC: 30 MG/DL
PROTHROM AB SERPL-ACNC: 12.3 SEC — SIGNIFICANT CHANGE UP (ref 9.8–12.7)
RBC # BLD: 3.6 M/UL — LOW (ref 3.8–5.2)
RBC # BLD: 4.27 M/UL — SIGNIFICANT CHANGE UP (ref 3.8–5.2)
RBC # FLD: 14.8 % — HIGH (ref 10.3–14.5)
RBC # FLD: 14.8 % — HIGH (ref 10.3–14.5)
RH IG SCN BLD-IMP: POSITIVE — SIGNIFICANT CHANGE UP
SAO2 % BLDV: 35 % — LOW (ref 67–88)
SODIUM SERPL-SCNC: 131 MMOL/L — LOW (ref 135–145)
SP GR SPEC: 1.01 — SIGNIFICANT CHANGE UP (ref 1.01–1.02)
UROBILINOGEN FLD QL: NEGATIVE — SIGNIFICANT CHANGE UP
WBC # BLD: 9.2 K/UL — SIGNIFICANT CHANGE UP (ref 3.8–10.5)
WBC # BLD: 9.8 K/UL — SIGNIFICANT CHANGE UP (ref 3.8–10.5)
WBC # FLD AUTO: 9.2 K/UL — SIGNIFICANT CHANGE UP (ref 3.8–10.5)
WBC # FLD AUTO: 9.8 K/UL — SIGNIFICANT CHANGE UP (ref 3.8–10.5)

## 2018-06-08 PROCEDURE — 73502 X-RAY EXAM HIP UNI 2-3 VIEWS: CPT | Mod: 26,LT

## 2018-06-08 PROCEDURE — 70450 CT HEAD/BRAIN W/O DYE: CPT | Mod: 26

## 2018-06-08 PROCEDURE — 72192 CT PELVIS W/O DYE: CPT | Mod: 26

## 2018-06-08 PROCEDURE — 76377 3D RENDER W/INTRP POSTPROCES: CPT | Mod: 26

## 2018-06-08 PROCEDURE — 71045 X-RAY EXAM CHEST 1 VIEW: CPT | Mod: 26

## 2018-06-08 PROCEDURE — 72125 CT NECK SPINE W/O DYE: CPT | Mod: 26

## 2018-06-08 PROCEDURE — 70450 CT HEAD/BRAIN W/O DYE: CPT | Mod: 26,77

## 2018-06-08 PROCEDURE — 93010 ELECTROCARDIOGRAM REPORT: CPT

## 2018-06-08 PROCEDURE — 99285 EMERGENCY DEPT VISIT HI MDM: CPT | Mod: 25

## 2018-06-08 PROCEDURE — 70486 CT MAXILLOFACIAL W/O DYE: CPT | Mod: 26

## 2018-06-08 RX ORDER — DEXTROSE 50 % IN WATER 50 %
25 SYRINGE (ML) INTRAVENOUS ONCE
Qty: 0 | Refills: 0 | Status: DISCONTINUED | OUTPATIENT
Start: 2018-06-08 | End: 2018-06-14

## 2018-06-08 RX ORDER — INSULIN LISPRO 100/ML
VIAL (ML) SUBCUTANEOUS
Qty: 0 | Refills: 0 | Status: DISCONTINUED | OUTPATIENT
Start: 2018-06-08 | End: 2018-06-09

## 2018-06-08 RX ORDER — IPRATROPIUM/ALBUTEROL SULFATE 18-103MCG
3 AEROSOL WITH ADAPTER (GRAM) INHALATION EVERY 6 HOURS
Qty: 0 | Refills: 0 | Status: DISCONTINUED | OUTPATIENT
Start: 2018-06-08 | End: 2018-06-14

## 2018-06-08 RX ORDER — SODIUM CHLORIDE 9 MG/ML
1000 INJECTION INTRAMUSCULAR; INTRAVENOUS; SUBCUTANEOUS ONCE
Qty: 0 | Refills: 0 | Status: COMPLETED | OUTPATIENT
Start: 2018-06-08 | End: 2018-06-08

## 2018-06-08 RX ORDER — DEXTROSE 50 % IN WATER 50 %
12.5 SYRINGE (ML) INTRAVENOUS ONCE
Qty: 0 | Refills: 0 | Status: DISCONTINUED | OUTPATIENT
Start: 2018-06-08 | End: 2018-06-14

## 2018-06-08 RX ORDER — DOCUSATE SODIUM 100 MG
100 CAPSULE ORAL
Qty: 0 | Refills: 0 | Status: DISCONTINUED | OUTPATIENT
Start: 2018-06-08 | End: 2018-06-14

## 2018-06-08 RX ORDER — MIRTAZAPINE 45 MG/1
15 TABLET, ORALLY DISINTEGRATING ORAL AT BEDTIME
Qty: 0 | Refills: 0 | Status: DISCONTINUED | OUTPATIENT
Start: 2018-06-08 | End: 2018-06-14

## 2018-06-08 RX ORDER — METOPROLOL TARTRATE 50 MG
100 TABLET ORAL DAILY
Qty: 0 | Refills: 0 | Status: DISCONTINUED | OUTPATIENT
Start: 2018-06-08 | End: 2018-06-14

## 2018-06-08 RX ORDER — DULOXETINE HYDROCHLORIDE 30 MG/1
60 CAPSULE, DELAYED RELEASE ORAL DAILY
Qty: 0 | Refills: 0 | Status: DISCONTINUED | OUTPATIENT
Start: 2018-06-08 | End: 2018-06-14

## 2018-06-08 RX ORDER — GLUCAGON INJECTION, SOLUTION 0.5 MG/.1ML
1 INJECTION, SOLUTION SUBCUTANEOUS ONCE
Qty: 0 | Refills: 0 | Status: DISCONTINUED | OUTPATIENT
Start: 2018-06-08 | End: 2018-06-14

## 2018-06-08 RX ORDER — INSULIN LISPRO 100/ML
VIAL (ML) SUBCUTANEOUS
Qty: 0 | Refills: 0 | Status: DISCONTINUED | OUTPATIENT
Start: 2018-06-08 | End: 2018-06-14

## 2018-06-08 RX ORDER — INSULIN GLARGINE 100 [IU]/ML
15 INJECTION, SOLUTION SUBCUTANEOUS AT BEDTIME
Qty: 0 | Refills: 0 | Status: DISCONTINUED | OUTPATIENT
Start: 2018-06-08 | End: 2018-06-09

## 2018-06-08 RX ORDER — SODIUM CHLORIDE 9 MG/ML
1000 INJECTION, SOLUTION INTRAVENOUS
Qty: 0 | Refills: 0 | Status: DISCONTINUED | OUTPATIENT
Start: 2018-06-08 | End: 2018-06-10

## 2018-06-08 RX ORDER — ONDANSETRON 8 MG/1
4 TABLET, FILM COATED ORAL EVERY 6 HOURS
Qty: 0 | Refills: 0 | Status: DISCONTINUED | OUTPATIENT
Start: 2018-06-08 | End: 2018-06-14

## 2018-06-08 RX ORDER — ACETAMINOPHEN 500 MG
650 TABLET ORAL ONCE
Qty: 0 | Refills: 0 | Status: COMPLETED | OUTPATIENT
Start: 2018-06-08 | End: 2018-06-08

## 2018-06-08 RX ORDER — ARIPIPRAZOLE 15 MG/1
2 TABLET ORAL DAILY
Qty: 0 | Refills: 0 | Status: DISCONTINUED | OUTPATIENT
Start: 2018-06-08 | End: 2018-06-14

## 2018-06-08 RX ORDER — AMIODARONE HYDROCHLORIDE 400 MG/1
200 TABLET ORAL DAILY
Qty: 0 | Refills: 0 | Status: DISCONTINUED | OUTPATIENT
Start: 2018-06-08 | End: 2018-06-14

## 2018-06-08 RX ORDER — SIMVASTATIN 20 MG/1
20 TABLET, FILM COATED ORAL AT BEDTIME
Qty: 0 | Refills: 0 | Status: DISCONTINUED | OUTPATIENT
Start: 2018-06-08 | End: 2018-06-14

## 2018-06-08 RX ORDER — CLONAZEPAM 1 MG
0.5 TABLET ORAL
Qty: 0 | Refills: 0 | Status: DISCONTINUED | OUTPATIENT
Start: 2018-06-08 | End: 2018-06-14

## 2018-06-08 RX ORDER — TIOTROPIUM BROMIDE 18 UG/1
1 CAPSULE ORAL; RESPIRATORY (INHALATION) DAILY
Qty: 0 | Refills: 0 | Status: DISCONTINUED | OUTPATIENT
Start: 2018-06-08 | End: 2018-06-14

## 2018-06-08 RX ORDER — ALBUTEROL 90 UG/1
1 AEROSOL, METERED ORAL EVERY 4 HOURS
Qty: 0 | Refills: 0 | Status: DISCONTINUED | OUTPATIENT
Start: 2018-06-08 | End: 2018-06-14

## 2018-06-08 RX ORDER — ASPIRIN/CALCIUM CARB/MAGNESIUM 324 MG
81 TABLET ORAL DAILY
Qty: 0 | Refills: 0 | Status: DISCONTINUED | OUTPATIENT
Start: 2018-06-08 | End: 2018-06-09

## 2018-06-08 RX ORDER — DEXTROSE 50 % IN WATER 50 %
15 SYRINGE (ML) INTRAVENOUS ONCE
Qty: 0 | Refills: 0 | Status: DISCONTINUED | OUTPATIENT
Start: 2018-06-08 | End: 2018-06-14

## 2018-06-08 RX ORDER — INSULIN LISPRO 100/ML
7 VIAL (ML) SUBCUTANEOUS
Qty: 0 | Refills: 0 | Status: DISCONTINUED | OUTPATIENT
Start: 2018-06-08 | End: 2018-06-09

## 2018-06-08 RX ORDER — PROTHROMBIN COMPLEX CONCENTRATE (HUMAN) 25.5; 16.5; 24; 22; 22; 26 [IU]/ML; [IU]/ML; [IU]/ML; [IU]/ML; [IU]/ML; [IU]/ML
1500 POWDER, FOR SOLUTION INTRAVENOUS ONCE
Qty: 0 | Refills: 0 | Status: COMPLETED | OUTPATIENT
Start: 2018-06-08 | End: 2018-06-08

## 2018-06-08 RX ORDER — FUROSEMIDE 40 MG
40 TABLET ORAL DAILY
Qty: 0 | Refills: 0 | Status: DISCONTINUED | OUTPATIENT
Start: 2018-06-08 | End: 2018-06-14

## 2018-06-08 RX ADMIN — Medication 300 MILLIGRAM(S): at 12:14

## 2018-06-08 RX ADMIN — Medication 0.5 MILLIGRAM(S): at 22:53

## 2018-06-08 RX ADMIN — SODIUM CHLORIDE 1000 MILLILITER(S): 9 INJECTION INTRAMUSCULAR; INTRAVENOUS; SUBCUTANEOUS at 09:22

## 2018-06-08 RX ADMIN — Medication 3 MILLILITER(S): at 22:55

## 2018-06-08 RX ADMIN — MIRTAZAPINE 15 MILLIGRAM(S): 45 TABLET, ORALLY DISINTEGRATING ORAL at 22:53

## 2018-06-08 RX ADMIN — PROTHROMBIN COMPLEX CONCENTRATE (HUMAN) 400 INTERNATIONAL UNIT(S): 25.5; 16.5; 24; 22; 22; 26 POWDER, FOR SOLUTION INTRAVENOUS at 14:04

## 2018-06-08 RX ADMIN — DULOXETINE HYDROCHLORIDE 60 MILLIGRAM(S): 30 CAPSULE, DELAYED RELEASE ORAL at 22:54

## 2018-06-08 RX ADMIN — INSULIN GLARGINE 15 UNIT(S): 100 INJECTION, SOLUTION SUBCUTANEOUS at 23:06

## 2018-06-08 RX ADMIN — Medication 100 MILLIGRAM(S): at 22:53

## 2018-06-08 RX ADMIN — Medication 650 MILLIGRAM(S): at 13:11

## 2018-06-08 RX ADMIN — ARIPIPRAZOLE 2 MILLIGRAM(S): 15 TABLET ORAL at 22:54

## 2018-06-08 RX ADMIN — SIMVASTATIN 20 MILLIGRAM(S): 20 TABLET, FILM COATED ORAL at 22:53

## 2018-06-08 NOTE — ED PROVIDER NOTE - OBJECTIVE STATEMENT
86 YOF p/w unwitnessed mechanical fall while walking to her daughters house. Pt fell, denies LOC, states she heard some noice downstairs which worried her. Pt hit head is on eliquis. Pt has multiple skin tares on left elbow left knee, and left temple. Pt endorses left hip pain and has hematoma over the hip with moderate tenderness. Pt denies any urinary symptoms, chest pain, back pain, neck pain, abdominal pain. Per daughter, pt has been slightly confused over the past week and recently had her DM medications changed. 86 YOF p/w unwitnessed mechanical fall while walking to her daughters house. Pt fell, denies LOC, states she heard some noice downstairs which worried her. Pt hit head is on eliquis. Pt has multiple skin tears on left elbow left knee, and left temple. Pt endorses left hip pain and has hematoma over the hip with moderate tenderness. Pt denies any urinary symptoms, chest pain, back pain, neck pain, abdominal pain. Per daughter, pt has been slightly confused over the past week and recently had her DM medications changed, complained of lightheadedness and feeling faint multiple times.

## 2018-06-08 NOTE — ED PROVIDER NOTE - MEDICAL DECISION MAKING DETAILS
Trauma on eliquis, no chest pain, no back pain, no abdominal pain, no distracting injuries. Will get CTH/c-spine. CXR, pelvic xray, pt has large left hip hematoma concern for possible fx. Will get trauma labs, UA due to recent confusion.

## 2018-06-08 NOTE — H&P ADULT - PROBLEM SELECTOR PLAN 1
repeat CT scan reviewed  neurosurgery fu, paged for fu  management as per neurosurgery  hold AC repeat CT scan reviewed  neurosurgery fu, paged for fu  management as per neurosurgery    spoke at 7 pm  with Dr Osvaldo LUDWIG resident about the repeat CT scan, he will fu soon  hold AC

## 2018-06-08 NOTE — H&P ADULT - ASSESSMENT
86 YOF p/w unwitnessed mechanical fall while walking to her daughters house. Pt fell, denies LOC, states she heard some noice downstairs which worried her. Pt hit head is on eliquis. Pt has multiple skin tares on left elbow left knee, and left temple. Pt endorses left hip pain and has hematoma over the hip with moderate tenderness. Pt denies any urinary symptoms, chest pain, back pain, neck pain, abdominal pain. Per daughter, pt has been slightly confused over the past week and recently had her DM medications changed

## 2018-06-08 NOTE — CONSULT NOTE ADULT - SUBJECTIVE AND OBJECTIVE BOX
p ([id number])     HPI: Ashley Mazvivianmagaly, 85 yo female pmh of a fib pacemaker sp fal lthis am on elliquis and asa81. CTH shows very small right inferior temporal tsah with left orbital floor fx.    PAST MEDICAL HISTORY   COPD (chronic obstructive pulmonary disease)  Depression  Anxiety  Atrial fibrillation, unspecified type  Pacemaker  Renal Cancer  Overweight  PVD (Peripheral Vascular Disease)  Crohns Disease  Cigarette Smoker  COPD (Chronic Obstructive Pulmonary Disease)  Arteriosclerotic Heart Disease (ASHD)  HTN (Hypertension)  Diabetes    PAST SURGICAL HISTORY   S/P carpal tunnel release  S/P left cataract extraction  s/p colon resection  S/P Nephrectomy  Hip Replacement  History of Total Knee Replacement  S/P Hernia Surgery  S/P Cholecystectomy    amoxicillin (Flushing; Vomiting; Nausea)  lisinopril (Angioedema)      MEDICATIONS:  Antibiotics:    Neuro:    Anticoagulation:    Other:      SOCIAL HISTORY:   Occupation:   Marital Status:     FAMILY HISTORY:  Family history of pancreatic cancer (Sibling)      REVIEW OF SYSTEMS:  Check here if all are normal other than Neurological/HPI [x]  General:  Eyes:  ENT:  Cardiac:  Respiratory:  GI:  Musculoskeletal:   Skin:  Neurologic:   Psychiatric:     PHYSICAL EXAMINATION:   T(C): 36.8 (18 @ 10:25), Max: 36.8 (18 @ 10:25)  HR: 62 (18 @ 10:25) (60 - 62)  BP: 168/101 (18 @ 10:25) (145/79 - 168/101)  RR: 18 (18 @ 10:25) (16 - 18)  SpO2: 97% (18 @ 10:25) (97% - 98%)  Wt(kg): --    General Examination:     Neurologic Examination:           AOx3, FC, PERRL, EOMI, V1-3 intact, no facial, palate reji symmetric, tongue midline, shrug 5/5  5/5 throughout, no drift  SILT  No clonus or babinski      LABS:                        12.7   9.2   )-----------( 258      ( 2018 08:23 )             39.2         131<L>  |  93<L>  |  54<H>  ----------------------------<  325<H>  3.9   |  25  |  2.43<H>    Ca    9.2      2018 08:23    TPro  7.2  /  Alb  3.4  /  TBili  0.3  /  DBili  x   /  AST  39  /  ALT  31  /  AlkPhos  98  -    PT/INR - ( 2018 08:23 )   PT: 12.3 sec;   INR: 1.13 ratio         PTT - ( 2018 08:23 )  PTT:31.2 sec  Urinalysis Basic - ( 2018 09:34 )    Color: Yellow / Appearance: Clear / S.012 / pH: x  Gluc: x / Ketone: Negative  / Bili: Negative / Urobili: Negative   Blood: x / Protein: 30 mg/dL / Nitrite: Negative   Leuk Esterase: Negative / RBC: x / WBC x   Sq Epi: x / Non Sq Epi: OCC /HPF / Bacteria: x        RADIOLOGY & ADDITIONAL STUDIES:

## 2018-06-08 NOTE — ED PROVIDER NOTE - PROGRESS NOTE DETAILS
Pt CT head showing SAH trace amount and orbital floor fracture on the left. Will consult neurosurgery and pt will likely be admitted. Ophtalmology consulted for orbital floor fracture. NO clinical signs of entrapment. Wound ppx ordered. Trauma and NSG consult pending. Decreasing H&H. seen by trauma plan admission for monitoring. s/p kcentra for reversal though admittedly low benefit. will monitor.

## 2018-06-08 NOTE — ED PROVIDER NOTE - CONSTITUTIONAL, MLM
normal... Well appearing, well nourished, awake, alert, oriented to person, place, time/situation and in no apparent distress. Well appearing, well nourished, awake, alert, oriented to person, place, approximate time and some recent events and in minimal distress from pain

## 2018-06-08 NOTE — ED PROVIDER NOTE - CARE PLAN
Principal Discharge DX:	Traumatic subarachnoid hemorrhage without loss of consciousness, initial encounter  Secondary Diagnosis:	Open fracture of left orbital floor, initial encounter  Secondary Diagnosis:	Near syncope

## 2018-06-08 NOTE — ED PROVIDER NOTE - INTERPRETATION
A-paced, LBBB morphology, left axis deviation, prolonged MO, widented QTc c/w LBBB, negative sgarbossa

## 2018-06-08 NOTE — PROCEDURE NOTE - ADDITIONAL PROCEDURE DETAILS
Indication for interrogation: s/p mechanical fall.   Measured data WNL, NL PM function; sensing and pacing thresholds are wnl. Pt is not PM dependent  Since last device interrogation 2/13/18 stored data revealed no tachyarrhythmia events for review.   Changes made: none    [id number]

## 2018-06-08 NOTE — CONSULT NOTE ADULT - SUBJECTIVE AND OBJECTIVE BOX
Patient is a 86y Female whom presented to the hospital with     PAST MEDICAL & SURGICAL HISTORY:  COPD (chronic obstructive pulmonary disease)  Depression  Anxiety  Atrial fibrillation, unspecified type  Pacemaker  Overweight  PVD (Peripheral Vascular Disease)  Arteriosclerotic Heart Disease (ASHD)  HTN (Hypertension)  Diabetes  S/P carpal tunnel release  S/P left cataract extraction  s/p colon resection  S/P Nephrectomy: right  Hip Replacement  History of Total Knee Replacement  S/P Hernia Surgery  S/P Cholecystectomy      MEDICATIONS  (STANDING):  ALBUTerol    90 MICROgram(s) HFA Inhaler 1 Puff(s) Inhalation every 4 hours  ALBUTerol/ipratropium for Nebulization 3 milliLiter(s) Nebulizer every 6 hours  amiodarone    Tablet 200 milliGRAM(s) Oral daily  ARIPiprazole 2 milliGRAM(s) Oral daily  aspirin  chewable 81 milliGRAM(s) Oral daily  clonazePAM Tablet 0.5 milliGRAM(s) Oral two times a day  dextrose 5%. 1000 milliLiter(s) (50 mL/Hr) IV Continuous <Continuous>  dextrose 50% Injectable 12.5 Gram(s) IV Push once  dextrose 50% Injectable 25 Gram(s) IV Push once  dextrose 50% Injectable 25 Gram(s) IV Push once  docusate sodium 100 milliGRAM(s) Oral two times a day  DULoxetine 60 milliGRAM(s) Oral daily  furosemide    Tablet 40 milliGRAM(s) Oral daily  insulin glargine Injectable (LANTUS) 15 Unit(s) SubCutaneous at bedtime  insulin lispro (HumaLOG) corrective regimen sliding scale   SubCutaneous three times a day before meals  insulin lispro (HumaLOG) corrective regimen sliding scale   SubCutaneous three times a day before meals  insulin lispro Injectable (HumaLOG) 7 Unit(s) SubCutaneous three times a day before meals  metoprolol succinate  milliGRAM(s) Oral daily  mirtazapine 15 milliGRAM(s) Oral at bedtime  simvastatin 20 milliGRAM(s) Oral at bedtime  tiotropium 18 MICROgram(s) Capsule 1 Capsule(s) Inhalation daily      Allergies    amoxicillin (Flushing; Vomiting; Nausea)  lisinopril (Angioedema)    Intolerances        SOCIAL HISTORY:  Denies ETOh,Smoking,     FAMILY HISTORY:  Family history of pancreatic cancer (Sibling)        VITAL:  T(C): , Max: 37.4 (18 @ 20:23)  T(F): , Max: 99.4 (18 @ 20:23)  HR: 61 (18 @ 20:47)  BP: 147/77 (18 @ 20:47)  BP(mean): --  RR: 18 (18 @ 20:47)  SpO2: 95% (18 @ 20:47)  Wt(kg): --    I and O's:    Height (cm): 165.1 ( @ 20:47)  Weight (kg): 60.1 ( @ 13:18)  BMI (kg/m2): 22 ( @ 20:47)  BSA (m2): 1.66 ( @ 20:47)    PHYSICAL EXAM:    Constitutional: NAD  HEENT: conjunctive   clear   Neck:  No JVD  Respiratory: CTAB  Cardiovascular: S1 and S2  Gastrointestinal: BS+, soft, NT/ND  Extremities: No peripheral edema  Neurological: A/O x 3, no focal deficits  Psychiatric: Normal mood, normal affect  : No Valencia  Skin: No rashes  Access: Not applicable    LABS:                        10.8   9.8   )-----------( 260      ( 2018 17:00 )             33.3     06-08    131<L>  |  93<L>  |  54<H>  ----------------------------<  325<H>  3.9   |  25  |  2.43<H>    Ca    9.2      2018 08:23    TPro  7.2  /  Alb  3.4  /  TBili  0.3  /  DBili  x   /  AST  39  /  ALT  31  /  AlkPhos  98  06-08      Urine Studies:  Urinalysis Basic - ( 2018 09:34 )    Color: Yellow / Appearance: Clear / S.012 / pH: x  Gluc: x / Ketone: Negative  / Bili: Negative / Urobili: Negative   Blood: x / Protein: 30 mg/dL / Nitrite: Negative   Leuk Esterase: Negative / RBC: x / WBC x   Sq Epi: x / Non Sq Epi: OCC /HPF / Bacteria: x            RADIOLOGY & ADDITIONAL STUDIES: Patient is a 86y Female whom presented to the hospital with ckd and carlene     PAST MEDICAL & SURGICAL HISTORY:  COPD (chronic obstructive pulmonary disease)  Depression  Anxiety  Atrial fibrillation, unspecified type  Pacemaker  Overweight  PVD (Peripheral Vascular Disease)  Arteriosclerotic Heart Disease (ASHD)  HTN (Hypertension)  Diabetes  S/P carpal tunnel release  S/P left cataract extraction  s/p colon resection  S/P Nephrectomy: right  Hip Replacement  History of Total Knee Replacement  S/P Hernia Surgery  S/P Cholecystectomy      MEDICATIONS  (STANDING):  ALBUTerol    90 MICROgram(s) HFA Inhaler 1 Puff(s) Inhalation every 4 hours  ALBUTerol/ipratropium for Nebulization 3 milliLiter(s) Nebulizer every 6 hours  amiodarone    Tablet 200 milliGRAM(s) Oral daily  ARIPiprazole 2 milliGRAM(s) Oral daily  aspirin  chewable 81 milliGRAM(s) Oral daily  clonazePAM Tablet 0.5 milliGRAM(s) Oral two times a day  dextrose 5%. 1000 milliLiter(s) (50 mL/Hr) IV Continuous <Continuous>  dextrose 50% Injectable 12.5 Gram(s) IV Push once  dextrose 50% Injectable 25 Gram(s) IV Push once  dextrose 50% Injectable 25 Gram(s) IV Push once  docusate sodium 100 milliGRAM(s) Oral two times a day  DULoxetine 60 milliGRAM(s) Oral daily  furosemide    Tablet 40 milliGRAM(s) Oral daily  insulin glargine Injectable (LANTUS) 15 Unit(s) SubCutaneous at bedtime  insulin lispro (HumaLOG) corrective regimen sliding scale   SubCutaneous three times a day before meals  insulin lispro (HumaLOG) corrective regimen sliding scale   SubCutaneous three times a day before meals  insulin lispro Injectable (HumaLOG) 7 Unit(s) SubCutaneous three times a day before meals  metoprolol succinate  milliGRAM(s) Oral daily  mirtazapine 15 milliGRAM(s) Oral at bedtime  simvastatin 20 milliGRAM(s) Oral at bedtime  tiotropium 18 MICROgram(s) Capsule 1 Capsule(s) Inhalation daily      Allergies    amoxicillin (Flushing; Vomiting; Nausea)  lisinopril (Angioedema)    Intolerances        SOCIAL HISTORY:  Denies ETOh,Smoking,     FAMILY HISTORY:  Family history of pancreatic cancer (Sibling)    Constitutional: No fever, fatigue or weight loss.  Skin: No rash.  Eyes: No recent vision problems or eye pain.  ENT: No congestion, ear pain, or sore throat.  Endocrine: No thyroid problems.  Cardiovascular: No chest pain or palpation.  Respiratory: No cough, shortness of breath, congestion, or wheezing.  Gastrointestinal: No abdominal pain, nausea, vomiting, or diarrhea.  Genitourinary: No dysuria.  Musculoskeletal: No joint swelling.  Neurologic: No headache.      VITAL:  T(C): , Max: 37.4 (18 @ 20:23)  T(F): , Max: 99.4 (18 @ 20:23)  HR: 61 (18 @ 20:47)  BP: 147/77 (18 @ 20:47)  BP(mean): --  RR: 18 (18 @ 20:47)  SpO2: 95% (18 @ 20:47)  Wt(kg): --    I and O's:    Height (cm): 165.1 ( @ 20:47)  Weight (kg): 60.1 ( @ 13:18)  BMI (kg/m2): 22 ( @ 20:47)  BSA (m2): 1.66 ( @ 20:47)    PHYSICAL EXAM:    Constitutional: NAD  HEENT: conjunctive   clear   Neck:  No JVD  Respiratory: CTAB  Cardiovascular: S1 and S2  Gastrointestinal: BS+, soft, NT/ND  Extremities: pos  peripheral edema  Neurological: no focal deficits  Psychiatric: Normal mood, normal affect  : No Valencia  Skin: pos  rashes , dry   Access: Not applicable    LABS:                        10.8   9.8   )-----------( 260      ( 2018 17:00 )             33.3         131<L>  |  93<L>  |  54<H>  ----------------------------<  325<H>  3.9   |  25  |  2.43<H>    Ca    9.2      2018 08:23    TPro  7.2  /  Alb  3.4  /  TBili  0.3  /  DBili  x   /  AST  39  /  ALT  31  /  AlkPhos  98  06-08      Urine Studies:  Urinalysis Basic - ( 2018 09:34 )    Color: Yellow / Appearance: Clear / S.012 / pH: x  Gluc: x / Ketone: Negative  / Bili: Negative / Urobili: Negative   Blood: x / Protein: 30 mg/dL / Nitrite: Negative   Leuk Esterase: Negative / RBC: x / WBC x   Sq Epi: x / Non Sq Epi: OCC /HPF / Bacteria: x            RADIOLOGY & ADDITIONAL STUDIES:

## 2018-06-08 NOTE — CONSULT NOTE ADULT - SUBJECTIVE AND OBJECTIVE BOX
[facility] Ophthalmology Consult Note    HPI: 86 YOF PMHx PM, HTN, DM, COPD p/w unwitnessed mechanical fall while walking to her daughters house. Pt struck L side of face, denies LOC, vision change, flashes, floaters, diplopia. c/o periorbital tenderness    PMH: as above  Meds: see med rec  POcHx (including surgeries/lasers/trauma):  CAT sx OU  Drops: None  FamHx: None  Social Hx: None  Allergies: amoxicillin, lisinopril    ROS:  General (neg), Vision (per HPI), Head and Neck (neg), Pulm (neg), CV (neg), GI (neg),  (neg), Musculoskeletal (neg), Skin/Integ (neg), Neuro (neg), Endocrine (neg), Heme (neg), All/Immuno (neg)    Mood and Affect Appropriate ( x ),  Oriented to Time, Place, and Person x 3 ( x )    Ophthalmology Exam    Visual acuity (sc): 20/30 OU  Pupils: PERRL OU, no APD  Ttono: 18 OU  Extraocular movements (EOMs): Full OU, no pain, no diplopia   Confrontational Visual Field (CVF):  Full OU  Color Plates: 8/12 OU    Pen Light Exam (PLE)  External:  Flat OD, OS 2+ periorbital ecchymosis w/ 1+ edema. Large scab on upper left forehead  Lids/Lashes/Lacrimal Ducts: Flat OD, OS mile upper and lower lid ecchymosis w/ edema  Sclera/Conjunctiva:  W+Q OU  Cornea: Cl OU, neg abrasion, neg kelley OU  Anterior Chamber: D+F OU, neg hyphema OU  Iris:  Flat OU  Lens:  PCIOL OU, mild PCO OS    Fundus Exam: dilated with 1% tropicamide and 2.5% phenylephrine  Approval obtained from primary team for dilation  Patient aware that pupils can remained dilated for at least 4-6 hours  Exam performed with 20D lens    Vitreous: wnl OU  Disc, cup/disc: sharp and pink OU  Macula:  few scattered MAs OU  Vessels:  wnl OU  Periphery: wnl OU    Diagnostic Testing: CT max/face: Complete opacification of the left maxillary sinus with high   density, likely hemorrhage. There is a slightly depressed left orbital floor   fracture. This is adjacent to the inferior rectus muscle which does not   appear to be radiologically entrapped.     >>86 yof w/ L orbital floor fx, no eye complaints.  -No clinical signs of muscle entrapment, EOMs full, IOP wnl, neg for proptosis  -Cold packs to eye  -Erythromycin ointment to skin abrasion OS  -PO abx per ED team  -Open mouth sneezing, no nose blowing  -Afrin BID  -Artifical tears QID OU  -Rec plastics consult    Follow-Up:  Patient should follow up his/her ophthalmologist or in the [facility] Ophthalmology Practice within 1 week of discharge.  [address] [place] [address] [phone]

## 2018-06-08 NOTE — ED PROVIDER NOTE - ENMT, MLM
Airway patent, Nasal mucosa clear. Mouth with normal mucosa. Throat has no vesicles, no oropharyngeal exudates and uvula is midline. Left temple skin tear 1cm.

## 2018-06-08 NOTE — H&P ADULT - NSHPPHYSICALEXAM_GEN_ALL_CORE
General: frail  Neurology: confused  Respiratory: CTA B/L  CV: RRR, S1S2, no murmurs, rubs or gallops  Abdominal: Soft, NT, ND +BS, Last BM  Extremities: No edema, + peripheral pulses  Skin Normal

## 2018-06-08 NOTE — H&P ADULT - NSHPLABSRESULTS_GEN_ALL_CORE
Lab Results:  CBC  CBC Full  -  ( 2018 17:00 )  WBC Count : 9.8 K/uL  Hemoglobin : 10.8 g/dL  Hematocrit : 33.3 %  Platelet Count - Automated : 260 K/uL  Mean Cell Volume : 92.4 fl  Mean Cell Hemoglobin : 30.0 pg  Mean Cell Hemoglobin Concentration : 32.4 gm/dL  Auto Neutrophil # : x  Auto Lymphocyte # : x  Auto Monocyte # : x  Auto Eosinophil # : x  Auto Basophil # : x  Auto Neutrophil % : x  Auto Lymphocyte % : x  Auto Monocyte % : x  Auto Eosinophil % : x  Auto Basophil % : x    .		Differential:	[] Automated		[] Manual  Chemistry                        10.8   9.8   )-----------( 260      ( 2018 17:00 )             33.3     06-08    131<L>  |  93<L>  |  54<H>  ----------------------------<  325<H>  3.9   |  25  |  2.43<H>    Ca    9.2      2018 08:23    TPro  7.2  /  Alb  3.4  /  TBili  0.3  /  DBili  x   /  AST  39  /  ALT  31  /  AlkPhos  98  06-08    LIVER FUNCTIONS - ( 2018 08:23 )  Alb: 3.4 g/dL / Pro: 7.2 g/dL / ALK PHOS: 98 U/L / ALT: 31 U/L / AST: 39 U/L / GGT: x           PT/INR - ( 2018 08:23 )   PT: 12.3 sec;   INR: 1.13 ratio         PTT - ( 2018 08:23 )  PTT:31.2 sec  Urinalysis Basic - ( 2018 09:34 )    Color: Yellow / Appearance: Clear / S.012 / pH: x  Gluc: x / Ketone: Negative  / Bili: Negative / Urobili: Negative   Blood: x / Protein: 30 mg/dL / Nitrite: Negative   Leuk Esterase: Negative / RBC: x / WBC x   Sq Epi: x / Non Sq Epi: OCC /HPF / Bacteria: x            MICROBIOLOGY/CULTURES:      RADIOLOGY RESULTS: reviewed

## 2018-06-08 NOTE — ED PROVIDER NOTE - NS ED ROS FT
CONSTITUTIONAL: No fevers, no chills  Eyes: no visual changes  Ears: no ear drainage, no ear pain  Nose: no nasal congestion  Mouth/Throat: no sore throat  Cardiovascular: No Chest pain  Respiratory: No SOB  Gastrointestinal: No n/v/d, no abd pain  Genitourinary: no dysuria, no hematuria  SKIN: skin tares  NEURO: +headache  PSYCHIATRIC: no known mental health issues.

## 2018-06-08 NOTE — CONSULT NOTE ADULT - ASSESSMENT
86 YOF p/w unwitnessed mechanical fall while walking to her daughters house. Pt fell, denies LOC, states she heard some noice downstairs which worried her. Pt hit head is on eliquis. Pt has multiple skin tares on left elbow left knee, and left temple. Pt endorses left hip pain and has hematoma over the hip with moderate tenderness. nowwith ckd and carlene 86 YOF p/w unwitnessed mechanical fall while walking to her daughters house. Pt fell, denies LOC, states she heard some noice downstairs which worried her. Pt hit head is on eliquis. Pt has multiple skin tares on left elbow left knee, and left temple. Pt endorses left hip pain and has hematoma over the hip with moderate tenderness. now with ckd and carlene

## 2018-06-08 NOTE — ED PROVIDER NOTE - ATTENDING CONTRIBUTION TO CARE
ATTENDING MD:  Juanito MENDOZA, personally have seen and examined this patient.  I have discussed all aspects of care with the resident physician. Resident note reviewed and agree on plan of care and except where noted.  See HPI, PE, and MDM for details.    GEN: minimal distress, AOx name, place, situation, year  HEAD: L-forehead and eye hematoma with periorbital bony tenderness  EYES: 3mm R pupil, 4mm L pupil, pupils round and reactive to light, extra-occular movements are intact, no entrapment  ENT: no other facial bone tenderness, no hemotympanum, trachea midline  CV: irregular rhythm, normal rate, 2+ distal pulses  RESP: lungs clear to auscultation bilaterally, equal breath sounds bilaterally, chest wall nontender  MSK: pelvis stable, L-hip large hematoma, full ROM of legs, mild skin tears of forearm and left leg, stable, no spinal tenderness  NEURO: CNII-XII intact, strength intact, sensation intact to light touch, finger-nose-finger intact, gait deerred    MDM: s/p fall on AC, with hematoma, r/o facial injury, ICH, hip fracture. trend H/H for hematoma. trauma consult PRN.

## 2018-06-08 NOTE — CONSULT NOTE ADULT - ASSESSMENT
87 yo female sp fall on asa81/elliquis with small tsah right inferior temporal, trauma eval, ophtho consult, cth 6 hours. Hold all AC until fu outpatient 1-2 weeks after discharge. May reconsult nsgy as needed, if cth is not read as stable at 6 hour.

## 2018-06-09 LAB
ANION GAP SERPL CALC-SCNC: 14 MMOL/L — SIGNIFICANT CHANGE UP (ref 5–17)
APTT BLD: 29.3 SEC — SIGNIFICANT CHANGE UP (ref 27.5–37.4)
BUN SERPL-MCNC: 43 MG/DL — HIGH (ref 7–23)
CALCIUM SERPL-MCNC: 8.7 MG/DL — SIGNIFICANT CHANGE UP (ref 8.4–10.5)
CHLORIDE SERPL-SCNC: 98 MMOL/L — SIGNIFICANT CHANGE UP (ref 96–108)
CO2 SERPL-SCNC: 25 MMOL/L — SIGNIFICANT CHANGE UP (ref 22–31)
CREAT SERPL-MCNC: 1.98 MG/DL — HIGH (ref 0.5–1.3)
CULTURE RESULTS: NO GROWTH — SIGNIFICANT CHANGE UP
GLUCOSE BLDC GLUCOMTR-MCNC: 134 MG/DL — HIGH (ref 70–99)
GLUCOSE BLDC GLUCOMTR-MCNC: 167 MG/DL — HIGH (ref 70–99)
GLUCOSE BLDC GLUCOMTR-MCNC: 209 MG/DL — HIGH (ref 70–99)
GLUCOSE BLDC GLUCOMTR-MCNC: 229 MG/DL — HIGH (ref 70–99)
GLUCOSE BLDC GLUCOMTR-MCNC: 266 MG/DL — HIGH (ref 70–99)
GLUCOSE BLDC GLUCOMTR-MCNC: 276 MG/DL — HIGH (ref 70–99)
GLUCOSE SERPL-MCNC: 148 MG/DL — HIGH (ref 70–99)
HBA1C BLD-MCNC: 8.4 % — HIGH (ref 4–5.6)
HCT VFR BLD CALC: 30.6 % — LOW (ref 34.5–45)
HGB BLD-MCNC: 9.8 G/DL — LOW (ref 11.5–15.5)
INR BLD: 0.98 RATIO — SIGNIFICANT CHANGE UP (ref 0.88–1.16)
MCHC RBC-ENTMCNC: 28.2 PG — SIGNIFICANT CHANGE UP (ref 27–34)
MCHC RBC-ENTMCNC: 32 GM/DL — SIGNIFICANT CHANGE UP (ref 32–36)
MCV RBC AUTO: 88.2 FL — SIGNIFICANT CHANGE UP (ref 80–100)
PLATELET # BLD AUTO: 220 K/UL — SIGNIFICANT CHANGE UP (ref 150–400)
POTASSIUM SERPL-MCNC: 3.6 MMOL/L — SIGNIFICANT CHANGE UP (ref 3.5–5.3)
POTASSIUM SERPL-SCNC: 3.6 MMOL/L — SIGNIFICANT CHANGE UP (ref 3.5–5.3)
PROTHROM AB SERPL-ACNC: 11.1 SEC — SIGNIFICANT CHANGE UP (ref 10–13.1)
RBC # BLD: 3.47 M/UL — LOW (ref 3.8–5.2)
RBC # FLD: 16.9 % — HIGH (ref 10.3–14.5)
SODIUM SERPL-SCNC: 137 MMOL/L — SIGNIFICANT CHANGE UP (ref 135–145)
SPECIMEN SOURCE: SIGNIFICANT CHANGE UP
WBC # BLD: 8.8 K/UL — SIGNIFICANT CHANGE UP (ref 3.8–10.5)
WBC # FLD AUTO: 8.8 K/UL — SIGNIFICANT CHANGE UP (ref 3.8–10.5)

## 2018-06-09 PROCEDURE — 70450 CT HEAD/BRAIN W/O DYE: CPT | Mod: 26

## 2018-06-09 RX ORDER — INSULIN LISPRO 100/ML
VIAL (ML) SUBCUTANEOUS AT BEDTIME
Qty: 0 | Refills: 0 | Status: DISCONTINUED | OUTPATIENT
Start: 2018-06-09 | End: 2018-06-14

## 2018-06-09 RX ORDER — INSULIN GLARGINE 100 [IU]/ML
21 INJECTION, SOLUTION SUBCUTANEOUS AT BEDTIME
Qty: 0 | Refills: 0 | Status: DISCONTINUED | OUTPATIENT
Start: 2018-06-09 | End: 2018-06-10

## 2018-06-09 RX ORDER — INSULIN LISPRO 100/ML
10 VIAL (ML) SUBCUTANEOUS
Qty: 0 | Refills: 0 | Status: DISCONTINUED | OUTPATIENT
Start: 2018-06-09 | End: 2018-06-10

## 2018-06-09 RX ADMIN — Medication 7 UNIT(S): at 08:26

## 2018-06-09 RX ADMIN — SIMVASTATIN 20 MILLIGRAM(S): 20 TABLET, FILM COATED ORAL at 21:14

## 2018-06-09 RX ADMIN — AMIODARONE HYDROCHLORIDE 200 MILLIGRAM(S): 400 TABLET ORAL at 05:19

## 2018-06-09 RX ADMIN — Medication 3 MILLILITER(S): at 17:03

## 2018-06-09 RX ADMIN — Medication 3 MILLILITER(S): at 23:34

## 2018-06-09 RX ADMIN — Medication 3: at 12:36

## 2018-06-09 RX ADMIN — Medication 40 MILLIGRAM(S): at 05:19

## 2018-06-09 RX ADMIN — Medication 100 MILLIGRAM(S): at 05:19

## 2018-06-09 RX ADMIN — Medication 0.5 MILLIGRAM(S): at 17:10

## 2018-06-09 RX ADMIN — DULOXETINE HYDROCHLORIDE 60 MILLIGRAM(S): 30 CAPSULE, DELAYED RELEASE ORAL at 12:36

## 2018-06-09 RX ADMIN — Medication 81 MILLIGRAM(S): at 12:36

## 2018-06-09 RX ADMIN — ARIPIPRAZOLE 2 MILLIGRAM(S): 15 TABLET ORAL at 12:36

## 2018-06-09 RX ADMIN — Medication 3 MILLILITER(S): at 05:19

## 2018-06-09 RX ADMIN — Medication 3: at 17:02

## 2018-06-09 RX ADMIN — Medication 0.5 MILLIGRAM(S): at 05:19

## 2018-06-09 RX ADMIN — Medication 3 MILLILITER(S): at 12:37

## 2018-06-09 RX ADMIN — Medication 7 UNIT(S): at 17:02

## 2018-06-09 RX ADMIN — Medication 100 MILLIGRAM(S): at 17:03

## 2018-06-09 RX ADMIN — Medication 7 UNIT(S): at 12:36

## 2018-06-09 RX ADMIN — INSULIN GLARGINE 21 UNIT(S): 100 INJECTION, SOLUTION SUBCUTANEOUS at 23:33

## 2018-06-09 RX ADMIN — MIRTAZAPINE 15 MILLIGRAM(S): 45 TABLET, ORALLY DISINTEGRATING ORAL at 21:14

## 2018-06-09 NOTE — PROGRESS NOTE ADULT - ASSESSMENT
86 YOF p/w unwitnessed mechanical fall while walking to her daughters house. Pt fell, denies LOC, states she heard some noice downstairs which worried her. Pt hit head is on eliquis. Pt has multiple skin tares on left elbow left knee, and left temple. Pt endorses left hip pain and has hematoma over the hip with moderate tenderness. Pt denies any urinary symptoms, chest pain, back pain, neck pain, abdominal pain. Per daughter, pt has been slightly confused over the past week and recently had her DM medications changed    Problem/Plan - 1:  ·  Problem: Traumatic subarachnoid hemorrhage without loss of consciousness, initial encounter.  Plan: repeat CT scan reviewed  neurosurgery fu, paged for fu  management as per neurosurgery  serial CT scans    Problem/Plan - 2:  ·  Problem: Open fracture of left orbital floor, initial encounter.  Plan: Ophthalmology consult appreciated  no acute intervention needed.     Problem/Plan - 3:  ·  Problem: Near syncope.  Plan: telemonitor  cards fu  PPM interoogation.     Problem/Plan - 4:  ·  Problem: Atrial fibrillation, unspecified type.  Plan: hold AC for now    Problem/Plan - 5:  ·  Problem: Diabetes.  Plan: hold basal/bolus  ISS.

## 2018-06-09 NOTE — CONSULT NOTE ADULT - PROBLEM SELECTOR RECOMMENDATION 3
On meds primary team following up
,obtain ECHO to evaluate LVEF,cardiology consult,continue current managmnet,O2 supply,anticoagulation plan as per cardiology consult

## 2018-06-09 NOTE — CONSULT NOTE ADULT - PROBLEM SELECTOR PROBLEM 2
Traumatic subarachnoid hemorrhage without loss of consciousness, initial encounter
Traumatic subarachnoid hemorrhage without loss of consciousness, initial encounter

## 2018-06-09 NOTE — CONSULT NOTE ADULT - ASSESSMENT
Assessment  DMT2: 86y Female with DM T2 with hyperglycemia on basal bolus insulin, blood sugars running high, no hypoglycemic episode,  eating meals, compliant with low carb diet.  COPD: On medications, stable, monitored.  HTN: Controlled, On med.  Subarachnoid Bleed: Monitor, stable

## 2018-06-09 NOTE — PROGRESS NOTE ADULT - SUBJECTIVE AND OBJECTIVE BOX
Patient is a 86y old  Female who presents with a chief complaint of fall (2018 18:49)      INTERVAL HPI/OVERNIGHT EVENTS:  T(C): 36.6 (18 @ 09:02), Max: 37.4 (18 @ 20:23)  HR: 70 (18 @ 09:02) (59 - 70)  BP: 120/63 (18 @ 09:02) (106/54 - 168/101)  RR: 18 (18 @ 09:02) (16 - 20)  SpO2: 100% (18 @ 09:02) (95% - 100%)  Wt(kg): --  I&O's Summary    2018 07:01  -  2018 07:00  --------------------------------------------------------  IN: 200 mL / OUT: 0 mL / NET: 200 mL        LABS:                        9.8    8.80  )-----------( 220      ( 2018 08:25 )             30.6     06-    137  |  98  |  43<H>  ----------------------------<  148<H>  3.6   |  25  |  1.98<H>    Ca    8.7      2018 06:41    TPro  7.2  /  Alb  3.4  /  TBili  0.3  /  DBili  x   /  AST  39  /  ALT  31  /  AlkPhos  98  06-08    PT/INR - ( 2018 08:25 )   PT: 11.1 sec;   INR: 0.98 ratio         PTT - ( 2018 08:25 )  PTT:29.3 sec  Urinalysis Basic - ( 2018 09:34 )    Color: Yellow / Appearance: Clear / S.012 / pH: x  Gluc: x / Ketone: Negative  / Bili: Negative / Urobili: Negative   Blood: x / Protein: 30 mg/dL / Nitrite: Negative   Leuk Esterase: Negative / RBC: x / WBC x   Sq Epi: x / Non Sq Epi: OCC /HPF / Bacteria: x      CAPILLARY BLOOD GLUCOSE      POCT Blood Glucose.: 134 mg/dL (2018 07:38)  POCT Blood Glucose.: 256 mg/dL (2018 22:52)  POCT Blood Glucose.: 226 mg/dL (2018 21:24)        Urinalysis Basic - ( 2018 09:34 )    Color: Yellow / Appearance: Clear / S.012 / pH: x  Gluc: x / Ketone: Negative  / Bili: Negative / Urobili: Negative   Blood: x / Protein: 30 mg/dL / Nitrite: Negative   Leuk Esterase: Negative / RBC: x / WBC x   Sq Epi: x / Non Sq Epi: OCC /HPF / Bacteria: x        MEDICATIONS  (STANDING):  ALBUTerol    90 MICROgram(s) HFA Inhaler 1 Puff(s) Inhalation every 4 hours  ALBUTerol/ipratropium for Nebulization 3 milliLiter(s) Nebulizer every 6 hours  amiodarone    Tablet 200 milliGRAM(s) Oral daily  ARIPiprazole 2 milliGRAM(s) Oral daily  aspirin  chewable 81 milliGRAM(s) Oral daily  clonazePAM Tablet 0.5 milliGRAM(s) Oral two times a day  dextrose 5%. 1000 milliLiter(s) (50 mL/Hr) IV Continuous <Continuous>  dextrose 50% Injectable 12.5 Gram(s) IV Push once  dextrose 50% Injectable 25 Gram(s) IV Push once  dextrose 50% Injectable 25 Gram(s) IV Push once  docusate sodium 100 milliGRAM(s) Oral two times a day  DULoxetine 60 milliGRAM(s) Oral daily  furosemide    Tablet 40 milliGRAM(s) Oral daily  insulin glargine Injectable (LANTUS) 15 Unit(s) SubCutaneous at bedtime  insulin lispro (HumaLOG) corrective regimen sliding scale   SubCutaneous three times a day before meals  insulin lispro (HumaLOG) corrective regimen sliding scale   SubCutaneous three times a day before meals  insulin lispro Injectable (HumaLOG) 7 Unit(s) SubCutaneous three times a day before meals  metoprolol succinate  milliGRAM(s) Oral daily  mirtazapine 15 milliGRAM(s) Oral at bedtime  simvastatin 20 milliGRAM(s) Oral at bedtime  tiotropium 18 MICROgram(s) Capsule 1 Capsule(s) Inhalation daily    MEDICATIONS  (PRN):  dextrose 40% Gel 15 Gram(s) Oral once PRN Blood Glucose LESS THAN 70 milliGRAM(s)/deciliter  glucagon  Injectable 1 milliGRAM(s) IntraMuscular once PRN Glucose LESS THAN 70 milligrams/deciliter  ondansetron Injectable 4 milliGRAM(s) IV Push every 6 hours PRN Nausea          PHYSICAL EXAM:  GENERAL: NAD, well-groomed, well-developed  HEAD:  Atraumatic, Normocephalic  CHEST/LUNG: Clear to percussion bilaterally; No rales, rhonchi, wheezing, or rubs  HEART: Regular rate and rhythm; No murmurs, rubs, or gallops  ABDOMEN: Soft, Nontender, Nondistended; Bowel sounds present  EXTREMITIES:  2+ Peripheral Pulses, No clubbing, cyanosis, or edema  LYMPH: No lymphadenopathy noted  SKIN: No rashes or lesions    Care Discussed with Consultants/Other Providers [ +] YES  [ ] NO

## 2018-06-09 NOTE — PROGRESS NOTE ADULT - ASSESSMENT
-please obtain CTA of L thigh to assess for active bleeding at site of hematoma. 86F s/p fall w/ SAH w/ interval expansion now stable on repeat CT this am, L orbital fx, L hip hematoma.     -Please obtain Plastic Surgery (Face) consult for facial injuries, as recommended by Ophthalmology.     - trend CBC  - Care of orbital fracture per Ophthalmology   - diet as tolerated, no acute surgical intervention        GLADIS York MD (PGY2)    (Please page [facility] [phone] for questions/concerns.)

## 2018-06-09 NOTE — CONSULT NOTE ADULT - SUBJECTIVE AND OBJECTIVE BOX
HPI:  86 YOF p/w unwitnessed mechanical fall while walking to her daughters house. Pt fell, denies LOC, states she heard some noice downstairs which worried her. Pt hit head is on eliquis. Pt has multiple skin tares on left elbow left knee, and left temple. Pt endorses left hip pain and has hematoma over the hip with moderate tenderness. Pt denies any urinary symptoms, chest pain, back pain, neck pain, abdominal pain. Per daughter, pt has been slightly confused over the past week and recently had her DM medications changed (08 Jun 2018 18:49)  Patient has history of diabetes, on insulin at home, no recent hypoglycemic episodes, no polyuria polydipsia. Patient follows up with PCP for diabetes management.    PAST MEDICAL & SURGICAL HISTORY:  COPD (chronic obstructive pulmonary disease)  Depression  Anxiety  Atrial fibrillation, unspecified type  Pacemaker  Overweight  PVD (Peripheral Vascular Disease)  Arteriosclerotic Heart Disease (ASHD)  HTN (Hypertension)  Diabetes  S/P carpal tunnel release  S/P left cataract extraction  s/p colon resection  S/P Nephrectomy: right  Hip Replacement  History of Total Knee Replacement  S/P Hernia Surgery  S/P Cholecystectomy      FAMILY HISTORY:  Family history of pancreatic cancer (Sibling)      Social History:    Outpatient Medications:    MEDICATIONS  (STANDING):  ALBUTerol    90 MICROgram(s) HFA Inhaler 1 Puff(s) Inhalation every 4 hours  ALBUTerol/ipratropium for Nebulization 3 milliLiter(s) Nebulizer every 6 hours  amiodarone    Tablet 200 milliGRAM(s) Oral daily  ARIPiprazole 2 milliGRAM(s) Oral daily  clonazePAM Tablet 0.5 milliGRAM(s) Oral two times a day  dextrose 5%. 1000 milliLiter(s) (50 mL/Hr) IV Continuous <Continuous>  dextrose 50% Injectable 12.5 Gram(s) IV Push once  dextrose 50% Injectable 25 Gram(s) IV Push once  dextrose 50% Injectable 25 Gram(s) IV Push once  docusate sodium 100 milliGRAM(s) Oral two times a day  DULoxetine 60 milliGRAM(s) Oral daily  furosemide    Tablet 40 milliGRAM(s) Oral daily  insulin glargine Injectable (LANTUS) 21 Unit(s) SubCutaneous at bedtime  insulin lispro (HumaLOG) corrective regimen sliding scale   SubCutaneous three times a day before meals  insulin lispro (HumaLOG) corrective regimen sliding scale   SubCutaneous at bedtime  insulin lispro Injectable (HumaLOG) 10 Unit(s) SubCutaneous three times a day before meals  metoprolol succinate  milliGRAM(s) Oral daily  mirtazapine 15 milliGRAM(s) Oral at bedtime  simvastatin 20 milliGRAM(s) Oral at bedtime  tiotropium 18 MICROgram(s) Capsule 1 Capsule(s) Inhalation daily    MEDICATIONS  (PRN):  dextrose 40% Gel 15 Gram(s) Oral once PRN Blood Glucose LESS THAN 70 milliGRAM(s)/deciliter  glucagon  Injectable 1 milliGRAM(s) IntraMuscular once PRN Glucose LESS THAN 70 milligrams/deciliter  ondansetron Injectable 4 milliGRAM(s) IV Push every 6 hours PRN Nausea      Allergies    amoxicillin (Flushing; Vomiting; Nausea)  lisinopril (Angioedema)    Intolerances      Review of Systems:  Constitutional: No fever, no chills  Eyes: No blurry vision  Neuro: No tremors  HEENT: No pain, no neck swelling  Cardiovascular: No chest pain, no palpitations  Respiratory: Has SOB, no cough  GI: No nausea, vomiting, abdominal pain  : No dysuria  Skin: no rash  MSK: Has leg swelling.  Psych: no depression  Endocrine: no polyuria, polydipsia    ALL OTHER SYSTEMS REVIEWED AND NEGATIVE    UNABLE TO OBTAIN    PHYSICAL EXAM:  VITALS: T(C): 36.4 (06-09-18 @ 11:52)  T(F): 97.5 (06-09-18 @ 11:52), Max: 99.4 (06-08-18 @ 20:23)  HR: 64 (06-09-18 @ 11:52) (60 - 70)  BP: 101/61 (06-09-18 @ 11:52) (101/61 - 159/69)  RR:  (18 - 18)  SpO2:  (95% - 100%)  Wt(kg): --  GENERAL: NAD, well-groomed, well-developed  EYES: No proptosis, no lid lag  HEENT:  Atraumatic, Normocephalic  THYROID: Normal size, no palpable nodules  RESPIRATORY: Clear to auscultation bilaterally; No rales, rhonchi, wheezing  CARDIOVASCULAR: Si S2, No murmurs;  GI: Soft, non distended, normal bowel sounds  SKIN: Dry, intact, No rashes or lesions  MUSCULOSKELETAL: Has BL lower extremity edema.  NEURO:  no tremor, sensation decreased in feet BL,    POCT Blood Glucose.: 276 mg/dL (06-09-18 @ 16:19)  POCT Blood Glucose.: 266 mg/dL (06-09-18 @ 12:27)  POCT Blood Glucose.: 209 mg/dL (06-09-18 @ 11:17)  POCT Blood Glucose.: 134 mg/dL (06-09-18 @ 07:38)  POCT Blood Glucose.: 256 mg/dL (06-08-18 @ 22:52)  POCT Blood Glucose.: 226 mg/dL (06-08-18 @ 21:24)  POCT Blood Glucose.: 307 mg/dL (06-08-18 @ 07:43)                            9.8    8.80  )-----------( 220      ( 09 Jun 2018 08:25 )             30.6       06-09    137  |  98  |  43<H>  ----------------------------<  148<H>  3.6   |  25  |  1.98<H>    EGFR if : 26<L>  EGFR if non : 22<L>    Ca    8.7      06-09    TPro  7.2  /  Alb  3.4  /  TBili  0.3  /  DBili  x   /  AST  39  /  ALT  31  /  AlkPhos  98  06-08      Thyroid Function Tests:      Hemoglobin A1C, Whole Blood: 8.4 % <H> [4.0 - 5.6] (06-09-18 @ 08:25)          Radiology:

## 2018-06-09 NOTE — PROGRESS NOTE ADULT - SUBJECTIVE AND OBJECTIVE BOX
Tertiary Trauma Survey (TTS)    Date of TTS:   18                           Time: 12:10pm  Admit Date:  18                            Trauma Activation: consult  Admit GCS: E-4     V-5     M-6     HPI:  86F p/w unwitnessed mechanical fall while walking to her daughters house. Pt fell, denies LOC, states she heard some noice downstairs which worried her. Pt hit head is on eliquis. Pt has multiple skin tares on left elbow left knee, and left temple. Pt endorses left hip pain and has hematoma over the hip with moderate tenderness. Pt denies any urinary symptoms, chest pain, back pain, neck pain, abdominal pain. Per daughter, pt has been slightly confused over the past week and recently had her DM medications changed.     CT in the ED revealed L orbital floor fracture and SAH. CT pelvis revealed large L hip hematoma w/o fracture. She received 1500U K centra.      PAST MEDICAL & SURGICAL HISTORY:  COPD (chronic obstructive pulmonary disease)  Depression  Anxiety  Atrial fibrillation, unspecified type  Pacemaker  Overweight  PVD (Peripheral Vascular Disease)  Arteriosclerotic Heart Disease (ASHD)  HTN (Hypertension)  Diabetes  S/P carpal tunnel release  S/P left cataract extraction  s/p colon resection  S/P Nephrectomy: right  Hip Replacement  History of Total Knee Replacement  S/P Hernia Surgery  S/P Cholecystectomy    [ x ] No significant past history as reviewed with the patient and family    FAMILY HISTORY:  Family history of pancreatic cancer (Sibling)    [ x ] Family history not pertinent as reviewed with the patient and family    SOCIAL HISTORY: denies tobacco/EtOH use; lives alone    Medications (inpatient): ALBUTerol    90 MICROgram(s) HFA Inhaler 1 Puff(s) Inhalation every 4 hours  ALBUTerol/ipratropium for Nebulization 3 milliLiter(s) Nebulizer every 6 hours  amiodarone    Tablet 200 milliGRAM(s) Oral daily  ARIPiprazole 2 milliGRAM(s) Oral daily  aspirin  chewable 81 milliGRAM(s) Oral daily  clonazePAM Tablet 0.5 milliGRAM(s) Oral two times a day  dextrose 5%. 1000 milliLiter(s) IV Continuous <Continuous>  dextrose 50% Injectable 12.5 Gram(s) IV Push once  dextrose 50% Injectable 25 Gram(s) IV Push once  dextrose 50% Injectable 25 Gram(s) IV Push once  docusate sodium 100 milliGRAM(s) Oral two times a day  DULoxetine 60 milliGRAM(s) Oral daily  furosemide    Tablet 40 milliGRAM(s) Oral daily  insulin glargine Injectable (LANTUS) 15 Unit(s) SubCutaneous at bedtime  insulin lispro (HumaLOG) corrective regimen sliding scale   SubCutaneous three times a day before meals  insulin lispro (HumaLOG) corrective regimen sliding scale   SubCutaneous three times a day before meals  insulin lispro Injectable (HumaLOG) 7 Unit(s) SubCutaneous three times a day before meals  metoprolol succinate  milliGRAM(s) Oral daily  mirtazapine 15 milliGRAM(s) Oral at bedtime  simvastatin 20 milliGRAM(s) Oral at bedtime  tiotropium 18 MICROgram(s) Capsule 1 Capsule(s) Inhalation daily    Medications (PRN):dextrose 40% Gel 15 Gram(s) Oral once PRN  glucagon  Injectable 1 milliGRAM(s) IntraMuscular once PRN  ondansetron Injectable 4 milliGRAM(s) IV Push every 6 hours PRN    Home Medications:  Advair Diskus 250 mcg-50 mcg inhalation powder: 1 puff(s) inhaled 2 times a day (23 Dec 2017 02:18)  amiodarone 200 mg oral tablet: 1 tab(s) orally once a day (23 Dec 2017 02:18)  ARIPiprazole 2 mg oral tablet: 1 tab(s) orally once a day (23 Dec 2017 02:18)  aspirin 81 mg oral tablet, chewable: 1 tab(s) orally once a day (23 Dec 2017 02:18)  Colace 100 mg oral capsule: 4 cap(s) orally once a day (at night) (23 Dec 2017 02:18)  Cymbalta 60 mg oral delayed release capsule: 1 cap(s) orally once a day (23 Dec 2017 02:18)  Eliquis 2.5 mg oral tablet: 1 tab(s) orally 2 times a day (23 Dec 2017 02:18)  HumaLOG KwikPen 100 units/mL subcutaneous solution: 4 unit(s) subcutaneous 3 times a day (23 Dec 2017 02:18)  KlonoPIN 0.5 mg oral tablet: 1 tablet orally 1-2 times daily, As Needed (23 Dec 2017 02:18)  Lasix 40 mg oral tablet: 1 tab(s) orally once a day (23 Dec 2017 02:18)  Remeron 15 mg oral tablet: 1 tab(s) orally once a day (at bedtime) (23 Dec 2017 02:18)  simvastatin 20 mg oral tablet: 1 tab(s) orally once a day (at bedtime) (23 Dec 2017 02:18)  Toprol- mg oral tablet, extended release: 1 tab(s) orally once a day (23 Dec 2017 02:18)  Toujeo SoloStar 300 units/mL subcutaneous solution: 12 unit(s) subcutaneous once a day (23 Dec 2017 02:18)      Allergies: amoxicillin (Flushing; Vomiting; Nausea)  lisinopril (Angioedema)  (Intolerances: x)    Vital Signs Last 24 Hrs  T(C): 36.4 (2018 11:52), Max: 37.4 (2018 20:23)  T(F): 97.5 (2018 11:52), Max: 99.4 (2018 20:23)  HR: 64 (2018 11:52) (59 - 70)  BP: 101/61 (2018 11:52) (101/61 - 159/69)  BP(mean): --  RR: 18 (2018 11:52) (16 - 20)  SpO2: 96% (2018 11:52) (95% - 100%)  Drug Dosing Weight  Height (cm): 165.1 (2018 20:47)  Weight (kg): 60.1 (2018 13:18)  BMI (kg/m2): 22 (2018 20:47)  BSA (m2): 1.66 (2018 20:47)      NAD, awake and alert  HEENT: Ecchymosis superior and lateral to L eye  Pulm: Respirations nonlabored  Abd; Abdomen soft, nontender, nondistended; nodularity of L abdominal   No guarding or rebound tenderness   L gluteal region firm, ecchymotic, tender  Extremities warm, full ROM                            9.8    8.80  )-----------( 220      ( 2018 08:25 )             30.6     -    137  |  98  |  43<H>  ----------------------------<  148<H>  3.6   |  25  |  1.98<H>    Ca    8.7      2018 06:41    TPro  7.2  /  Alb  3.4  /  TBili  0.3  /  DBili  x   /  AST  39  /  ALT  31  /  AlkPhos  98  06-08    PT/INR - ( 2018 08:25 )   PT: 11.1 sec;   INR: 0.98 ratio         PTT - ( 2018 08:25 )  PTT:29.3 sec  Urinalysis Basic - ( 2018 09:34 )    Color: Yellow / Appearance: Clear / S.012 / pH: x  Gluc: x / Ketone: Negative  / Bili: Negative / Urobili: Negative   Blood: x / Protein: 30 mg/dL / Nitrite: Negative   Leuk Esterase: Negative / RBC: x / WBC x   Sq Epi: x / Non Sq Epi: OCC /HPF / Bacteria: x        List Injuries Identified to Date:    List Operative and Interventional Radiological Procedures:     Consults (Date):  [x  ] Neurosurgery: 18  [  ] Orthopedics  [  ] Plastics  [  ] Urology  [  ] PM&R  [  ] Social Work  [x] Ophthalmology: 18  [x] Nephrology: 18    RADIOLOGICAL FINDINGS REVIEW:  CXR:    Pelvis Films:     C-Spine Films:    T/L/S Spine Films:    Extremity Films:    Head CT:    C-Spine CT:    Neck CT:    Chest CT:    ABD/Pelvis CT:    Other:    Interpretation of Findings: Tertiary Trauma Survey (TTS)    Date of TTS:   18                           Time: 12:10pm  Admit Date:  18                            Trauma Activation: consult  Admit GCS: E-4     V-5     M-6     HPI:  86F p/w unwitnessed mechanical fall while walking to her daughters house. Pt fell, denies LOC, states she heard some noice downstairs which worried her. Pt hit head is on eliquis. Pt has multiple skin tares on left elbow left knee, and left temple. Pt endorses left hip pain and has hematoma over the hip with moderate tenderness. Pt denies any urinary symptoms, chest pain, back pain, neck pain, abdominal pain. Per daughter, pt has been slightly confused over the past week and recently had her DM medications changed.     CT in the ED revealed L orbital floor fracture and SAH. CT pelvis revealed large L hip hematoma w/o fracture. She received 1500U K centra.      PAST MEDICAL & SURGICAL HISTORY:  COPD (chronic obstructive pulmonary disease)  Depression  Anxiety  Atrial fibrillation, unspecified type  Pacemaker  Overweight  PVD (Peripheral Vascular Disease)  Arteriosclerotic Heart Disease (ASHD)  HTN (Hypertension)  Diabetes  S/P carpal tunnel release  S/P left cataract extraction  s/p colon resection  S/P Nephrectomy: right  Hip Replacement  History of Total Knee Replacement  S/P Hernia Surgery  S/P Cholecystectomy    [ x ] No significant past history as reviewed with the patient and family    FAMILY HISTORY:  Family history of pancreatic cancer (Sibling)    [ x ] Family history not pertinent as reviewed with the patient and family    SOCIAL HISTORY: denies tobacco/EtOH use; lives alone    Medications (inpatient): ALBUTerol    90 MICROgram(s) HFA Inhaler 1 Puff(s) Inhalation every 4 hours  ALBUTerol/ipratropium for Nebulization 3 milliLiter(s) Nebulizer every 6 hours  amiodarone    Tablet 200 milliGRAM(s) Oral daily  ARIPiprazole 2 milliGRAM(s) Oral daily  aspirin  chewable 81 milliGRAM(s) Oral daily  clonazePAM Tablet 0.5 milliGRAM(s) Oral two times a day  dextrose 5%. 1000 milliLiter(s) IV Continuous <Continuous>  dextrose 50% Injectable 12.5 Gram(s) IV Push once  dextrose 50% Injectable 25 Gram(s) IV Push once  dextrose 50% Injectable 25 Gram(s) IV Push once  docusate sodium 100 milliGRAM(s) Oral two times a day  DULoxetine 60 milliGRAM(s) Oral daily  furosemide    Tablet 40 milliGRAM(s) Oral daily  insulin glargine Injectable (LANTUS) 15 Unit(s) SubCutaneous at bedtime  insulin lispro (HumaLOG) corrective regimen sliding scale   SubCutaneous three times a day before meals  insulin lispro (HumaLOG) corrective regimen sliding scale   SubCutaneous three times a day before meals  insulin lispro Injectable (HumaLOG) 7 Unit(s) SubCutaneous three times a day before meals  metoprolol succinate  milliGRAM(s) Oral daily  mirtazapine 15 milliGRAM(s) Oral at bedtime  simvastatin 20 milliGRAM(s) Oral at bedtime  tiotropium 18 MICROgram(s) Capsule 1 Capsule(s) Inhalation daily    Medications (PRN):dextrose 40% Gel 15 Gram(s) Oral once PRN  glucagon  Injectable 1 milliGRAM(s) IntraMuscular once PRN  ondansetron Injectable 4 milliGRAM(s) IV Push every 6 hours PRN    Home Medications:  Advair Diskus 250 mcg-50 mcg inhalation powder: 1 puff(s) inhaled 2 times a day (23 Dec 2017 02:18)  amiodarone 200 mg oral tablet: 1 tab(s) orally once a day (23 Dec 2017 02:18)  ARIPiprazole 2 mg oral tablet: 1 tab(s) orally once a day (23 Dec 2017 02:18)  aspirin 81 mg oral tablet, chewable: 1 tab(s) orally once a day (23 Dec 2017 02:18)  Colace 100 mg oral capsule: 4 cap(s) orally once a day (at night) (23 Dec 2017 02:18)  Cymbalta 60 mg oral delayed release capsule: 1 cap(s) orally once a day (23 Dec 2017 02:18)  Eliquis 2.5 mg oral tablet: 1 tab(s) orally 2 times a day (23 Dec 2017 02:18)  HumaLOG KwikPen 100 units/mL subcutaneous solution: 4 unit(s) subcutaneous 3 times a day (23 Dec 2017 02:18)  KlonoPIN 0.5 mg oral tablet: 1 tablet orally 1-2 times daily, As Needed (23 Dec 2017 02:18)  Lasix 40 mg oral tablet: 1 tab(s) orally once a day (23 Dec 2017 02:18)  Remeron 15 mg oral tablet: 1 tab(s) orally once a day (at bedtime) (23 Dec 2017 02:18)  simvastatin 20 mg oral tablet: 1 tab(s) orally once a day (at bedtime) (23 Dec 2017 02:18)  Toprol- mg oral tablet, extended release: 1 tab(s) orally once a day (23 Dec 2017 02:18)  Toujeo SoloStar 300 units/mL subcutaneous solution: 12 unit(s) subcutaneous once a day (23 Dec 2017 02:18)      Allergies: amoxicillin (Flushing; Vomiting; Nausea)  lisinopril (Angioedema)  (Intolerances: x)    Vital Signs Last 24 Hrs  T(C): 36.4 (2018 11:52), Max: 37.4 (2018 20:23)  T(F): 97.5 (2018 11:52), Max: 99.4 (2018 20:23)  HR: 64 (2018 11:52) (59 - 70)  BP: 101/61 (2018 11:52) (101/61 - 159/69)  BP(mean): --  RR: 18 (2018 11:52) (16 - 20)  SpO2: 96% (2018 11:52) (95% - 100%)  Drug Dosing Weight  Height (cm): 165.1 (2018 20:47)  Weight (kg): 60.1 (2018 13:18)  BMI (kg/m2): 22 (2018 20:47)  BSA (m2): 1.66 (2018 20:47)      NAD, awake and alert  HEENT: Ecchymosis superior and lateral to L eye  Pulm: Respirations nonlabored  Abd; Abdomen soft, nontender, nondistended; nodularity of L abdominal   No guarding or rebound tenderness   Ext; L gluteal region firm, ecchymotic, tender;   Extremities warm, full ROM                            9.8    8.80  )-----------( 220      ( 2018 08:25 )             30.6     06-09    137  |  98  |  43<H>  ----------------------------<  148<H>  3.6   |  25  |  1.98<H>    Ca    8.7      2018 06:41    TPro  7.2  /  Alb  3.4  /  TBili  0.3  /  DBili  x   /  AST  39  /  ALT  31  /  AlkPhos  98  06-08    PT/INR - ( 2018 08:25 )   PT: 11.1 sec;   INR: 0.98 ratio         PTT - ( 2018 08:25 )  PTT:29.3 sec  Urinalysis Basic - ( 2018 09:34 )    Color: Yellow / Appearance: Clear / S.012 / pH: x  Gluc: x / Ketone: Negative  / Bili: Negative / Urobili: Negative   Blood: x / Protein: 30 mg/dL / Nitrite: Negative   Leuk Esterase: Negative / RBC: x / WBC x   Sq Epi: x / Non Sq Epi: OCC /HPF / Bacteria: x        List Injuries Identified to Date:    List Operative and Interventional Radiological Procedures:     Consults (Date):  [x  ] Neurosurgery: 18  [  ] Orthopedics  [  ] Plastics  [  ] Urology  [  ] PM&R  [  ] Social Work  [x] Ophthalmology: 18  [x] Nephrology: 18    RADIOLOGICAL FINDINGS REVIEW:  CXR:    Pelvis Films:     C-Spine Films:    T/L/S Spine Films:    Extremity Films:    Head CT:    C-Spine CT:    Neck CT:    Chest CT:    ABD/Pelvis CT:    Other:    Interpretation of Findings: Tertiary Trauma Survey (TTS)    Date of TTS:   18                           Time: 12:10pm  Admit Date:  18                            Trauma Activation: consult  Admit GCS: E-4     V-5     M-6     HPI:  86F p/w unwitnessed mechanical fall while walking to her daughters house. Pt fell, denies LOC, states she heard some noice downstairs which worried her. Pt hit head is on eliquis. Pt has multiple skin tares on left elbow left knee, and left temple. Pt endorses left hip pain and has hematoma over the hip with moderate tenderness. Pt denies any urinary symptoms, chest pain, back pain, neck pain, abdominal pain. Per daughter, pt has been slightly confused over the past week and recently had her DM medications changed.     CT in the ED revealed L orbital floor fracture and SAH. CT pelvis revealed large L hip hematoma w/o fracture. She received 1500U K centra.    This am some pain at thigh hematoma, otherwise denying headache, changes in vision. Reports tolerating diet & passing flatus.       PAST MEDICAL & SURGICAL HISTORY:  COPD (chronic obstructive pulmonary disease)  Depression  Anxiety  Atrial fibrillation, unspecified type  Pacemaker  Overweight  PVD (Peripheral Vascular Disease)  Arteriosclerotic Heart Disease (ASHD)  HTN (Hypertension)  Diabetes  S/P carpal tunnel release  S/P left cataract extraction  s/p colon resection  S/P Nephrectomy: right  Hip Replacement  History of Total Knee Replacement  S/P Hernia Surgery  S/P Cholecystectomy    [ x ] No significant past history as reviewed with the patient and family    FAMILY HISTORY:  Family history of pancreatic cancer (Sibling)    [ x ] Family history not pertinent as reviewed with the patient and family    SOCIAL HISTORY: denies tobacco/EtOH use; lives alone    Medications (inpatient): ALBUTerol    90 MICROgram(s) HFA Inhaler 1 Puff(s) Inhalation every 4 hours  ALBUTerol/ipratropium for Nebulization 3 milliLiter(s) Nebulizer every 6 hours  amiodarone    Tablet 200 milliGRAM(s) Oral daily  ARIPiprazole 2 milliGRAM(s) Oral daily  aspirin  chewable 81 milliGRAM(s) Oral daily  clonazePAM Tablet 0.5 milliGRAM(s) Oral two times a day  dextrose 5%. 1000 milliLiter(s) IV Continuous <Continuous>  dextrose 50% Injectable 12.5 Gram(s) IV Push once  dextrose 50% Injectable 25 Gram(s) IV Push once  dextrose 50% Injectable 25 Gram(s) IV Push once  docusate sodium 100 milliGRAM(s) Oral two times a day  DULoxetine 60 milliGRAM(s) Oral daily  furosemide    Tablet 40 milliGRAM(s) Oral daily  insulin glargine Injectable (LANTUS) 15 Unit(s) SubCutaneous at bedtime  insulin lispro (HumaLOG) corrective regimen sliding scale   SubCutaneous three times a day before meals  insulin lispro (HumaLOG) corrective regimen sliding scale   SubCutaneous three times a day before meals  insulin lispro Injectable (HumaLOG) 7 Unit(s) SubCutaneous three times a day before meals  metoprolol succinate  milliGRAM(s) Oral daily  mirtazapine 15 milliGRAM(s) Oral at bedtime  simvastatin 20 milliGRAM(s) Oral at bedtime  tiotropium 18 MICROgram(s) Capsule 1 Capsule(s) Inhalation daily    Medications (PRN):dextrose 40% Gel 15 Gram(s) Oral once PRN  glucagon  Injectable 1 milliGRAM(s) IntraMuscular once PRN  ondansetron Injectable 4 milliGRAM(s) IV Push every 6 hours PRN    Home Medications:  Advair Diskus 250 mcg-50 mcg inhalation powder: 1 puff(s) inhaled 2 times a day (23 Dec 2017 02:18)  amiodarone 200 mg oral tablet: 1 tab(s) orally once a day (23 Dec 2017 02:18)  ARIPiprazole 2 mg oral tablet: 1 tab(s) orally once a day (23 Dec 2017 02:18)  aspirin 81 mg oral tablet, chewable: 1 tab(s) orally once a day (23 Dec 2017 02:18)  Colace 100 mg oral capsule: 4 cap(s) orally once a day (at night) (23 Dec 2017 02:18)  Cymbalta 60 mg oral delayed release capsule: 1 cap(s) orally once a day (23 Dec 2017 02:18)  Eliquis 2.5 mg oral tablet: 1 tab(s) orally 2 times a day (23 Dec 2017 02:18)  HumaLOG KwikPen 100 units/mL subcutaneous solution: 4 unit(s) subcutaneous 3 times a day (23 Dec 2017 02:18)  KlonoPIN 0.5 mg oral tablet: 1 tablet orally 1-2 times daily, As Needed (23 Dec 2017 02:18)  Lasix 40 mg oral tablet: 1 tab(s) orally once a day (23 Dec 2017 02:18)  Remeron 15 mg oral tablet: 1 tab(s) orally once a day (at bedtime) (23 Dec 2017 02:18)  simvastatin 20 mg oral tablet: 1 tab(s) orally once a day (at bedtime) (23 Dec 2017 02:18)  Toprol- mg oral tablet, extended release: 1 tab(s) orally once a day (23 Dec 2017 02:18)  Toujeo SoloStar 300 units/mL subcutaneous solution: 12 unit(s) subcutaneous once a day (23 Dec 2017 02:18)      Allergies: amoxicillin (Flushing; Vomiting; Nausea)  lisinopril (Angioedema)  (Intolerances: x)    Vital Signs Last 24 Hrs  T(C): 36.4 (2018 11:52), Max: 37.4 (2018 20:23)  T(F): 97.5 (2018 11:52), Max: 99.4 (2018 20:23)  HR: 64 (2018 11:52) (59 - 70)  BP: 101/61 (2018 11:52) (101/61 - 159/69)  BP(mean): --  RR: 18 (2018 11:52) (16 - 20)  SpO2: 96% (2018 11:52) (95% - 100%)  Drug Dosing Weight  Height (cm): 165.1 (2018 20:47)  Weight (kg): 60.1 (2018 13:18)  BMI (kg/m2): 22 (2018 20:47)  BSA (m2): 1.66 (2018 20:47)      PE:  Gen: NAD, awake and alert  HEENT: ecchymosis superior and lateral to L eye  Pulm: respirations nonlabored on RA  Abd: Abdomen soft, nontender, nondistended; nodularity of abdominal wall L of umbilicus; no guarding or rebound tenderness   Ext:  L gluteal region firm, ecchymotic, tender; abrasions, scrapes, skin tears of upper extremities b/l,   Extremities warm, full ROM; feet warm                            9.8    8.80  )-----------( 220      ( 2018 08:25 )             30.6     06-09    137  |  98  |  43<H>  ----------------------------<  148<H>  3.6   |  25  |  1.98<H>    Ca    8.7      2018 06:41    TPro  7.2  /  Alb  3.4  /  TBili  0.3  /  DBili  x   /  AST  39  /  ALT  31  /  AlkPhos  98      PT/INR - ( 2018 08:25 )   PT: 11.1 sec;   INR: 0.98 ratio         PTT - ( 2018 08:25 )  PTT:29.3 sec  Urinalysis Basic - ( 2018 09:34 )    Color: Yellow / Appearance: Clear / S.012 / pH: x  Gluc: x / Ketone: Negative  / Bili: Negative / Urobili: Negative   Blood: x / Protein: 30 mg/dL / Nitrite: Negative   Leuk Esterase: Negative / RBC: x / WBC x   Sq Epi: x / Non Sq Epi: OCC /HPF / Bacteria: x        List Injuries Identified to Date:  -small amount of subarachnoid hemorrhage at the level of the choroidal fissure on the right  -Left orbital floor fracture with evidence of hemorrhage at the level of the left maxillary sinus  -large subcutaneous hematoma in the lateral subcutaneous tissues overlying the left hemipelvis and proximal femur.  - opacification of the left maxillary sinus with high density, likely hemorrhage    List Operative and Interventional Radiological Procedures: none    Consults (Date):  [x  ] Neurosurgery: 18  [  ] Orthopedics  [  ] Plastics  [  ] Urology  [  ] PM&R  [  ] Social Work  [x] Ophthalmology: 18  [x] Nephrology: 18      RADIOLOGICAL FINDINGS REVIEW:  CXR:  < from: Xray Chest 1 View- PORTABLE-Urgent (18 @ 08:38) >  Impression:  Clear lungs.    < end of copied text >      Pelvis Films:   < from: Xray Hip 2-3 Views, Left (18 @ 08:38) >  Impression: No fracture or dislocation of the left hip.    < end of copied text >    < from: Xray Pelvis AP only (18 @ 08:38) >  Impression: No acute fracture or dislocation of the pelvis in this   patient who is status post total right hip arthroplasty.    < end of copied text >    C-Spine Films:    T/L/S Spine Films:    Extremity Films:    Head CT:  < from: CT Head No Cont (18 @ 09:23) >  IMPRESSION:    Similar subarachnoid hemorrhage in the region of the right choroidal   fissure. Small parenchymal component cannot be excluded. Small hemorrhage   layering in the bilateral occipital horns is similar.    < end of copied text >    < from: CT Head No Cont (18 @ 17:33) >  IMPRESSION:     There has been an interval increase in the hemorrhage at the level of the   right choroid fissure compared to the prior study. An underlying brain   brain parenchymal component of hemorrhage can't be excluded. The   hemorrhage measures up to 1.4 cm in greatest transverse diameter in the   coronal plane.    Redemonstrated left orbital floor fracture with hemorrhage in the left   maxillary sinus.     < end of copied text >    < from: CT Head No Cont (18 @ 09:56) >  VENTRICLES AND SULCI: Age-appropriate involutional changes  INTRA-AXIAL:  Microvascular ischemic changes involving the   periventricular and subcortical white matter. These are seen on the prior   study and are relatively unchanged.  EXTRA-AXIAL: Suspicion of a small amount of subarachnoid hemorrhage at   the level of the choroidal fissure on the right. This is as seen on the   coronal image 601 image 30 .  VISUALIZED SINUSES: Hemorrhage in the left maxillary sinus. .  VISUALIZED MASTOIDS:  Clear.  CALVARIUM: Left periorbital soft tissue swelling  MISCELLANEOUS: Left orbital floor fracture is identified with evidence of   hemorrhage at the level of the left maxillary sinus. This is best seen on   the coronal reconstructed images. Consider dedicated maxillofacial study   for more complete evaluation.    IMPRESSION: Subtle subarachnoid hemorrhage suggested at what appears to   be the level of the choroidal fissure on the right. Suspicion oforbital   floor fracture on the left.    < end of copied text >      C-Spine CT:  < from: CT Cervical Spine No Cont (18 @ 08:50) >  IMPRESSION: Multilevel degenerative changes as described. No evidence of   acute fracture or subluxation.    < end of copied text >      Neck CT:    Chest CT:    ABD/Pelvis CT:  < from: CT Pelvis Bony Only No Cont (18 @ 11:07) >  IMPRESSION:    No acute fracture is demonstrated. Please note that the bones are   diffusely demineralized, limiting evaluation for nondisplaced fractures.   If there is persistent clinical concern for fracture or if the patient   cannotweight-bear, an MRI is recommended for more sensitive evaluation   if there are no medical contraindications.     Large lateral subcutaneous hematoma.    < end of copied text >    Other:  < from: CT Maxillofacial No Cont (18 @ 10:54) >  IMPRESSION: Complete opacification of the left maxillary sinus with high   density, likely hemorrhage. There is a slightly depressed left orbital   floor fracture. This is adjacent to the inferior rectus muscle which does   not appear to be radiologically entrapped. However, recommend clinical   correlation.    < end of copied text >      Interpretation of Findings:

## 2018-06-09 NOTE — PROGRESS NOTE ADULT - ASSESSMENT
86 YOF p/w unwitnessed mechanical fall while walking to her daughters house. Pt fell, denies LOC, states she heard some noice downstairs which worried her. Pt hit head is on eliquis. Pt has multiple skin tares on left elbow left knee, and left temple. Pt endorses left hip pain and has hematoma over the hip with moderate tenderness. now with ckd and carlene

## 2018-06-09 NOTE — CONSULT NOTE ADULT - SUBJECTIVE AND OBJECTIVE BOX
TRAUMA SURGERY CONSULT NOTE    Patient is a 86y old  Female who presents with a chief complaint of fall (2018 18:49)      HPI:  86 YOF p/w unwitnessed mechanical fall while walking to her daughters house. Pt fell, denies LOC, states she heard some noice downstairs which worried her. Pt hit head is on eliquis. Pt has multiple skin tares on left elbow left knee, and left temple. Pt endorses left hip pain and has hematoma over the hip with moderate tenderness. Pt denies any urinary symptoms, chest pain, back pain, neck pain, abdominal pain. Per daughter, pt has been slightly confused over the past week and recently had her DM medications changed (2018 18:49)  CT in the ED revealed L orbital floor fracture and SAH. CT pelvis revealed large L hip hematoma w/o fracture. She received 1500U K centra.      PAST MEDICAL & SURGICAL HISTORY:  COPD (chronic obstructive pulmonary disease)  Depression  Anxiety  Atrial fibrillation, unspecified type  Pacemaker  Overweight  PVD (Peripheral Vascular Disease)  Arteriosclerotic Heart Disease (ASHD)  HTN (Hypertension)  Diabetes  S/P carpal tunnel release  S/P left cataract extraction  s/p colon resection  S/P Nephrectomy: right  Hip Replacement  History of Total Knee Replacement  S/P Hernia Surgery  S/P Cholecystectomy    [  ] No significant past history as reviewed with the patient and family    FAMILY HISTORY:  Family history of pancreatic cancer (Sibling)    [  ] Family history not pertinent as reviewed with the patient and family    SOCIAL HISTORY:    MEDICATIONS  (STANDING):  ALBUTerol    90 MICROgram(s) HFA Inhaler 1 Puff(s) Inhalation every 4 hours  ALBUTerol/ipratropium for Nebulization 3 milliLiter(s) Nebulizer every 6 hours  amiodarone    Tablet 200 milliGRAM(s) Oral daily  ARIPiprazole 2 milliGRAM(s) Oral daily  aspirin  chewable 81 milliGRAM(s) Oral daily  clonazePAM Tablet 0.5 milliGRAM(s) Oral two times a day  dextrose 5%. 1000 milliLiter(s) (50 mL/Hr) IV Continuous <Continuous>  dextrose 50% Injectable 12.5 Gram(s) IV Push once  dextrose 50% Injectable 25 Gram(s) IV Push once  dextrose 50% Injectable 25 Gram(s) IV Push once  docusate sodium 100 milliGRAM(s) Oral two times a day  DULoxetine 60 milliGRAM(s) Oral daily  furosemide    Tablet 40 milliGRAM(s) Oral daily  insulin glargine Injectable (LANTUS) 15 Unit(s) SubCutaneous at bedtime  insulin lispro (HumaLOG) corrective regimen sliding scale   SubCutaneous three times a day before meals  insulin lispro (HumaLOG) corrective regimen sliding scale   SubCutaneous three times a day before meals  insulin lispro Injectable (HumaLOG) 7 Unit(s) SubCutaneous three times a day before meals  metoprolol succinate  milliGRAM(s) Oral daily  mirtazapine 15 milliGRAM(s) Oral at bedtime  simvastatin 20 milliGRAM(s) Oral at bedtime  tiotropium 18 MICROgram(s) Capsule 1 Capsule(s) Inhalation daily    MEDICATIONS  (PRN):  dextrose 40% Gel 15 Gram(s) Oral once PRN Blood Glucose LESS THAN 70 milliGRAM(s)/deciliter  glucagon  Injectable 1 milliGRAM(s) IntraMuscular once PRN Glucose LESS THAN 70 milligrams/deciliter  ondansetron Injectable 4 milliGRAM(s) IV Push every 6 hours PRN Nausea    Allergies    amoxicillin (Flushing; Vomiting; Nausea)  lisinopril (Angioedema)    Intolerances        Vital Signs Last 24 Hrs  T(C): 37.2 (2018 20:47), Max: 37.4 (2018 20:23)  T(F): 99 (2018 20:47), Max: 99.4 (2018 20:23)  HR: 61 (2018 20:47) (59 - 62)  BP: 147/77 (2018 20:47) (106/54 - 168/101)  BP(mean): --  RR: 18 (2018 20:47) (16 - 20)  SpO2: 95% (2018 20:47) (95% - 100%)  Daily Height in cm: 165.1 (2018 20:47)    Daily     NAD, awake and alert  PERRL, EOMI  Ecchymosis superior and lateral to L eye  Respirations nonlabored  Abdomen soft, nontender, nondistended  No guarding or rebound tenderness   L gluteal region firm, ecchymotic, tender  Extremities warm, full ROM                        10.8   9.8   )-----------( 260      ( 2018 17:00 )             33.3     06-08    131<L>  |  93<L>  |  54<H>  ----------------------------<  325<H>  3.9   |  25  |  2.43<H>    Ca    9.2      2018 08:23    TPro  7.2  /  Alb  3.4  /  TBili  0.3  /  DBili  x   /  AST  39  /  ALT  31  /  AlkPhos  98  06-08    PT/INR - ( 2018 08:23 )   PT: 12.3 sec;   INR: 1.13 ratio         PTT - ( 2018 08:23 )  PTT:31.2 sec  Urinalysis Basic - ( 2018 09:34 )    Color: Yellow / Appearance: Clear / S.012 / pH: x  Gluc: x / Ketone: Negative  / Bili: Negative / Urobili: Negative   Blood: x / Protein: 30 mg/dL / Nitrite: Negative   Leuk Esterase: Negative / RBC: x / WBC x   Sq Epi: x / Non Sq Epi: OCC /HPF / Bacteria: x        IMAGING STUDIES:  < from: CT Head No Cont (18 @ 17:33) >    There has been an interval increase in the hemorrhage at the level of the   right choroid fissure compared to the prior study. An underlying brain   brain parenchymal component of hemorrhage can't be excluded. The   hemorrhage measures up to 1.4 cm in greatest transverse diameter in the   coronal plane.    Redemonstrated left orbital floor fracture with hemorrhage in the left   maxillary sinus.     < end of copied text >      < from: CT Pelvis Bony Only No Cont (18 @ 11:07) >  No acute fracture is demonstrated. Please note that the bones are   diffusely demineralized, limiting evaluation for nondisplaced fractures.   If there is persistent clinical concern for fracture or if the patient   cannotweight-bear, an MRI is recommended for more sensitive evaluation   if there are no medical contraindications.     Large lateral subcutaneous hematoma.    < end of copied text >

## 2018-06-09 NOTE — CHART NOTE - NSCHARTNOTEFT_GEN_A_CORE
CTH AM reviewed.  Please continue hold all AC/antiplt.  DVT prophylaxis ok tomorrow if needed from neurosurgical perspective. Fu outpatient in 1-2 weeks after discharge (can fu with dr. Mckeon for timing to restart ac/antiplt/etc.).

## 2018-06-09 NOTE — CONSULT NOTE ADULT - ASSESSMENT
86F s/p fall w/ SAH w/ interval expansion, orbital fx, L hip hematoma    - CBC stabilized, continue to trend  - CT head in AM, f/u NSx  - optho c/s appreciated  - tertiary survey to follow  - diet as tolerated, no acute surgical intervention  - seen and examined w/ Dr. Ninfa Sinha MD  [id number]

## 2018-06-09 NOTE — CONSULT NOTE ADULT - SUBJECTIVE AND OBJECTIVE BOX
[facility] [place]([facility]        Patient is a 86y old  Female who presents with a chief complaint of fall (2018 18:49)      HPI:   86 YOF p/w unwitnessed mechanical fall while walking to her daughters house. Pt fell, denies LOC, states she heard some noice downstairs which worried her. Pt hit head is on eliquis. Pt has multiple skin tares on left elbow left knee, and left temple. Pt endorses left hip pain and has hematoma over the hip with moderate tenderness. Pt denies any urinary symptoms, chest pain, back pain, neck pain, abdominal pain. Per daughter, pt has been slightly confused over the past week and recently had her DM medications changed         *****PAST MEDICAL / Surgical  HISTORY:  PAST MEDICAL & SURGICAL HISTORY:  COPD (chronic obstructive pulmonary disease)  Depression  Anxiety  Atrial fibrillation, unspecified type  Pacemaker  Overweight  PVD (Peripheral Vascular Disease)  Arteriosclerotic Heart Disease (ASHD)  HTN (Hypertension)  Diabetes  S/P carpal tunnel release  S/P left cataract extraction  s/p colon resection  S/P Nephrectomy: right  Hip Replacement  History of Total Knee Replacement  S/P Hernia Surgery  S/P Cholecystectomy           *****FAMILY HISTORY:  FAMILY HISTORY:  Family history of pancreatic cancer (Sibling)           *****SOCIAL HISTORY:  Alcohol: None  Smoking: None         *****ALLERGIES:   Allergies    amoxicillin (Flushing; Vomiting; Nausea)  lisinopril (Angioedema)    Intolerances             *****MEDICATIONS: current medication reviewed and documented.   MEDICATIONS  (STANDING):  ALBUTerol    90 MICROgram(s) HFA Inhaler 1 Puff(s) Inhalation every 4 hours  ALBUTerol/ipratropium for Nebulization 3 milliLiter(s) Nebulizer every 6 hours  amiodarone    Tablet 200 milliGRAM(s) Oral daily  ARIPiprazole 2 milliGRAM(s) Oral daily  aspirin  chewable 81 milliGRAM(s) Oral daily  clonazePAM Tablet 0.5 milliGRAM(s) Oral two times a day  dextrose 5%. 1000 milliLiter(s) (50 mL/Hr) IV Continuous <Continuous>  dextrose 50% Injectable 12.5 Gram(s) IV Push once  dextrose 50% Injectable 25 Gram(s) IV Push once  dextrose 50% Injectable 25 Gram(s) IV Push once  docusate sodium 100 milliGRAM(s) Oral two times a day  DULoxetine 60 milliGRAM(s) Oral daily  furosemide    Tablet 40 milliGRAM(s) Oral daily  insulin glargine Injectable (LANTUS) 15 Unit(s) SubCutaneous at bedtime  insulin lispro (HumaLOG) corrective regimen sliding scale   SubCutaneous three times a day before meals  insulin lispro (HumaLOG) corrective regimen sliding scale   SubCutaneous three times a day before meals  insulin lispro Injectable (HumaLOG) 7 Unit(s) SubCutaneous three times a day before meals  metoprolol succinate  milliGRAM(s) Oral daily  mirtazapine 15 milliGRAM(s) Oral at bedtime  simvastatin 20 milliGRAM(s) Oral at bedtime  tiotropium 18 MICROgram(s) Capsule 1 Capsule(s) Inhalation daily    MEDICATIONS  (PRN):  dextrose 40% Gel 15 Gram(s) Oral once PRN Blood Glucose LESS THAN 70 milliGRAM(s)/deciliter  glucagon  Injectable 1 milliGRAM(s) IntraMuscular once PRN Glucose LESS THAN 70 milligrams/deciliter  ondansetron Injectable 4 milliGRAM(s) IV Push every 6 hours PRN Nausea           *****REVIEW OF SYSTEM:  GEN: no fever, no chills, no pain  RESP: no SOB, no cough, no sputum  CVS: no chest pain, no palpitations, no edema  GI: no abdominal pain, no nausea, no vomiting, no constipation, no diarrhea  : no dysurea, no frequency, no hematurea  Neuro: no headache, no dizziness  PSYCH: no anxiety, no depression  Derm : no itching, no rash         *****VITAL SIGNS:  T(C): 36.4 (18 @ 11:52), Max: 37.4 (18 @ 20:23)  HR: 64 (18 @ 11:52) (59 - 70)  BP: 101/61 (18 @ 11:52) (101/61 - 159/69)  RR: 18 (18 @ 11:52) (16 - 20)  SpO2: 96% (18 @ 11:52) (95% - 100%)  Wt(kg): --     @ 07:01  -   @ 07:00  --------------------------------------------------------  IN: 200 mL / OUT: 0 mL / NET: 200 mL             *****PHYSICAL EXAM:   Alert oriented x 1 falls asleep mid sentence.  Attention comprehension are limited.  follows 1 step commands.       EOMI fundi not visualized,  VFF to confrontration  No facial asymmetry   Tongue is midline   Palate elevates symmetrically   Moving all 4 ext minimally.    moves r arm antigravity.   left arm with limited mvt due to pain  She can lift both legs slightly.      Gait : not assessed.        >>> <<<         *****LAB AND IMAGIN.8    8.80  )-----------( 220      ( 2018 08:25 )             30.6                   137  |  98  |  43<H>  ----------------------------<  148<H>  3.6   |  25  |  1.98<H>    Ca    8.7      2018 06:41    TPro  7.2  /  Alb  3.4  /  TBili  0.3  /  DBili  x   /  AST  39  /  ALT  31  /  AlkPhos  98  06-08    PT/INR - ( 2018 08:25 )   PT: 11.1 sec;   INR: 0.98 ratio         PTT - ( 2018 08:25 )  PTT:29.3 sec                        Urinalysis Basic - ( 2018 09:34 )    Color: Yellow / Appearance: Clear / S.012 / pH: x  Gluc: x / Ketone: Negative  / Bili: Negative / Urobili: Negative   Blood: x / Protein: 30 mg/dL / Nitrite: Negative   Leuk Esterase: Negative / RBC: x / WBC x   Sq Epi: x / Non Sq Epi: OCC /HPF / Bacteria: x      < from: CT Head No Cont (18 @ 09:23) >  Similar subarachnoid hemorrhage in the region of the right   choroidal fissure. Small parenchymal component cannotbe excluded. Small   hemorrhage layering in the bilateral occipital horns is similar.    No hydrocephalus or midline shift. No CT evidence of acute territorial   infarct. Extensive white matter microvascular ischemic disease. Stable   chronic appearing low-density bilateral frontotemporal extra-axial   collections measuring up to 1 cm in greatest depth on the left and 0.8 cm   on the right.    < end of copied text >    < from: CT Head No Cont (18 @ 17:33) >  There has been an interval increase in the hemorrhage at the level of the   right choroid fissure compared to the prior study. An underlying brain   brain parenchymal component of hemorrhage can't be excluded. The   hemorrhage measures up to 1.4 cm in greatest transverse diameter in the   coronal plane.    Redemonstrated left orbital floor fracture with hemorrhage in the left   maxillary sinus.     < end of copied text >  [All pertinent recent Imaging reports reviewed]    < from: CT Head No Cont (18 @ 09:56) >   Subtle subarachnoid hemorrhage suggested at what appears to   be the level of the choroidal fissure on the right. Suspicion oforbital   floor fracture on the left.      < end of copied text >       *****A S S E S S M E N T   A N D   P L A N :      87 YO F p/w unwitnessed mechanical fall while walking to her daughters house. Pt fell, denies LOC, states she heard some noice downstairs which worried her. Pt hit head is on eliquis. Pt has multiple skin tares on left elbow left knee, and left temple. Pt endorses left hip pain and has hematoma over the hip with moderate tenderness. Pt denies any urinary symptoms, chest pain, back pain, neck pain, abdominal pain. Per daughter, pt has been slightly confused over the past week and recently had her DM medications changed    Traumatic sah/small ivh while on noac,   Hold all antithrombotics for now.  CT head showing stability of bleed. Neurosurgery eval appreciated, no interventions.   + orbital floor fracture, eye mvt are intact  consider ophtho follow up as outpt.   Likely multifactorial toxic metabolic encephalopathy, will continue monitor.   trauma requesting ct of leg to r/o bleeding  H/H stable.   pt denies any pain.     Fall likely mechanical  elevated bun/cr.  consider orthostatics.   will continue to monitor closely.       60 minutes spent on the total encounter;  more than 50 % of the visit was spent on counseling  and or coordinating care by the attending physician.    Thank you for allowing me to participate in the care of this juan patient. Please do not hesitate to call me if you have any questions.   ___________________________  Will follow with you.  Thank you,  Vandana Tay MD  Diplomate of the American Board of Neurology and Psychiatry.  Diplomate of the American Board of Vascular Neurology.   [facility] ([facility]), [facility]   Ph: [phone]      This and subsequent notes were partially created using voice recognition software and will  inherently be subject to errors including those of syntax and sound alike substitutions which may escape proofreading. In such instances original meaning may be extrapolated by contextual derivation.

## 2018-06-09 NOTE — CONSULT NOTE ADULT - PROBLEM SELECTOR RECOMMENDATION 2
Continue monitoring, Neuro/primary team FU
repeat CT scan reviewed  neurosurgery fu, paged for fu  management as per neurosurgery

## 2018-06-10 LAB
GLUCOSE BLDC GLUCOMTR-MCNC: 170 MG/DL — HIGH (ref 70–99)
GLUCOSE BLDC GLUCOMTR-MCNC: 190 MG/DL — HIGH (ref 70–99)
GLUCOSE BLDC GLUCOMTR-MCNC: 210 MG/DL — HIGH (ref 70–99)
GLUCOSE BLDC GLUCOMTR-MCNC: 212 MG/DL — HIGH (ref 70–99)
GLUCOSE BLDC GLUCOMTR-MCNC: 223 MG/DL — HIGH (ref 70–99)
GLUCOSE BLDC GLUCOMTR-MCNC: 65 MG/DL — LOW (ref 70–99)
GLUCOSE BLDC GLUCOMTR-MCNC: 68 MG/DL — LOW (ref 70–99)
GLUCOSE BLDC GLUCOMTR-MCNC: 72 MG/DL — SIGNIFICANT CHANGE UP (ref 70–99)
GLUCOSE BLDC GLUCOMTR-MCNC: 99 MG/DL — SIGNIFICANT CHANGE UP (ref 70–99)

## 2018-06-10 PROCEDURE — 73030 X-RAY EXAM OF SHOULDER: CPT | Mod: 26,LT

## 2018-06-10 PROCEDURE — 70450 CT HEAD/BRAIN W/O DYE: CPT | Mod: 26

## 2018-06-10 RX ORDER — BACITRACIN ZINC 500 UNIT/G
1 OINTMENT IN PACKET (EA) TOPICAL DAILY
Qty: 0 | Refills: 0 | Status: DISCONTINUED | OUTPATIENT
Start: 2018-06-10 | End: 2018-06-14

## 2018-06-10 RX ORDER — SODIUM CHLORIDE 9 MG/ML
1000 INJECTION, SOLUTION INTRAVENOUS
Qty: 0 | Refills: 0 | Status: DISCONTINUED | OUTPATIENT
Start: 2018-06-10 | End: 2018-06-10

## 2018-06-10 RX ORDER — INSULIN LISPRO 100/ML
6 VIAL (ML) SUBCUTANEOUS
Qty: 0 | Refills: 0 | Status: DISCONTINUED | OUTPATIENT
Start: 2018-06-10 | End: 2018-06-10

## 2018-06-10 RX ORDER — DEXTROSE 50 % IN WATER 50 %
25 SYRINGE (ML) INTRAVENOUS ONCE
Qty: 0 | Refills: 0 | Status: COMPLETED | OUTPATIENT
Start: 2018-06-10 | End: 2018-06-10

## 2018-06-10 RX ORDER — INSULIN GLARGINE 100 [IU]/ML
10 INJECTION, SOLUTION SUBCUTANEOUS AT BEDTIME
Qty: 0 | Refills: 0 | Status: DISCONTINUED | OUTPATIENT
Start: 2018-06-10 | End: 2018-06-10

## 2018-06-10 RX ADMIN — Medication 100 MILLIGRAM(S): at 05:01

## 2018-06-10 RX ADMIN — Medication 10 UNIT(S): at 17:02

## 2018-06-10 RX ADMIN — Medication 40 MILLIGRAM(S): at 05:01

## 2018-06-10 RX ADMIN — Medication 0.5 MILLIGRAM(S): at 17:52

## 2018-06-10 RX ADMIN — ARIPIPRAZOLE 2 MILLIGRAM(S): 15 TABLET ORAL at 11:18

## 2018-06-10 RX ADMIN — Medication 100 MILLIGRAM(S): at 17:02

## 2018-06-10 RX ADMIN — Medication 3 MILLILITER(S): at 11:19

## 2018-06-10 RX ADMIN — Medication 2: at 12:50

## 2018-06-10 RX ADMIN — AMIODARONE HYDROCHLORIDE 200 MILLIGRAM(S): 400 TABLET ORAL at 05:01

## 2018-06-10 RX ADMIN — Medication 1 APPLICATION(S): at 16:19

## 2018-06-10 RX ADMIN — Medication 3 MILLILITER(S): at 17:02

## 2018-06-10 RX ADMIN — Medication 3 MILLILITER(S): at 05:01

## 2018-06-10 RX ADMIN — SODIUM CHLORIDE 50 MILLILITER(S): 9 INJECTION, SOLUTION INTRAVENOUS at 22:31

## 2018-06-10 RX ADMIN — DULOXETINE HYDROCHLORIDE 60 MILLIGRAM(S): 30 CAPSULE, DELAYED RELEASE ORAL at 11:18

## 2018-06-10 RX ADMIN — MIRTAZAPINE 15 MILLIGRAM(S): 45 TABLET, ORALLY DISINTEGRATING ORAL at 21:10

## 2018-06-10 RX ADMIN — Medication 0.5 MILLIGRAM(S): at 05:03

## 2018-06-10 RX ADMIN — Medication 2: at 17:02

## 2018-06-10 RX ADMIN — Medication 25 MILLILITER(S): at 22:03

## 2018-06-10 RX ADMIN — SIMVASTATIN 20 MILLIGRAM(S): 20 TABLET, FILM COATED ORAL at 21:10

## 2018-06-10 RX ADMIN — Medication 10 UNIT(S): at 08:16

## 2018-06-10 RX ADMIN — Medication 10 UNIT(S): at 12:50

## 2018-06-10 NOTE — PROGRESS NOTE ADULT - SUBJECTIVE AND OBJECTIVE BOX
Patient is a 86y Female whom presented to the hospital with ckd and carlene     PAST MEDICAL & SURGICAL HISTORY:  COPD (chronic obstructive pulmonary disease)  Depression  Anxiety  Atrial fibrillation, unspecified type  Pacemaker  Overweight  PVD (Peripheral Vascular Disease)  Arteriosclerotic Heart Disease (ASHD)  HTN (Hypertension)  Diabetes  S/P carpal tunnel release  S/P left cataract extraction  s/p colon resection  S/P Nephrectomy: right  Hip Replacement  History of Total Knee Replacement  S/P Hernia Surgery  S/P Cholecystectomy      MEDICATIONS  (STANDING):  ALBUTerol    90 MICROgram(s) HFA Inhaler 1 Puff(s) Inhalation every 4 hours  ALBUTerol/ipratropium for Nebulization 3 milliLiter(s) Nebulizer every 6 hours  amiodarone    Tablet 200 milliGRAM(s) Oral daily  ARIPiprazole 2 milliGRAM(s) Oral daily  aspirin  chewable 81 milliGRAM(s) Oral daily  clonazePAM Tablet 0.5 milliGRAM(s) Oral two times a day  dextrose 5%. 1000 milliLiter(s) (50 mL/Hr) IV Continuous <Continuous>  dextrose 50% Injectable 12.5 Gram(s) IV Push once  dextrose 50% Injectable 25 Gram(s) IV Push once  dextrose 50% Injectable 25 Gram(s) IV Push once  docusate sodium 100 milliGRAM(s) Oral two times a day  DULoxetine 60 milliGRAM(s) Oral daily  furosemide    Tablet 40 milliGRAM(s) Oral daily  insulin glargine Injectable (LANTUS) 15 Unit(s) SubCutaneous at bedtime  insulin lispro (HumaLOG) corrective regimen sliding scale   SubCutaneous three times a day before meals  insulin lispro (HumaLOG) corrective regimen sliding scale   SubCutaneous three times a day before meals  insulin lispro Injectable (HumaLOG) 7 Unit(s) SubCutaneous three times a day before meals  metoprolol succinate  milliGRAM(s) Oral daily  mirtazapine 15 milliGRAM(s) Oral at bedtime  simvastatin 20 milliGRAM(s) Oral at bedtime  tiotropium 18 MICROgram(s) Capsule 1 Capsule(s) Inhalation daily      Allergies    amoxicillin (Flushing; Vomiting; Nausea)  lisinopril (Angioedema)    Intolerances        SOCIAL HISTORY:  Denies ETOh,Smoking,     FAMILY HISTORY:  Family history of pancreatic cancer (Sibling)    Constitutional: No fever, fatigue or weight loss.  Skin: pos  rash.  Eyes: No recent vision problems or eye pain.  ENT: No congestion, ear pain, or sore throat.  Endocrine: No thyroid problems.  Cardiovascular: No chest pain or palpation.  Respiratory: No cough, shortness of breath, congestion, or wheezing.  Gastrointestinal: No abdominal pain, nausea, vomiting, or diarrhea.  Genitourinary: No dysuria.  Musculoskeletal: No joint swelling.  Neurologic: No headache.                            9.8    8.80  )-----------( 220      ( 2018 08:25 )             30.6       CBC Full  -  ( 2018 08:25 )  WBC Count : 8.80 K/uL  Hemoglobin : 9.8 g/dL  Hematocrit : 30.6 %  Platelet Count - Automated : 220 K/uL  Mean Cell Volume : 88.2 fl  Mean Cell Hemoglobin : 28.2 pg  Mean Cell Hemoglobin Concentration : 32.0 gm/dL  Auto Neutrophil # : x  Auto Lymphocyte # : x  Auto Monocyte # : x  Auto Eosinophil # : x  Auto Basophil # : x  Auto Neutrophil % : x  Auto Lymphocyte % : x  Auto Monocyte % : x  Auto Eosinophil % : x  Auto Basophil % : x      -    137  |  98  |  43<H>  ----------------------------<  148<H>  3.6   |  25  |  1.98<H>    Ca    8.7      2018 06:41        CAPILLARY BLOOD GLUCOSE      POCT Blood Glucose.: 99 mg/dL (10 Patrick 2018 07:22)  POCT Blood Glucose.: 167 mg/dL (2018 23:31)  POCT Blood Glucose.: 229 mg/dL (2018 21:10)  POCT Blood Glucose.: 276 mg/dL (2018 16:19)  POCT Blood Glucose.: 266 mg/dL (2018 12:27)  POCT Blood Glucose.: 209 mg/dL (2018 11:17)      Vital Signs Last 24 Hrs  T(C): 36.6 (10 Patrick 2018 04:17), Max: 36.7 (2018 20:12)  T(F): 97.9 (10 Patrick 2018 04:17), Max: 98.1 (2018 20:12)  HR: 60 (10 Patrick 2018 04:17) (60 - 64)  BP: 146/68 (10 Patrick 2018 04:17) (101/61 - 146/68)  BP(mean): --  RR: 18 (10 Patrick 2018 04:17) (18 - 18)  SpO2: 100% (10 Patrick 2018 04:17) (96% - 100%)        PT/INR - ( 2018 08:25 )   PT: 11.1 sec;   INR: 0.98 ratio         PTT - ( 2018 08:25 )  PTT:29.3 sec                      PHYSICAL EXAM:    Constitutional: NAD  HEENT: conjunctive   clear   Neck:  No JVD  Respiratory: CTAB  Cardiovascular: S1 and S2  Gastrointestinal: BS+, soft, NT/ND  Extremities: pos  peripheral edema  Neurological: no focal deficits  Psychiatric: Normal mood, normal affect  : No [place]  Skin: pos  rashes , dry   Access: Not applicable    LABS:                        10.8   9.8   )-----------( 260      ( 2018 17:00 )             33.3     06-08    131<L>  |  93<L>  |  54<H>  ----------------------------<  325<H>  3.9   |  25  |  2.43<H>    Ca    9.2      2018 08:23    TPro  7.2  /  Alb  3.4  /  TBili  0.3  /  DBili  x   /  AST  39  /  ALT  31  /  AlkPhos  98  -08      Urine Studies:  Urinalysis Basic - ( 2018 09:34 )    Color: Yellow / Appearance: Clear / S.012 / pH: x  Gluc: x / Ketone: Negative  / Bili: Negative / Urobili: Negative   Blood: x / Protein: 30 mg/dL / Nitrite: Negative   Leuk Esterase: Negative / RBC: x / WBC x   Sq Epi: x / Non Sq Epi: OCC /HPF / Bacteria: x            RADIOLOGY & ADDITIONAL STUDIES:

## 2018-06-10 NOTE — PROGRESS NOTE ADULT - ASSESSMENT
Assessment  DMT2: 86y Female with DM T2 with hyperglycemia on basal bolus insulin, blood fluctuating, had hypoglycemic episode,  poor PO intake.  COPD: On medications, stable, monitored.  HTN: Controlled, On med.  Subarachnoid Bleed: Monitor, stable

## 2018-06-10 NOTE — PROGRESS NOTE ADULT - SUBJECTIVE AND OBJECTIVE BOX
Patient is a 86y old  Female who presents with a chief complaint of fall (08 Jun 2018 18:49)      INTERVAL HPI/OVERNIGHT EVENTS:  T(C): 36.3 (06-10-18 @ 11:54), Max: 36.7 (06-09-18 @ 20:12)  HR: 60 (06-10-18 @ 11:54) (60 - 60)  BP: 146/74 (06-10-18 @ 11:54) (133/71 - 146/74)  RR: 18 (06-10-18 @ 04:17) (18 - 18)  SpO2: 100% (06-10-18 @ 04:17) (98% - 100%)  Wt(kg): --  I&O's Summary    09 Jun 2018 07:01  -  10 Patrick 2018 07:00  --------------------------------------------------------  IN: 940 mL / OUT: 550 mL / NET: 390 mL    10 Patrick 2018 07:01  -  10 Patrick 2018 14:38  --------------------------------------------------------  IN: 460 mL / OUT: 0 mL / NET: 460 mL        LABS:                        9.8    8.80  )-----------( 220      ( 09 Jun 2018 08:25 )             30.6     06-09    137  |  98  |  43<H>  ----------------------------<  148<H>  3.6   |  25  |  1.98<H>    Ca    8.7      09 Jun 2018 06:41      PT/INR - ( 09 Jun 2018 08:25 )   PT: 11.1 sec;   INR: 0.98 ratio         PTT - ( 09 Jun 2018 08:25 )  PTT:29.3 sec    CAPILLARY BLOOD GLUCOSE      POCT Blood Glucose.: 212 mg/dL (10 Patrick 2018 12:23)  POCT Blood Glucose.: 190 mg/dL (10 Patrick 2018 11:13)  POCT Blood Glucose.: 99 mg/dL (10 Patrick 2018 07:22)  POCT Blood Glucose.: 167 mg/dL (09 Jun 2018 23:31)  POCT Blood Glucose.: 229 mg/dL (09 Jun 2018 21:10)  POCT Blood Glucose.: 276 mg/dL (09 Jun 2018 16:19)            MEDICATIONS  (STANDING):  ALBUTerol    90 MICROgram(s) HFA Inhaler 1 Puff(s) Inhalation every 4 hours  ALBUTerol/ipratropium for Nebulization 3 milliLiter(s) Nebulizer every 6 hours  amiodarone    Tablet 200 milliGRAM(s) Oral daily  ARIPiprazole 2 milliGRAM(s) Oral daily  BACItracin   Ointment 1 Application(s) Topical daily  clonazePAM Tablet 0.5 milliGRAM(s) Oral two times a day  dextrose 5%. 1000 milliLiter(s) (50 mL/Hr) IV Continuous <Continuous>  dextrose 50% Injectable 12.5 Gram(s) IV Push once  dextrose 50% Injectable 25 Gram(s) IV Push once  dextrose 50% Injectable 25 Gram(s) IV Push once  docusate sodium 100 milliGRAM(s) Oral two times a day  DULoxetine 60 milliGRAM(s) Oral daily  furosemide    Tablet 40 milliGRAM(s) Oral daily  insulin glargine Injectable (LANTUS) 21 Unit(s) SubCutaneous at bedtime  insulin lispro (HumaLOG) corrective regimen sliding scale   SubCutaneous three times a day before meals  insulin lispro (HumaLOG) corrective regimen sliding scale   SubCutaneous at bedtime  insulin lispro Injectable (HumaLOG) 10 Unit(s) SubCutaneous three times a day before meals  metoprolol succinate  milliGRAM(s) Oral daily  mirtazapine 15 milliGRAM(s) Oral at bedtime  simvastatin 20 milliGRAM(s) Oral at bedtime  tiotropium 18 MICROgram(s) Capsule 1 Capsule(s) Inhalation daily    MEDICATIONS  (PRN):  dextrose 40% Gel 15 Gram(s) Oral once PRN Blood Glucose LESS THAN 70 milliGRAM(s)/deciliter  glucagon  Injectable 1 milliGRAM(s) IntraMuscular once PRN Glucose LESS THAN 70 milligrams/deciliter  ondansetron Injectable 4 milliGRAM(s) IV Push every 6 hours PRN Nausea          PHYSICAL EXAM:  GENERAL: NAD, well-groomed, well-developed  HEAD:  Atraumatic, Normocephalic  CHEST/LUNG: Clear to percussion bilaterally; No rales, rhonchi, wheezing, or rubs  HEART: Regular rate and rhythm; No murmurs, rubs, or gallops  ABDOMEN: Soft, Nontender, Nondistended; Bowel sounds present  EXTREMITIES:  2+ Peripheral Pulses, No clubbing, cyanosis, or edema  LYMPH: No lymphadenopathy noted  SKIN: No rashes or lesions    Care Discussed with Consultants/Other Providers [ + YES  [ ] NO Patient is a 86y old  Female who presents with a chief complaint of fall (08 Jun 2018 18:49)      INTERVAL HPI/OVERNIGHT EVENTS:  T(C): 36.3 (06-10-18 @ 11:54), Max: 36.7 (06-09-18 @ 20:12)  HR: 60 (06-10-18 @ 11:54) (60 - 60)  BP: 146/74 (06-10-18 @ 11:54) (133/71 - 146/74)  RR: 18 (06-10-18 @ 04:17) (18 - 18)  SpO2: 100% (06-10-18 @ 04:17) (98% - 100%)  Wt(kg): --  I&O's Summary    09 Jun 2018 07:01  -  10 Patrick 2018 07:00  --------------------------------------------------------  IN: 940 mL / OUT: 550 mL / NET: 390 mL    10 Patrick 2018 07:01  -  10 Patrick 2018 14:38  --------------------------------------------------------  IN: 460 mL / OUT: 0 mL / NET: 460 mL        LABS:                        9.8    8.80  )-----------( 220      ( 09 Jun 2018 08:25 )             30.6     06-09    137  |  98  |  43<H>  ----------------------------<  148<H>  3.6   |  25  |  1.98<H>    Ca    8.7      09 Jun 2018 06:41      PT/INR - ( 09 Jun 2018 08:25 )   PT: 11.1 sec;   INR: 0.98 ratio         PTT - ( 09 Jun 2018 08:25 )  PTT:29.3 sec    CAPILLARY BLOOD GLUCOSE      POCT Blood Glucose.: 212 mg/dL (10 Patrick 2018 12:23)  POCT Blood Glucose.: 190 mg/dL (10 Patrick 2018 11:13)  POCT Blood Glucose.: 99 mg/dL (10 Patrick 2018 07:22)  POCT Blood Glucose.: 167 mg/dL (09 Jun 2018 23:31)  POCT Blood Glucose.: 229 mg/dL (09 Jun 2018 21:10)  POCT Blood Glucose.: 276 mg/dL (09 Jun 2018 16:19)            MEDICATIONS  (STANDING):  ALBUTerol    90 MICROgram(s) HFA Inhaler 1 Puff(s) Inhalation every 4 hours  ALBUTerol/ipratropium for Nebulization 3 milliLiter(s) Nebulizer every 6 hours  amiodarone    Tablet 200 milliGRAM(s) Oral daily  ARIPiprazole 2 milliGRAM(s) Oral daily  BACItracin   Ointment 1 Application(s) Topical daily  clonazePAM Tablet 0.5 milliGRAM(s) Oral two times a day  dextrose 5%. 1000 milliLiter(s) (50 mL/Hr) IV Continuous <Continuous>  dextrose 50% Injectable 12.5 Gram(s) IV Push once  dextrose 50% Injectable 25 Gram(s) IV Push once  dextrose 50% Injectable 25 Gram(s) IV Push once  docusate sodium 100 milliGRAM(s) Oral two times a day  DULoxetine 60 milliGRAM(s) Oral daily  furosemide    Tablet 40 milliGRAM(s) Oral daily  insulin glargine Injectable (LANTUS) 21 Unit(s) SubCutaneous at bedtime  insulin lispro (HumaLOG) corrective regimen sliding scale   SubCutaneous three times a day before meals  insulin lispro (HumaLOG) corrective regimen sliding scale   SubCutaneous at bedtime  insulin lispro Injectable (HumaLOG) 10 Unit(s) SubCutaneous three times a day before meals  metoprolol succinate  milliGRAM(s) Oral daily  mirtazapine 15 milliGRAM(s) Oral at bedtime  simvastatin 20 milliGRAM(s) Oral at bedtime  tiotropium 18 MICROgram(s) Capsule 1 Capsule(s) Inhalation daily    MEDICATIONS  (PRN):  dextrose 40% Gel 15 Gram(s) Oral once PRN Blood Glucose LESS THAN 70 milliGRAM(s)/deciliter  glucagon  Injectable 1 milliGRAM(s) IntraMuscular once PRN Glucose LESS THAN 70 milligrams/deciliter  ondansetron Injectable 4 milliGRAM(s) IV Push every 6 hours PRN Nausea          PHYSICAL EXAM:  GENERAL: frail  CHEST/LUNG: Clear to percussion bilaterally; No rales, rhonchi, wheezing, or rubs  HEART: Regular rate and rhythm; No murmurs, rubs, or gallops  ABDOMEN: Soft, Nontender, Nondistended; Bowel sounds present  EXTREMITIES:  2+ Peripheral Pulses, No clubbing, cyanosis, or edema  LYMPH: No lymphadenopathy noted  SKIN: No rashes or lesions    Care Discussed with Consultants/Other Providers [ + YES  [ ] NO

## 2018-06-10 NOTE — PROGRESS NOTE ADULT - ASSESSMENT
86 YOF p/w unwitnessed mechanical fall while walking to her daughters house. Pt fell, denies LOC, states she heard some noice downstairs which worried her. Pt hit head is on eliquis. Pt has multiple skin tares on left elbow left knee, and left temple. Pt endorses left hip pain and has hematoma over the hip with moderate tenderness. Pt denies any urinary symptoms, chest pain, back pain, neck pain, abdominal pain. Per daughter, pt has been slightly confused over the past week and recently had her DM medications changed    Problem/Plan - 1:  ·  Problem: Traumatic subarachnoid hemorrhage without loss of consciousness, initial encounter.  Plan: repeat CT scan reviewed  neurosurgery fu, paged for fu  management as per neurosurgery  serial CT scans, CT pending today  neurosurgery fu     Problem/Plan - 2:  ·  Problem: Open fracture of left orbital floor, initial encounter.  Plan: Ophthalmology consult appreciated  no acute intervention needed.     Problem/Plan - 3:  ·  Problem: Near syncope.  Plan: telemonitor  cards fu  PPM interoogation.     Problem/Plan - 4:  ·  Problem: Atrial fibrillation, unspecified type.  Plan: hold AC for now    Problem/Plan - 5:  ·  Problem: Diabetes.  Plan: hold basal/bolus  ISS. 86 YOF p/w unwitnessed mechanical fall while walking to her daughters house. Pt fell, denies LOC, states she heard some noice downstairs which worried her. Pt hit head is on eliquis. Pt has multiple skin tares on left elbow left knee, and left temple. Pt endorses left hip pain and has hematoma over the hip with moderate tenderness. Pt denies any urinary symptoms, chest pain, back pain, neck pain, abdominal pain. Per daughter, pt has been slightly confused over the past week and recently had her DM medications changed    Problem/Plan - 1:  ·  Problem: Traumatic subarachnoid hemorrhage without loss of consciousness, initial encounter.  Plan: repeat CT scan reviewed  neurosurgery fu, paged for fu  management as per neurosurgery  serial CT scans, CT pending today, signed out to PRESTON Herring on thr floor to call Neurosurgery to fu after CT scan is done  neurosurgery fu     Problem/Plan - 2:  ·  Problem: Open fracture of left orbital floor, initial encounter.  Plan: Ophthalmology consult appreciated  no acute intervention needed.     Problem/Plan - 3:  ·  Problem: Near syncope.  Plan: telemonitor  cards fu  PPM interrogation     Problem/Plan - 4:  ·  Problem: Atrial fibrillation, unspecified type.  Plan: hold AC for now    Problem/Plan - 5:  ·  Problem Hematoma Plan Monitor cbc  todays CBC pending

## 2018-06-10 NOTE — PROGRESS NOTE ADULT - SUBJECTIVE AND OBJECTIVE BOX
Chief complaint  Patient is a 86y old  Female who presents with a chief complaint of fall (08 Jun 2018 18:49)   Review of systems  Patient in bed, looks comfortable, no fever,  had hypoglycemia.    Labs and Fingersticks  CAPILLARY BLOOD GLUCOSE      POCT Blood Glucose.: 210 mg/dL (10 Patrick 2018 23:22)  POCT Blood Glucose.: 170 mg/dL (10 Patrick 2018 22:34)  POCT Blood Glucose.: 65 mg/dL (10 Patrick 2018 21:40)  POCT Blood Glucose.: 68 mg/dL (10 Patrick 2018 21:23)  POCT Blood Glucose.: 72 mg/dL (10 Patrick 2018 21:07)  POCT Blood Glucose.: 223 mg/dL (10 Patrick 2018 16:17)  POCT Blood Glucose.: 212 mg/dL (10 Patrick 2018 12:23)  POCT Blood Glucose.: 190 mg/dL (10 Patrick 2018 11:13)  POCT Blood Glucose.: 99 mg/dL (10 Patrick 2018 07:22)      Anion Gap, Serum: 14 (06-09 @ 06:41)    Hemoglobin A1C, Whole Blood: 8.4 <H> (06-09 @ 08:25)    Calcium, Total Serum: 8.7 (06-09 @ 06:41)          06-09    137  |  98  |  43<H>  ----------------------------<  148<H>  3.6   |  25  |  1.98<H>    Ca    8.7      09 Jun 2018 06:41                          9.8    8.80  )-----------( 220      ( 09 Jun 2018 08:25 )             30.6     Medications  MEDICATIONS  (STANDING):  ALBUTerol    90 MICROgram(s) HFA Inhaler 1 Puff(s) Inhalation every 4 hours  ALBUTerol/ipratropium for Nebulization 3 milliLiter(s) Nebulizer every 6 hours  amiodarone    Tablet 200 milliGRAM(s) Oral daily  ARIPiprazole 2 milliGRAM(s) Oral daily  BACItracin   Ointment 1 Application(s) Topical daily  clonazePAM Tablet 0.5 milliGRAM(s) Oral two times a day  dextrose 5%. 1000 milliLiter(s) (50 mL/Hr) IV Continuous <Continuous>  dextrose 5%. 1000 milliLiter(s) (50 mL/Hr) IV Continuous <Continuous>  dextrose 50% Injectable 12.5 Gram(s) IV Push once  dextrose 50% Injectable 25 Gram(s) IV Push once  dextrose 50% Injectable 25 Gram(s) IV Push once  docusate sodium 100 milliGRAM(s) Oral two times a day  DULoxetine 60 milliGRAM(s) Oral daily  furosemide    Tablet 40 milliGRAM(s) Oral daily  insulin lispro (HumaLOG) corrective regimen sliding scale   SubCutaneous three times a day before meals  insulin lispro (HumaLOG) corrective regimen sliding scale   SubCutaneous at bedtime  metoprolol succinate  milliGRAM(s) Oral daily  mirtazapine 15 milliGRAM(s) Oral at bedtime  simvastatin 20 milliGRAM(s) Oral at bedtime  tiotropium 18 MICROgram(s) Capsule 1 Capsule(s) Inhalation daily      Physical Exam  General: Patient comfortable in bed  Vital Signs Last 12 Hrs  T(F): 98.9 (06-10-18 @ 20:01), Max: 98.9 (06-10-18 @ 20:01)  HR: 60 (06-10-18 @ 20:01) (60 - 60)  BP: 119/67 (06-10-18 @ 20:01) (119/67 - 146/74)  BP(mean): --  RR: 18 (06-10-18 @ 20:01) (18 - 18)  SpO2: 96% (06-10-18 @ 20:01) (96% - 96%)  Neck: No palpable thyroid nodules.  CVS: S1S2, No murmurs  Respiratory: No wheezing, no crepitations  GI: Abdomen soft, bowel sounds positive  Musculoskeletal:  edema lower extremities.   Skin: No skin rashes, no ecchymosis    Diagnostics    Hemoglobin A1C, Whole Blood: Routine (06-08 @ 18:50)

## 2018-06-10 NOTE — CHART NOTE - NSCHARTNOTEFT_GEN_A_CORE
Patient is a 86y old  Female who presents with a chief complaint of fall (08 Jun 2018 18:49)  Informed by RN of pt becoming hypoglycemic - FS 72 to 68 to 65,  from 223 pre-dinner level, for which she received 2 units of Humalog, per scale. RN attempted PO remedies, but FS continued to drift lower. 1/2 Amp of D50 ordered to be given, followed by IVF D5% @ 50 mL/H.  FS post interventions etelvina to 170.   Pt was seen & examined at bedside, post treatment, who was alert & responsive, followed commands, & was conversive.   Pt denied having headache, dizziness, cp, sob, n/v or palpitations.   Pt awaiting rpt CT of head tonight for SAH follow up.     Vital Signs Last 24 Hrs  T(C): 37.2 (10 Patrick 2018 20:01), Max: 37.2 (10 Patrick 2018 20:01)  T(F): 98.9 (10 Patrick 2018 20:01), Max: 98.9 (10 Patrick 2018 20:01)  HR: 60 (10 Patrick 2018 20:01) (60 - 60)  BP: 119/67 (10 Patrick 2018 20:01) (119/67 - 146/74)  BP(mean): --  RR: 18 (10 Patrick 2018 20:01) (18 - 18)  SpO2: 96% (10 Patrick 2018 20:01) (96% - 100%)      Labs:                          9.8    8.80  )-----------( 220      ( 09 Jun 2018 08:25 )             30.6     06-09    137  |  98  |  43<H>  ----------------------------<  148<H>  3.6   |  25  |  1.98<H>    Ca    8.7      09 Jun 2018 06:41          Radiology:    Physical Exam:  General: WN/WD NAD  Neurology: A&Ox3, nonfocal, SAHA x 4  Head:  Normocephalic, atraumatic  Eyes: PERRLA, EOMI; + eccymosis Lt lower orbit  Respiratory: CTA B/L  CV: RRR, S1S2, grade 2/6 ZAKI heard best @ 5th LICS  Abdominal: Soft, non distended, non tender, + BS  MSK: No edema, + peripheral pulses, FROM all 4 extremity    Assessment & Plan:  HPI:  86 YOF p/w unwitnessed mechanical fall while walking to her daughters house. Pt fell, denies LOC, states she heard some noice downstairs which worried her. Pt hit head is on eliquis. Pt has multiple skin tares on left elbow left knee, and left temple. Pt endorses left hip pain and has hematoma over the hip with moderate tenderness. Pt denies any urinary symptoms, chest pain, back pain, neck pain, abdominal pain. Per daughter, pt has been slightly confused over the past week and recently had her DM medications changed (08 Jun 2018 18:49)    Pt seen this evening post hypoglycemic episode, corrected after giving 1/2 amp D50, followed by D5% IVF @ 50 mL/H.   Currently FS = 170. Pt alert & oriented, and asymptomatic; VSS.  PLAN:  >Continue to monitor FS AC & HS  >D/C IVF D5% as FS as pt is no longer hypoglycemic  >Hold Insulin per bedtime sliding scale tonight  >Endorse to day team    Of note, pt underwent repeat CT scan of head tonight, follow up of SAH. Will follow results.    Follow up with Attending in AM.  Milagros Jose PA-C  #[id number]

## 2018-06-11 ENCOUNTER — TRANSCRIPTION ENCOUNTER (OUTPATIENT)
Age: 83
End: 2018-06-11

## 2018-06-11 LAB
ANION GAP SERPL CALC-SCNC: 13 MMOL/L — SIGNIFICANT CHANGE UP (ref 5–17)
APTT BLD: 28.3 SEC — SIGNIFICANT CHANGE UP (ref 27.5–37.4)
BUN SERPL-MCNC: 34 MG/DL — HIGH (ref 7–23)
CALCIUM SERPL-MCNC: 8.4 MG/DL — SIGNIFICANT CHANGE UP (ref 8.4–10.5)
CHLORIDE SERPL-SCNC: 97 MMOL/L — SIGNIFICANT CHANGE UP (ref 96–108)
CO2 SERPL-SCNC: 26 MMOL/L — SIGNIFICANT CHANGE UP (ref 22–31)
CREAT SERPL-MCNC: 1.54 MG/DL — HIGH (ref 0.5–1.3)
GLUCOSE BLDC GLUCOMTR-MCNC: 179 MG/DL — HIGH (ref 70–99)
GLUCOSE BLDC GLUCOMTR-MCNC: 181 MG/DL — HIGH (ref 70–99)
GLUCOSE BLDC GLUCOMTR-MCNC: 190 MG/DL — HIGH (ref 70–99)
GLUCOSE BLDC GLUCOMTR-MCNC: 193 MG/DL — HIGH (ref 70–99)
GLUCOSE SERPL-MCNC: 141 MG/DL — HIGH (ref 70–99)
HCT VFR BLD CALC: 28.5 % — LOW (ref 34.5–45)
HGB BLD-MCNC: 8.8 G/DL — LOW (ref 11.5–15.5)
INR BLD: 1.02 RATIO — SIGNIFICANT CHANGE UP (ref 0.88–1.16)
MCHC RBC-ENTMCNC: 27.8 PG — SIGNIFICANT CHANGE UP (ref 27–34)
MCHC RBC-ENTMCNC: 30.9 GM/DL — LOW (ref 32–36)
MCV RBC AUTO: 89.9 FL — SIGNIFICANT CHANGE UP (ref 80–100)
PLATELET # BLD AUTO: 249 K/UL — SIGNIFICANT CHANGE UP (ref 150–400)
POTASSIUM SERPL-MCNC: 3.8 MMOL/L — SIGNIFICANT CHANGE UP (ref 3.5–5.3)
POTASSIUM SERPL-SCNC: 3.8 MMOL/L — SIGNIFICANT CHANGE UP (ref 3.5–5.3)
PROTHROM AB SERPL-ACNC: 11.5 SEC — SIGNIFICANT CHANGE UP (ref 10–13.1)
RBC # BLD: 3.17 M/UL — LOW (ref 3.8–5.2)
RBC # FLD: 16 % — HIGH (ref 10.3–14.5)
SODIUM SERPL-SCNC: 136 MMOL/L — SIGNIFICANT CHANGE UP (ref 135–145)
WBC # BLD: 7.47 K/UL — SIGNIFICANT CHANGE UP (ref 3.8–10.5)
WBC # FLD AUTO: 7.47 K/UL — SIGNIFICANT CHANGE UP (ref 3.8–10.5)

## 2018-06-11 PROCEDURE — 99222 1ST HOSP IP/OBS MODERATE 55: CPT

## 2018-06-11 RX ORDER — APIXABAN 2.5 MG/1
1 TABLET, FILM COATED ORAL
Qty: 0 | Refills: 0 | COMMUNITY

## 2018-06-11 RX ORDER — TIOTROPIUM BROMIDE 18 UG/1
1 CAPSULE ORAL; RESPIRATORY (INHALATION)
Qty: 1 | Refills: 0 | OUTPATIENT
Start: 2018-06-11

## 2018-06-11 RX ORDER — BACITRACIN ZINC 500 UNIT/G
1 OINTMENT IN PACKET (EA) TOPICAL
Qty: 0 | Refills: 0 | COMMUNITY
Start: 2018-06-11

## 2018-06-11 RX ADMIN — Medication 3 MILLILITER(S): at 05:11

## 2018-06-11 RX ADMIN — Medication 100 MILLIGRAM(S): at 05:11

## 2018-06-11 RX ADMIN — SIMVASTATIN 20 MILLIGRAM(S): 20 TABLET, FILM COATED ORAL at 22:50

## 2018-06-11 RX ADMIN — MIRTAZAPINE 15 MILLIGRAM(S): 45 TABLET, ORALLY DISINTEGRATING ORAL at 22:50

## 2018-06-11 RX ADMIN — Medication 100 MILLIGRAM(S): at 16:41

## 2018-06-11 RX ADMIN — Medication 1: at 08:12

## 2018-06-11 RX ADMIN — ARIPIPRAZOLE 2 MILLIGRAM(S): 15 TABLET ORAL at 12:05

## 2018-06-11 RX ADMIN — Medication 3 MILLILITER(S): at 12:05

## 2018-06-11 RX ADMIN — DULOXETINE HYDROCHLORIDE 60 MILLIGRAM(S): 30 CAPSULE, DELAYED RELEASE ORAL at 12:05

## 2018-06-11 RX ADMIN — Medication 0.5 MILLIGRAM(S): at 05:11

## 2018-06-11 RX ADMIN — Medication 40 MILLIGRAM(S): at 05:12

## 2018-06-11 RX ADMIN — Medication 3 MILLILITER(S): at 16:41

## 2018-06-11 RX ADMIN — Medication 1: at 12:06

## 2018-06-11 RX ADMIN — Medication 1: at 16:41

## 2018-06-11 RX ADMIN — Medication 1 APPLICATION(S): at 12:06

## 2018-06-11 RX ADMIN — AMIODARONE HYDROCHLORIDE 200 MILLIGRAM(S): 400 TABLET ORAL at 05:11

## 2018-06-11 RX ADMIN — Medication 0.5 MILLIGRAM(S): at 16:41

## 2018-06-11 NOTE — PROVIDER CONTACT NOTE (OTHER) - ACTION/TREATMENT ORDERED:
As per PA, administer 25 mL Dex 50% IVP STAT. Continue to Follow hypoglycemic protocol- Dex 5% gtt at 50 ml/hr. Recheck FS 30 minutes post IVP administration.

## 2018-06-11 NOTE — PROGRESS NOTE ADULT - SUBJECTIVE AND OBJECTIVE BOX
Patient is a 86y Female whom presented to the hospital with ckd and carlene     PAST MEDICAL & SURGICAL HISTORY:  COPD (chronic obstructive pulmonary disease)  Depression  Anxiety  Atrial fibrillation, unspecified type  Pacemaker  Overweight  PVD (Peripheral Vascular Disease)  Arteriosclerotic Heart Disease (ASHD)  HTN (Hypertension)  Diabetes  S/P carpal tunnel release  S/P left cataract extraction  s/p colon resection  S/P Nephrectomy: right  Hip Replacement  History of Total Knee Replacement  S/P Hernia Surgery  S/P Cholecystectomy      MEDICATIONS  (STANDING):  ALBUTerol    90 MICROgram(s) HFA Inhaler 1 Puff(s) Inhalation every 4 hours  ALBUTerol/ipratropium for Nebulization 3 milliLiter(s) Nebulizer every 6 hours  amiodarone    Tablet 200 milliGRAM(s) Oral daily  ARIPiprazole 2 milliGRAM(s) Oral daily  BACItracin   Ointment 1 Application(s) Topical daily  clonazePAM Tablet 0.5 milliGRAM(s) Oral two times a day  dextrose 50% Injectable 12.5 Gram(s) IV Push once  dextrose 50% Injectable 25 Gram(s) IV Push once  dextrose 50% Injectable 25 Gram(s) IV Push once  docusate sodium 100 milliGRAM(s) Oral two times a day  DULoxetine 60 milliGRAM(s) Oral daily  furosemide    Tablet 40 milliGRAM(s) Oral daily  insulin lispro (HumaLOG) corrective regimen sliding scale   SubCutaneous three times a day before meals  insulin lispro (HumaLOG) corrective regimen sliding scale   SubCutaneous at bedtime  metoprolol succinate  milliGRAM(s) Oral daily  mirtazapine 15 milliGRAM(s) Oral at bedtime  simvastatin 20 milliGRAM(s) Oral at bedtime  tiotropium 18 MICROgram(s) Capsule 1 Capsule(s) Inhalation daily    MEDICATIONS  (PRN):  dextrose 40% Gel 15 Gram(s) Oral once PRN Blood Glucose LESS THAN 70 milliGRAM(s)/deciliter  glucagon  Injectable 1 milliGRAM(s) IntraMuscular once PRN Glucose LESS THAN 70 milligrams/deciliter  ondansetron Injectable 4 milliGRAM(s) IV Push every 6 hours PRN Nausea      Allergies    amoxicillin (Flushing; Vomiting; Nausea)  lisinopril (Angioedema)    Intolerances        Constitutional: No fever, fatigue or weight loss.  Cardiovascular: No chest pain or palpation.  Respiratory: No cough, shortness of breath, congestion, or wheezing.  Gastrointestinal: No abdominal pain, nausea, vomiting, or diarrhea.                                           8.8    7.47  )-----------( 249      ( 11 Jun 2018 06:17 )             28.5       CBC Full  -  ( 11 Jun 2018 06:17 )  WBC Count : 7.47 K/uL  Hemoglobin : 8.8 g/dL  Hematocrit : 28.5 %  Platelet Count - Automated : 249 K/uL  Mean Cell Volume : 89.9 fl  Mean Cell Hemoglobin : 27.8 pg  Mean Cell Hemoglobin Concentration : 30.9 gm/dL  Auto Neutrophil # : x  Auto Lymphocyte # : x  Auto Monocyte # : x  Auto Eosinophil # : x  Auto Basophil # : x  Auto Neutrophil % : x  Auto Lymphocyte % : x  Auto Monocyte % : x  Auto Eosinophil % : x  Auto Basophil % : x      06-11    136  |  97  |  34<H>  ----------------------------<  141<H>  3.8   |  26  |  1.54<H>    Ca    8.4      11 Jun 2018 05:21        CAPILLARY BLOOD GLUCOSE      POCT Blood Glucose.: 190 mg/dL (11 Jun 2018 11:20)  POCT Blood Glucose.: 181 mg/dL (11 Jun 2018 07:37)  POCT Blood Glucose.: 210 mg/dL (10 Patrick 2018 23:22)  POCT Blood Glucose.: 170 mg/dL (10 Patrick 2018 22:34)  POCT Blood Glucose.: 65 mg/dL (10 Patrick 2018 21:40)  POCT Blood Glucose.: 68 mg/dL (10 Patrick 2018 21:23)  POCT Blood Glucose.: 72 mg/dL (10 Patrick 2018 21:07)  POCT Blood Glucose.: 223 mg/dL (10 Patrick 2018 16:17)      Vital Signs Last 24 Hrs  T(C): 36.5 (11 Jun 2018 12:00), Max: 37.2 (10 Patrick 2018 20:01)  T(F): 97.7 (11 Jun 2018 12:00), Max: 98.9 (10 Patrick 2018 20:01)  HR: 60 (11 Jun 2018 12:00) (60 - 60)  BP: 107/67 (11 Jun 2018 12:00) (107/67 - 138/76)  BP(mean): --  RR: 18 (11 Jun 2018 12:00) (18 - 18)  SpO2: 98% (11 Jun 2018 12:00) (96% - 98%)        PT/INR - ( 11 Jun 2018 06:17 )   PT: 11.5 sec;   INR: 1.02 ratio         PTT - ( 11 Jun 2018 06:17 )  PTT:28.3 sec                PHYSICAL EXAM:    Constitutional: NAD  HEENT: conjunctive   clear   Neck:  No JVD  Respiratory: CTAB  Cardiovascular: S1 and S2  Gastrointestinal: BS+, soft, NT/ND  Extremities: pos  peripheral edema  Neurological: no focal deficits  Psychiatric: Normal mood, normal affect  : No [place]  Skin: pos  rashes , dry   Access: Not applicable

## 2018-06-11 NOTE — CHART NOTE - NSCHARTNOTEFT_GEN_A_CORE
Trauma Surgery Chart Note  [facility] [id number]    -Please obtain Facial/Plastics Consult given injuries, as recommended by Ophthalmology Consult note (6/8/18).     -Discussed request for consult w/ primary team NP on [facility] Sunday morning & again w/ Aspen Raymond NP this am.       Otherwise, Trauma work-up complete, no further imaging indicated.       GLADIS York MD (PGY2)    (Please [facility], [id number] for questions/concerns.)

## 2018-06-11 NOTE — PROGRESS NOTE ADULT - SUBJECTIVE AND OBJECTIVE BOX
Patient is a 86y old  Female who presents with a chief complaint of fall (11 Jun 2018 12:52)      INTERVAL HPI/OVERNIGHT EVENTS:  T(C): 36.5 (06-11-18 @ 12:00), Max: 37.2 (06-10-18 @ 20:01)  HR: 60 (06-11-18 @ 12:00) (60 - 60)  BP: 107/67 (06-11-18 @ 12:00) (107/67 - 138/76)  RR: 18 (06-11-18 @ 12:00) (18 - 18)  SpO2: 98% (06-11-18 @ 12:00) (96% - 98%)  Wt(kg): --  I&O's Summary    10 Patrick 2018 07:01  -  11 Jun 2018 07:00  --------------------------------------------------------  IN: 940 mL / OUT: 0 mL / NET: 940 mL    11 Jun 2018 07:01  -  11 Jun 2018 16:19  --------------------------------------------------------  IN: 500 mL / OUT: 0 mL / NET: 500 mL        LABS:                        8.8    7.47  )-----------( 249      ( 11 Jun 2018 06:17 )             28.5     06-11    136  |  97  |  34<H>  ----------------------------<  141<H>  3.8   |  26  |  1.54<H>    Ca    8.4      11 Jun 2018 05:21      PT/INR - ( 11 Jun 2018 06:17 )   PT: 11.5 sec;   INR: 1.02 ratio         PTT - ( 11 Jun 2018 06:17 )  PTT:28.3 sec    CAPILLARY BLOOD GLUCOSE      POCT Blood Glucose.: 190 mg/dL (11 Jun 2018 11:20)  POCT Blood Glucose.: 181 mg/dL (11 Jun 2018 07:37)  POCT Blood Glucose.: 210 mg/dL (10 Patrick 2018 23:22)  POCT Blood Glucose.: 170 mg/dL (10 Patrick 2018 22:34)  POCT Blood Glucose.: 65 mg/dL (10 Patrick 2018 21:40)  POCT Blood Glucose.: 68 mg/dL (10 Patrick 2018 21:23)  POCT Blood Glucose.: 72 mg/dL (10 Patrick 2018 21:07)            MEDICATIONS  (STANDING):  ALBUTerol    90 MICROgram(s) HFA Inhaler 1 Puff(s) Inhalation every 4 hours  ALBUTerol/ipratropium for Nebulization 3 milliLiter(s) Nebulizer every 6 hours  amiodarone    Tablet 200 milliGRAM(s) Oral daily  ARIPiprazole 2 milliGRAM(s) Oral daily  BACItracin   Ointment 1 Application(s) Topical daily  clonazePAM Tablet 0.5 milliGRAM(s) Oral two times a day  dextrose 50% Injectable 12.5 Gram(s) IV Push once  dextrose 50% Injectable 25 Gram(s) IV Push once  dextrose 50% Injectable 25 Gram(s) IV Push once  docusate sodium 100 milliGRAM(s) Oral two times a day  DULoxetine 60 milliGRAM(s) Oral daily  furosemide    Tablet 40 milliGRAM(s) Oral daily  insulin lispro (HumaLOG) corrective regimen sliding scale   SubCutaneous three times a day before meals  insulin lispro (HumaLOG) corrective regimen sliding scale   SubCutaneous at bedtime  metoprolol succinate  milliGRAM(s) Oral daily  mirtazapine 15 milliGRAM(s) Oral at bedtime  simvastatin 20 milliGRAM(s) Oral at bedtime  tiotropium 18 MICROgram(s) Capsule 1 Capsule(s) Inhalation daily    MEDICATIONS  (PRN):  dextrose 40% Gel 15 Gram(s) Oral once PRN Blood Glucose LESS THAN 70 milliGRAM(s)/deciliter  glucagon  Injectable 1 milliGRAM(s) IntraMuscular once PRN Glucose LESS THAN 70 milligrams/deciliter  ondansetron Injectable 4 milliGRAM(s) IV Push every 6 hours PRN Nausea          PHYSICAL EXAM:  GENERAL: frail  CHEST/LUNG: Clear to percussion bilaterally; No rales, rhonchi, wheezing, or rubs  HEART: Regular rate and rhythm; No murmurs, rubs, or gallops  ABDOMEN: Soft, Nontender, Nondistended; Bowel sounds present  EXTREMITIES:  2+ Peripheral Pulses, No clubbing, cyanosis, or edema  LYMPH: No lymphadenopathy noted  SKIN: No rashes or lesions    Care Discussed with Consultants/Other Providers [+ ] YES  [ ] NO

## 2018-06-11 NOTE — DISCHARGE NOTE ADULT - MEDICATION SUMMARY - MEDICATIONS TO TAKE
I will START or STAY ON the medications listed below when I get home from the hospital:    amiodarone 200 mg oral tablet  -- 1 tab(s) by mouth once a day  -- Indication: For Atrial fibrillation, unspecified type    KlonoPIN 0.5 mg oral tablet  -- 1 tablet by mouth 1-2 times daily, As Needed  -- Indication: For Depression    Remeron 15 mg oral tablet  -- 1 tab(s) by mouth once a day (at bedtime)  -- Indication: For Depression    Cymbalta 60 mg oral delayed release capsule  -- 1 cap(s) by mouth once a day  -- Indication: For Depression    HumaLOG KwikPen 100 units/mL subcutaneous solution  -- 4 unit(s) subcutaneous 3 times a day  -- Indication: For Diabetes    Toujeo SoloStar 300 units/mL subcutaneous solution  -- 12 unit(s) subcutaneous once a day  -- Indication: For Diabetes    simvastatin 20 mg oral tablet  -- 1 tab(s) by mouth once a day (at bedtime)  -- Indication: For HLD    ARIPiprazole 2 mg oral tablet  -- 1 tab(s) by mouth once a day  -- Indication: For psych    Toprol- mg oral tablet, extended release  -- 1 tab(s) by mouth once a day  -- Indication: For CHF    Advair Diskus 250 mcg-50 mcg inhalation powder  -- 1 puff(s) inhaled 2 times a day  -- Indication: For SOB    tiotropium 18 mcg inhalation capsule  -- 1 cap(s) inhaled once a day  -- Indication: For SOB    bacitracin 500 units/g topical ointment  -- 1 application on skin once a day  -- Indication: For wound    Lasix 40 mg oral tablet  -- 1 tab(s) by mouth once a day  -- Avoid prolonged or excessive exposure to direct and/or artificial sunlight while taking this medication.  It is very important that you take or use this exactly as directed.  Do not skip doses or discontinue unless directed by your doctor.  It may be advisable to drink a full glass orange juice or eat a banana daily while taking this medication.    -- Indication: For CHF    Colace 100 mg oral capsule  -- 4 cap(s) by mouth once a day (at night)  -- Indication: For constipation I will START or STAY ON the medications listed below when I get home from the hospital:    amiodarone 200 mg oral tablet  -- 1 tab(s) by mouth once a day  -- Indication: For Atrial fibrillation, unspecified type    KlonoPIN 0.5 mg oral tablet  -- 1 tablet by mouth 1-2 times daily, As Needed  -- Indication: For Depression    Remeron 15 mg oral tablet  -- 1 tab(s) by mouth once a day (at bedtime)  -- Indication: For Depression    Cymbalta 60 mg oral delayed release capsule  -- 1 cap(s) by mouth once a day  -- Indication: For Depression    insulin glargine  -- 6 unit(s) subcutaneous once a day (at bedtime)  -- Indication: For Diabetes    insulin lispro (concentrated) 200 units/mL subcutaneous solution  -- 5 unit(s) subcutaneous 3 times a day (with meals)   -- Indication: For Diabetes    simvastatin 20 mg oral tablet  -- 1 tab(s) by mouth once a day (at bedtime)  -- Indication: For HLD    ARIPiprazole 2 mg oral tablet  -- 1 tab(s) by mouth once a day  -- Indication: For psych    Toprol- mg oral tablet, extended release  -- 1 tab(s) by mouth once a day  -- Indication: For CHF    Advair Diskus 250 mcg-50 mcg inhalation powder  -- 1 puff(s) inhaled 2 times a day  -- Indication: For SOB    tiotropium 18 mcg inhalation capsule  -- 1 cap(s) inhaled once a day  -- Indication: For SOB    bacitracin 500 units/g topical ointment  -- 1 application on skin once a day  -- Indication: For wound    Lasix 40 mg oral tablet  -- 1 tab(s) by mouth once a day  -- Avoid prolonged or excessive exposure to direct and/or artificial sunlight while taking this medication.  It is very important that you take or use this exactly as directed.  Do not skip doses or discontinue unless directed by your doctor.  It may be advisable to drink a full glass orange juice or eat a banana daily while taking this medication.    -- Indication: For CHF    Colace 100 mg oral capsule  -- 4 cap(s) by mouth once a day (at night)  -- Indication: For constipation

## 2018-06-11 NOTE — CONSULT NOTE ADULT - PROBLEM SELECTOR RECOMMENDATION 9
-   - No acute surgical intervention  - HOB elevation  - Ice to L face  - Pain control  - Please follow up with Dr. Medina as needed. You may call [phone] to schedule an appointment.
Will increase Lantus to 21 units at bed time.  Will increase Humalog to 10 units before each meal in addition to Humalog correction scale coverage.  Patient counseled for compliance with consistent low carb diet.
CHRONIC KIDNEY DISEASE, STAGE 3:   Serum creatinine is increased , approximating a GFR is increased ml/min.   There is no progression. No uremic symptoms. No evidence of  worsening  Anemia.  Fluid status stable.  Will continue to avoid nephrotoxic drugs.   Patient remains asymptomatic.   Continue current therapy.

## 2018-06-11 NOTE — PROVIDER CONTACT NOTE (OTHER) - BACKGROUND
Pt. s/p fall with SAH. PMH COPD, ASHD and pacemaker. Pt. with Hx DM2 treated with sliding scale insulin before meals and at bedtime. Pt. also receives lantus before bedtime.

## 2018-06-11 NOTE — PROGRESS NOTE ADULT - ASSESSMENT
Problem/Plan - 1:  ·  Problem: Traumatic subarachnoid hemorrhage without loss of consciousness, initial encounter.  Plan: repeat CT scan reviewed  neurosurgery fu, paged for fu  management as per neurosurgery  CT scans stable  neurosurgery fu     Problem/Plan - 2:  ·  Problem: Open fracture of left orbital floor, initial encounter.  Plan: Ophthalmology consult appreciated  no acute intervention needed.     Problem/Plan - 3:  ·  Problem: Near syncope.  Plan: telemonitor  cards fu  PPM interrogation     Problem/Plan - 4:  ·  Problem: Atrial fibrillation, unspecified type.  Plan: hold AC for now    PT and dc planning in am    Problem/Plan - 5:  ·  Problem Hematoma Plan Monitor cbc  todays CBC pending

## 2018-06-11 NOTE — DISCHARGE NOTE ADULT - CARE PROVIDER_API CALL
Angelika Mckeon (MD; PhD), Neurological Surgery  [address] [address]  Phone: [phone]  Fax: [phone]

## 2018-06-11 NOTE — PROGRESS NOTE ADULT - ASSESSMENT
86F s/p mechanical fall with small R mesial temporal hemorrhage, suspected traumatic in nature.     -Continue to hold Eliquis and aspirin  -No contraindication to DVT prophylaxis   -Patient may follow up with Dr. Mckeon as outpatient in two weeks

## 2018-06-11 NOTE — PROGRESS NOTE ADULT - SUBJECTIVE AND OBJECTIVE BOX
[facility] Neurological Care [facility]        - Patient seen and examined.  - Today, patient is without complaints.  son at bedside.        *****MEDICATIONS: Current medication reviewed and documented.    MEDICATIONS  (STANDING):  ALBUTerol    90 MICROgram(s) HFA Inhaler 1 Puff(s) Inhalation every 4 hours  ALBUTerol/ipratropium for Nebulization 3 milliLiter(s) Nebulizer every 6 hours  amiodarone    Tablet 200 milliGRAM(s) Oral daily  ARIPiprazole 2 milliGRAM(s) Oral daily  BACItracin   Ointment 1 Application(s) Topical daily  clonazePAM Tablet 0.5 milliGRAM(s) Oral two times a day  dextrose 50% Injectable 12.5 Gram(s) IV Push once  dextrose 50% Injectable 25 Gram(s) IV Push once  dextrose 50% Injectable 25 Gram(s) IV Push once  docusate sodium 100 milliGRAM(s) Oral two times a day  DULoxetine 60 milliGRAM(s) Oral daily  furosemide    Tablet 40 milliGRAM(s) Oral daily  insulin lispro (HumaLOG) corrective regimen sliding scale   SubCutaneous three times a day before meals  insulin lispro (HumaLOG) corrective regimen sliding scale   SubCutaneous at bedtime  metoprolol succinate  milliGRAM(s) Oral daily  mirtazapine 15 milliGRAM(s) Oral at bedtime  simvastatin 20 milliGRAM(s) Oral at bedtime  tiotropium 18 MICROgram(s) Capsule 1 Capsule(s) Inhalation daily    MEDICATIONS  (PRN):  dextrose 40% Gel 15 Gram(s) Oral once PRN Blood Glucose LESS THAN 70 milliGRAM(s)/deciliter  glucagon  Injectable 1 milliGRAM(s) IntraMuscular once PRN Glucose LESS THAN 70 milligrams/deciliter  ondansetron Injectable 4 milliGRAM(s) IV Push every 6 hours PRN Nausea           ***** REVIEW OF SYSTEM:  GEN: no fever, no chills, no pain  RESP: no SOB, no cough, no sputum  CVS: no chest pain, no palpitations, no edema  GI: no abdominal pain, no nausea, no vomiting, no constipation, no diarrhea  : no dysurea, no frequency  NEURO: no headache, no diziness  PSYCH: no depression, not anxious  Derm : no itching, no rash         ***** VITAL SIGNS:  T(F): 97.7 (06-11-18 @ 12:00), Max: 98.9 (06-10-18 @ 20:01)  HR: 60 (18 @ 12:00) (60 - 60)  BP: 107/67 (18 @ 12:00) (107/67 - 138/76)  RR: 18 (18 @ 12:00) (18 - 18)  SpO2: 98% (18 @ 12:00) (96% - 98%)  Wt(kg): --  ,   I&O's Summary    10 Patrick 2018 07:  -  2018 07:00  --------------------------------------------------------  IN: 940 mL / OUT: 0 mL / NET: 940 mL    2018 07:01  -  2018 14:42  --------------------------------------------------------  IN: 500 mL / OUT: 0 mL / NET: 500 mL             *****PHYSICAL EXAM: Alert oriented x 1 falls asleep mid sentence.  Attention comprehension are limited.  follows 1 step commands.       EOMI fundi not visualized,  VFF to confrontration  No facial asymmetry   Tongue is midline   Palate elevates symmetrically   Moving all 4 ext minimally.    moves r arm antigravity.   left arm with limited mvt due to pain  She can lift both legs slightly.      Gait : not assessed.           *****LAB AND IMAGIN.8    7.47  )-----------( 249      ( 2018 06:17 )             28.5               -    136  |  97  |  34<H>  ----------------------------<  141<H>  3.8   |  26  |  1.54<H>    Ca    8.4      2018 05:21      PT/INR - ( 2018 06:17 )   PT: 11.5 sec;   INR: 1.02 ratio         PTT - ( 2018 06:17 )  PTT:28.3 sec                     [All pertinent recent Imaging/Reports reviewed]           *****A S S E S S M E N T   A N D   P L A N :      87 YO F p/w unwitnessed mechanical fall while walking to her daughters house. Pt fell, denies LOC, states she heard some noice downstairs which worried her. Pt hit head is on eliquis. Pt has multiple skin tares on left elbow left knee, and left temple. Pt endorses left hip pain and has hematoma over the hip with moderate tenderness. Pt denies any urinary symptoms, chest pain, back pain, neck pain, abdominal pain. Per daughter, pt has been slightly confused over the past week and recently had her DM medications changed    Traumatic sah/small ivh while on noac, stable bleed.  Hold eliquis for atleast 2 weeks.  can restart dvt prophylaxis, cleared by neurosurgery.    Neurosurgery eval appreciated, pt to follow up with neuro surgery on discharge.      multifactorial toxic metabolic encephalopathy improving.      Fall likely mechanical, home with home pt per Physical therapy, pt lives alone may need additional services set up  ? whether she would be candidate for short term rehab.   OOB to chair  will continue to monitor closely.         Thank you for allowing me to participate in the care of this patient. Please do not hesitate to call me if you have any  questions.        ________________  Vandana Tay MD  [facility] Neurological [facility]  [phone]     30 minutes spent on total encounter; more than 50 % of the visit was  spent counseling and or  coordinating care by the attending physician.   At the present time, [facility] does not  provide outpatient followup, best to call the your insurance to find a participating provider.  This was explained to you at the time of the visit. Alternatively, if your insurance allows it, you can follow up with a neurologist  Dr. Pedro Huston([place]) [phone] or Dr. Deion Barone ( [place]) [phone]

## 2018-06-11 NOTE — DISCHARGE NOTE ADULT - SECONDARY DIAGNOSIS.
Open fracture of left orbital floor, initial encounter Atrial fibrillation, unspecified type HTN (Hypertension)

## 2018-06-11 NOTE — DISCHARGE NOTE ADULT - CARE PLAN
Principal Discharge DX:	Traumatic subarachnoid hemorrhage without loss of consciousness, initial encounter  Goal:	Remain without falls or injury  Secondary Diagnosis:	Open fracture of left orbital floor, initial encounter  Secondary Diagnosis:	Atrial fibrillation, unspecified type  Assessment and plan of treatment:	Atrial fibrillation is the most common heart rhythm problem & has the risk of stroke & heart attack  It helps if you control your blood pressure, not drink more than 1-2 alcohol drinks per day, cut down on caffeine, getting treatment for over active thyroid gland, & getting exercise  Call your doctor if you feel your heart racing or beating unusually, chest tightness or pain, lightheaded, faint, shortness of breath especially with exercise  It is important to take your heart medication as prescribed  Hold all anticoagulation until your doctor says it's ok to restart it.  Secondary Diagnosis:	HTN (Hypertension)  Assessment and plan of treatment:	Low salt diet  Activity as tolerated.  Take all medication as prescribed.  Follow up with your medical doctor for routine blood pressure monitoring at your next visit.  Notify your doctor if you have any of the following symptoms:   Dizziness, Lightheadedness, Blurry vision, Headache, Chest pain, Shortness of breath Principal Discharge DX:	Traumatic subarachnoid hemorrhage without loss of consciousness, initial encounter  Goal:	Remain without falls or injury  Assessment and plan of treatment:	You are being transferred to rehab for conditioning and strengthening  Secondary Diagnosis:	Open fracture of left orbital floor, initial encounter  Assessment and plan of treatment:	You are being transferred to rehab for conditioning and strengthening  Secondary Diagnosis:	Atrial fibrillation, unspecified type  Assessment and plan of treatment:	Atrial fibrillation is the most common heart rhythm problem & has the risk of stroke & heart attack  It helps if you control your blood pressure, not drink more than 1-2 alcohol drinks per day, cut down on caffeine, getting treatment for over active thyroid gland, & getting exercise  Call your doctor if you feel your heart racing or beating unusually, chest tightness or pain, lightheaded, faint, shortness of breath especially with exercise  It is important to take your heart medication as prescribed  Hold all anticoagulation until your doctor says it's ok to restart it.  Secondary Diagnosis:	HTN (Hypertension)  Assessment and plan of treatment:	Low salt diet  Activity as tolerated.  Take all medication as prescribed.  Follow up with your medical doctor for routine blood pressure monitoring at your next visit.  Notify your doctor if you have any of the following symptoms:   Dizziness, Lightheadedness, Blurry vision, Headache, Chest pain, Shortness of breath Principal Discharge DX:	Traumatic subarachnoid hemorrhage without loss of consciousness, initial encounter  Goal:	Remain without falls or injury  Assessment and plan of treatment:	You are being transferred to rehab for conditioning and strengthening  Secondary Diagnosis:	Open fracture of left orbital floor, initial encounter  Assessment and plan of treatment:	You are being transferred to rehab for conditioning and strengthening  Secondary Diagnosis:	Atrial fibrillation, unspecified type  Assessment and plan of treatment:	Continue to hold anticoagulation for 1-2 weeks pending Neurosurgery approval   Atrial fibrillation is the most common heart rhythm problem & has the risk of stroke & heart attack  It helps if you control your blood pressure, not drink more than 1-2 alcohol drinks per day, cut down on caffeine, getting treatment for over active thyroid gland, & getting exercise  Call your doctor if you feel your heart racing or beating unusually, chest tightness or pain, lightheaded, faint, shortness of breath especially with exercise  It is important to take your heart medication as prescribed  Hold all anticoagulation until your doctor says it's ok to restart it.  Secondary Diagnosis:	HTN (Hypertension)  Assessment and plan of treatment:	Low salt diet  Activity as tolerated.  Take all medication as prescribed.  Follow up with your medical doctor for routine blood pressure monitoring at your next visit.  Notify your doctor if you have any of the following symptoms:   Dizziness, Lightheadedness, Blurry vision, Headache, Chest pain, Shortness of breath

## 2018-06-11 NOTE — DISCHARGE NOTE ADULT - PLAN OF CARE
Remain without falls or injury Atrial fibrillation is the most common heart rhythm problem & has the risk of stroke & heart attack  It helps if you control your blood pressure, not drink more than 1-2 alcohol drinks per day, cut down on caffeine, getting treatment for over active thyroid gland, & getting exercise  Call your doctor if you feel your heart racing or beating unusually, chest tightness or pain, lightheaded, faint, shortness of breath especially with exercise  It is important to take your heart medication as prescribed  Hold all anticoagulation until your doctor says it's ok to restart it. Low salt diet  Activity as tolerated.  Take all medication as prescribed.  Follow up with your medical doctor for routine blood pressure monitoring at your next visit.  Notify your doctor if you have any of the following symptoms:   Dizziness, Lightheadedness, Blurry vision, Headache, Chest pain, Shortness of breath You are being transferred to rehab for conditioning and strengthening Continue to hold anticoagulation for 1-2 weeks pending Neurosurgery approval   Atrial fibrillation is the most common heart rhythm problem & has the risk of stroke & heart attack  It helps if you control your blood pressure, not drink more than 1-2 alcohol drinks per day, cut down on caffeine, getting treatment for over active thyroid gland, & getting exercise  Call your doctor if you feel your heart racing or beating unusually, chest tightness or pain, lightheaded, faint, shortness of breath especially with exercise  It is important to take your heart medication as prescribed  Hold all anticoagulation until your doctor says it's ok to restart it.

## 2018-06-11 NOTE — DISCHARGE NOTE ADULT - MEDICATION SUMMARY - MEDICATIONS TO STOP TAKING
I will STOP taking the medications listed below when I get home from the hospital:    Eliquis 2.5 mg oral tablet  -- 1 tab(s) by mouth 2 times a day    aspirin 81 mg oral tablet, chewable  -- 1 tab(s) by mouth once a day    predniSONE 20 mg oral tablet  -- 2 tab(s) by mouth once a day    cefuroxime 500 mg oral tablet  -- 1 tab(s) by mouth 2 times a day   -- Finish all this medication unless otherwise directed by prescriber.  Medication should be taken with plenty of water.  Take with food or milk.

## 2018-06-11 NOTE — PROVIDER CONTACT NOTE (OTHER) - SITUATION
Pt. alert and oriented x 3-4 admitted s/p fall with SAH. Pt. with Hx DM2 treated with sliding scale insulin before meals and at bedtime. Pt. also receives lantus before bedtime. Pt. last FS 65.

## 2018-06-11 NOTE — PROGRESS NOTE ADULT - SUBJECTIVE AND OBJECTIVE BOX
Chief complaint  Patient is a 86y old  Female who presents with a chief complaint of fall (11 Jun 2018 12:52)   Review of systems  Patient in bed, looks comfortable, no fever,  no hypoglycemia.    Labs and Fingersticks  CAPILLARY BLOOD GLUCOSE      POCT Blood Glucose.: 179 mg/dL (11 Jun 2018 16:19)  POCT Blood Glucose.: 190 mg/dL (11 Jun 2018 11:20)  POCT Blood Glucose.: 181 mg/dL (11 Jun 2018 07:37)  POCT Blood Glucose.: 210 mg/dL (10 Patrick 2018 23:22)  POCT Blood Glucose.: 170 mg/dL (10 Patrick 2018 22:34)  POCT Blood Glucose.: 65 mg/dL (10 Patrick 2018 21:40)  POCT Blood Glucose.: 68 mg/dL (10 Patrick 2018 21:23)  POCT Blood Glucose.: 72 mg/dL (10 Patrick 2018 21:07)      Anion Gap, Serum: 13 (06-11 @ 05:21)      Calcium, Total Serum: 8.4 (06-11 @ 05:21)          06-11    136  |  97  |  34<H>  ----------------------------<  141<H>  3.8   |  26  |  1.54<H>    Ca    8.4      11 Jun 2018 05:21                          8.8    7.47  )-----------( 249      ( 11 Jun 2018 06:17 )             28.5     Medications  MEDICATIONS  (STANDING):  ALBUTerol    90 MICROgram(s) HFA Inhaler 1 Puff(s) Inhalation every 4 hours  ALBUTerol/ipratropium for Nebulization 3 milliLiter(s) Nebulizer every 6 hours  amiodarone    Tablet 200 milliGRAM(s) Oral daily  ARIPiprazole 2 milliGRAM(s) Oral daily  BACItracin   Ointment 1 Application(s) Topical daily  clonazePAM Tablet 0.5 milliGRAM(s) Oral two times a day  dextrose 50% Injectable 12.5 Gram(s) IV Push once  dextrose 50% Injectable 25 Gram(s) IV Push once  dextrose 50% Injectable 25 Gram(s) IV Push once  docusate sodium 100 milliGRAM(s) Oral two times a day  DULoxetine 60 milliGRAM(s) Oral daily  furosemide    Tablet 40 milliGRAM(s) Oral daily  insulin lispro (HumaLOG) corrective regimen sliding scale   SubCutaneous three times a day before meals  insulin lispro (HumaLOG) corrective regimen sliding scale   SubCutaneous at bedtime  metoprolol succinate  milliGRAM(s) Oral daily  mirtazapine 15 milliGRAM(s) Oral at bedtime  simvastatin 20 milliGRAM(s) Oral at bedtime  tiotropium 18 MICROgram(s) Capsule 1 Capsule(s) Inhalation daily      Physical Exam  General: Patient comfortable in bed  Vital Signs Last 12 Hrs  T(F): 97.7 (06-11-18 @ 12:00), Max: 97.7 (06-11-18 @ 12:00)  HR: 60 (06-11-18 @ 12:00) (60 - 60)  BP: 107/67 (06-11-18 @ 12:00) (107/67 - 107/67)  BP(mean): --  RR: 18 (06-11-18 @ 12:00) (18 - 18)  SpO2: 98% (06-11-18 @ 12:00) (98% - 98%)  Neck: No palpable thyroid nodules.  CVS: S1S2, No murmurs  Respiratory: No wheezing, no crepitations  GI: Abdomen soft, bowel sounds positive  Musculoskeletal:  edema lower extremities.   Skin: No skin rashes, no ecchymosis    Diagnostics    Hemoglobin A1C, Whole Blood: Routine (06-08 @ 18:50)

## 2018-06-11 NOTE — PROGRESS NOTE ADULT - SUBJECTIVE AND OBJECTIVE BOX
Patient seen and examined at bedside.    T(C): 37.2 (06-10-18 @ 20:01), Max: 37.2 (06-10-18 @ 20:01)  HR: 60 (06-10-18 @ 20:01) (60 - 60)  BP: 119/67 (06-10-18 @ 20:01) (119/67 - 146/74)  RR: 18 (06-10-18 @ 20:01) (18 - 18)  SpO2: 96% (06-10-18 @ 20:01) (96% - 100%)  Wt(kg): --    EXAM:    AOx3, Following Commands  CN: PERRL, EOMI, no facial droop  Motor: 5/5 throughout, no drift  Sensation intact to light touch  Reflexes: no clonus     RADIOLOGY:     CT head from tonight reviewed: Stable hemorrhage with expected evolution. Awaiting official report from Radiology.

## 2018-06-11 NOTE — CONSULT NOTE ADULT - SUBJECTIVE AND OBJECTIVE BOX
Plastic Surgery Consult Note (pg [facility]: [id number], [facility]: [phone])    HPI: 86 YOF p/w unwitnessed mechanical fall while walking to her daughters house. Pt fell, denies LOC, states she heard some noise downstairs which worried her. Pt hit head is on eliquis. Pt has multiple skin tares on left elbow left knee, and left temple. Pt endorses left hip pain and has hematoma over the hip with moderate tenderness. Pt denies any urinary symptoms, chest pain, back pain, neck pain, abdominal pain. Per daughter, pt has been slightly confused over the past week and recently had her DM medications changed.    At the time of consultation, the patient denies any pain w/ EOM, change in vision, or loss of sensation of the face    ** VITAL SIGNS / I&O's **    Vital Signs Last 24 Hrs  T(C): 36.5 (11 Jun 2018 12:00), Max: 37.2 (10 Patrick 2018 20:01)  T(F): 97.7 (11 Jun 2018 12:00), Max: 98.9 (10 Patrick 2018 20:01)  HR: 60 (11 Jun 2018 12:00) (60 - 60)  BP: 107/67 (11 Jun 2018 12:00) (107/67 - 138/76)  BP(mean): --  RR: 18 (11 Jun 2018 12:00) (18 - 18)  SpO2: 98% (11 Jun 2018 12:00) (96% - 98%)      10 Patrick 2018 07:01  -  11 Jun 2018 07:00  --------------------------------------------------------  IN:    Oral Fluid: 940 mL  Total IN: 940 mL    OUT:  Total OUT: 0 mL    Total NET: 940 mL      11 Jun 2018 07:01  -  11 Jun 2018 16:38  --------------------------------------------------------  IN:    Oral Fluid: 500 mL  Total IN: 500 mL    OUT:  Total OUT: 0 mL    Total NET: 500 mL          ** PHYSICAL EXAM **    -- CONSTITUTIONAL: AOx3. NAD.   -- HEENT: L periorbital ecchymosis/edema; EOMI; PER; CN's 2-12 intact; no vertical dystopia; no enophthalmos      **MEDS**  ALBUTerol    90 MICROgram(s) HFA Inhaler 1 Puff(s) Inhalation every 4 hours  ALBUTerol/ipratropium for Nebulization 3 milliLiter(s) Nebulizer every 6 hours  amiodarone    Tablet 200 milliGRAM(s) Oral daily  ARIPiprazole 2 milliGRAM(s) Oral daily  BACItracin   Ointment 1 Application(s) Topical daily  clonazePAM Tablet 0.5 milliGRAM(s) Oral two times a day  dextrose 40% Gel 15 Gram(s) Oral once PRN  dextrose 50% Injectable 12.5 Gram(s) IV Push once  dextrose 50% Injectable 25 Gram(s) IV Push once  dextrose 50% Injectable 25 Gram(s) IV Push once  docusate sodium 100 milliGRAM(s) Oral two times a day  DULoxetine 60 milliGRAM(s) Oral daily  furosemide    Tablet 40 milliGRAM(s) Oral daily  glucagon  Injectable 1 milliGRAM(s) IntraMuscular once PRN  insulin lispro (HumaLOG) corrective regimen sliding scale   SubCutaneous three times a day before meals  insulin lispro (HumaLOG) corrective regimen sliding scale   SubCutaneous at bedtime  metoprolol succinate  milliGRAM(s) Oral daily  mirtazapine 15 milliGRAM(s) Oral at bedtime  ondansetron Injectable 4 milliGRAM(s) IV Push every 6 hours PRN  simvastatin 20 milliGRAM(s) Oral at bedtime  tiotropium 18 MICROgram(s) Capsule 1 Capsule(s) Inhalation daily      ** LABS **                          8.8    7.47  )-----------( 249      ( 11 Jun 2018 06:17 )             28.5     11 Jun 2018 05:21    136    |  97     |  34     ----------------------------<  141    3.8     |  26     |  1.54     Ca    8.4        11 Jun 2018 05:21      PT/INR - ( 11 Jun 2018 06:17 )   PT: 11.5 sec;   INR: 1.02 ratio         PTT - ( 11 Jun 2018 06:17 )  PTT:28.3 sec  CAPILLARY BLOOD GLUCOSE      POCT Blood Glucose.: 179 mg/dL (11 Jun 2018 16:19)  POCT Blood Glucose.: 190 mg/dL (11 Jun 2018 11:20)  POCT Blood Glucose.: 181 mg/dL (11 Jun 2018 07:37)  POCT Blood Glucose.: 210 mg/dL (10 Patrick 2018 23:22)  POCT Blood Glucose.: 170 mg/dL (10 Patrick 2018 22:34)  POCT Blood Glucose.: 65 mg/dL (10 Patrick 2018 21:40)  POCT Blood Glucose.: 68 mg/dL (10 Patrick 2018 21:23)  POCT Blood Glucose.: 72 mg/dL (10 Patrick 2018 21:07)

## 2018-06-11 NOTE — DISCHARGE NOTE ADULT - PATIENT PORTAL LINK FT
You can access the [facility] Patient Portal, offered by [facility], by registering with the following website: [email or web address]

## 2018-06-11 NOTE — DISCHARGE NOTE ADULT - HOSPITAL COURSE
86 YOF p/w unwitnessed mechanical fall while walking to her daughters house. Pt fell, denies LOC, states she heard some noice downstairs which worried her. Pt hit head is on eliquis. Pt has multiple skin tares on left elbow left knee, and left temple. Pt endorses left hip pain and has hematoma over the hip with moderate tenderness. now with ckd and carlene    Stage 3 chronic kidney disease.  Stage 3 chronic kidney disease. Recommendation:  increase water intake , low salt diet   Serum creatinine is increased , approximating a GFR is increased ml/min.   There is no progression. No uremic symptoms. No evidence of  worsening  Anemia.  Fluid status stable.  Will continue to avoid nephrotoxic drugs.   Patient remains asymptomatic.   Continue current therapy.   Traumatic subarachnoid hemorrhage without loss of consciousness, initial encounter.  Plan: Traumatic subarachnoid hemorrhage without loss of consciousness, initial encounter.  Recommendation: repeat CT scan reviewed  neurosurgery fu, paged for fu, management as per neurosurgery.   Atrial fibrillation, unspecified type.  obtain ECHO to evaluate LVEF,cardiology consult,continue current managmnet,O2 supply, anticoagulation plan as per cardiology consult.   HTN (Hypertension).  BP monitoring, continue current antihypertensive meds, low salt diet, follow up with PMD in 1-2 weeks.   DARLING to JOSE.

## 2018-06-11 NOTE — PROGRESS NOTE ADULT - ASSESSMENT
Assessment  DMT2: 86y Female with DM T2 with hyperglycemia on basal bolus insulin, blood fluctuating, no new hypoglycemic episode,  poor PO intake.  COPD: On medications, stable, monitored.  HTN: Controlled, On med.  Subarachnoid Bleed: Monitor, stable

## 2018-06-11 NOTE — CONSULT NOTE ADULT - CONSULT REQUESTED DATE/TIME
08-Jun-2018 11:26
08-Jun-2018 14:05
08-Jun-2018 15:45
09-Jun-2018 12:11
09-Jun-2018 19:27
11-Jun-2018 16:37
08-Jun-2018 21:37

## 2018-06-11 NOTE — DISCHARGE NOTE ADULT - ADDITIONAL INSTRUCTIONS
Make appointments to follow up with your out patient physicians.  Bring all discharge paperwork including discharge medication list to your follow up appointments.   Follow up with Dr. Mckeon, neurosurgery as outpatient in two weeks. Make appointments to follow up with your out patient physicians.  Bring all discharge paperwork including discharge medication list to your follow up appointments.   Follow up with Dr. Mckeon, neurosurgery as outpatient in one-two weeks.

## 2018-06-12 ENCOUNTER — APPOINTMENT (OUTPATIENT)
Dept: ELECTROPHYSIOLOGY | Facility: CLINIC | Age: 83
End: 2018-06-12

## 2018-06-12 LAB
ANION GAP SERPL CALC-SCNC: 11 MMOL/L — SIGNIFICANT CHANGE UP (ref 5–17)
BUN SERPL-MCNC: 36 MG/DL — HIGH (ref 7–23)
CALCIUM SERPL-MCNC: 8.6 MG/DL — SIGNIFICANT CHANGE UP (ref 8.4–10.5)
CHLORIDE SERPL-SCNC: 96 MMOL/L — SIGNIFICANT CHANGE UP (ref 96–108)
CO2 SERPL-SCNC: 26 MMOL/L — SIGNIFICANT CHANGE UP (ref 22–31)
CREAT SERPL-MCNC: 1.75 MG/DL — HIGH (ref 0.5–1.3)
GLUCOSE BLDC GLUCOMTR-MCNC: 207 MG/DL — HIGH (ref 70–99)
GLUCOSE BLDC GLUCOMTR-MCNC: 216 MG/DL — HIGH (ref 70–99)
GLUCOSE BLDC GLUCOMTR-MCNC: 233 MG/DL — HIGH (ref 70–99)
GLUCOSE BLDC GLUCOMTR-MCNC: 279 MG/DL — HIGH (ref 70–99)
GLUCOSE SERPL-MCNC: 192 MG/DL — HIGH (ref 70–99)
HCT VFR BLD CALC: 28.6 % — LOW (ref 34.5–45)
HGB BLD-MCNC: 8.9 G/DL — LOW (ref 11.5–15.5)
MCHC RBC-ENTMCNC: 28.2 PG — SIGNIFICANT CHANGE UP (ref 27–34)
MCHC RBC-ENTMCNC: 31.1 GM/DL — LOW (ref 32–36)
MCV RBC AUTO: 90.5 FL — SIGNIFICANT CHANGE UP (ref 80–100)
PLATELET # BLD AUTO: 265 K/UL — SIGNIFICANT CHANGE UP (ref 150–400)
POTASSIUM SERPL-MCNC: 3.7 MMOL/L — SIGNIFICANT CHANGE UP (ref 3.5–5.3)
POTASSIUM SERPL-SCNC: 3.7 MMOL/L — SIGNIFICANT CHANGE UP (ref 3.5–5.3)
RBC # BLD: 3.16 M/UL — LOW (ref 3.8–5.2)
RBC # FLD: 16.1 % — HIGH (ref 10.3–14.5)
SODIUM SERPL-SCNC: 133 MMOL/L — LOW (ref 135–145)
WBC # BLD: 7.42 K/UL — SIGNIFICANT CHANGE UP (ref 3.8–10.5)
WBC # FLD AUTO: 7.42 K/UL — SIGNIFICANT CHANGE UP (ref 3.8–10.5)

## 2018-06-12 RX ORDER — INSULIN LISPRO 100/ML
5 VIAL (ML) SUBCUTANEOUS
Qty: 0 | Refills: 0 | Status: DISCONTINUED | OUTPATIENT
Start: 2018-06-12 | End: 2018-06-14

## 2018-06-12 RX ORDER — HEPARIN SODIUM 5000 [USP'U]/ML
5000 INJECTION INTRAVENOUS; SUBCUTANEOUS EVERY 12 HOURS
Qty: 0 | Refills: 0 | Status: DISCONTINUED | OUTPATIENT
Start: 2018-06-12 | End: 2018-06-14

## 2018-06-12 RX ORDER — INSULIN GLARGINE 100 [IU]/ML
6 INJECTION, SOLUTION SUBCUTANEOUS AT BEDTIME
Qty: 0 | Refills: 0 | Status: DISCONTINUED | OUTPATIENT
Start: 2018-06-12 | End: 2018-06-14

## 2018-06-12 RX ADMIN — Medication 0.5 MILLIGRAM(S): at 05:52

## 2018-06-12 RX ADMIN — Medication 40 MILLIGRAM(S): at 05:52

## 2018-06-12 RX ADMIN — ONDANSETRON 4 MILLIGRAM(S): 8 TABLET, FILM COATED ORAL at 11:03

## 2018-06-12 RX ADMIN — Medication 1 APPLICATION(S): at 12:32

## 2018-06-12 RX ADMIN — DULOXETINE HYDROCHLORIDE 60 MILLIGRAM(S): 30 CAPSULE, DELAYED RELEASE ORAL at 12:32

## 2018-06-12 RX ADMIN — SIMVASTATIN 20 MILLIGRAM(S): 20 TABLET, FILM COATED ORAL at 21:53

## 2018-06-12 RX ADMIN — Medication 3 MILLILITER(S): at 17:21

## 2018-06-12 RX ADMIN — Medication 3 MILLILITER(S): at 05:52

## 2018-06-12 RX ADMIN — Medication 0.5 MILLIGRAM(S): at 17:21

## 2018-06-12 RX ADMIN — Medication 100 MILLIGRAM(S): at 05:52

## 2018-06-12 RX ADMIN — ARIPIPRAZOLE 2 MILLIGRAM(S): 15 TABLET ORAL at 12:33

## 2018-06-12 RX ADMIN — Medication 100 MILLIGRAM(S): at 17:21

## 2018-06-12 RX ADMIN — MIRTAZAPINE 15 MILLIGRAM(S): 45 TABLET, ORALLY DISINTEGRATING ORAL at 21:53

## 2018-06-12 RX ADMIN — Medication 3 MILLILITER(S): at 12:32

## 2018-06-12 RX ADMIN — Medication 2: at 17:21

## 2018-06-12 RX ADMIN — Medication 3: at 12:29

## 2018-06-12 RX ADMIN — Medication 2: at 07:50

## 2018-06-12 RX ADMIN — HEPARIN SODIUM 5000 UNIT(S): 5000 INJECTION INTRAVENOUS; SUBCUTANEOUS at 17:21

## 2018-06-12 RX ADMIN — AMIODARONE HYDROCHLORIDE 200 MILLIGRAM(S): 400 TABLET ORAL at 05:52

## 2018-06-12 RX ADMIN — INSULIN GLARGINE 6 UNIT(S): 100 INJECTION, SOLUTION SUBCUTANEOUS at 21:53

## 2018-06-12 NOTE — PROGRESS NOTE ADULT - SUBJECTIVE AND OBJECTIVE BOX
Patient is a 86y old  Female who presents with a chief complaint of fall (11 Jun 2018 12:52)      INTERVAL HPI/OVERNIGHT EVENTS:  T(C): 36.6 (06-12-18 @ 11:37), Max: 36.7 (06-11-18 @ 22:49)  HR: 60 (06-12-18 @ 11:37) (60 - 60)  BP: 131/73 (06-12-18 @ 11:37) (106/63 - 138/69)  RR: 18 (06-12-18 @ 11:37) (18 - 20)  SpO2: 97% (06-12-18 @ 11:37) (93% - 99%)  Wt(kg): --  I&O's Summary    11 Jun 2018 07:01  -  12 Jun 2018 07:00  --------------------------------------------------------  IN: 940 mL / OUT: 0 mL / NET: 940 mL    12 Jun 2018 07:01  -  12 Jun 2018 13:15  --------------------------------------------------------  IN: 360 mL / OUT: 0 mL / NET: 360 mL        LABS:                        8.9    7.42  )-----------( 265      ( 12 Jun 2018 07:48 )             28.6     06-12    133<L>  |  96  |  36<H>  ----------------------------<  192<H>  3.7   |  26  |  1.75<H>    Ca    8.6      12 Jun 2018 06:19      PT/INR - ( 11 Jun 2018 06:17 )   PT: 11.5 sec;   INR: 1.02 ratio         PTT - ( 11 Jun 2018 06:17 )  PTT:28.3 sec    CAPILLARY BLOOD GLUCOSE      POCT Blood Glucose.: 279 mg/dL (12 Jun 2018 11:31)  POCT Blood Glucose.: 207 mg/dL (12 Jun 2018 07:40)  POCT Blood Glucose.: 193 mg/dL (11 Jun 2018 21:05)  POCT Blood Glucose.: 179 mg/dL (11 Jun 2018 16:19)            MEDICATIONS  (STANDING):  ALBUTerol    90 MICROgram(s) HFA Inhaler 1 Puff(s) Inhalation every 4 hours  ALBUTerol/ipratropium for Nebulization 3 milliLiter(s) Nebulizer every 6 hours  amiodarone    Tablet 200 milliGRAM(s) Oral daily  ARIPiprazole 2 milliGRAM(s) Oral daily  BACItracin   Ointment 1 Application(s) Topical daily  clonazePAM Tablet 0.5 milliGRAM(s) Oral two times a day  dextrose 50% Injectable 12.5 Gram(s) IV Push once  dextrose 50% Injectable 25 Gram(s) IV Push once  dextrose 50% Injectable 25 Gram(s) IV Push once  docusate sodium 100 milliGRAM(s) Oral two times a day  DULoxetine 60 milliGRAM(s) Oral daily  furosemide    Tablet 40 milliGRAM(s) Oral daily  heparin  Injectable 5000 Unit(s) SubCutaneous every 12 hours  insulin lispro (HumaLOG) corrective regimen sliding scale   SubCutaneous three times a day before meals  insulin lispro (HumaLOG) corrective regimen sliding scale   SubCutaneous at bedtime  metoprolol succinate  milliGRAM(s) Oral daily  mirtazapine 15 milliGRAM(s) Oral at bedtime  simvastatin 20 milliGRAM(s) Oral at bedtime  tiotropium 18 MICROgram(s) Capsule 1 Capsule(s) Inhalation daily    MEDICATIONS  (PRN):  dextrose 40% Gel 15 Gram(s) Oral once PRN Blood Glucose LESS THAN 70 milliGRAM(s)/deciliter  glucagon  Injectable 1 milliGRAM(s) IntraMuscular once PRN Glucose LESS THAN 70 milligrams/deciliter  ondansetron Injectable 4 milliGRAM(s) IV Push every 6 hours PRN Nausea          PHYSICAL EXAM:  GENERAL: NAD, well-groomed, well-developed  HEAD:  Atraumatic, Normocephalic  CHEST/LUNG: Clear to percussion bilaterally; No rales, rhonchi, wheezing, or rubs  HEART: Regular rate and rhythm; No murmurs, rubs, or gallops  ABDOMEN: Soft, Nontender, Nondistended; Bowel sounds present  EXTREMITIES:  2+ Peripheral Pulses, No clubbing, cyanosis, or edema  LYMPH: No lymphadenopathy noted  SKIN: No rashes or lesions    Care Discussed with Consultants/Other Providers [+] YES  [ ] NO

## 2018-06-12 NOTE — PHYSICAL THERAPY INITIAL EVALUATION ADULT - ADDITIONAL COMMENTS
History obtained from pt, and confirmed by daughter Lulú Denny via phone conversation. Pt lives alone in private house, 3 steps to enter, (+)rail. Was independent ambulator with rolling walker pta. Has HHA 9 hrs/day. Granddaughter lives in basement, daughter lives 2 doors away. As per daughter, when pt fell, was ambulating to her daughter's house without her walker in the middle of the night.

## 2018-06-12 NOTE — PHYSICAL THERAPY INITIAL EVALUATION ADULT - PERTINENT HX OF CURRENT PROBLEM, REHAB EVAL
86 y/oF admitted 6/8 p/w unwitnessed fall, hit head. L hip pain and hematoma L hip. As per H&P, pt with slight confusion during week pta, recent change in DM meds.CT head with subtle SAH, suspicion for (later confirmed) ortbital floor fx on L. CT pelvis neg for fx, with L lateral subcutaneous hematoma.

## 2018-06-12 NOTE — PROGRESS NOTE ADULT - SUBJECTIVE AND OBJECTIVE BOX
Chief complaint  Patient is a 86y old  Female who presents with a chief complaint of fall (11 Jun 2018 12:52)   Review of systems  Patient in bed, looks comfortable, no fever, no hypoglycemia.    Labs and Fingersticks  CAPILLARY BLOOD GLUCOSE      POCT Blood Glucose.: 233 mg/dL (12 Jun 2018 21:13)  POCT Blood Glucose.: 216 mg/dL (12 Jun 2018 16:28)  POCT Blood Glucose.: 279 mg/dL (12 Jun 2018 11:31)  POCT Blood Glucose.: 207 mg/dL (12 Jun 2018 07:40)      Anion Gap, Serum: 11 (06-12 @ 06:19)  Anion Gap, Serum: 13 (06-11 @ 05:21)      Calcium, Total Serum: 8.6 (06-12 @ 06:19)  Calcium, Total Serum: 8.4 (06-11 @ 05:21)          06-12    133<L>  |  96  |  36<H>  ----------------------------<  192<H>  3.7   |  26  |  1.75<H>    Ca    8.6      12 Jun 2018 06:19                          8.9    7.42  )-----------( 265      ( 12 Jun 2018 07:48 )             28.6     Medications  MEDICATIONS  (STANDING):  ALBUTerol    90 MICROgram(s) HFA Inhaler 1 Puff(s) Inhalation every 4 hours  ALBUTerol/ipratropium for Nebulization 3 milliLiter(s) Nebulizer every 6 hours  amiodarone    Tablet 200 milliGRAM(s) Oral daily  ARIPiprazole 2 milliGRAM(s) Oral daily  BACItracin   Ointment 1 Application(s) Topical daily  clonazePAM Tablet 0.5 milliGRAM(s) Oral two times a day  dextrose 50% Injectable 12.5 Gram(s) IV Push once  dextrose 50% Injectable 25 Gram(s) IV Push once  dextrose 50% Injectable 25 Gram(s) IV Push once  docusate sodium 100 milliGRAM(s) Oral two times a day  DULoxetine 60 milliGRAM(s) Oral daily  furosemide    Tablet 40 milliGRAM(s) Oral daily  heparin  Injectable 5000 Unit(s) SubCutaneous every 12 hours  insulin glargine Injectable (LANTUS) 6 Unit(s) SubCutaneous at bedtime  insulin lispro (HumaLOG) corrective regimen sliding scale   SubCutaneous three times a day before meals  insulin lispro (HumaLOG) corrective regimen sliding scale   SubCutaneous at bedtime  insulin lispro Injectable (HumaLOG) 5 Unit(s) SubCutaneous three times a day before meals  metoprolol succinate  milliGRAM(s) Oral daily  mirtazapine 15 milliGRAM(s) Oral at bedtime  simvastatin 20 milliGRAM(s) Oral at bedtime  tiotropium 18 MICROgram(s) Capsule 1 Capsule(s) Inhalation daily      Physical Exam  General: Patient comfortable in bed  Vital Signs Last 12 Hrs  T(F): 97.6 (06-12-18 @ 20:06), Max: 97.8 (06-12-18 @ 11:37)  HR: 60 (06-12-18 @ 20:06) (60 - 60)  BP: 107/63 (06-12-18 @ 20:06) (106/63 - 131/73)  BP(mean): --  RR: 17 (06-12-18 @ 20:06) (17 - 18)  SpO2: 97% (06-12-18 @ 20:06) (93% - 97%)  Neck: No palpable thyroid nodules.  CVS: S1S2, No murmurs  Respiratory: No wheezing, no crepitations  GI: Abdomen soft, bowel sounds positive  Musculoskeletal:  edema lower extremities.   Skin: No skin rashes, no ecchymosis    Diagnostics    Hemoglobin A1C, Whole Blood: Routine (06-08 @ 18:50)

## 2018-06-12 NOTE — PROGRESS NOTE ADULT - SUBJECTIVE AND OBJECTIVE BOX
Patient is a 86y Female whom presented to the hospital with ckd and carlene     PAST MEDICAL & SURGICAL HISTORY:  COPD (chronic obstructive pulmonary disease)  Depression  Anxiety  Atrial fibrillation, unspecified type  Pacemaker  Overweight  PVD (Peripheral Vascular Disease)  Arteriosclerotic Heart Disease (ASHD)  HTN (Hypertension)  Diabetes  S/P carpal tunnel release  S/P left cataract extraction  s/p colon resection  S/P Nephrectomy: right  Hip Replacement  History of Total Knee Replacement  S/P Hernia Surgery  S/P Cholecystectomy      MEDICATIONS  (STANDING):  ALBUTerol    90 MICROgram(s) HFA Inhaler 1 Puff(s) Inhalation every 4 hours  ALBUTerol/ipratropium for Nebulization 3 milliLiter(s) Nebulizer every 6 hours  amiodarone    Tablet 200 milliGRAM(s) Oral daily  ARIPiprazole 2 milliGRAM(s) Oral daily  BACItracin   Ointment 1 Application(s) Topical daily  clonazePAM Tablet 0.5 milliGRAM(s) Oral two times a day  dextrose 50% Injectable 12.5 Gram(s) IV Push once  dextrose 50% Injectable 25 Gram(s) IV Push once  dextrose 50% Injectable 25 Gram(s) IV Push once  docusate sodium 100 milliGRAM(s) Oral two times a day  DULoxetine 60 milliGRAM(s) Oral daily  furosemide    Tablet 40 milliGRAM(s) Oral daily  insulin lispro (HumaLOG) corrective regimen sliding scale   SubCutaneous three times a day before meals  insulin lispro (HumaLOG) corrective regimen sliding scale   SubCutaneous at bedtime  metoprolol succinate  milliGRAM(s) Oral daily  mirtazapine 15 milliGRAM(s) Oral at bedtime  simvastatin 20 milliGRAM(s) Oral at bedtime  tiotropium 18 MICROgram(s) Capsule 1 Capsule(s) Inhalation daily    MEDICATIONS  (PRN):  dextrose 40% Gel 15 Gram(s) Oral once PRN Blood Glucose LESS THAN 70 milliGRAM(s)/deciliter  glucagon  Injectable 1 milliGRAM(s) IntraMuscular once PRN Glucose LESS THAN 70 milligrams/deciliter  ondansetron Injectable 4 milliGRAM(s) IV Push every 6 hours PRN Nausea      Allergies    amoxicillin (Flushing; Vomiting; Nausea)  lisinopril (Angioedema)    Intolerances        Constitutional: No fever, fatigue or weight loss.  Cardiovascular: No chest pain or palpation.  Respiratory: No cough, shortness of breath, congestion, or wheezing.  Gastrointestinal: No abdominal pain, nausea, vomiting, or diarrhea.                                            8.9    7.42  )-----------( 265      ( 12 Jun 2018 07:48 )             28.6       CBC Full  -  ( 12 Jun 2018 07:48 )  WBC Count : 7.42 K/uL  Hemoglobin : 8.9 g/dL  Hematocrit : 28.6 %  Platelet Count - Automated : 265 K/uL  Mean Cell Volume : 90.5 fl  Mean Cell Hemoglobin : 28.2 pg  Mean Cell Hemoglobin Concentration : 31.1 gm/dL  Auto Neutrophil # : x  Auto Lymphocyte # : x  Auto Monocyte # : x  Auto Eosinophil # : x  Auto Basophil # : x  Auto Neutrophil % : x  Auto Lymphocyte % : x  Auto Monocyte % : x  Auto Eosinophil % : x  Auto Basophil % : x      06-12    133<L>  |  96  |  36<H>  ----------------------------<  192<H>  3.7   |  26  |  1.75<H>    Ca    8.6      12 Jun 2018 06:19        CAPILLARY BLOOD GLUCOSE      POCT Blood Glucose.: 279 mg/dL (12 Jun 2018 11:31)  POCT Blood Glucose.: 207 mg/dL (12 Jun 2018 07:40)  POCT Blood Glucose.: 193 mg/dL (11 Jun 2018 21:05)  POCT Blood Glucose.: 179 mg/dL (11 Jun 2018 16:19)      Vital Signs Last 24 Hrs  T(C): 36.6 (12 Jun 2018 11:37), Max: 36.7 (11 Jun 2018 22:49)  T(F): 97.8 (12 Jun 2018 11:37), Max: 98.1 (11 Jun 2018 22:49)  HR: 60 (12 Jun 2018 11:37) (60 - 60)  BP: 131/73 (12 Jun 2018 11:37) (106/63 - 138/69)  BP(mean): --  RR: 18 (12 Jun 2018 11:37) (18 - 20)  SpO2: 97% (12 Jun 2018 11:37) (93% - 99%)        PT/INR - ( 11 Jun 2018 06:17 )   PT: 11.5 sec;   INR: 1.02 ratio         PTT - ( 11 Jun 2018 06:17 )  PTT:28.3 sec                                 PHYSICAL EXAM:    Constitutional: NAD  HEENT: conjunctive   clear   Neck:  No JVD  Respiratory: CTAB  Cardiovascular: S1 and S2  Gastrointestinal: BS+, soft, NT/ND  Extremities: pos  peripheral edema  Neurological: no focal deficits  Psychiatric: Normal mood, normal affect  : No [place]  Skin: pos  rashes , dry   Access: Not applicable

## 2018-06-12 NOTE — PROGRESS NOTE ADULT - ASSESSMENT
Assessment  DMT2: 86y Female with DM T2 with hyperglycemia on basal bolus insulin, blood trending up, no new hypoglycemic episode,  poor PO intake.  COPD: On medications, stable, monitored.  HTN: Controlled, On med.  Subarachnoid Bleed: Monitor, stable

## 2018-06-12 NOTE — PHYSICAL THERAPY INITIAL EVALUATION ADULT - PLANNED THERAPY INTERVENTIONS, PT EVAL
stair training- GOAL: Pt will negotiate 3 steps with rail and supervision, 2 weeks/bed mobility training/gait training/transfer training

## 2018-06-12 NOTE — PROGRESS NOTE ADULT - ASSESSMENT
Problem/Plan - 1:  ·  Problem: Traumatic subarachnoid hemorrhage without loss of consciousness, initial encounter.  Plan: repeat CT scan reviewed  neurosurgery fu, paged for fu  management as per neurosurgery  CT scans stable  neurosurgery fu appreciated    Problem/Plan - 2:  ·  Problem: Open fracture of left orbital floor, initial encounter.  Plan: Ophthalmology consult appreciated  no acute intervention needed.     Problem/Plan - 3:  ·  Problem: Near syncope.  Plan: telemonitor  cards fu  PPM interrogation     Problem/Plan - 4:  ·  Problem: Atrial fibrillation, unspecified type.  Plan: hold AC for now    PT and dc planning in am    Problem/Plan - 5:  ·  Problem Hematoma Plan Monitor cbc    pt and dc planning to rehab

## 2018-06-12 NOTE — PROGRESS NOTE ADULT - SUBJECTIVE AND OBJECTIVE BOX
[facility]        - Patient seen and examined.  - Today, patient is without complaints.         *****MEDICATIONS: Current medication reviewed and documented.    MEDICATIONS  (STANDING):  ALBUTerol    90 MICROgram(s) HFA Inhaler 1 Puff(s) Inhalation every 4 hours  ALBUTerol/ipratropium for Nebulization 3 milliLiter(s) Nebulizer every 6 hours  amiodarone    Tablet 200 milliGRAM(s) Oral daily  ARIPiprazole 2 milliGRAM(s) Oral daily  BACItracin   Ointment 1 Application(s) Topical daily  clonazePAM Tablet 0.5 milliGRAM(s) Oral two times a day  dextrose 50% Injectable 12.5 Gram(s) IV Push once  dextrose 50% Injectable 25 Gram(s) IV Push once  dextrose 50% Injectable 25 Gram(s) IV Push once  docusate sodium 100 milliGRAM(s) Oral two times a day  DULoxetine 60 milliGRAM(s) Oral daily  furosemide    Tablet 40 milliGRAM(s) Oral daily  heparin  Injectable 5000 Unit(s) SubCutaneous every 12 hours  insulin lispro (HumaLOG) corrective regimen sliding scale   SubCutaneous three times a day before meals  insulin lispro (HumaLOG) corrective regimen sliding scale   SubCutaneous at bedtime  metoprolol succinate  milliGRAM(s) Oral daily  mirtazapine 15 milliGRAM(s) Oral at bedtime  simvastatin 20 milliGRAM(s) Oral at bedtime  tiotropium 18 MICROgram(s) Capsule 1 Capsule(s) Inhalation daily    MEDICATIONS  (PRN):  dextrose 40% Gel 15 Gram(s) Oral once PRN Blood Glucose LESS THAN 70 milliGRAM(s)/deciliter  glucagon  Injectable 1 milliGRAM(s) IntraMuscular once PRN Glucose LESS THAN 70 milligrams/deciliter  ondansetron Injectable 4 milliGRAM(s) IV Push every 6 hours PRN Nausea           ***** REVIEW OF SYSTEM:  GEN: no fever, no chills, no pain  RESP: no SOB, no cough, no sputum  CVS: no chest pain, no palpitations, no edema  GI: no abdominal pain, no nausea, no vomiting, no constipation, no diarrhea  : no dysurea, no frequency  NEURO: no headache, no diziness  PSYCH: no depression, not anxious  Derm : no itching, no rash         ***** VITAL SIGNS:  T(F): 97.8 (18 @ 11:37), Max: 98.1 (18 @ 22:49)  HR: 60 (18 @ 11:37) (60 - 60)  BP: 131/73 (18 @ 11:37) (106/63 - 138/69)  RR: 18 (18 @ 11:37) (18 - 20)  SpO2: 97% (18 @ 11:37) (93% - 99%)  Wt(kg): --  ,   I&O's Summary    2018 07:01  -  2018 07:00  --------------------------------------------------------  IN: 940 mL / OUT: 0 mL / NET: 940 mL    2018 07:01  -  2018 14:00  --------------------------------------------------------  IN: 360 mL / OUT: 0 mL / NET: 360 mL             *****PHYSICAL EXAM:   Alert oriented x 1 falls asleep mid sentence.  Attention comprehension are limited.  follows 1 step commands.  not cooperative with rest of exam.            *****LAB AND IMAGIN.9    7.42  )-----------( 265      ( 2018 07:48 )             28.6               12    133<L>  |  96  |  36<H>  ----------------------------<  192<H>  3.7   |  26  |  1.75<H>    Ca    8.6      2018 06:19      PT/INR - ( 2018 06:17 )   PT: 11.5 sec;   INR: 1.02 ratio         PTT - ( 2018 06:17 )  PTT:28.3 sec                     [All pertinent recent Imaging/Reports reviewed]           *****A S S E S S M E N T   A N D   P L A N :    87 YO F p/w unwitnessed mechanical fall while walking to her daughters house. Pt fell, denies LOC, states she heard some noice downstairs which worried her. Pt hit head is on eliquis. Pt has multiple skin tares on left elbow left knee, and left temple. Pt endorses left hip pain and has hematoma over the hip with moderate tenderness. Pt denies any urinary symptoms, chest pain, back pain, neck pain, abdominal pain. Per daughter, pt has been slightly confused over the past week and recently had her DM medications changed    Traumatic sah/small ivh while on noac, stable bleed.  Hold eliquis for atleast 2 weeks.    Neurosurgery eval appreciated, pt to follow up with neuro surgery on discharge.  heparin sq restarted.     multifactorial toxic metabolic encephalopathy, worsening lethargy.    will follow closely.   Fall likely mechanical, home with home pt per Physical therapy, pt lives alone may need additional services set up  ? whether she would be candidate for short term rehab.   OOB to chair  will continue to monitor closely.           Thank you for allowing me to participate in the care of this patient. Please do not hesitate to call me if you have any  questions.        ________________  Vandana Tay MD  [facility] ([facility]  [phone]     30 minutes spent on total encounter; more than 50 % of the visit was  spent counseling and or  coordinating care by the attending physician.   At the present time, [facility] does not  provide outpatient followup, best to call the your insurance to find a participating provider.  This was explained to you at the time of the visit. Alternatively, if your insurance allows it, you can follow up with a neurologist  Dr. Pedro Huston([place]) [phone] or Dr. Deion Barone ( [place]) [phone]

## 2018-06-13 LAB
ANION GAP SERPL CALC-SCNC: 11 MMOL/L — SIGNIFICANT CHANGE UP (ref 5–17)
BUN SERPL-MCNC: 40 MG/DL — HIGH (ref 7–23)
CALCIUM SERPL-MCNC: 8.9 MG/DL — SIGNIFICANT CHANGE UP (ref 8.4–10.5)
CHLORIDE SERPL-SCNC: 94 MMOL/L — LOW (ref 96–108)
CO2 SERPL-SCNC: 29 MMOL/L — SIGNIFICANT CHANGE UP (ref 22–31)
CREAT SERPL-MCNC: 1.94 MG/DL — HIGH (ref 0.5–1.3)
GLUCOSE BLDC GLUCOMTR-MCNC: 122 MG/DL — HIGH (ref 70–99)
GLUCOSE BLDC GLUCOMTR-MCNC: 139 MG/DL — HIGH (ref 70–99)
GLUCOSE BLDC GLUCOMTR-MCNC: 206 MG/DL — HIGH (ref 70–99)
GLUCOSE BLDC GLUCOMTR-MCNC: 99 MG/DL — SIGNIFICANT CHANGE UP (ref 70–99)
GLUCOSE SERPL-MCNC: 161 MG/DL — HIGH (ref 70–99)
HBA1C BLD-MCNC: 8.2 % — HIGH (ref 4–5.6)
HCT VFR BLD CALC: 28 % — LOW (ref 34.5–45)
HGB BLD-MCNC: 9.1 G/DL — LOW (ref 11.5–15.5)
MCHC RBC-ENTMCNC: 30.3 PG — SIGNIFICANT CHANGE UP (ref 27–34)
MCHC RBC-ENTMCNC: 32.5 GM/DL — SIGNIFICANT CHANGE UP (ref 32–36)
MCV RBC AUTO: 93.2 FL — SIGNIFICANT CHANGE UP (ref 80–100)
PLATELET # BLD AUTO: 278 K/UL — SIGNIFICANT CHANGE UP (ref 150–400)
POTASSIUM SERPL-MCNC: 3.9 MMOL/L — SIGNIFICANT CHANGE UP (ref 3.5–5.3)
POTASSIUM SERPL-SCNC: 3.9 MMOL/L — SIGNIFICANT CHANGE UP (ref 3.5–5.3)
RBC # BLD: 3 M/UL — LOW (ref 3.8–5.2)
RBC # FLD: 14.1 % — SIGNIFICANT CHANGE UP (ref 10.3–14.5)
SODIUM SERPL-SCNC: 134 MMOL/L — LOW (ref 135–145)
WBC # BLD: 8.4 K/UL — SIGNIFICANT CHANGE UP (ref 3.8–10.5)
WBC # FLD AUTO: 8.4 K/UL — SIGNIFICANT CHANGE UP (ref 3.8–10.5)

## 2018-06-13 PROCEDURE — 70450 CT HEAD/BRAIN W/O DYE: CPT | Mod: 26

## 2018-06-13 RX ORDER — INSULIN GLARGINE 100 [IU]/ML
12 INJECTION, SOLUTION SUBCUTANEOUS
Qty: 0 | Refills: 0 | COMMUNITY

## 2018-06-13 RX ORDER — INSULIN GLARGINE 100 [IU]/ML
6 INJECTION, SOLUTION SUBCUTANEOUS
Qty: 0 | Refills: 0 | COMMUNITY
Start: 2018-06-13

## 2018-06-13 RX ORDER — INSULIN LISPRO 100/ML
4 VIAL (ML) SUBCUTANEOUS
Qty: 0 | Refills: 0 | COMMUNITY

## 2018-06-13 RX ORDER — INSULIN LISPRO 100/ML
5 VIAL (ML) SUBCUTANEOUS
Qty: 1 | Refills: 0 | OUTPATIENT
Start: 2018-06-13

## 2018-06-13 RX ADMIN — HEPARIN SODIUM 5000 UNIT(S): 5000 INJECTION INTRAVENOUS; SUBCUTANEOUS at 05:36

## 2018-06-13 RX ADMIN — Medication 5 UNIT(S): at 11:44

## 2018-06-13 RX ADMIN — Medication 0.5 MILLIGRAM(S): at 17:04

## 2018-06-13 RX ADMIN — SIMVASTATIN 20 MILLIGRAM(S): 20 TABLET, FILM COATED ORAL at 21:15

## 2018-06-13 RX ADMIN — Medication 1 APPLICATION(S): at 18:45

## 2018-06-13 RX ADMIN — Medication 2: at 11:44

## 2018-06-13 RX ADMIN — DULOXETINE HYDROCHLORIDE 60 MILLIGRAM(S): 30 CAPSULE, DELAYED RELEASE ORAL at 11:42

## 2018-06-13 RX ADMIN — HEPARIN SODIUM 5000 UNIT(S): 5000 INJECTION INTRAVENOUS; SUBCUTANEOUS at 17:08

## 2018-06-13 RX ADMIN — Medication 3 MILLILITER(S): at 17:09

## 2018-06-13 RX ADMIN — Medication 3 MILLILITER(S): at 23:56

## 2018-06-13 RX ADMIN — Medication 3 MILLILITER(S): at 12:31

## 2018-06-13 RX ADMIN — Medication 100 MILLIGRAM(S): at 17:02

## 2018-06-13 RX ADMIN — ARIPIPRAZOLE 2 MILLIGRAM(S): 15 TABLET ORAL at 17:02

## 2018-06-13 RX ADMIN — Medication 100 MILLIGRAM(S): at 05:36

## 2018-06-13 RX ADMIN — AMIODARONE HYDROCHLORIDE 200 MILLIGRAM(S): 400 TABLET ORAL at 05:36

## 2018-06-13 RX ADMIN — MIRTAZAPINE 15 MILLIGRAM(S): 45 TABLET, ORALLY DISINTEGRATING ORAL at 21:15

## 2018-06-13 RX ADMIN — Medication 0.5 MILLIGRAM(S): at 05:35

## 2018-06-13 RX ADMIN — Medication 5 UNIT(S): at 08:02

## 2018-06-13 RX ADMIN — INSULIN GLARGINE 6 UNIT(S): 100 INJECTION, SOLUTION SUBCUTANEOUS at 21:15

## 2018-06-13 RX ADMIN — Medication 100 MILLIGRAM(S): at 05:35

## 2018-06-13 RX ADMIN — Medication 3 MILLILITER(S): at 05:35

## 2018-06-13 RX ADMIN — Medication 3 MILLILITER(S): at 01:13

## 2018-06-13 RX ADMIN — Medication 40 MILLIGRAM(S): at 05:36

## 2018-06-13 RX ADMIN — Medication 5 UNIT(S): at 17:06

## 2018-06-13 NOTE — PROGRESS NOTE ADULT - SUBJECTIVE AND OBJECTIVE BOX
Patient is a 86y old  Female who presents with a chief complaint of fall (11 Jun 2018 12:52)  slightly more lethargic today      INTERVAL HPI/OVERNIGHT EVENTS:  T(C): 36.6 (06-13-18 @ 20:03), Max: 36.7 (06-13-18 @ 04:05)  HR: 60 (06-13-18 @ 20:03) (60 - 60)  BP: 128/67 (06-13-18 @ 20:03) (106/63 - 128/67)  RR: 18 (06-13-18 @ 20:03) (18 - 18)  SpO2: 96% (06-13-18 @ 20:03) (94% - 96%)  Wt(kg): --  I&O's Summary    12 Jun 2018 07:01  -  13 Jun 2018 07:00  --------------------------------------------------------  IN: 920 mL / OUT: 350 mL / NET: 570 mL    13 Jun 2018 07:01  -  13 Jun 2018 20:14  --------------------------------------------------------  IN: 960 mL / OUT: 0 mL / NET: 960 mL        LABS:                        9.1    8.4   )-----------( 278      ( 13 Jun 2018 06:32 )             28.0     06-13    134<L>  |  94<L>  |  40<H>  ----------------------------<  161<H>  3.9   |  29  |  1.94<H>    Ca    8.9      13 Jun 2018 06:31          CAPILLARY BLOOD GLUCOSE      POCT Blood Glucose.: 99 mg/dL (13 Jun 2018 16:52)  POCT Blood Glucose.: 206 mg/dL (13 Jun 2018 11:42)  POCT Blood Glucose.: 122 mg/dL (13 Jun 2018 07:47)  POCT Blood Glucose.: 233 mg/dL (12 Jun 2018 21:13)            MEDICATIONS  (STANDING):  ALBUTerol    90 MICROgram(s) HFA Inhaler 1 Puff(s) Inhalation every 4 hours  ALBUTerol/ipratropium for Nebulization 3 milliLiter(s) Nebulizer every 6 hours  amiodarone    Tablet 200 milliGRAM(s) Oral daily  ARIPiprazole 2 milliGRAM(s) Oral daily  BACItracin   Ointment 1 Application(s) Topical daily  clonazePAM Tablet 0.5 milliGRAM(s) Oral two times a day  dextrose 50% Injectable 12.5 Gram(s) IV Push once  dextrose 50% Injectable 25 Gram(s) IV Push once  dextrose 50% Injectable 25 Gram(s) IV Push once  docusate sodium 100 milliGRAM(s) Oral two times a day  DULoxetine 60 milliGRAM(s) Oral daily  furosemide    Tablet 40 milliGRAM(s) Oral daily  heparin  Injectable 5000 Unit(s) SubCutaneous every 12 hours  insulin glargine Injectable (LANTUS) 6 Unit(s) SubCutaneous at bedtime  insulin lispro (HumaLOG) corrective regimen sliding scale   SubCutaneous three times a day before meals  insulin lispro (HumaLOG) corrective regimen sliding scale   SubCutaneous at bedtime  insulin lispro Injectable (HumaLOG) 5 Unit(s) SubCutaneous three times a day before meals  metoprolol succinate  milliGRAM(s) Oral daily  mirtazapine 15 milliGRAM(s) Oral at bedtime  simvastatin 20 milliGRAM(s) Oral at bedtime  tiotropium 18 MICROgram(s) Capsule 1 Capsule(s) Inhalation daily    MEDICATIONS  (PRN):  dextrose 40% Gel 15 Gram(s) Oral once PRN Blood Glucose LESS THAN 70 milliGRAM(s)/deciliter  glucagon  Injectable 1 milliGRAM(s) IntraMuscular once PRN Glucose LESS THAN 70 milligrams/deciliter  ondansetron Injectable 4 milliGRAM(s) IV Push every 6 hours PRN Nausea          PHYSICAL EXAM:  GENERAL: NAD, well-groomed, well-developed  HEAD:  Atraumatic, Normocephalic  CHEST/LUNG: Clear to percussion bilaterally; No rales, rhonchi, wheezing, or rubs  HEART: Regular rate and rhythm; No murmurs, rubs, or gallops  ABDOMEN: Soft, Nontender, Nondistended; Bowel sounds present  EXTREMITIES:  2+ Peripheral Pulses, No clubbing, cyanosis, or edema  LYMPH: No lymphadenopathy noted  SKIN: No rashes or lesions    Care Discussed with Consultants/Other Providers [ ] YES  [ ] NO

## 2018-06-13 NOTE — PROGRESS NOTE ADULT - SUBJECTIVE AND OBJECTIVE BOX
Patient is a 86y Female whom presented to the hospital with ckd and carlene     PAST MEDICAL & SURGICAL HISTORY:  COPD (chronic obstructive pulmonary disease)  Depression  Anxiety  Atrial fibrillation, unspecified type  Pacemaker  Overweight  PVD (Peripheral Vascular Disease)  Arteriosclerotic Heart Disease (ASHD)  HTN (Hypertension)  Diabetes  S/P carpal tunnel release  S/P left cataract extraction  s/p colon resection  S/P Nephrectomy: right  Hip Replacement  History of Total Knee Replacement  S/P Hernia Surgery  S/P Cholecystectomy      MEDICATIONS  (STANDING):  ALBUTerol    90 MICROgram(s) HFA Inhaler 1 Puff(s) Inhalation every 4 hours  ALBUTerol/ipratropium for Nebulization 3 milliLiter(s) Nebulizer every 6 hours  amiodarone    Tablet 200 milliGRAM(s) Oral daily  ARIPiprazole 2 milliGRAM(s) Oral daily  BACItracin   Ointment 1 Application(s) Topical daily  clonazePAM Tablet 0.5 milliGRAM(s) Oral two times a day  dextrose 50% Injectable 12.5 Gram(s) IV Push once  dextrose 50% Injectable 25 Gram(s) IV Push once  dextrose 50% Injectable 25 Gram(s) IV Push once  docusate sodium 100 milliGRAM(s) Oral two times a day  DULoxetine 60 milliGRAM(s) Oral daily  furosemide    Tablet 40 milliGRAM(s) Oral daily  insulin lispro (HumaLOG) corrective regimen sliding scale   SubCutaneous three times a day before meals  insulin lispro (HumaLOG) corrective regimen sliding scale   SubCutaneous at bedtime  metoprolol succinate  milliGRAM(s) Oral daily  mirtazapine 15 milliGRAM(s) Oral at bedtime  simvastatin 20 milliGRAM(s) Oral at bedtime  tiotropium 18 MICROgram(s) Capsule 1 Capsule(s) Inhalation daily    MEDICATIONS  (PRN):  dextrose 40% Gel 15 Gram(s) Oral once PRN Blood Glucose LESS THAN 70 milliGRAM(s)/deciliter  glucagon  Injectable 1 milliGRAM(s) IntraMuscular once PRN Glucose LESS THAN 70 milligrams/deciliter  ondansetron Injectable 4 milliGRAM(s) IV Push every 6 hours PRN Nausea      Allergies    amoxicillin (Flushing; Vomiting; Nausea)  lisinopril (Angioedema)    Intolerances        Constitutional: No fever, fatigue or weight loss.  Cardiovascular: No chest pain or palpation.  Respiratory: No cough, shortness of breath, congestion, or wheezing.  Gastrointestinal: No abdominal pain, nausea, vomiting, or diarrhea.                                                               9.1    8.4   )-----------( 278      ( 13 Jun 2018 06:32 )             28.0       CBC Full  -  ( 13 Jun 2018 06:32 )  WBC Count : 8.4 K/uL  Hemoglobin : 9.1 g/dL  Hematocrit : 28.0 %  Platelet Count - Automated : 278 K/uL  Mean Cell Volume : 93.2 fl  Mean Cell Hemoglobin : 30.3 pg  Mean Cell Hemoglobin Concentration : 32.5 gm/dL  Auto Neutrophil # : x  Auto Lymphocyte # : x  Auto Monocyte # : x  Auto Eosinophil # : x  Auto Basophil # : x  Auto Neutrophil % : x  Auto Lymphocyte % : x  Auto Monocyte % : x  Auto Eosinophil % : x  Auto Basophil % : x      06-13    134<L>  |  94<L>  |  40<H>  ----------------------------<  161<H>  3.9   |  29  |  1.94<H>    Ca    8.9      13 Jun 2018 06:31        CAPILLARY BLOOD GLUCOSE      POCT Blood Glucose.: 99 mg/dL (13 Jun 2018 16:52)  POCT Blood Glucose.: 206 mg/dL (13 Jun 2018 11:42)  POCT Blood Glucose.: 122 mg/dL (13 Jun 2018 07:47)  POCT Blood Glucose.: 233 mg/dL (12 Jun 2018 21:13)      Vital Signs Last 24 Hrs  T(C): 36.6 (13 Jun 2018 20:03), Max: 36.7 (13 Jun 2018 04:05)  T(F): 97.8 (13 Jun 2018 20:03), Max: 98.1 (13 Jun 2018 04:05)  HR: 60 (13 Jun 2018 20:03) (60 - 60)  BP: 128/67 (13 Jun 2018 20:03) (106/63 - 128/67)  BP(mean): --  RR: 18 (13 Jun 2018 20:03) (18 - 18)  SpO2: 96% (13 Jun 2018 20:03) (94% - 96%)                PHYSICAL EXAM:    Constitutional: NAD  HEENT: conjunctive   clear   Neck:  No JVD  Respiratory: CTAB  Cardiovascular: S1 and S2  Gastrointestinal: BS+, soft, NT/ND  Extremities: pos  peripheral edema  Neurological: no focal deficits  Psychiatric: Normal mood, normal affect  : No [place]  Skin: pos  rashes , dry   Access: Not applicable

## 2018-06-13 NOTE — PROGRESS NOTE ADULT - ASSESSMENT
Problem/Plan - 1:  ·  Problem: Traumatic subarachnoid hemorrhage without loss of consciousness, initial encounter.  Plan: repeat CT scan reviewed  neurosurgery fu, paged for fu  management as per neurosurgery  CT scans stable  neurosurgery fu appreciated  repeat CT head done today, stable    Problem/Plan - 2:  ·  Problem: Open fracture of left orbital floor, initial encounter.  Plan: Ophthalmology consult appreciated  no acute intervention needed.     Problem/Plan - 3:  ·  Problem: Near syncope.  Plan: telemonitor  cards fu  PPM interrogation     Problem/Plan - 4:  ·  Problem: Atrial fibrillation, unspecified type.  Plan: hold AC for now    Problem/Plan - 5:  ·  Problem Hematoma Plan Monitor cbc    pt and dc planning to rehab

## 2018-06-13 NOTE — PROGRESS NOTE ADULT - SUBJECTIVE AND OBJECTIVE BOX
Chief complaint  Patient is a 86y old  Female who presents with a chief complaint of fall (11 Jun 2018 12:52)   Review of systems  Patient in bed, looks comfortable, no fever, no hypoglycemia.    Labs and Fingersticks  CAPILLARY BLOOD GLUCOSE      POCT Blood Glucose.: 99 mg/dL (13 Jun 2018 16:52)  POCT Blood Glucose.: 206 mg/dL (13 Jun 2018 11:42)  POCT Blood Glucose.: 122 mg/dL (13 Jun 2018 07:47)  POCT Blood Glucose.: 233 mg/dL (12 Jun 2018 21:13)      Anion Gap, Serum: 11 (06-13 @ 06:31)  Anion Gap, Serum: 11 (06-12 @ 06:19)    Hemoglobin A1C, Whole Blood: 8.2 <H> (06-13 @ 07:41)    Calcium, Total Serum: 8.9 (06-13 @ 06:31)  Calcium, Total Serum: 8.6 (06-12 @ 06:19)          06-13    134<L>  |  94<L>  |  40<H>  ----------------------------<  161<H>  3.9   |  29  |  1.94<H>    Ca    8.9      13 Jun 2018 06:31                          9.1    8.4   )-----------( 278      ( 13 Jun 2018 06:32 )             28.0     Medications  MEDICATIONS  (STANDING):  ALBUTerol    90 MICROgram(s) HFA Inhaler 1 Puff(s) Inhalation every 4 hours  ALBUTerol/ipratropium for Nebulization 3 milliLiter(s) Nebulizer every 6 hours  amiodarone    Tablet 200 milliGRAM(s) Oral daily  ARIPiprazole 2 milliGRAM(s) Oral daily  BACItracin   Ointment 1 Application(s) Topical daily  clonazePAM Tablet 0.5 milliGRAM(s) Oral two times a day  dextrose 50% Injectable 12.5 Gram(s) IV Push once  dextrose 50% Injectable 25 Gram(s) IV Push once  dextrose 50% Injectable 25 Gram(s) IV Push once  docusate sodium 100 milliGRAM(s) Oral two times a day  DULoxetine 60 milliGRAM(s) Oral daily  furosemide    Tablet 40 milliGRAM(s) Oral daily  heparin  Injectable 5000 Unit(s) SubCutaneous every 12 hours  insulin glargine Injectable (LANTUS) 6 Unit(s) SubCutaneous at bedtime  insulin lispro (HumaLOG) corrective regimen sliding scale   SubCutaneous three times a day before meals  insulin lispro (HumaLOG) corrective regimen sliding scale   SubCutaneous at bedtime  insulin lispro Injectable (HumaLOG) 5 Unit(s) SubCutaneous three times a day before meals  metoprolol succinate  milliGRAM(s) Oral daily  mirtazapine 15 milliGRAM(s) Oral at bedtime  simvastatin 20 milliGRAM(s) Oral at bedtime  tiotropium 18 MICROgram(s) Capsule 1 Capsule(s) Inhalation daily      Physical Exam  General: Patient comfortable in bed  Vital Signs Last 12 Hrs  T(F): 97.8 (06-13-18 @ 20:03), Max: 97.8 (06-13-18 @ 20:03)  HR: 60 (06-13-18 @ 20:03) (60 - 60)  BP: 128/67 (06-13-18 @ 20:03) (106/63 - 128/67)  BP(mean): --  RR: 18 (06-13-18 @ 20:03) (18 - 18)  SpO2: 96% (06-13-18 @ 20:03) (95% - 96%)  Neck: No palpable thyroid nodules.  CVS: S1S2, No murmurs  Respiratory: No wheezing, no crepitations  GI: Abdomen soft, bowel sounds positive  Musculoskeletal:  edema lower extremities.   Skin: No skin rashes, no ecchymosis    Diagnostics    Hemoglobin A1C, Whole Blood: Routine (06-08 @ 18:50)

## 2018-06-14 VITALS
SYSTOLIC BLOOD PRESSURE: 126 MMHG | OXYGEN SATURATION: 96 % | TEMPERATURE: 98 F | RESPIRATION RATE: 18 BRPM | DIASTOLIC BLOOD PRESSURE: 70 MMHG | HEART RATE: 62 BPM

## 2018-06-14 LAB
AMMONIA BLD-MCNC: 10 UMOL/L — LOW (ref 11–55)
GLUCOSE BLDC GLUCOMTR-MCNC: 168 MG/DL — HIGH (ref 70–99)
GLUCOSE BLDC GLUCOMTR-MCNC: 182 MG/DL — HIGH (ref 70–99)
HCT VFR BLD CALC: 27.8 % — LOW (ref 34.5–45)
HGB BLD-MCNC: 8.5 G/DL — LOW (ref 11.5–15.5)
MCHC RBC-ENTMCNC: 27.8 PG — SIGNIFICANT CHANGE UP (ref 27–34)
MCHC RBC-ENTMCNC: 30.6 GM/DL — LOW (ref 32–36)
MCV RBC AUTO: 90.8 FL — SIGNIFICANT CHANGE UP (ref 80–100)
PLATELET # BLD AUTO: 297 K/UL — SIGNIFICANT CHANGE UP (ref 150–400)
RBC # BLD: 3.06 M/UL — LOW (ref 3.8–5.2)
RBC # FLD: 16 % — HIGH (ref 10.3–14.5)
WBC # BLD: 8.02 K/UL — SIGNIFICANT CHANGE UP (ref 3.8–10.5)
WBC # FLD AUTO: 8.02 K/UL — SIGNIFICANT CHANGE UP (ref 3.8–10.5)

## 2018-06-14 PROCEDURE — 80053 COMPREHEN METABOLIC PANEL: CPT

## 2018-06-14 PROCEDURE — 87086 URINE CULTURE/COLONY COUNT: CPT

## 2018-06-14 PROCEDURE — 85027 COMPLETE CBC AUTOMATED: CPT

## 2018-06-14 PROCEDURE — 82330 ASSAY OF CALCIUM: CPT

## 2018-06-14 PROCEDURE — 82947 ASSAY GLUCOSE BLOOD QUANT: CPT

## 2018-06-14 PROCEDURE — 85014 HEMATOCRIT: CPT

## 2018-06-14 PROCEDURE — 82803 BLOOD GASES ANY COMBINATION: CPT

## 2018-06-14 PROCEDURE — 72192 CT PELVIS W/O DYE: CPT

## 2018-06-14 PROCEDURE — 84132 ASSAY OF SERUM POTASSIUM: CPT

## 2018-06-14 PROCEDURE — 80048 BASIC METABOLIC PNL TOTAL CA: CPT

## 2018-06-14 PROCEDURE — 86850 RBC ANTIBODY SCREEN: CPT

## 2018-06-14 PROCEDURE — 86900 BLOOD TYPING SEROLOGIC ABO: CPT

## 2018-06-14 PROCEDURE — 96374 THER/PROPH/DIAG INJ IV PUSH: CPT

## 2018-06-14 PROCEDURE — 97161 PT EVAL LOW COMPLEX 20 MIN: CPT

## 2018-06-14 PROCEDURE — 86901 BLOOD TYPING SEROLOGIC RH(D): CPT

## 2018-06-14 PROCEDURE — 73030 X-RAY EXAM OF SHOULDER: CPT

## 2018-06-14 PROCEDURE — 99285 EMERGENCY DEPT VISIT HI MDM: CPT | Mod: 25

## 2018-06-14 PROCEDURE — 82962 GLUCOSE BLOOD TEST: CPT

## 2018-06-14 PROCEDURE — 70450 CT HEAD/BRAIN W/O DYE: CPT

## 2018-06-14 PROCEDURE — 94640 AIRWAY INHALATION TREATMENT: CPT

## 2018-06-14 PROCEDURE — 73502 X-RAY EXAM HIP UNI 2-3 VIEWS: CPT

## 2018-06-14 PROCEDURE — 82435 ASSAY OF BLOOD CHLORIDE: CPT

## 2018-06-14 PROCEDURE — 84295 ASSAY OF SERUM SODIUM: CPT

## 2018-06-14 PROCEDURE — 76377 3D RENDER W/INTRP POSTPROCES: CPT

## 2018-06-14 PROCEDURE — 72125 CT NECK SPINE W/O DYE: CPT

## 2018-06-14 PROCEDURE — 82140 ASSAY OF AMMONIA: CPT

## 2018-06-14 PROCEDURE — 85610 PROTHROMBIN TIME: CPT

## 2018-06-14 PROCEDURE — 85730 THROMBOPLASTIN TIME PARTIAL: CPT

## 2018-06-14 PROCEDURE — 93005 ELECTROCARDIOGRAM TRACING: CPT

## 2018-06-14 PROCEDURE — 72170 X-RAY EXAM OF PELVIS: CPT

## 2018-06-14 PROCEDURE — 71045 X-RAY EXAM CHEST 1 VIEW: CPT

## 2018-06-14 PROCEDURE — 83036 HEMOGLOBIN GLYCOSYLATED A1C: CPT

## 2018-06-14 PROCEDURE — 70486 CT MAXILLOFACIAL W/O DYE: CPT

## 2018-06-14 PROCEDURE — 81001 URINALYSIS AUTO W/SCOPE: CPT

## 2018-06-14 PROCEDURE — 83605 ASSAY OF LACTIC ACID: CPT

## 2018-06-14 PROCEDURE — 86922 COMPATIBILITY TEST ANTIGLOB: CPT

## 2018-06-14 RX ADMIN — Medication 5 UNIT(S): at 07:59

## 2018-06-14 RX ADMIN — DULOXETINE HYDROCHLORIDE 60 MILLIGRAM(S): 30 CAPSULE, DELAYED RELEASE ORAL at 13:57

## 2018-06-14 RX ADMIN — Medication 3 MILLILITER(S): at 05:29

## 2018-06-14 RX ADMIN — Medication 100 MILLIGRAM(S): at 05:29

## 2018-06-14 RX ADMIN — Medication 5 UNIT(S): at 12:11

## 2018-06-14 RX ADMIN — AMIODARONE HYDROCHLORIDE 200 MILLIGRAM(S): 400 TABLET ORAL at 05:29

## 2018-06-14 RX ADMIN — Medication 0.5 MILLIGRAM(S): at 05:31

## 2018-06-14 RX ADMIN — Medication 1 APPLICATION(S): at 11:00

## 2018-06-14 RX ADMIN — Medication 1: at 07:59

## 2018-06-14 RX ADMIN — Medication 40 MILLIGRAM(S): at 05:29

## 2018-06-14 RX ADMIN — ARIPIPRAZOLE 2 MILLIGRAM(S): 15 TABLET ORAL at 13:57

## 2018-06-14 RX ADMIN — Medication 1: at 12:11

## 2018-06-14 RX ADMIN — HEPARIN SODIUM 5000 UNIT(S): 5000 INJECTION INTRAVENOUS; SUBCUTANEOUS at 05:30

## 2018-06-14 NOTE — PROGRESS NOTE ADULT - SUBJECTIVE AND OBJECTIVE BOX
Chief complaint  Patient is a 86y old  Female who presents with a chief complaint of fall (11 Jun 2018 12:52)   Review of systems  Patient in bed, looks comfortable, no fever, no hypoglycemia.    Labs and Fingersticks  CAPILLARY BLOOD GLUCOSE      POCT Blood Glucose.: 182 mg/dL (14 Jun 2018 11:41)  POCT Blood Glucose.: 168 mg/dL (14 Jun 2018 07:40)  POCT Blood Glucose.: 139 mg/dL (13 Jun 2018 21:05)  POCT Blood Glucose.: 99 mg/dL (13 Jun 2018 16:52)      Anion Gap, Serum: 11 (06-13 @ 06:31)    Hemoglobin A1C, Whole Blood: 8.2 <H> (06-13 @ 07:41)    Calcium, Total Serum: 8.9 (06-13 @ 06:31)          06-13    134<L>  |  94<L>  |  40<H>  ----------------------------<  161<H>  3.9   |  29  |  1.94<H>    Ca    8.9      13 Jun 2018 06:31                          8.5    8.02  )-----------( 297      ( 14 Jun 2018 07:59 )             27.8     Medications  MEDICATIONS  (STANDING):  ALBUTerol    90 MICROgram(s) HFA Inhaler 1 Puff(s) Inhalation every 4 hours  ALBUTerol/ipratropium for Nebulization 3 milliLiter(s) Nebulizer every 6 hours  amiodarone    Tablet 200 milliGRAM(s) Oral daily  ARIPiprazole 2 milliGRAM(s) Oral daily  BACItracin   Ointment 1 Application(s) Topical daily  clonazePAM Tablet 0.5 milliGRAM(s) Oral two times a day  dextrose 50% Injectable 12.5 Gram(s) IV Push once  dextrose 50% Injectable 25 Gram(s) IV Push once  dextrose 50% Injectable 25 Gram(s) IV Push once  docusate sodium 100 milliGRAM(s) Oral two times a day  DULoxetine 60 milliGRAM(s) Oral daily  furosemide    Tablet 40 milliGRAM(s) Oral daily  heparin  Injectable 5000 Unit(s) SubCutaneous every 12 hours  insulin glargine Injectable (LANTUS) 6 Unit(s) SubCutaneous at bedtime  insulin lispro (HumaLOG) corrective regimen sliding scale   SubCutaneous three times a day before meals  insulin lispro (HumaLOG) corrective regimen sliding scale   SubCutaneous at bedtime  insulin lispro Injectable (HumaLOG) 5 Unit(s) SubCutaneous three times a day before meals  metoprolol succinate  milliGRAM(s) Oral daily  mirtazapine 15 milliGRAM(s) Oral at bedtime  simvastatin 20 milliGRAM(s) Oral at bedtime  tiotropium 18 MICROgram(s) Capsule 1 Capsule(s) Inhalation daily      Physical Exam  General: Patient comfortable in bed  Vital Signs Last 12 Hrs  T(F): 97.7 (06-14-18 @ 11:15), Max: 97.9 (06-14-18 @ 04:09)  HR: 60 (06-14-18 @ 11:15) (60 - 60)  BP: 102/69 (06-14-18 @ 11:15) (102/69 - 145/58)  BP(mean): --  RR: 18 (06-14-18 @ 11:15) (18 - 18)  SpO2: 96% (06-14-18 @ 11:15) (96% - 97%)  Neck: No palpable thyroid nodules.  CVS: S1S2, No murmurs  Respiratory: No wheezing, no crepitations  GI: Abdomen soft, bowel sounds positive  Musculoskeletal:  edema lower extremities.   Skin: No skin rashes, no ecchymosis    Diagnostics    Hemoglobin A1C, Whole Blood: Routine (06-08 @ 18:50)

## 2018-06-14 NOTE — PROGRESS NOTE ADULT - PROBLEM SELECTOR PLAN 4
BP monitoring,continue current antihypertensive meds, low salt diet,followup with PMD in 1-2 weeks

## 2018-06-14 NOTE — PROGRESS NOTE ADULT - PROBLEM SELECTOR PROBLEM 3
Atrial fibrillation, unspecified type
HTN (Hypertension)

## 2018-06-14 NOTE — PROGRESS NOTE ADULT - PROBLEM SELECTOR PROBLEM 2
Traumatic subarachnoid hemorrhage without loss of consciousness, initial encounter

## 2018-06-14 NOTE — PROGRESS NOTE ADULT - PROBLEM SELECTOR PLAN 2
Traumatic subarachnoid hemorrhage without loss of consciousness, initial encounter.  Recommendation: repeat CT scan reviewed  neurosurgery fu, paged for fu  management as per neurosurgery.
Continue monitoring, neuro FU
Traumatic subarachnoid hemorrhage without loss of consciousness, initial encounter.  Recommendation: repeat CT scan reviewed  neurosurgery fu, paged for fu  management as per neurosurgery.

## 2018-06-14 NOTE — PROGRESS NOTE ADULT - SUBJECTIVE AND OBJECTIVE BOX
[facility]        - Patient seen and examined.  - Today, patient is without complaints.         *****MEDICATIONS: Current medication reviewed and documented.    MEDICATIONS  (STANDING):  ALBUTerol    90 MICROgram(s) HFA Inhaler 1 Puff(s) Inhalation every 4 hours  ALBUTerol/ipratropium for Nebulization 3 milliLiter(s) Nebulizer every 6 hours  amiodarone    Tablet 200 milliGRAM(s) Oral daily  ARIPiprazole 2 milliGRAM(s) Oral daily  BACItracin   Ointment 1 Application(s) Topical daily  clonazePAM Tablet 0.5 milliGRAM(s) Oral two times a day  dextrose 50% Injectable 12.5 Gram(s) IV Push once  dextrose 50% Injectable 25 Gram(s) IV Push once  dextrose 50% Injectable 25 Gram(s) IV Push once  docusate sodium 100 milliGRAM(s) Oral two times a day  DULoxetine 60 milliGRAM(s) Oral daily  furosemide    Tablet 40 milliGRAM(s) Oral daily  heparin  Injectable 5000 Unit(s) SubCutaneous every 12 hours  insulin glargine Injectable (LANTUS) 6 Unit(s) SubCutaneous at bedtime  insulin lispro (HumaLOG) corrective regimen sliding scale   SubCutaneous three times a day before meals  insulin lispro (HumaLOG) corrective regimen sliding scale   SubCutaneous at bedtime  insulin lispro Injectable (HumaLOG) 5 Unit(s) SubCutaneous three times a day before meals  metoprolol succinate  milliGRAM(s) Oral daily  mirtazapine 15 milliGRAM(s) Oral at bedtime  simvastatin 20 milliGRAM(s) Oral at bedtime  tiotropium 18 MICROgram(s) Capsule 1 Capsule(s) Inhalation daily    MEDICATIONS  (PRN):  dextrose 40% Gel 15 Gram(s) Oral once PRN Blood Glucose LESS THAN 70 milliGRAM(s)/deciliter  glucagon  Injectable 1 milliGRAM(s) IntraMuscular once PRN Glucose LESS THAN 70 milligrams/deciliter  ondansetron Injectable 4 milliGRAM(s) IV Push every 6 hours PRN Nausea           ***** REVIEW OF SYSTEM:  GEN: no fever, no chills, no pain  RESP: no SOB, no cough, no sputum  CVS: no chest pain, no palpitations, no edema  GI: no abdominal pain, no nausea, no vomiting, no constipation, no diarrhea  : no dysurea, no frequency  NEURO: no headache, no diziness  PSYCH: no depression, not anxious  Derm : no itching, no rash         ***** VITAL SIGNS:  T(F): 97.7 (18 @ 11:15), Max: 97.9 (18 @ 04:09)  HR: 60 (18 @ 11:15) (60 - 60)  BP: 102/69 (18 @ 11:15) (102/69 - 145/58)  RR: 18 (18 @ 11:15) (18 - 18)  SpO2: 96% (18 @ 11:15) (96% - 97%)  Wt(kg): --  ,   I&O's Summary    2018 07:  -  2018 07:00  --------------------------------------------------------  IN: 960 mL / OUT: 0 mL / NET: 960 mL    2018 07:01  -  2018 12:42  --------------------------------------------------------  IN: 240 mL / OUT: 0 mL / NET: 240 mL             *****PHYSICAL EXAM:    Alert oriented x 1 falls asleep mid sentence.  Attention comprehension are limited.  follows 1 step commands.  not cooperative with rest of exam.            *****LAB AND IMAGIN.5    8.02  )-----------( 297      ( 2018 07:59 )             27.8               06-13    134<L>  |  94<L>  |  40<H>  ----------------------------<  161<H>  3.9   |  29  |  1.94<H>    Ca    8.9      2018 06:31                           [All pertinent recent Imaging/Reports reviewed]           *****A S S E S S M E N T   A N D   P L A N :          87 YO F p/w unwitnessed mechanical fall while walking to her daughters house. Pt fell, denies LOC, states she heard some noice downstairs which worried her. Pt hit head is on eliquis. Pt has multiple skin tares on left elbow left knee, and left temple. Pt endorses left hip pain and has hematoma over the hip with moderate tenderness. Pt denies any urinary symptoms, chest pain, back pain, neck pain, abdominal pain. Per daughter, pt has been slightly confused over the past week and recently had her DM medications changed    Traumatic sah/small ivh while on noac, stable bleed.  Hold eliquis for atleast 2 weeks.  risk vs. benefits discussed with son.   Neurosurgery eval appreciated, pt to follow up with neuro surgery on discharge.       multifactorial toxic metabolic encephalopathy,resolved.  Fall likely mechanical,discharge to  short term rehab.   OOB to chair  will continue to monitor closely.   Thank you for allowing me to participate in the care of this patient. Please do not hesitate to call me if you have any  questions.        ________________  Vandana Tay MD  [facility] ([facility]  [phone]     30 minutes spent on total encounter; more than 50 % of the visit was  spent counseling and or  coordinating care by the attending physician.   At the present time, [facility] does not  provide outpatient followup, best to call the your insurance to find a participating provider.  This was explained to you at the time of the visit. Alternatively, if your insurance allows it, you can follow up with a neurologist  Dr. Pedro Huston([place]) [phone] or Dr. Deion Barone ( [place]) [phone]

## 2018-06-14 NOTE — PROGRESS NOTE ADULT - PROBLEM SELECTOR PLAN 3
,obtain ECHO to evaluate LVEF,cardiology consult,continue current managmnet,O2 supply,anticoagulation plan as per cardiology consult.
On meds primary team following up

## 2018-06-14 NOTE — PROGRESS NOTE ADULT - SUBJECTIVE AND OBJECTIVE BOX
Patient is a 86y Female whom presented to the hospital with ckd and carlene     PAST MEDICAL & SURGICAL HISTORY:  COPD (chronic obstructive pulmonary disease)  Depression  Anxiety  Atrial fibrillation, unspecified type  Pacemaker  Overweight  PVD (Peripheral Vascular Disease)  Arteriosclerotic Heart Disease (ASHD)  HTN (Hypertension)  Diabetes  S/P carpal tunnel release  S/P left cataract extraction  s/p colon resection  S/P Nephrectomy: right  Hip Replacement  History of Total Knee Replacement  S/P Hernia Surgery  S/P Cholecystectomy          : No fever, fatigue  No chest pain or palpation.                               8.5    8.02  )-----------( 297      ( 14 Jun 2018 07:59 )             27.8       CBC Full  -  ( 14 Jun 2018 07:59 )  WBC Count : 8.02 K/uL  Hemoglobin : 8.5 g/dL  Hematocrit : 27.8 %  Platelet Count - Automated : 297 K/uL  Mean Cell Volume : 90.8 fl  Mean Cell Hemoglobin : 27.8 pg  Mean Cell Hemoglobin Concentration : 30.6 gm/dL  Auto Neutrophil # : x  Auto Lymphocyte # : x  Auto Monocyte # : x  Auto Eosinophil # : x  Auto Basophil # : x  Auto Neutrophil % : x  Auto Lymphocyte % : x  Auto Monocyte % : x  Auto Eosinophil % : x  Auto Basophil % : x      06-13    134<L>  |  94<L>  |  40<H>  ----------------------------<  161<H>  3.9   |  29  |  1.94<H>    Ca    8.9      13 Jun 2018 06:31        CAPILLARY BLOOD GLUCOSE      POCT Blood Glucose.: 182 mg/dL (14 Jun 2018 11:41)  POCT Blood Glucose.: 168 mg/dL (14 Jun 2018 07:40)  POCT Blood Glucose.: 139 mg/dL (13 Jun 2018 21:05)  POCT Blood Glucose.: 99 mg/dL (13 Jun 2018 16:52)      Vital Signs Last 24 Hrs  T(C): 36.5 (14 Jun 2018 11:15), Max: 36.6 (13 Jun 2018 20:03)  T(F): 97.7 (14 Jun 2018 11:15), Max: 97.9 (14 Jun 2018 04:09)  HR: 60 (14 Jun 2018 11:15) (60 - 60)  BP: 102/69 (14 Jun 2018 11:15) (102/69 - 145/58)  BP(mean): --  RR: 18 (14 Jun 2018 11:15) (18 - 18)  SpO2: 96% (14 Jun 2018 11:15) (96% - 97%)                 PHYSICAL EXAM:    Constitutional: NAD  HEENT: conjunctive   clear   Neck:  No JVD  Respiratory: CTAB  Cardiovascular: S1 and S2  Gastrointestinal: BS+, soft, NT/ND  Extremities: pos  peripheral edema  Neurological: no focal deficits  Psychiatric: Normal mood, normal affect  : No [place]  Skin: pos  rashes , dry   Access: Not applicable

## 2018-06-14 NOTE — PROGRESS NOTE ADULT - ASSESSMENT
Assessment  DMT2: 86y Female with DM T2 with hyperglycemia started on basal bolus insulin, blood sugar improving, no new hypoglycemic episode,  poor PO intake.  COPD: On medications, stable, monitored.  HTN: Controlled, On med.  Subarachnoid Bleed: Monitor, stable

## 2018-06-14 NOTE — PROGRESS NOTE ADULT - PROBLEM SELECTOR PROBLEM 1
Stage 3 chronic kidney disease
Diabetes
Stage 3 chronic kidney disease

## 2018-06-14 NOTE — PROGRESS NOTE ADULT - PROVIDER SPECIALTY LIST ADULT
Endocrinology
Hospitalist
Nephrology
Neurology
Neurology
Neurosurgery
Trauma Surgery
Hospitalist
Neurology
Nephrology

## 2018-06-14 NOTE — PROGRESS NOTE ADULT - PROBLEM SELECTOR PLAN 1
Stage 3 chronic kidney disease. Recommendation:  increase water intake , low salt diet   Serum creatinine is increased , approximating a GFR is increased ml/min.   There is no progression. No uremic symptoms. No evidence of  worsening  Anemia.  Fluid status stable.  Will continue to avoid nephrotoxic drugs.   Patient remains asymptomatic.   Continue current therapy.
Stage 3 chronic kidney disease. Recommendation: CHRONIC KIDNEY DISEASE, STAGE 3: increase water intake , low salt diet   Serum creatinine is increased , approximating a GFR is increased ml/min.   There is no progression. No uremic symptoms. No evidence of  worsening  Anemia.  Fluid status stable.  Will continue to avoid nephrotoxic drugs.   Patient remains asymptomatic.   Continue current therapy.
Will DC Lantus for now, will continue Humalog coverage, will continue monitoring FS FU
Will continue Humalog coverage, will continue monitoring FS FU
Will continue current insulin regimen for now. Will continue monitoring FS, log, will Follow up.  Patient counseled for compliance with consistent low carb diet.
Will continue current insulin regimen for now. Will continue monitoring FS, log, will Follow up.  Patient counseled for compliance with consistent low carb diet.
Will start on Lantus 6u qhs. Humalog 5u before each meal, continue monitoring FS will FU

## 2018-10-22 ENCOUNTER — EMERGENCY (EMERGENCY)
Facility: HOSPITAL | Age: 83
LOS: 1 days | Discharge: HOME | End: 2018-10-22
Attending: STUDENT IN AN ORGANIZED HEALTH CARE EDUCATION/TRAINING PROGRAM
Payer: MEDICARE

## 2018-10-22 VITALS
RESPIRATION RATE: 16 BRPM | SYSTOLIC BLOOD PRESSURE: 161 MMHG | OXYGEN SATURATION: 98 % | TEMPERATURE: 99 F | HEART RATE: 58 BPM | DIASTOLIC BLOOD PRESSURE: 87 MMHG

## 2018-10-22 VITALS
SYSTOLIC BLOOD PRESSURE: 164 MMHG | HEART RATE: 61 BPM | TEMPERATURE: 98 F | DIASTOLIC BLOOD PRESSURE: 82 MMHG | OXYGEN SATURATION: 98 % | RESPIRATION RATE: 14 BRPM

## 2018-10-22 PROBLEM — J44.9 CHRONIC OBSTRUCTIVE PULMONARY DISEASE, UNSPECIFIED: Chronic | Status: ACTIVE | Noted: 2017-12-23

## 2018-10-22 PROBLEM — F32.9 MAJOR DEPRESSIVE DISORDER, SINGLE EPISODE, UNSPECIFIED: Chronic | Status: ACTIVE | Noted: 2017-12-23

## 2018-10-22 PROBLEM — F41.9 ANXIETY DISORDER, UNSPECIFIED: Chronic | Status: ACTIVE | Noted: 2017-12-23

## 2018-10-22 LAB
ALBUMIN SERPL ELPH-MCNC: 3.4 G/DL — SIGNIFICANT CHANGE UP (ref 3.3–5)
ALP SERPL-CCNC: 78 U/L — SIGNIFICANT CHANGE UP (ref 40–120)
ALT FLD-CCNC: 16 U/L — SIGNIFICANT CHANGE UP (ref 10–45)
ANION GAP SERPL CALC-SCNC: 12 MMOL/L — SIGNIFICANT CHANGE UP (ref 5–17)
APTT BLD: 28.4 SEC — SIGNIFICANT CHANGE UP (ref 27.5–37.4)
AST SERPL-CCNC: 21 U/L — SIGNIFICANT CHANGE UP (ref 10–40)
BILIRUB SERPL-MCNC: 0.3 MG/DL — SIGNIFICANT CHANGE UP (ref 0.2–1.2)
BLD GP AB SCN SERPL QL: NEGATIVE — SIGNIFICANT CHANGE UP
BUN SERPL-MCNC: 51 MG/DL — HIGH (ref 7–23)
CALCIUM SERPL-MCNC: 9.3 MG/DL — SIGNIFICANT CHANGE UP (ref 8.4–10.5)
CHLORIDE SERPL-SCNC: 97 MMOL/L — SIGNIFICANT CHANGE UP (ref 96–108)
CO2 SERPL-SCNC: 26 MMOL/L — SIGNIFICANT CHANGE UP (ref 22–31)
CREAT SERPL-MCNC: 1.87 MG/DL — HIGH (ref 0.5–1.3)
GLUCOSE SERPL-MCNC: 251 MG/DL — HIGH (ref 70–99)
HCT VFR BLD CALC: 35.7 % — SIGNIFICANT CHANGE UP (ref 34.5–45)
HGB BLD-MCNC: 11.2 G/DL — LOW (ref 11.5–15.5)
INR BLD: 1.02 RATIO — SIGNIFICANT CHANGE UP (ref 0.88–1.16)
MCHC RBC-ENTMCNC: 27.6 PG — SIGNIFICANT CHANGE UP (ref 27–34)
MCHC RBC-ENTMCNC: 31.4 GM/DL — LOW (ref 32–36)
MCV RBC AUTO: 88 FL — SIGNIFICANT CHANGE UP (ref 80–100)
PLATELET # BLD AUTO: 342 K/UL — SIGNIFICANT CHANGE UP (ref 150–400)
POTASSIUM SERPL-MCNC: 4.2 MMOL/L — SIGNIFICANT CHANGE UP (ref 3.5–5.3)
POTASSIUM SERPL-SCNC: 4.2 MMOL/L — SIGNIFICANT CHANGE UP (ref 3.5–5.3)
PROT SERPL-MCNC: 7.5 G/DL — SIGNIFICANT CHANGE UP (ref 6–8.3)
PROTHROM AB SERPL-ACNC: 11 SEC — SIGNIFICANT CHANGE UP (ref 9.8–12.7)
RBC # BLD: 4.06 M/UL — SIGNIFICANT CHANGE UP (ref 3.8–5.2)
RBC # FLD: 14.9 % — HIGH (ref 10.3–14.5)
RH IG SCN BLD-IMP: POSITIVE — SIGNIFICANT CHANGE UP
SODIUM SERPL-SCNC: 135 MMOL/L — SIGNIFICANT CHANGE UP (ref 135–145)
WBC # BLD: 6.7 K/UL — SIGNIFICANT CHANGE UP (ref 3.8–10.5)
WBC # FLD AUTO: 6.7 K/UL — SIGNIFICANT CHANGE UP (ref 3.8–10.5)

## 2018-10-22 PROCEDURE — 72125 CT NECK SPINE W/O DYE: CPT | Mod: 26

## 2018-10-22 PROCEDURE — 12001 RPR S/N/AX/GEN/TRNK 2.5CM/<: CPT

## 2018-10-22 PROCEDURE — 85730 THROMBOPLASTIN TIME PARTIAL: CPT

## 2018-10-22 PROCEDURE — 72170 X-RAY EXAM OF PELVIS: CPT | Mod: 26

## 2018-10-22 PROCEDURE — 70450 CT HEAD/BRAIN W/O DYE: CPT

## 2018-10-22 PROCEDURE — 86850 RBC ANTIBODY SCREEN: CPT

## 2018-10-22 PROCEDURE — 86922 COMPATIBILITY TEST ANTIGLOB: CPT

## 2018-10-22 PROCEDURE — 70450 CT HEAD/BRAIN W/O DYE: CPT | Mod: 26

## 2018-10-22 PROCEDURE — 71045 X-RAY EXAM CHEST 1 VIEW: CPT

## 2018-10-22 PROCEDURE — 99284 EMERGENCY DEPT VISIT MOD MDM: CPT | Mod: 25

## 2018-10-22 PROCEDURE — 72125 CT NECK SPINE W/O DYE: CPT

## 2018-10-22 PROCEDURE — 80053 COMPREHEN METABOLIC PANEL: CPT

## 2018-10-22 PROCEDURE — 71045 X-RAY EXAM CHEST 1 VIEW: CPT | Mod: 26

## 2018-10-22 PROCEDURE — 86900 BLOOD TYPING SEROLOGIC ABO: CPT

## 2018-10-22 PROCEDURE — 86901 BLOOD TYPING SEROLOGIC RH(D): CPT

## 2018-10-22 PROCEDURE — 72170 X-RAY EXAM OF PELVIS: CPT

## 2018-10-22 PROCEDURE — 85027 COMPLETE CBC AUTOMATED: CPT

## 2018-10-22 PROCEDURE — 85610 PROTHROMBIN TIME: CPT

## 2018-10-22 NOTE — ED PROVIDER NOTE - OBJECTIVE STATEMENT
Martin JOSE MD PGY1: 87 F BIBEMS s/p mechanical slip and fall picking up hearing aid from kitchen floor. No LOC at any point. No complaints at the moment. Assoc laceration @ occiput. No weakness/numbness/tingling/chest pain/SOB/pain. ASA 81 for AC.

## 2018-10-22 NOTE — ED PROVIDER NOTE - MEDICAL DECISION MAKING DETAILS
87 F s/p mechanical fall on ASA 81 BIBEMS. Will obtain CT Head and Neck non-con to eval for ICH or C spine fx. Will explore lac s/p CT.

## 2018-10-22 NOTE — ED PROVIDER NOTE - PHYSICAL EXAMINATION
PHYSICAL EXAM:    GENERAL: NAD, well-developed  HEENT:  Laceration to occiput not visualized 2/2 C-collar. Will re-evaluate s/p CT C Spine.   CHEST/LUNG: Chest rise equal bilaterally. CTAB.   HEART: Regular rate and rhythm.  ABDOMEN: Soft, Nontender, Nondistended.  EXTREMITIES:  2+ Peripheral Pulses.  PSYCH: A&Ox3  SKIN: Chronic bruising over b/l arms.   MSK: No midline spinal tenderness. No midline C spine tenderness.  NEUROLOGY: strength 5/5 assessed by hang , at elbow and shoulder. No gross changes in sensation at fingertips. CN 2 - 12 intact.

## 2018-10-22 NOTE — ED ADULT NURSE NOTE - OBJECTIVE STATEMENT
86 y/o female A&Ox4, PMH DMII, HTN, MI with pacemaker, COPD, afib, PVD, depression, right nephrectomy, BIBEMS s/p fall at home. Pt states she was "try to  a battery that fell out of her hearing aid" when she fell and hit the back of her head. Pt denies LOC/vision changes/n/v. As per EMS, pt was on the floor "for about an hour and was found by her daughter who called EMS." Upon further assessment, airway patent and intact, denies chest pain/SOB. ABD soft nontender. Denies blood in urine and/or stool. Laceration noted to occiput. Peripheral pulses strong and normal baseline sensation present x4. Safety and comfort measures maintained. 86 y/o female A&Ox4, PMH DMII, HTN, MI with pacemaker, COPD, afib, PVD, depression, right nephrectomy, BIBEMS s/p fall at home. Pt states she was "try to  a battery that fell out of her hearing aid" when she fell and hit the back of her head. Pt denies LOC/vision changes/n/v. As per EMS, pt was on the floor "for about an hour and was found by her daughter who called EMS." Upon further assessment, airway patent and intact, denies chest pain/SOB. ABD soft nontender. Denies blood in urine and/or stool. Laceration noted to occiput, approx 1" in length. Peripheral pulses strong and normal baseline sensation present x4. Safety and comfort measures maintained.

## 2018-10-22 NOTE — ED PROVIDER NOTE - ATTENDING CONTRIBUTION TO CARE
88 yo F on asa 81mg qd presents after mechanical slip and fall at home. pt was wearing slippery footwear, dropped hearing aid and reports bending over to pick it up and falling and hitting her head. + occipital lac. patient denies loc, denies pain, weakness, numbness ,dizziness, palpitations, sob, abd pain, n ,v, visual/hearing changes.   GEN: no acute respiratory distress. nontoxic, speaking comfortably in full sentences  HEENT: 2cm vertical laceration occiput, no active bleeding. face symmetrical. PERRL 4mm, EOMI, normal auditory canal b/l, normal TM b/l. no hemotympanum. nose midline and without discharge,  MMM, oropharynx wnl.  Neck: no JVD, trachea midline, no LAD, no ttp  CV: RRR. +S1S2, no murmur. 2+ pulses in 4 extremities, cap refill <2 sec  Chest: CTA B/l. no wheezing, rales, rhonchi. no retractions. good air movement.   ABD: +BS, soft, non distended, non tender. No guarding/rebound.  : no cva or suprapubic tenderness  MSK: No clubbing, cyanosis, edema. FROM of all extremities. no tenderness to palpation. No midline or paraspinal tenderness. no spinal step-offs.  Neuro: AOOX3.  CN 2-12 intact; Sensation intact, motor 5/5 throughout. no ataxia  SKIN: chronic skin changes on ext    mech fall. xr/ctto r/o occult trauma. lac repair. tetanus <5 yr ago.  xr and ct neg for acute pathology. 3 staples placed. 7 day for removal. Return precautions were discussed with patient and daughters at bedside. stable for dc

## 2018-10-22 NOTE — ED ADULT NURSE NOTE - INTERVENTIONS DEFINITIONS
Provide visual cue, wrist band, yellow gown, etc./Monitor for mental status changes and reorient to person, place, and time/Non-slip footwear when patient is off stretcher/Physically safe environment: no spills, clutter or unnecessary equipment/Room bathroom lighting operational/[place] to call system/Call bell, personal items and telephone within reach/Instruct patient to call for assistance/Stretcher in lowest position, wheels locked, appropriate side rails in place/Reinforce activity limits and safety measures with patient and family/Monitor gait and stability

## 2018-10-22 NOTE — ED PROVIDER NOTE - CARE PLAN
Principal Discharge DX:	Laceration of scalp without foreign body, initial encounter  Secondary Diagnosis:	Fall, initial encounter

## 2018-10-30 NOTE — ED PROVIDER NOTE - AXIS
-Glucose 229 on arrival. History of DM. Pt not in DKA.   -Suspect dietary noncompliance.   -Encourage dietary modification. SSI as needed.      Normal

## 2018-11-10 ENCOUNTER — EMERGENCY (EMERGENCY)
Facility: HOSPITAL | Age: 83
LOS: 1 days | Discharge: HOME | End: 2018-11-10
Attending: EMERGENCY MEDICINE
Payer: MEDICARE

## 2018-11-10 VITALS
TEMPERATURE: 98 F | RESPIRATION RATE: 16 BRPM | HEART RATE: 60 BPM | SYSTOLIC BLOOD PRESSURE: 122 MMHG | OXYGEN SATURATION: 98 % | DIASTOLIC BLOOD PRESSURE: 77 MMHG

## 2018-11-10 VITALS
TEMPERATURE: 98 F | HEART RATE: 80 BPM | DIASTOLIC BLOOD PRESSURE: 66 MMHG | RESPIRATION RATE: 20 BRPM | SYSTOLIC BLOOD PRESSURE: 107 MMHG | OXYGEN SATURATION: 95 %

## 2018-11-10 LAB
ALBUMIN SERPL ELPH-MCNC: 3.6 G/DL — SIGNIFICANT CHANGE UP (ref 3.3–5)
ALP SERPL-CCNC: 72 U/L — SIGNIFICANT CHANGE UP (ref 40–120)
ALT FLD-CCNC: 14 U/L — SIGNIFICANT CHANGE UP (ref 10–45)
ANION GAP SERPL CALC-SCNC: 14 MMOL/L — SIGNIFICANT CHANGE UP (ref 5–17)
APPEARANCE UR: CLEAR — SIGNIFICANT CHANGE UP
AST SERPL-CCNC: 24 U/L — SIGNIFICANT CHANGE UP (ref 10–40)
BACTERIA # UR AUTO: NEGATIVE — SIGNIFICANT CHANGE UP
BASOPHILS # BLD AUTO: 0 K/UL — SIGNIFICANT CHANGE UP (ref 0–0.2)
BASOPHILS NFR BLD AUTO: 0.6 % — SIGNIFICANT CHANGE UP (ref 0–2)
BILIRUB SERPL-MCNC: 0.4 MG/DL — SIGNIFICANT CHANGE UP (ref 0.2–1.2)
BILIRUB UR-MCNC: NEGATIVE — SIGNIFICANT CHANGE UP
BUN SERPL-MCNC: 43 MG/DL — HIGH (ref 7–23)
CALCIUM SERPL-MCNC: 9.4 MG/DL — SIGNIFICANT CHANGE UP (ref 8.4–10.5)
CHLORIDE SERPL-SCNC: 94 MMOL/L — LOW (ref 96–108)
CO2 SERPL-SCNC: 25 MMOL/L — SIGNIFICANT CHANGE UP (ref 22–31)
COLOR SPEC: SIGNIFICANT CHANGE UP
CREAT SERPL-MCNC: 1.73 MG/DL — HIGH (ref 0.5–1.3)
DIFF PNL FLD: NEGATIVE — SIGNIFICANT CHANGE UP
EOSINOPHIL # BLD AUTO: 0.4 K/UL — SIGNIFICANT CHANGE UP (ref 0–0.5)
EOSINOPHIL NFR BLD AUTO: 5.2 % — SIGNIFICANT CHANGE UP (ref 0–6)
EPI CELLS # UR: 0 /HPF — SIGNIFICANT CHANGE UP
GLUCOSE SERPL-MCNC: 150 MG/DL — HIGH (ref 70–99)
GLUCOSE UR QL: NEGATIVE — SIGNIFICANT CHANGE UP
HCT VFR BLD CALC: 36.5 % — SIGNIFICANT CHANGE UP (ref 34.5–45)
HGB BLD-MCNC: 11.2 G/DL — LOW (ref 11.5–15.5)
HYALINE CASTS # UR AUTO: 0 /LPF — SIGNIFICANT CHANGE UP (ref 0–2)
KETONES UR-MCNC: NEGATIVE — SIGNIFICANT CHANGE UP
LEUKOCYTE ESTERASE UR-ACNC: NEGATIVE — SIGNIFICANT CHANGE UP
LYMPHOCYTES # BLD AUTO: 1.5 K/UL — SIGNIFICANT CHANGE UP (ref 1–3.3)
LYMPHOCYTES # BLD AUTO: 19.7 % — SIGNIFICANT CHANGE UP (ref 13–44)
MCHC RBC-ENTMCNC: 27.1 PG — SIGNIFICANT CHANGE UP (ref 27–34)
MCHC RBC-ENTMCNC: 30.8 GM/DL — LOW (ref 32–36)
MCV RBC AUTO: 88.1 FL — SIGNIFICANT CHANGE UP (ref 80–100)
MONOCYTES # BLD AUTO: 0.9 K/UL — SIGNIFICANT CHANGE UP (ref 0–0.9)
MONOCYTES NFR BLD AUTO: 11.7 % — SIGNIFICANT CHANGE UP (ref 2–14)
NEUTROPHILS # BLD AUTO: 4.8 K/UL — SIGNIFICANT CHANGE UP (ref 1.8–7.4)
NEUTROPHILS NFR BLD AUTO: 62.8 % — SIGNIFICANT CHANGE UP (ref 43–77)
NITRITE UR-MCNC: NEGATIVE — SIGNIFICANT CHANGE UP
PH UR: 6.5 — SIGNIFICANT CHANGE UP (ref 5–8)
PLATELET # BLD AUTO: 238 K/UL — SIGNIFICANT CHANGE UP (ref 150–400)
POTASSIUM SERPL-MCNC: 3.7 MMOL/L — SIGNIFICANT CHANGE UP (ref 3.5–5.3)
POTASSIUM SERPL-SCNC: 3.7 MMOL/L — SIGNIFICANT CHANGE UP (ref 3.5–5.3)
PROT SERPL-MCNC: 7.3 G/DL — SIGNIFICANT CHANGE UP (ref 6–8.3)
PROT UR-MCNC: ABNORMAL
RBC # BLD: 4.14 M/UL — SIGNIFICANT CHANGE UP (ref 3.8–5.2)
RBC # FLD: 16 % — HIGH (ref 10.3–14.5)
RBC CASTS # UR COMP ASSIST: 2 /HPF — SIGNIFICANT CHANGE UP (ref 0–4)
SODIUM SERPL-SCNC: 133 MMOL/L — LOW (ref 135–145)
SP GR SPEC: 1.01 — SIGNIFICANT CHANGE UP (ref 1.01–1.02)
UROBILINOGEN FLD QL: NEGATIVE — SIGNIFICANT CHANGE UP
WBC # BLD: 7.7 K/UL — SIGNIFICANT CHANGE UP (ref 3.8–10.5)
WBC # FLD AUTO: 7.7 K/UL — SIGNIFICANT CHANGE UP (ref 3.8–10.5)
WBC UR QL: 0 /HPF — SIGNIFICANT CHANGE UP (ref 0–5)

## 2018-11-10 PROCEDURE — 87086 URINE CULTURE/COLONY COUNT: CPT

## 2018-11-10 PROCEDURE — 80053 COMPREHEN METABOLIC PANEL: CPT

## 2018-11-10 PROCEDURE — 70450 CT HEAD/BRAIN W/O DYE: CPT | Mod: 26

## 2018-11-10 PROCEDURE — 93010 ELECTROCARDIOGRAM REPORT: CPT

## 2018-11-10 PROCEDURE — 71045 X-RAY EXAM CHEST 1 VIEW: CPT | Mod: 26

## 2018-11-10 PROCEDURE — 82962 GLUCOSE BLOOD TEST: CPT

## 2018-11-10 PROCEDURE — 81001 URINALYSIS AUTO W/SCOPE: CPT

## 2018-11-10 PROCEDURE — 71045 X-RAY EXAM CHEST 1 VIEW: CPT

## 2018-11-10 PROCEDURE — 99284 EMERGENCY DEPT VISIT MOD MDM: CPT | Mod: 25

## 2018-11-10 PROCEDURE — 85027 COMPLETE CBC AUTOMATED: CPT

## 2018-11-10 PROCEDURE — 70450 CT HEAD/BRAIN W/O DYE: CPT

## 2018-11-10 PROCEDURE — 93005 ELECTROCARDIOGRAM TRACING: CPT

## 2018-11-10 NOTE — ED PROVIDER NOTE - ATTENDING CONTRIBUTION TO CARE
88y/o F with h/o COPD, DM, HTN, PPM, Afib, presenting with altered mental status.  Patient reportedly was at home this morning, and activated her life alert pendant when she thought she was in a boat (was found in her house) and needed help.  Patient continues to reiterate that she is in a dream, and wants to know why examiner is in her dream.  When asked, patient states she is currently at [facility], and is A&Ox3, but appears to perseverate on the dream.    On Physical Exam:  General: well appearing, in NAD, speaking clearly in full sentences and without difficulty; cooperative with exam  HEENT: PERRL, MMM  Neck: no neck tenderness, no nuchal rigidity  Cardiac: normal s1, s2; RRR; no MGR  Lungs: CTABL  Abdomen: soft nontender/nondistended  : no bladder tenderness or distension  Skin: intact, no rash  Extremities: no peripheral edema, no gross deformities  Neuro: no gross neurologic deficits.  CN II-XII intact.  Moving all extremities; Str 5/5 grossly in all extremities.  No pronator drift.  coordination intact with upper and lower extremities.  No gross sensory deficits elicited on exam.    A/P: Concerning for acute delirium, though is afebrile, well appearing and otherwise A&Ox3; also consider underlying/undiagnosed dementia.  Will obtain CT head, labs including cbc, cmp, ua.  ECG.  Continue to monitor, attempt to obtain additional / collateral information from family.  Patient reportedly lives alone; at present does not appear to be safe discharge if continues to have confusion. 88y/o F with h/o COPD, DM, HTN, PPM, Afib, presenting with altered mental status.  Patient reportedly was at home this morning, and activated her life alert pendant when she thought she was in a boat (was found in her house) and needed help.  Patient continues to reiterate that she is in a dream, and wants to know why examiner is in her dream.  When asked, patient states she is currently at [facility], and is A&Ox3, but appears to perseverate on the dream.    On Physical Exam:  General: well appearing, in NAD, speaking clearly in full sentences and without difficulty; cooperative with exam  HEENT: PERRL, MMM  Neck: no neck tenderness, no nuchal rigidity  Cardiac: normal s1, s2; RRR; no MGR  Lungs: CTABL  Abdomen: soft nontender/nondistended  : no bladder tenderness or distension  Skin: intact, no rash  Extremities: no peripheral edema, no gross deformities  Neuro: no gross neurologic deficits.  CN II-XII intact.  Moving all extremities; Str 5/5 grossly in all extremities.  No pronator drift.  coordination intact with upper and lower extremities.  No gross sensory deficits elicited on exam.    A/P: Concerning for acute delirium, though is afebrile, well appearing and otherwise A&Ox3; also consider underlying/undiagnosed dementia.  Will obtain CT head, labs including cbc, cmp, ua.  ECG.  Continue to monitor, attempt to obtain additional / collateral information from family.  Patient reportedly lives alone; at present does not appear to be safe discharge if continues to have confusion.    ED Course: patient remained hemodynamically stable, labs unrevealing, baseline BUN/Cr, UA not c/w infection, afebile rectally.  CT Head with no acute findings.  Patient's daugther arrived and reports that patient has had longstanding issue with hallucinations, occasionally does think she is somewhere else, has help/aide at home and is following with geriatric psychiatric and pcp for this.  Given no change from baseline, no signs of active infection, is stable for dc.

## 2018-11-10 NOTE — ED PROVIDER NOTE - PROGRESS NOTE DETAILS
Spoke with patient's daughter, reports patient follows with nephro, urologist, psychiatrist. Pt with depression/anxiety/insomnia and takes Klonopin at night. Pt was suffering from hallucinations so psych recently decreased Klonopin from twice daily to only before bed. No current UTI treatment. - Dominique Dye PA-C Spoke with patient's daughter, reports patient follows with nephro, urologist, psychiatrist. Pt with depression/anxiety/insomnia and takes Klonopin at night. Pt was suffering from hallucinations so psych recently decreased Klonopin from twice daily to only before bed. No current UTI treatment. Pt lives in a house with other daughter, also has HHA 9-9 daily. - Dominique Dye PA-C

## 2018-11-10 NOTE — ED PROVIDER NOTE - CONSTITUTIONAL MENTATION
oriented to person, place, time/situation/alert/A&O x 3/awake No oriented to person, place, time/situation/awake/alert/A&O x 3 when re-oriented and reminded she is not in a dream

## 2018-11-10 NOTE — ED ADULT NURSE NOTE - OBJECTIVE STATEMENT
86 y/o female presenting to the ED via EMS from home complaining of episode of confusion. Per EMS patient called 911 saying "there is a man drowning in a boat". Per EMS patient lives alone at home but has life alert. Per EMS family showed up when life alert went off and states patient is A&OX3 at baseline. On arrival FS completed 151. Patient A&OX3, but repetitively asks "is this part of my dream?". Hx of HTN, DM, frequent UTIs (patient states is currently treated for UTI). Patient denies any dizziness, n/v/d, numbness, tingling, SOB, chest pain, fevers. Healing skin tear noted to right shin. Skin tear appears well, no drainage noted. Skin tear dressed. Nonblancheable redness noted to sacrum, stage 1. Patient afebrile in ED. A&OX3. Safety and comfort measures provided.

## 2018-11-10 NOTE — ED PROVIDER NOTE - PLAN OF CARE
Continue your current medication regimen.  Follow up with your Primary Care Physician and Psychiatrist within the next 2-3 days.  Bring a copy of your test results with you to your appointment.  Return to the Emergency Room if you experience new or worsening symptoms.

## 2018-11-10 NOTE — ED PROVIDER NOTE - CARE PLAN
Principal Discharge DX:	Altered mental state  Assessment and plan of treatment:	Continue your current medication regimen.  Follow up with your Primary Care Physician and Psychiatrist within the next 2-3 days.  Bring a copy of your test results with you to your appointment.  Return to the Emergency Room if you experience new or worsening symptoms.

## 2018-11-10 NOTE — ED ADULT NURSE NOTE - CHPI ED NUR SYMPTOMS NEG
no chills/no dizziness/no pain/no vomiting/no decreased eating/drinking/no tingling/no nausea/no weakness/no fever

## 2018-11-11 LAB
CULTURE RESULTS: NO GROWTH — SIGNIFICANT CHANGE UP
SPECIMEN SOURCE: SIGNIFICANT CHANGE UP

## 2019-01-30 ENCOUNTER — APPOINTMENT (OUTPATIENT)
Dept: ELECTROPHYSIOLOGY | Facility: CLINIC | Age: 84
End: 2019-01-30
Payer: MEDICARE

## 2019-01-30 VITALS
WEIGHT: 146 LBS | HEIGHT: 64 IN | DIASTOLIC BLOOD PRESSURE: 48 MMHG | HEART RATE: 80 BPM | SYSTOLIC BLOOD PRESSURE: 84 MMHG | OXYGEN SATURATION: 95 % | BODY MASS INDEX: 24.92 KG/M2

## 2019-01-30 PROCEDURE — 93280 PM DEVICE PROGR EVAL DUAL: CPT

## 2019-04-06 ENCOUNTER — INPATIENT (INPATIENT)
Facility: HOSPITAL | Age: 84
LOS: 4 days | Discharge: HOME HEALTH | DRG: 871 | End: 2019-04-11
Attending: INTERNAL MEDICINE | Admitting: INTERNAL MEDICINE
Payer: MEDICARE

## 2019-04-06 VITALS
HEIGHT: 65 IN | DIASTOLIC BLOOD PRESSURE: 68 MMHG | SYSTOLIC BLOOD PRESSURE: 128 MMHG | WEIGHT: 149.91 LBS | TEMPERATURE: 97 F | HEART RATE: 63 BPM | OXYGEN SATURATION: 97 %

## 2019-04-06 DIAGNOSIS — A41.9 SEPSIS, UNSPECIFIED ORGANISM: ICD-10-CM

## 2019-04-06 DIAGNOSIS — E11.22 TYPE 2 DIABETES MELLITUS WITH DIABETIC CHRONIC KIDNEY DISEASE: ICD-10-CM

## 2019-04-06 DIAGNOSIS — E44.0 MODERATE PROTEIN-CALORIE MALNUTRITION: ICD-10-CM

## 2019-04-06 DIAGNOSIS — D64.9 ANEMIA, UNSPECIFIED: ICD-10-CM

## 2019-04-06 DIAGNOSIS — I50.23 ACUTE ON CHRONIC SYSTOLIC (CONGESTIVE) HEART FAILURE: ICD-10-CM

## 2019-04-06 DIAGNOSIS — I50.9 HEART FAILURE, UNSPECIFIED: ICD-10-CM

## 2019-04-06 DIAGNOSIS — J18.9 PNEUMONIA, UNSPECIFIED ORGANISM: ICD-10-CM

## 2019-04-06 DIAGNOSIS — N18.4 CHRONIC KIDNEY DISEASE, STAGE 4 (SEVERE): ICD-10-CM

## 2019-04-06 DIAGNOSIS — I50.1 LEFT VENTRICULAR FAILURE, UNSPECIFIED: ICD-10-CM

## 2019-04-06 DIAGNOSIS — I10 ESSENTIAL (PRIMARY) HYPERTENSION: ICD-10-CM

## 2019-04-06 DIAGNOSIS — J44.9 CHRONIC OBSTRUCTIVE PULMONARY DISEASE, UNSPECIFIED: ICD-10-CM

## 2019-04-06 LAB
ALBUMIN SERPL ELPH-MCNC: 2.9 G/DL — LOW (ref 3.3–5)
ALP SERPL-CCNC: 81 U/L — SIGNIFICANT CHANGE UP (ref 40–120)
ALT FLD-CCNC: 14 U/L — SIGNIFICANT CHANGE UP (ref 10–45)
ANION GAP SERPL CALC-SCNC: 13 MMOL/L — SIGNIFICANT CHANGE UP (ref 5–17)
APPEARANCE UR: CLEAR — SIGNIFICANT CHANGE UP
AST SERPL-CCNC: 21 U/L — SIGNIFICANT CHANGE UP (ref 10–40)
BASOPHILS # BLD AUTO: 0 K/UL — SIGNIFICANT CHANGE UP (ref 0–0.2)
BASOPHILS NFR BLD AUTO: 0.2 % — SIGNIFICANT CHANGE UP (ref 0–2)
BILIRUB SERPL-MCNC: 0.3 MG/DL — SIGNIFICANT CHANGE UP (ref 0.2–1.2)
BILIRUB UR-MCNC: NEGATIVE — SIGNIFICANT CHANGE UP
BUN SERPL-MCNC: 30 MG/DL — HIGH (ref 7–23)
CALCIUM SERPL-MCNC: 9.2 MG/DL — SIGNIFICANT CHANGE UP (ref 8.4–10.5)
CHLORIDE SERPL-SCNC: 103 MMOL/L — SIGNIFICANT CHANGE UP (ref 96–108)
CK MB CFR SERPL CALC: 2.9 NG/ML — SIGNIFICANT CHANGE UP (ref 0–3.8)
CK SERPL-CCNC: 61 U/L — SIGNIFICANT CHANGE UP (ref 25–170)
CO2 SERPL-SCNC: 21 MMOL/L — LOW (ref 22–31)
COLOR SPEC: YELLOW — SIGNIFICANT CHANGE UP
CREAT SERPL-MCNC: 1.54 MG/DL — HIGH (ref 0.5–1.3)
DIFF PNL FLD: NEGATIVE — SIGNIFICANT CHANGE UP
EOSINOPHIL # BLD AUTO: 0.1 K/UL — SIGNIFICANT CHANGE UP (ref 0–0.5)
EOSINOPHIL NFR BLD AUTO: 0.5 % — SIGNIFICANT CHANGE UP (ref 0–6)
FLU A RESULT: SIGNIFICANT CHANGE UP
FLU A RESULT: SIGNIFICANT CHANGE UP
FLUAV AG NPH QL: SIGNIFICANT CHANGE UP
FLUBV AG NPH QL: SIGNIFICANT CHANGE UP
GAS PNL BLDV: SIGNIFICANT CHANGE UP
GLUCOSE SERPL-MCNC: 466 MG/DL — CRITICAL HIGH (ref 70–99)
GLUCOSE UR QL: ABNORMAL
HCT VFR BLD CALC: 32.6 % — LOW (ref 34.5–45)
HGB BLD-MCNC: 10.5 G/DL — LOW (ref 11.5–15.5)
KETONES UR-MCNC: NEGATIVE — SIGNIFICANT CHANGE UP
LEUKOCYTE ESTERASE UR-ACNC: ABNORMAL
LYMPHOCYTES # BLD AUTO: 0.9 K/UL — LOW (ref 1–3.3)
LYMPHOCYTES # BLD AUTO: 6.1 % — LOW (ref 13–44)
MCHC RBC-ENTMCNC: 29.5 PG — SIGNIFICANT CHANGE UP (ref 27–34)
MCHC RBC-ENTMCNC: 32.2 GM/DL — SIGNIFICANT CHANGE UP (ref 32–36)
MCV RBC AUTO: 91.5 FL — SIGNIFICANT CHANGE UP (ref 80–100)
MONOCYTES # BLD AUTO: 0.8 K/UL — SIGNIFICANT CHANGE UP (ref 0–0.9)
MONOCYTES NFR BLD AUTO: 5.4 % — SIGNIFICANT CHANGE UP (ref 2–14)
NEUTROPHILS # BLD AUTO: 13.3 K/UL — HIGH (ref 1.8–7.4)
NEUTROPHILS NFR BLD AUTO: 87.8 % — HIGH (ref 43–77)
NITRITE UR-MCNC: NEGATIVE — SIGNIFICANT CHANGE UP
NT-PROBNP SERPL-SCNC: HIGH PG/ML (ref 0–300)
PH UR: 6 — SIGNIFICANT CHANGE UP (ref 5–8)
PLATELET # BLD AUTO: 461 K/UL — HIGH (ref 150–400)
POTASSIUM SERPL-MCNC: 4.6 MMOL/L — SIGNIFICANT CHANGE UP (ref 3.5–5.3)
POTASSIUM SERPL-SCNC: 4.6 MMOL/L — SIGNIFICANT CHANGE UP (ref 3.5–5.3)
PROT SERPL-MCNC: 6.9 G/DL — SIGNIFICANT CHANGE UP (ref 6–8.3)
PROT UR-MCNC: ABNORMAL
RAPID RVP RESULT: SIGNIFICANT CHANGE UP
RBC # BLD: 3.57 M/UL — LOW (ref 3.8–5.2)
RBC # FLD: 14.3 % — SIGNIFICANT CHANGE UP (ref 10.3–14.5)
RSV RESULT: SIGNIFICANT CHANGE UP
RSV RNA RESP QL NAA+PROBE: SIGNIFICANT CHANGE UP
SODIUM SERPL-SCNC: 137 MMOL/L — SIGNIFICANT CHANGE UP (ref 135–145)
SP GR SPEC: 1.02 — SIGNIFICANT CHANGE UP (ref 1.01–1.02)
TROPONIN T, HIGH SENSITIVITY RESULT: 51 NG/L — SIGNIFICANT CHANGE UP (ref 0–51)
UROBILINOGEN FLD QL: NEGATIVE — SIGNIFICANT CHANGE UP
WBC # BLD: 15.1 K/UL — HIGH (ref 3.8–10.5)
WBC # FLD AUTO: 15.1 K/UL — HIGH (ref 3.8–10.5)

## 2019-04-06 PROCEDURE — 71250 CT THORAX DX C-: CPT | Mod: 26

## 2019-04-06 PROCEDURE — 71045 X-RAY EXAM CHEST 1 VIEW: CPT | Mod: 26

## 2019-04-06 PROCEDURE — 93010 ELECTROCARDIOGRAM REPORT: CPT

## 2019-04-06 PROCEDURE — 73564 X-RAY EXAM KNEE 4 OR MORE: CPT | Mod: 26,LT

## 2019-04-06 PROCEDURE — 99285 EMERGENCY DEPT VISIT HI MDM: CPT | Mod: 25

## 2019-04-06 RX ORDER — INSULIN LISPRO 100/ML
VIAL (ML) SUBCUTANEOUS
Qty: 0 | Refills: 0 | Status: DISCONTINUED | OUTPATIENT
Start: 2019-04-06 | End: 2019-04-11

## 2019-04-06 RX ORDER — DOCUSATE SODIUM 100 MG
100 CAPSULE ORAL
Qty: 0 | Refills: 0 | Status: DISCONTINUED | OUTPATIENT
Start: 2019-04-06 | End: 2019-04-11

## 2019-04-06 RX ORDER — DULOXETINE HYDROCHLORIDE 30 MG/1
60 CAPSULE, DELAYED RELEASE ORAL DAILY
Qty: 0 | Refills: 0 | Status: DISCONTINUED | OUTPATIENT
Start: 2019-04-06 | End: 2019-04-07

## 2019-04-06 RX ORDER — CLONAZEPAM 1 MG
0.5 TABLET ORAL
Qty: 0 | Refills: 0 | Status: DISCONTINUED | OUTPATIENT
Start: 2019-04-06 | End: 2019-04-08

## 2019-04-06 RX ORDER — DEXTROSE 50 % IN WATER 50 %
15 SYRINGE (ML) INTRAVENOUS ONCE
Qty: 0 | Refills: 0 | Status: DISCONTINUED | OUTPATIENT
Start: 2019-04-06 | End: 2019-04-11

## 2019-04-06 RX ORDER — GLUCAGON INJECTION, SOLUTION 0.5 MG/.1ML
1 INJECTION, SOLUTION SUBCUTANEOUS ONCE
Qty: 0 | Refills: 0 | Status: DISCONTINUED | OUTPATIENT
Start: 2019-04-06 | End: 2019-04-11

## 2019-04-06 RX ORDER — ACETAMINOPHEN 500 MG
975 TABLET ORAL ONCE
Qty: 0 | Refills: 0 | Status: COMPLETED | OUTPATIENT
Start: 2019-04-06 | End: 2019-04-06

## 2019-04-06 RX ORDER — CLONAZEPAM 1 MG
1 TABLET ORAL
Qty: 0 | Refills: 0 | COMMUNITY

## 2019-04-06 RX ORDER — ARIPIPRAZOLE 15 MG/1
2 TABLET ORAL DAILY
Qty: 0 | Refills: 0 | Status: DISCONTINUED | OUTPATIENT
Start: 2019-04-06 | End: 2019-04-08

## 2019-04-06 RX ORDER — DEXTROSE 50 % IN WATER 50 %
12.5 SYRINGE (ML) INTRAVENOUS ONCE
Qty: 0 | Refills: 0 | Status: DISCONTINUED | OUTPATIENT
Start: 2019-04-06 | End: 2019-04-11

## 2019-04-06 RX ORDER — PANTOPRAZOLE SODIUM 20 MG/1
40 TABLET, DELAYED RELEASE ORAL
Qty: 0 | Refills: 0 | Status: DISCONTINUED | OUTPATIENT
Start: 2019-04-06 | End: 2019-04-11

## 2019-04-06 RX ORDER — DOCUSATE SODIUM 100 MG
4 CAPSULE ORAL
Qty: 0 | Refills: 0 | COMMUNITY

## 2019-04-06 RX ORDER — INSULIN LISPRO 100/ML
7 VIAL (ML) SUBCUTANEOUS
Qty: 0 | Refills: 0 | Status: DISCONTINUED | OUTPATIENT
Start: 2019-04-06 | End: 2019-04-08

## 2019-04-06 RX ORDER — CEFEPIME 1 G/1
1000 INJECTION, POWDER, FOR SOLUTION INTRAMUSCULAR; INTRAVENOUS EVERY 24 HOURS
Qty: 0 | Refills: 0 | Status: DISCONTINUED | OUTPATIENT
Start: 2019-04-07 | End: 2019-04-10

## 2019-04-06 RX ORDER — VANCOMYCIN HCL 1 G
1000 VIAL (EA) INTRAVENOUS ONCE
Qty: 0 | Refills: 0 | Status: COMPLETED | OUTPATIENT
Start: 2019-04-06 | End: 2019-04-06

## 2019-04-06 RX ORDER — FUROSEMIDE 40 MG
40 TABLET ORAL DAILY
Qty: 0 | Refills: 0 | Status: DISCONTINUED | OUTPATIENT
Start: 2019-04-07 | End: 2019-04-10

## 2019-04-06 RX ORDER — SIMVASTATIN 20 MG/1
20 TABLET, FILM COATED ORAL AT BEDTIME
Qty: 0 | Refills: 0 | Status: DISCONTINUED | OUTPATIENT
Start: 2019-04-06 | End: 2019-04-11

## 2019-04-06 RX ORDER — SODIUM CHLORIDE 9 MG/ML
1000 INJECTION, SOLUTION INTRAVENOUS
Qty: 0 | Refills: 0 | Status: DISCONTINUED | OUTPATIENT
Start: 2019-04-06 | End: 2019-04-11

## 2019-04-06 RX ORDER — INSULIN LISPRO 100/ML
8 VIAL (ML) SUBCUTANEOUS ONCE
Qty: 0 | Refills: 0 | Status: COMPLETED | OUTPATIENT
Start: 2019-04-06 | End: 2019-04-06

## 2019-04-06 RX ORDER — FUROSEMIDE 40 MG
40 TABLET ORAL ONCE
Qty: 0 | Refills: 0 | Status: COMPLETED | OUTPATIENT
Start: 2019-04-06 | End: 2019-04-06

## 2019-04-06 RX ORDER — VANCOMYCIN HCL 1 G
1000 VIAL (EA) INTRAVENOUS EVERY 24 HOURS
Qty: 0 | Refills: 0 | Status: DISCONTINUED | OUTPATIENT
Start: 2019-04-07 | End: 2019-04-08

## 2019-04-06 RX ORDER — INSULIN LISPRO 100/ML
VIAL (ML) SUBCUTANEOUS AT BEDTIME
Qty: 0 | Refills: 0 | Status: DISCONTINUED | OUTPATIENT
Start: 2019-04-06 | End: 2019-04-11

## 2019-04-06 RX ORDER — AMIODARONE HYDROCHLORIDE 400 MG/1
200 TABLET ORAL DAILY
Qty: 0 | Refills: 0 | Status: DISCONTINUED | OUTPATIENT
Start: 2019-04-06 | End: 2019-04-11

## 2019-04-06 RX ORDER — IPRATROPIUM/ALBUTEROL SULFATE 18-103MCG
3 AEROSOL WITH ADAPTER (GRAM) INHALATION EVERY 6 HOURS
Qty: 0 | Refills: 0 | Status: DISCONTINUED | OUTPATIENT
Start: 2019-04-06 | End: 2019-04-10

## 2019-04-06 RX ORDER — CEFEPIME 1 G/1
1000 INJECTION, POWDER, FOR SOLUTION INTRAMUSCULAR; INTRAVENOUS ONCE
Qty: 0 | Refills: 0 | Status: COMPLETED | OUTPATIENT
Start: 2019-04-06 | End: 2019-04-06

## 2019-04-06 RX ORDER — HEPARIN SODIUM 5000 [USP'U]/ML
5000 INJECTION INTRAVENOUS; SUBCUTANEOUS EVERY 8 HOURS
Qty: 0 | Refills: 0 | Status: DISCONTINUED | OUTPATIENT
Start: 2019-04-06 | End: 2019-04-11

## 2019-04-06 RX ORDER — METOPROLOL TARTRATE 50 MG
100 TABLET ORAL DAILY
Qty: 0 | Refills: 0 | Status: DISCONTINUED | OUTPATIENT
Start: 2019-04-06 | End: 2019-04-11

## 2019-04-06 RX ORDER — DEXTROSE 50 % IN WATER 50 %
25 SYRINGE (ML) INTRAVENOUS ONCE
Qty: 0 | Refills: 0 | Status: DISCONTINUED | OUTPATIENT
Start: 2019-04-06 | End: 2019-04-11

## 2019-04-06 RX ORDER — INSULIN GLARGINE 100 [IU]/ML
20 INJECTION, SOLUTION SUBCUTANEOUS AT BEDTIME
Qty: 0 | Refills: 0 | Status: DISCONTINUED | OUTPATIENT
Start: 2019-04-06 | End: 2019-04-08

## 2019-04-06 RX ORDER — MIRTAZAPINE 45 MG/1
15 TABLET, ORALLY DISINTEGRATING ORAL AT BEDTIME
Qty: 0 | Refills: 0 | Status: DISCONTINUED | OUTPATIENT
Start: 2019-04-06 | End: 2019-04-07

## 2019-04-06 RX ADMIN — Medication 7 UNIT(S): at 19:17

## 2019-04-06 RX ADMIN — Medication 250 MILLIGRAM(S): at 15:52

## 2019-04-06 RX ADMIN — Medication 3 MILLILITER(S): at 19:52

## 2019-04-06 RX ADMIN — Medication 975 MILLIGRAM(S): at 12:54

## 2019-04-06 RX ADMIN — Medication 2: at 19:16

## 2019-04-06 RX ADMIN — Medication 40 MILLIGRAM(S): at 14:48

## 2019-04-06 RX ADMIN — Medication 100 MILLIGRAM(S): at 22:49

## 2019-04-06 RX ADMIN — MIRTAZAPINE 15 MILLIGRAM(S): 45 TABLET, ORALLY DISINTEGRATING ORAL at 22:49

## 2019-04-06 RX ADMIN — CEFEPIME 100 MILLIGRAM(S): 1 INJECTION, POWDER, FOR SOLUTION INTRAMUSCULAR; INTRAVENOUS at 15:00

## 2019-04-06 RX ADMIN — INSULIN GLARGINE 20 UNIT(S): 100 INJECTION, SOLUTION SUBCUTANEOUS at 22:48

## 2019-04-06 RX ADMIN — SIMVASTATIN 20 MILLIGRAM(S): 20 TABLET, FILM COATED ORAL at 22:49

## 2019-04-06 RX ADMIN — Medication 8 UNIT(S): at 14:39

## 2019-04-06 RX ADMIN — HEPARIN SODIUM 5000 UNIT(S): 5000 INJECTION INTRAVENOUS; SUBCUTANEOUS at 22:49

## 2019-04-06 NOTE — H&P ADULT - PROBLEM SELECTOR PLAN 1
diuresis  cardiac meds  - F/u TTE  tele, strict i/o, daily weight, fluid restrictions  PT, nutrition  all medical personnel and consultants management greatly appreciated

## 2019-04-06 NOTE — CONSULT NOTE ADULT - SUBJECTIVE AND OBJECTIVE BOX
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 (2019 16:01)    Patient is a 87y old  Female who presents with a chief complaint of worsening shortness of breath (2019 17:45)      INTERVAL HPI/OVERNIGHT EVENTS:        PAST MEDICAL & SURGICAL HISTORY:  COPD (chronic obstructive pulmonary disease)  Depression  Anxiety  Atrial fibrillation, unspecified type  Pacemaker  Overweight  PVD (Peripheral Vascular Disease)  Arteriosclerotic Heart Disease (ASHD)  HTN (Hypertension)  Diabetes  S/P carpal tunnel release  S/P left cataract extraction  s/p colon resection  S/P Nephrectomy: right  Hip Replacement  History of Total Knee Replacement  S/P Hernia Surgery  S/P Cholecystectomy      REVIEW OF SYSTEMS: Total of twelve systems have been reviewed with patient and found to be negative unless mentioned in HPI      SOCIAL HISTORY  Alcohol: Does not drink  Tobacco: Does not smoke  Illicit substance use: None      FAMILY HISTORY: Non contributory to the present illness        amoxicillin (Flushing; Vomiting; Nausea)  lisinopril (Angioedema)  penicillin (Hives)        T(C): 36.3 (19 @ 10:58), Max: 36.3 (19 @ 10:58)  HR: 60 (19 @ 17:49) (60 - 63)  BP: 133/73 (19 @ 17:49) (128/68 - 133/73)  RR: 20 (19 @ 17:49) (20 - 21)  SpO2: 97% (19 @ 17:49) (97% - 97%)      19 @ 07:01  -  19 @ 18:31  --------------------------------------------------------  IN: 0 mL / OUT: 400 mL / NET: -400 mL        PHYSICAL EXAM:  GENERAL: Not in distress   CHEST/LUNG:  Aire ntry bilaterally  HEART: s1 and s2 present  ABDOMEN:  Nontender and  Nondistended  EXTREMITIES: No pedal  edema  CNS: Awake and Alert      LABS:                        10.5   15.1  )-----------( 461      ( 2019 11:58 )             32.6     04-    137  |  103  |  30<H>  ----------------------------<  466<HH>  4.6   |  21<L>  |  1.54<H>    Ca    9.2      2019 11:58    TPro  6.9  /  Alb  2.9<L>  /  TBili  0.3  /  DBili  x   /  AST  21  /  ALT  14  /  AlkPhos  81  -06      Urinalysis Basic - ( 2019 12:48 )    Color: Yellow / Appearance: Clear / S.016 / pH: x  Gluc: x / Ketone: Negative  / Bili: Negative / Urobili: Negative   Blood: x / Protein: 30 mg/dL / Nitrite: Negative   Leuk Esterase: Moderate / RBC: 2 /hpf / WBC 25 /HPF   Sq Epi: x / Non Sq Epi: 5 / Bacteria: Negative      CAPILLARY BLOOD GLUCOSE      POCT Blood Glucose.: 411 mg/dL (2019 11:43)        Urinalysis Basic - ( 2019 12:48 )    Color: Yellow / Appearance: Clear / S.016 / pH: x  Gluc: x / Ketone: Negative  / Bili: Negative / Urobili: Negative   Blood: x / Protein: 30 mg/dL / Nitrite: Negative   Leuk Esterase: Moderate / RBC: 2 /hpf / WBC 25 /HPF   Sq Epi: x / Non Sq Epi: 5 / Bacteria: Negative        MEDICATIONS  (STANDING):  ALBUTerol/ipratropium for Nebulization 3 milliLiter(s) Nebulizer every 6 hours  amiodarone    Tablet 200 milliGRAM(s) Oral daily  ARIPiprazole 2 milliGRAM(s) Oral daily  dextrose 5%. 1000 milliLiter(s) (50 mL/Hr) IV Continuous <Continuous>  dextrose 50% Injectable 12.5 Gram(s) IV Push once  dextrose 50% Injectable 25 Gram(s) IV Push once  dextrose 50% Injectable 25 Gram(s) IV Push once  docusate sodium 100 milliGRAM(s) Oral two times a day  DULoxetine 60 milliGRAM(s) Oral daily  heparin  Injectable 5000 Unit(s) SubCutaneous every 8 hours  insulin glargine Injectable (LANTUS) 20 Unit(s) SubCutaneous at bedtime  insulin lispro (HumaLOG) corrective regimen sliding scale   SubCutaneous three times a day before meals  insulin lispro (HumaLOG) corrective regimen sliding scale   SubCutaneous at bedtime  insulin lispro Injectable (HumaLOG) 7 Unit(s) SubCutaneous three times a day before meals  metoprolol succinate  milliGRAM(s) Oral daily  mirtazapine 15 milliGRAM(s) Oral at bedtime  pantoprazole    Tablet 40 milliGRAM(s) Oral before breakfast  simvastatin 20 milliGRAM(s) Oral at bedtime    MEDICATIONS  (PRN):  clonazePAM Tablet 0.5 milliGRAM(s) Oral two times a day PRN anxiety, panic attack  dextrose 40% Gel 15 Gram(s) Oral once PRN Blood Glucose LESS THAN 70 milliGRAM(s)/deciLiter  glucagon  Injectable 1 milliGRAM(s) IntraMuscular once PRN Glucose <70 milliGRAM(s)/deciLiter          RADIOLOGY & ADDITIONAL TESTS: A 88 yo Female  from home, with multiple co-morbidities including COPD on home oxygen at night, who was recently hospitalized at [facility] and treated for Pneumonia currently on Doxycycline, presents to the ER for evaluation of worsening shortness of breath and generalized weakness. On admission, she found to have Leukocytosis, positive Urine analysis and RUL consolidation on CT chest.  She has received dose of Cefepime and Vancomycin, cultures pending. The ID consult requested to assist  with further evaluation and antibiotic management.         REVIEW OF SYSTEMS: Total of twelve systems have been reviewed with patient and found to be negative unless mentioned in HPI        PAST MEDICAL & SURGICAL HISTORY:  COPD (chronic obstructive pulmonary disease)  Depression, Anxiety  Atrial fibrillation, unspecified type  Pacemaker  PVD (Peripheral Vascular Disease)  Arteriosclerotic Heart Disease (ASHD)  HTN (Hypertension), Diabetes  S/P carpal tunnel release  S/P left cataract extraction  s/p colon resection  S/P Nephrectomy: right  Hip Replacement  History of Total Knee Replacement  S/P Hernia Surgery, S/P Cholecystectomy          SOCIAL HISTORY  Alcohol: Does not drink  Tobacco: Does not smoke  Illicit substance use: None        FAMILY HISTORY: Non contributory to the present illness        ALLERGIES: ? penicillin (Hives), Amoxicillin (Flushing; Vomiting; Nausea)  lisinopril (Angioedema)          T(C): 36.3 (19 @ 10:58), Max: 36.3 (19 @ 10:58)  HR: 60 (19 @ 17:49) (60 - 63)  BP: 133/73 (19 @ 17:49) (128/68 - 133/73)  RR: 20 (19 @ 17:49) (20 - 21)  SpO2: 97% (19 @ 17:49) (97% - 97%)      19 @ 07:01  -  19 @ 18:31  --------------------------------------------------------  IN: 0 mL / OUT: 400 mL / NET: -400 mL        PHYSICAL EXAM:  GENERAL: Not in distress, on oxygen via NC  HEENT: Right Lower eye lid red  CHEST/LUNG:  Air entry bilaterally  HEART: s1 and s2 present  ABDOMEN:  Nontender and  Nondistended  EXTREMITIES: No pedal  edema  CNS: Awake and Alert        LABS:                        10.5   15.1  )-----------( 461      ( 2019 11:58 )             32.6       04-    137  |  103  |  30<H>  ----------------------------<  466<HH>  4.6   |  21<L>  |  1.54<H>    Ca    9.2      2019 11:58    TPro  6.9  /  Alb  2.9<L>  /  TBili  0.3  /  DBili  x   /  AST  21  /  ALT  14  /  AlkPhos  81        Urinalysis Basic - ( 2019 12:48 )  Color: Yellow / Appearance: Clear / S.016 / pH: x  Gluc: x / Ketone: Negative  / Bili: Negative / Urobili: Negative   Blood: x / Protein: 30 mg/dL / Nitrite: Negative   Leuk Esterase: Moderate / RBC: 2 /hpf / WBC 25 /HPF   Sq Epi: x / Non Sq Epi: 5 / Bacteria: Negative      CAPILLARY BLOOD GLUCOSE  POCT Blood Glucose.: 411 mg/dL (2019 11:43)        MEDICATIONS  (STANDING):  ALBUTerol/ipratropium for Nebulization 3 milliLiter(s) Nebulizer every 6 hours  amiodarone    Tablet 200 milliGRAM(s) Oral daily  ARIPiprazole 2 milliGRAM(s) Oral daily  docusate sodium 100 milliGRAM(s) Oral two times a day  DULoxetine 60 milliGRAM(s) Oral daily  heparin  Injectable 5000 Unit(s) SubCutaneous every 8 hours  insulin glargine Injectable (LANTUS) 20 Unit(s) SubCutaneous at bedtime  insulin lispro (HumaLOG) corrective regimen sliding scale   SubCutaneous three times a day before meals  insulin lispro (HumaLOG) corrective regimen sliding scale   SubCutaneous at bedtime  insulin lispro Injectable (HumaLOG) 7 Unit(s) SubCutaneous three times a day before meals  metoprolol succinate  milliGRAM(s) Oral daily  mirtazapine 15 milliGRAM(s) Oral at bedtime  pantoprazole    Tablet 40 milliGRAM(s) Oral before breakfast  simvastatin 20 milliGRAM(s) Oral at bedtime    MEDICATIONS  (PRN):  clonazePAM Tablet 0.5 milliGRAM(s) Oral two times a day PRN anxiety, panic attack  dextrose 40% Gel 15 Gram(s) Oral once PRN Blood Glucose LESS THAN 70 milliGRAM(s)/deciLiter  glucagon  Injectable 1 milliGRAM(s) IntraMuscular once PRN Glucose <70 milliGRAM(s)/deciLiter        RADIOLOGY & ADDITIONAL TESTS:    19  : CT Chest No Cont (19 @ 18:17) Right upper lobe consolidation which may represents  pneumonia. Follow-up to resolution is advised.   - Small bilateral pleural effusions, right greater than left with  associated compressive atelectasis.    19 : Xray Knee w/Patella 4 View, Left (19 @ 14:13) Status post left knee arthroplasty with intact hardware. No periprosthetic lucency to suggest loosening.   Vascular calcifications. No acute fracture or dislocation.  Preserved joint spaces.

## 2019-04-06 NOTE — H&P ADULT - NSHPLABSRESULTS_GEN_ALL_CORE
Labs and imaging reviewed    LABS:                        10.5   15.1  )-----------( 461      ( 2019 11:58 )             32.6     04-    137  |  103  |  30<H>  ----------------------------<  466<HH>  4.6   |  21<L>  |  1.54<H>    Ca    9.2      2019 11:58    TPro  6.9  /  Alb  2.9<L>  /  TBili  0.3  /  DBili  x   /  AST  21  /  ALT  14  /  AlkPhos  81  -          Urinalysis Basic - ( 2019 12:48 )    Color: Yellow / Appearance: Clear / S.016 / pH: x  Gluc: x / Ketone: Negative  / Bili: Negative / Urobili: Negative   Blood: x / Protein: 30 mg/dL / Nitrite: Negative   Leuk Esterase: Moderate / RBC: 2 /hpf / WBC 25 /HPF   Sq Epi: x / Non Sq Epi: 5 / Bacteria: Negative      CAPILLARY BLOOD GLUCOSE      POCT Blood Glucose.: 411 mg/dL (2019 11:43)        LIVER FUNCTIONS - ( 2019 11:58 )  Alb: 2.9 g/dL / Pro: 6.9 g/dL / ALK PHOS: 81 U/L / ALT: 14 U/L / AST: 21 U/L / GGT: x               RADIOLOGY & ADDITIONAL STUDIES:

## 2019-04-06 NOTE — H&P ADULT - PROBLEM SELECTOR PLAN 5
tx of underlying acute chf and complicated bacterial pneumonia   diuresis   monitor clinical status closely

## 2019-04-06 NOTE — ED PROVIDER NOTE - CARE PLAN
Principal Discharge DX:	Acute congestive heart failure, unspecified heart failure type  Secondary Diagnosis:	Aspiration pneumonia of right lung, unspecified aspiration pneumonia type, unspecified part of lung

## 2019-04-06 NOTE — H&P ADULT - PROBLEM SELECTOR PLAN 3
complicated bacterial health care associated pneumonia, unsepcified type  f/u ct chest   broad spectrum abx renally dosed  f/u cultures  nebs, o2, maintain spo2 > 90%

## 2019-04-06 NOTE — ED PROVIDER NOTE - PHYSICAL EXAMINATION
A&Ox3, NAD. NCAT. PERRL, EOMI. Neck supple, no LAD. Lungs CTAB, diminished on right LL. +S1S2, RRR, No m/r/g. Abd soft, +ventral hernia w/o tenderness, NT/ND, +BS, no rebound or guarding. Extremities: cap refill <2, pulses in distal extremities 4+, noted 1+ pitting pedal edema. Skin without rash. CN II-XII intact. Strength 5/5 UE/LE. Sensations intact throughout.

## 2019-04-06 NOTE — H&P ADULT - HISTORY OF PRESENT ILLNESS
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

## 2019-04-06 NOTE — H&P ADULT - NSHPPHYSICALEXAM_GEN_ALL_CORE
T(C): 36.3 (04-06-19 @ 10:58), Max: 36.3 (04-06-19 @ 10:58)  HR: 63 (04-06-19 @ 10:58) (63 - 63)  BP: 128/68 (04-06-19 @ 10:58) (128/68 - 128/68)  RR: 21 (04-06-19 @ 15:48) (21 - 21)  SpO2: 97% (04-06-19 @ 10:58) (97% - 97%)    PHYSICAL EXAMINATION:   Constitutional: ill appearing  HEENT: NC, AT  Neck:  Supple  Respiratory: b/l lower lobe rales. mild tachypnea. Adequate airflow b/l. Not using accessory muscles of respiration.  Cardiovascular: systolic murmur, S1 & S2 intact, no R/G, 2+ radial pulses b/l  Gastrointestinal: Soft, NT, ND, normoactive b.s., no organomegaly/RT/rigidity  Extremities: gen pallor. WWP  Neurological:  Alert and awake.  crude sensation intact.

## 2019-04-06 NOTE — ED PROVIDER NOTE - NS ED ROS FT
Constitutional: No fever or chills  Eyes: No visual changes, eye pain or redness  HEENT: No throat pain, ear pain, nasal pain. No nose bleeding.  CV: No chest pain or lower extremity edema  Resp: see hpi  GI: No abd pain. No nausea or vomiting. No diarrhea. No constipation.   : No dysuria, hematuria.   MSK: No musculoskeletal pain  Skin: No rash  Neuro: No headache. No numbness or tingling. No weakness.

## 2019-04-06 NOTE — ED ADULT NURSE NOTE - NSIMPLEMENTINTERV_GEN_ALL_ED
Implemented All Fall with Harm Risk Interventions:  [place] to call system. Call bell, personal items and telephone within reach. Instruct patient to call for assistance. Room bathroom lighting operational. Non-slip footwear when patient is off stretcher. Physically safe environment: no spills, clutter or unnecessary equipment. Stretcher in lowest position, wheels locked, appropriate side rails in place. Provide visual cue, wrist band, yellow gown, etc. Monitor gait and stability. Monitor for mental status changes and reorient to person, place, and time. Review medications for side effects contributing to fall risk. Reinforce activity limits and safety measures with patient and family. Provide visual clues: red socks.

## 2019-04-06 NOTE — CONSULT NOTE ADULT - PROBLEM SELECTOR RECOMMENDATION 9
Will increase Lantus to 16 units at bed time.  Will increase Humalog to 7  units before each meal in addition to Humalog correction scale coverage.  Patient counseled for compliance with consistent low carb diet.

## 2019-04-06 NOTE — ED PROVIDER NOTE - PROGRESS NOTE DETAILS
spoke with Dr. Rhoades, has no privileges at [facility] TBA to unattached. -LUH AndinoC

## 2019-04-06 NOTE — ED PROVIDER NOTE - OBJECTIVE STATEMENT
Ashley Marcus is a 87 y.o. female with PMH of COPD, DM, HTN, PPM, CKD, nephrectomy, UTIs, Depression was brought to the ED by EMS from home for complaint of SOB and weakness since this morning. Pt was recently released from [facility] for pna, currently on doxycycline Ashley Marcus is a 87 y.o. female with PMH of COPD, DM, HTN, PPM, CKD, nephrectomy, UTIs, Depression was brought to the ED by EMS from home for complaint of SOB and weakness since this morning. Pt notes that she usually uses oxygen only while sleeping, has been having increased oxygen use during the day,   Pt was recently released from [facility] for pna, currently on doxycycline.

## 2019-04-06 NOTE — CONSULT NOTE ADULT - SUBJECTIVE AND OBJECTIVE BOX
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 (06 Apr 2019 16:01)  Patient has history of diabetes, known to my service,  on insulin at home, no recent hypoglycemic episodes, no polyuria polydipsia. Patient follows up with PCP for diabetes management.    PAST MEDICAL & SURGICAL HISTORY:  COPD (chronic obstructive pulmonary disease)  Depression  Anxiety  Atrial fibrillation, unspecified type  Pacemaker  Overweight  PVD (Peripheral Vascular Disease)  Arteriosclerotic Heart Disease (ASHD)  HTN (Hypertension)  Diabetes  S/P carpal tunnel release  S/P left cataract extraction  s/p colon resection  S/P Nephrectomy: right  Hip Replacement  History of Total Knee Replacement  S/P Hernia Surgery  S/P Cholecystectomy      FAMILY HISTORY:  Family history of pancreatic cancer (Sibling)      Social History:    Outpatient Medications:    MEDICATIONS  (STANDING):  ALBUTerol/ipratropium for Nebulization 3 milliLiter(s) Nebulizer every 6 hours  amiodarone    Tablet 200 milliGRAM(s) Oral daily  ARIPiprazole 2 milliGRAM(s) Oral daily  dextrose 5%. 1000 milliLiter(s) (50 mL/Hr) IV Continuous <Continuous>  dextrose 50% Injectable 12.5 Gram(s) IV Push once  dextrose 50% Injectable 25 Gram(s) IV Push once  dextrose 50% Injectable 25 Gram(s) IV Push once  docusate sodium 100 milliGRAM(s) Oral two times a day  DULoxetine 60 milliGRAM(s) Oral daily  heparin  Injectable 5000 Unit(s) SubCutaneous every 8 hours  insulin glargine Injectable (LANTUS) 20 Unit(s) SubCutaneous at bedtime  insulin lispro (HumaLOG) corrective regimen sliding scale   SubCutaneous three times a day before meals  insulin lispro (HumaLOG) corrective regimen sliding scale   SubCutaneous at bedtime  insulin lispro Injectable (HumaLOG) 7 Unit(s) SubCutaneous three times a day before meals  metoprolol succinate  milliGRAM(s) Oral daily  mirtazapine 15 milliGRAM(s) Oral at bedtime  pantoprazole    Tablet 40 milliGRAM(s) Oral before breakfast  simvastatin 20 milliGRAM(s) Oral at bedtime    MEDICATIONS  (PRN):  clonazePAM Tablet 0.5 milliGRAM(s) Oral two times a day PRN anxiety, panic attack  dextrose 40% Gel 15 Gram(s) Oral once PRN Blood Glucose LESS THAN 70 milliGRAM(s)/deciLiter  glucagon  Injectable 1 milliGRAM(s) IntraMuscular once PRN Glucose <70 milliGRAM(s)/deciLiter      Allergies    amoxicillin (Flushing; Vomiting; Nausea)  lisinopril (Angioedema)  penicillin (Hives)    Intolerances      Review of Systems:  Constitutional: No fever, no chills  Eyes: No blurry vision  Neuro: No tremors  HEENT: No pain, no neck swelling  Cardiovascular: No chest pain, no palpitations  Respiratory: Has SOB, no cough  GI: No nausea, vomiting, abdominal pain  : No dysuria  Skin: no rash  MSK: Has leg swelling.  Psych: no depression  Endocrine: no polyuria, polydipsia    ALL OTHER SYSTEMS REVIEWED AND NEGATIVE    UNABLE TO OBTAIN    PHYSICAL EXAM:  VITALS: T(C): 36.3 (04-06-19 @ 10:58)  T(F): 97.3 (04-06-19 @ 10:58), Max: 97.3 (04-06-19 @ 10:58)  HR: 63 (04-06-19 @ 10:58) (63 - 63)  BP: 128/68 (04-06-19 @ 10:58) (128/68 - 128/68)  RR:  (21 - 21)  SpO2:  (97% - 97%)  Wt(kg): --  GENERAL: NAD, well-groomed, well-developed  EYES: No proptosis, no lid lag  HEENT:  Atraumatic, Normocephalic  THYROID: Normal size, no palpable nodules  RESPIRATORY: Clear to auscultation bilaterally; No rales, rhonchi, wheezing  CARDIOVASCULAR: Si S2, No murmurs;  GI: Soft, non distended, normal bowel sounds  SKIN: Dry, intact, No rashes or lesions  MUSCULOSKELETAL: Has BL lower extremity edema.  NEURO:  no tremor, sensation decreased in feet BL,    POCT Blood Glucose.: 411 mg/dL (04-06-19 @ 11:43)                            10.5   15.1  )-----------( 461      ( 06 Apr 2019 11:58 )             32.6       04-06    137  |  103  |  30<H>  ----------------------------<  466<HH>  4.6   |  21<L>  |  1.54<H>    EGFR if : 35<L>  EGFR if non : 30<L>    Ca    9.2      04-06    TPro  6.9  /  Alb  2.9<L>  /  TBili  0.3  /  DBili  x   /  AST  21  /  ALT  14  /  AlkPhos  81  04-06      Thyroid Function Tests:              Radiology:

## 2019-04-06 NOTE — ED PROVIDER NOTE - ATTENDING CONTRIBUTION TO CARE
Dr. Lantigua : I have personally seen and examined this patient at the bedside. I have fully participated in the care of this patient. I have reviewed all pertinent clinical information, including history, physical exam, plan and the PA's note and agree except as noted.     87 y.o. F  PMH of COPD, DM, HTN, PPM, CKD, nephrectomy, UTIs, Depression pw worsening sob and weakness since this morning. notes that has been feeling sob since her pna 2 weeks ago (admitted for iv abx at that time at [facility] as per pt), uses o2 at home notes that has needed more oxygen. this morning felt lightheaded sob and weak and fell onto her left knee; no head trauma no loc. currently on doxy for pna but notes that feels worse. no cp. +cough  Denies f/c/n/v/palpitations/abd.pain/d/c/dysuria/hematuria. no sick contacts/recent travel. no hx of dvt/pe    PE:  head; atraumatic normocephalic  eyes: perrla  Heart: rrr s1s2  lungs: decreased bs at bl bases  abd: soft, nt nd + bs no rebound/guarding no cva ttp  le: no swelling no calf ttp; mild ttp to left knee with abrasion  back: no midline cervical/thoracic/lumbar ttp    -->pna vs chf vs electolyte imbalance will fu labs ekg ct chest to eval for pna; lasix abx--admit

## 2019-04-06 NOTE — H&P ADULT - PROBLEM SELECTOR PLAN 2
leukocytosis and tachypnea  2/2 complicated bacterial health care associated pneumonia, +/- aspiration pna, gram negative +/- gram + organism   - F/u ct chest   broad spectrum abx renally dosed  f/u cultures  nebs, o2, maintain spo2 > 90%  adjust management per consultants

## 2019-04-06 NOTE — CONSULT NOTE ADULT - ASSESSMENT
A 86 yo Female  from home, with multiple co-morbidities including COPD on home oxygen at night, who was recently hospitalized at [facility] and treated for Pneumonia currently on Doxycycline, presents to the ER for evaluation of worsening shortness of breath and generalized weakness. On admission, she found to have Leukocytosis, positive Urine analysis and RUL consolidation on CT chest.  She has received dose of Cefepime and Vancomycin, cultures pending. The ID consult requested to assist  with further evaluation and antibiotic management.     # RUL Pneumonia- Most likely HCAP in the setting of recently discharged from [facility] - RVP negative  # UTI  # leukocytosis    Would recommend:  1. Follow up urine and Blood cultures  2. Obtain Legionella urine AG and nasal swab for MRSA PCR  3. Sputum culture and Procalcitonin level   4. Continue Cefepime and Vancomycin until work up is done  5. Supplemental oxygenation and bronchodilator  as needed  6. Monitor WBC count and kidney function    d/w Patient and ER Nursing staff    will follow the patient with you and make further recommendation based on the clinical course and Lab results  Thank you for the opportunity to participate in Ms. AGOSTO's care

## 2019-04-06 NOTE — H&P ADULT - ASSESSMENT
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

## 2019-04-07 LAB
ALBUMIN SERPL ELPH-MCNC: 2.5 G/DL — LOW (ref 3.3–5)
ALP SERPL-CCNC: 73 U/L — SIGNIFICANT CHANGE UP (ref 40–120)
ALT FLD-CCNC: 15 U/L — SIGNIFICANT CHANGE UP (ref 10–45)
ANION GAP SERPL CALC-SCNC: 12 MMOL/L — SIGNIFICANT CHANGE UP (ref 5–17)
APTT BLD: 27.3 SEC — LOW (ref 27.5–36.3)
AST SERPL-CCNC: 24 U/L — SIGNIFICANT CHANGE UP (ref 10–40)
BILIRUB DIRECT SERPL-MCNC: 0.1 MG/DL — SIGNIFICANT CHANGE UP (ref 0–0.2)
BILIRUB INDIRECT FLD-MCNC: 0.2 MG/DL — SIGNIFICANT CHANGE UP (ref 0.2–1)
BILIRUB SERPL-MCNC: 0.3 MG/DL — SIGNIFICANT CHANGE UP (ref 0.2–1.2)
BUN SERPL-MCNC: 28 MG/DL — HIGH (ref 7–23)
CALCIUM SERPL-MCNC: 9.1 MG/DL — SIGNIFICANT CHANGE UP (ref 8.4–10.5)
CHLORIDE SERPL-SCNC: 105 MMOL/L — SIGNIFICANT CHANGE UP (ref 96–108)
CHOLEST SERPL-MCNC: 64 MG/DL — SIGNIFICANT CHANGE UP (ref 10–199)
CO2 SERPL-SCNC: 25 MMOL/L — SIGNIFICANT CHANGE UP (ref 22–31)
CREAT SERPL-MCNC: 1.54 MG/DL — HIGH (ref 0.5–1.3)
CULTURE RESULTS: SIGNIFICANT CHANGE UP
ERYTHROCYTE [SEDIMENTATION RATE] IN BLOOD: 93 MM/HR — HIGH (ref 0–20)
FERRITIN SERPL-MCNC: 160 NG/ML — HIGH (ref 15–150)
FOLATE SERPL-MCNC: 15.4 NG/ML — SIGNIFICANT CHANGE UP
GLUCOSE SERPL-MCNC: 93 MG/DL — SIGNIFICANT CHANGE UP (ref 70–99)
HBA1C BLD-MCNC: 9.6 % — HIGH (ref 4–5.6)
HCT VFR BLD CALC: 31.9 % — LOW (ref 34.5–45)
HDLC SERPL-MCNC: 24 MG/DL — LOW
HGB BLD-MCNC: 9.9 G/DL — LOW (ref 11.5–15.5)
INR BLD: 1.19 RATIO — HIGH (ref 0.88–1.16)
IRON SATN MFR SERPL: 18 % — SIGNIFICANT CHANGE UP (ref 14–50)
IRON SATN MFR SERPL: 35 UG/DL — SIGNIFICANT CHANGE UP (ref 30–160)
LACTATE SERPL-SCNC: 1.2 MMOL/L — SIGNIFICANT CHANGE UP (ref 0.7–2)
LDH SERPL L TO P-CCNC: 262 U/L — HIGH (ref 50–242)
LIPID PNL WITH DIRECT LDL SERPL: 30 MG/DL — SIGNIFICANT CHANGE UP
MAGNESIUM SERPL-MCNC: 1.9 MG/DL — SIGNIFICANT CHANGE UP (ref 1.6–2.6)
MCHC RBC-ENTMCNC: 28.5 PG — SIGNIFICANT CHANGE UP (ref 27–34)
MCHC RBC-ENTMCNC: 31 GM/DL — LOW (ref 32–36)
MCV RBC AUTO: 91.9 FL — SIGNIFICANT CHANGE UP (ref 80–100)
OSMOLALITY SERPL: 294 MOS/KG — SIGNIFICANT CHANGE UP (ref 289–308)
PHOSPHATE SERPL-MCNC: 3 MG/DL — SIGNIFICANT CHANGE UP (ref 2.5–4.5)
PLATELET # BLD AUTO: 421 K/UL — HIGH (ref 150–400)
POTASSIUM SERPL-MCNC: 3.4 MMOL/L — LOW (ref 3.5–5.3)
POTASSIUM SERPL-SCNC: 3.4 MMOL/L — LOW (ref 3.5–5.3)
PROCALCITONIN SERPL-MCNC: 0.21 NG/ML — HIGH (ref 0.02–0.1)
PROT SERPL-MCNC: 6.5 G/DL — SIGNIFICANT CHANGE UP (ref 6–8.3)
PROTHROM AB SERPL-ACNC: 13.7 SEC — HIGH (ref 10–13.1)
RBC # BLD: 3.47 M/UL — LOW (ref 3.8–5.2)
RBC # BLD: 3.47 M/UL — LOW (ref 3.8–5.2)
RBC # FLD: 16.2 % — HIGH (ref 10.3–14.5)
RETICS #: 81.5 K/UL — SIGNIFICANT CHANGE UP (ref 25–125)
RETICS/RBC NFR: 2.4 % — SIGNIFICANT CHANGE UP (ref 0.5–2.5)
SODIUM SERPL-SCNC: 142 MMOL/L — SIGNIFICANT CHANGE UP (ref 135–145)
SPECIMEN SOURCE: SIGNIFICANT CHANGE UP
TIBC SERPL-MCNC: 196 UG/DL — LOW (ref 220–430)
TOTAL CHOLESTEROL/HDL RATIO MEASUREMENT: 2.7 RATIO — LOW (ref 3.3–7.1)
TRIGL SERPL-MCNC: 51 MG/DL — SIGNIFICANT CHANGE UP (ref 10–149)
TSH SERPL-MCNC: 0.74 UIU/ML — SIGNIFICANT CHANGE UP (ref 0.27–4.2)
UIBC SERPL-MCNC: 161 UG/DL — SIGNIFICANT CHANGE UP (ref 110–370)
URATE SERPL-MCNC: 9.4 MG/DL — HIGH (ref 2.5–7)
VIT B12 SERPL-MCNC: 595 PG/ML — SIGNIFICANT CHANGE UP (ref 232–1245)
WBC # BLD: 13.32 K/UL — HIGH (ref 3.8–10.5)
WBC # FLD AUTO: 13.32 K/UL — HIGH (ref 3.8–10.5)

## 2019-04-07 RX ORDER — DULOXETINE HYDROCHLORIDE 30 MG/1
1 CAPSULE, DELAYED RELEASE ORAL
Qty: 0 | Refills: 0 | COMMUNITY

## 2019-04-07 RX ORDER — DULOXETINE HYDROCHLORIDE 30 MG/1
30 CAPSULE, DELAYED RELEASE ORAL DAILY
Qty: 0 | Refills: 0 | Status: DISCONTINUED | OUTPATIENT
Start: 2019-04-07 | End: 2019-04-11

## 2019-04-07 RX ORDER — POTASSIUM CHLORIDE 20 MEQ
20 PACKET (EA) ORAL
Qty: 0 | Refills: 0 | Status: COMPLETED | OUTPATIENT
Start: 2019-04-07 | End: 2019-04-07

## 2019-04-07 RX ORDER — ACETAMINOPHEN 500 MG
650 TABLET ORAL EVERY 6 HOURS
Qty: 0 | Refills: 0 | Status: DISCONTINUED | OUTPATIENT
Start: 2019-04-07 | End: 2019-04-11

## 2019-04-07 RX ORDER — MIRTAZAPINE 45 MG/1
30 TABLET, ORALLY DISINTEGRATING ORAL AT BEDTIME
Qty: 0 | Refills: 0 | Status: DISCONTINUED | OUTPATIENT
Start: 2019-04-07 | End: 2019-04-11

## 2019-04-07 RX ADMIN — Medication 250 MILLIGRAM(S): at 16:07

## 2019-04-07 RX ADMIN — Medication 100 MILLIGRAM(S): at 05:41

## 2019-04-07 RX ADMIN — DULOXETINE HYDROCHLORIDE 60 MILLIGRAM(S): 30 CAPSULE, DELAYED RELEASE ORAL at 12:00

## 2019-04-07 RX ADMIN — INSULIN GLARGINE 20 UNIT(S): 100 INJECTION, SOLUTION SUBCUTANEOUS at 22:02

## 2019-04-07 RX ADMIN — Medication 1: at 13:52

## 2019-04-07 RX ADMIN — SIMVASTATIN 20 MILLIGRAM(S): 20 TABLET, FILM COATED ORAL at 22:01

## 2019-04-07 RX ADMIN — CEFEPIME 100 MILLIGRAM(S): 1 INJECTION, POWDER, FOR SOLUTION INTRAMUSCULAR; INTRAVENOUS at 15:36

## 2019-04-07 RX ADMIN — Medication 7 UNIT(S): at 18:31

## 2019-04-07 RX ADMIN — HEPARIN SODIUM 5000 UNIT(S): 5000 INJECTION INTRAVENOUS; SUBCUTANEOUS at 22:01

## 2019-04-07 RX ADMIN — Medication 7 UNIT(S): at 10:05

## 2019-04-07 RX ADMIN — MIRTAZAPINE 30 MILLIGRAM(S): 45 TABLET, ORALLY DISINTEGRATING ORAL at 22:01

## 2019-04-07 RX ADMIN — PANTOPRAZOLE SODIUM 40 MILLIGRAM(S): 20 TABLET, DELAYED RELEASE ORAL at 05:41

## 2019-04-07 RX ADMIN — Medication 7 UNIT(S): at 13:52

## 2019-04-07 RX ADMIN — Medication 650 MILLIGRAM(S): at 20:30

## 2019-04-07 RX ADMIN — Medication 2: at 18:31

## 2019-04-07 RX ADMIN — AMIODARONE HYDROCHLORIDE 200 MILLIGRAM(S): 400 TABLET ORAL at 05:41

## 2019-04-07 RX ADMIN — Medication 3 MILLILITER(S): at 16:08

## 2019-04-07 RX ADMIN — HEPARIN SODIUM 5000 UNIT(S): 5000 INJECTION INTRAVENOUS; SUBCUTANEOUS at 05:41

## 2019-04-07 RX ADMIN — Medication 40 MILLIGRAM(S): at 05:41

## 2019-04-07 RX ADMIN — HEPARIN SODIUM 5000 UNIT(S): 5000 INJECTION INTRAVENOUS; SUBCUTANEOUS at 13:52

## 2019-04-07 RX ADMIN — Medication 100 MILLIGRAM(S): at 16:08

## 2019-04-07 RX ADMIN — Medication 20 MILLIEQUIVALENT(S): at 12:00

## 2019-04-07 RX ADMIN — Medication 20 MILLIEQUIVALENT(S): at 16:07

## 2019-04-07 RX ADMIN — Medication 650 MILLIGRAM(S): at 12:00

## 2019-04-07 RX ADMIN — Medication 3 MILLILITER(S): at 02:27

## 2019-04-07 RX ADMIN — Medication 650 MILLIGRAM(S): at 19:37

## 2019-04-07 RX ADMIN — Medication 3 MILLILITER(S): at 12:00

## 2019-04-07 RX ADMIN — Medication 650 MILLIGRAM(S): at 13:00

## 2019-04-07 NOTE — CONSULT NOTE ADULT - SUBJECTIVE AND OBJECTIVE BOX
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 (06 Apr 2019 16:01)      PAST MEDICAL & SURGICAL HISTORY:  COPD (chronic obstructive pulmonary disease)  Depression  Anxiety  Atrial fibrillation, unspecified type  Pacemaker  Overweight  PVD (Peripheral Vascular Disease)  Arteriosclerotic Heart Disease (ASHD)  HTN (Hypertension)  Diabetes  S/P carpal tunnel release  S/P left cataract extraction  s/p colon resection  S/P Nephrectomy: right  Hip Replacement  History of Total Knee Replacement  S/P Hernia Surgery  S/P Cholecystectomy          MEDICATIONS:  amiodarone    Tablet 200 milliGRAM(s) Oral daily  furosemide   Injectable 40 milliGRAM(s) IV Push daily  heparin  Injectable 5000 Unit(s) SubCutaneous every 8 hours  metoprolol succinate  milliGRAM(s) Oral daily    cefepime   IVPB 1000 milliGRAM(s) IV Intermittent every 24 hours  vancomycin  IVPB 1000 milliGRAM(s) IV Intermittent every 24 hours    ALBUTerol/ipratropium for Nebulization 3 milliLiter(s) Nebulizer every 6 hours    acetaminophen   Tablet .. 650 milliGRAM(s) Oral every 6 hours PRN  ARIPiprazole 2 milliGRAM(s) Oral daily  clonazePAM Tablet 0.5 milliGRAM(s) Oral two times a day PRN  DULoxetine 30 milliGRAM(s) Oral daily  mirtazapine 30 milliGRAM(s) Oral at bedtime    docusate sodium 100 milliGRAM(s) Oral two times a day  pantoprazole    Tablet 40 milliGRAM(s) Oral before breakfast    dextrose 40% Gel 15 Gram(s) Oral once PRN  dextrose 50% Injectable 12.5 Gram(s) IV Push once  dextrose 50% Injectable 25 Gram(s) IV Push once  dextrose 50% Injectable 25 Gram(s) IV Push once  glucagon  Injectable 1 milliGRAM(s) IntraMuscular once PRN  insulin glargine Injectable (LANTUS) 20 Unit(s) SubCutaneous at bedtime  insulin lispro (HumaLOG) corrective regimen sliding scale   SubCutaneous three times a day before meals  insulin lispro (HumaLOG) corrective regimen sliding scale   SubCutaneous at bedtime  insulin lispro Injectable (HumaLOG) 7 Unit(s) SubCutaneous three times a day before meals  simvastatin 20 milliGRAM(s) Oral at bedtime    dextrose 5%. 1000 milliLiter(s) IV Continuous <Continuous>      FAMILY HISTORY:  Family history of pancreatic cancer (Sibling)      Non-contributory    SOCIAL HISTORY:    not a smoker    Allergies    amoxicillin (Flushing; Vomiting; Nausea)  lisinopril (Angioedema)  penicillin (Hives)    Intolerances    	    REVIEW OF SYSTEMS:  CONSTITUTIONAL: No fever  EYES: No eye pain, visual disturbances, or discharge  ENMT:  No difficulty hearing, tinnitus  NECK: No pain or stiffness  RESPIRATORY: see hpi  CARDIOVASCULAR: No chest pain, palpitations, passing out, dizziness, or leg swelling  GASTROINTESTINAL:  No nausea, vomiting, diarrhea or constipation. No melena.  GENITOURINARY: No dysuria, hematuria  NEUROLOGICAL: No stroke like symptoms  SKIN: No burning or lesions   ENDOCRINE: No heat or cold intolerance  MUSCULOSKELETAL: No joint pain or swelling  PSYCHIATRIC: No  anxiety, mood swings  HEME/LYMPH: No bleeding gums  ALLERY AND IMMUNOLOGIC: No hives or eczema	    All other ROS negative    PHYSICAL EXAM:  T(C): 37 (04-07-19 @ 20:26), Max: 37 (04-07-19 @ 20:26)  HR: 60 (04-07-19 @ 20:26) (60 - 80)  BP: 124/79 (04-07-19 @ 20:26) (115/63 - 144/78)  RR: 20 (04-07-19 @ 20:26) (19 - 20)  SpO2: 96% (04-07-19 @ 20:26) (95% - 96%)  Wt(kg): --  I&O's Summary    06 Apr 2019 07:01  -  07 Apr 2019 07:00  --------------------------------------------------------  IN: 0 mL / OUT: 400 mL / NET: -400 mL        Appearance: Normal	  HEENT:   Normal oral mucosa, EOMI	  Cardiovascular: Normal S1 S2, No JVD, No murmurs  Respiratory: Lungs clear to auscultation	  Psychiatry: Alert, Mood & affect appropriate  Gastrointestinal:  Soft, Non-tender, + BS	  Skin: No rashes, No ecchymoses, No cyanosis	  Neurologic: Non-focal  Extremities:  No clubbing, cyanosis or edema  Vascular: Peripheral pulses palpable  bilaterally  	    	  	  CARDIAC MARKERS:                                  9.9    13.32 )-----------( 421      ( 07 Apr 2019 08:29 )             31.9     04-07    142  |  105  |  28<H>  ----------------------------<  93  3.4<L>   |  25  |  1.54<H>    Ca    9.1      07 Apr 2019 05:31  Phos  3.0     04-07  Mg     1.9     04-07    TPro  6.5  /  Alb  2.5<L>  /  TBili  0.3  /  DBili  0.1  /  AST  24  /  ALT  15  /  AlkPhos  73  04-07          EKG:  Radiology:  < from: CT Chest No Cont (04.06.19 @ 18:17) >  IMPRESSION:     Right upper lobe consolidation compatible with pneumonia for which   follow-up to resolution is recommended.    Mildly enlarged supraclavicular and mediastinal lymph nodes. Recommend   attention on follow-up.    Small layering right pleural effusion and subsegmental atelectasis of   basilar right lower lobe.     Left basilar pleural thickening and small loculated left pleural effusion   is grossly unchanged from 2016.    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 (06 Apr 2019 16:01)      PAST MEDICAL & SURGICAL HISTORY:  COPD (chronic obstructive pulmonary disease)  Depression  Anxiety  Atrial fibrillation, unspecified type  Pacemaker  Overweight  PVD (Peripheral Vascular Disease)  Arteriosclerotic Heart Disease (ASHD)  HTN (Hypertension)  Diabetes  S/P carpal tunnel release  S/P left cataract extraction  s/p colon resection  S/P Nephrectomy: right  Hip Replacement  History of Total Knee Replacement  S/P Hernia Surgery  S/P Cholecystectomy          MEDICATIONS:  amiodarone    Tablet 200 milliGRAM(s) Oral daily  furosemide   Injectable 40 milliGRAM(s) IV Push daily  heparin  Injectable 5000 Unit(s) SubCutaneous every 8 hours  metoprolol succinate  milliGRAM(s) Oral daily    cefepime   IVPB 1000 milliGRAM(s) IV Intermittent every 24 hours  vancomycin  IVPB 1000 milliGRAM(s) IV Intermittent every 24 hours    ALBUTerol/ipratropium for Nebulization 3 milliLiter(s) Nebulizer every 6 hours    acetaminophen   Tablet .. 650 milliGRAM(s) Oral every 6 hours PRN  ARIPiprazole 2 milliGRAM(s) Oral daily  clonazePAM Tablet 0.5 milliGRAM(s) Oral two times a day PRN  DULoxetine 30 milliGRAM(s) Oral daily  mirtazapine 30 milliGRAM(s) Oral at bedtime    docusate sodium 100 milliGRAM(s) Oral two times a day  pantoprazole    Tablet 40 milliGRAM(s) Oral before breakfast    dextrose 40% Gel 15 Gram(s) Oral once PRN  dextrose 50% Injectable 12.5 Gram(s) IV Push once  dextrose 50% Injectable 25 Gram(s) IV Push once  dextrose 50% Injectable 25 Gram(s) IV Push once  glucagon  Injectable 1 milliGRAM(s) IntraMuscular once PRN  insulin glargine Injectable (LANTUS) 20 Unit(s) SubCutaneous at bedtime  insulin lispro (HumaLOG) corrective regimen sliding scale   SubCutaneous three times a day before meals  insulin lispro (HumaLOG) corrective regimen sliding scale   SubCutaneous at bedtime  insulin lispro Injectable (HumaLOG) 7 Unit(s) SubCutaneous three times a day before meals  simvastatin 20 milliGRAM(s) Oral at bedtime    dextrose 5%. 1000 milliLiter(s) IV Continuous <Continuous>      FAMILY HISTORY:  Family history of pancreatic cancer (Sibling)      Non-contributory    SOCIAL HISTORY:    not a smoker    Allergies    amoxicillin (Flushing; Vomiting; Nausea)  lisinopril (Angioedema)  penicillin (Hives)    Intolerances    	    REVIEW OF SYSTEMS:  CONSTITUTIONAL: No fever  EYES: No eye pain, visual disturbances, or discharge  ENMT:  No difficulty hearing, tinnitus  NECK: No pain or stiffness  RESPIRATORY: see hpi  CARDIOVASCULAR: No chest pain, palpitations, passing out, dizziness, or leg swelling  GASTROINTESTINAL:  No nausea, vomiting, diarrhea or constipation. No melena.  GENITOURINARY: No dysuria, hematuria  NEUROLOGICAL: No stroke like symptoms  SKIN: No burning or lesions   ENDOCRINE: No heat or cold intolerance  MUSCULOSKELETAL: No joint pain or swelling  PSYCHIATRIC: No  anxiety, mood swings  HEME/LYMPH: No bleeding gums  ALLERY AND IMMUNOLOGIC: No hives or eczema	    All other ROS negative    PHYSICAL EXAM:  T(C): 37 (04-07-19 @ 20:26), Max: 37 (04-07-19 @ 20:26)  HR: 60 (04-07-19 @ 20:26) (60 - 80)  BP: 124/79 (04-07-19 @ 20:26) (115/63 - 144/78)  RR: 20 (04-07-19 @ 20:26) (19 - 20)  SpO2: 96% (04-07-19 @ 20:26) (95% - 96%)  Wt(kg): --  I&O's Summary    06 Apr 2019 07:01  -  07 Apr 2019 07:00  --------------------------------------------------------  IN: 0 mL / OUT: 400 mL / NET: -400 mL        Appearance: Normal	  HEENT:   Normal oral mucosa, EOMI	  Cardiovascular: Normal S1 S2, No JVD, No murmurs  Respiratory: Lungs clear to auscultation	  Psychiatry: Alert, Mood & affect appropriate  Gastrointestinal:  Soft, Non-tender, + BS	  Skin: No rashes, No ecchymoses, No cyanosis	  Neurologic: Non-focal  Extremities:  No clubbing, cyanosis or edema  Vascular: Peripheral pulses palpable  bilaterally  	    	  	  CARDIAC MARKERS:                                  9.9    13.32 )-----------( 421      ( 07 Apr 2019 08:29 )             31.9     04-07    142  |  105  |  28<H>  ----------------------------<  93  3.4<L>   |  25  |  1.54<H>    Ca    9.1      07 Apr 2019 05:31  Phos  3.0     04-07  Mg     1.9     04-07    TPro  6.5  /  Alb  2.5<L>  /  TBili  0.3  /  DBili  0.1  /  AST  24  /  ALT  15  /  AlkPhos  73  04-07          EKG:  Radiology:  < from: CT Chest No Cont (04.06.19 @ 18:17) >  IMPRESSION:     Right upper lobe consolidation compatible with pneumonia for which   follow-up to resolution is recommended.    Mildly enlarged supraclavicular and mediastinal lymph nodes. Recommend   attention on follow-up.    Small layering right pleural effusion and subsegmental atelectasis of   basilar right lower lobe.     Left basilar pleural thickening and small loculated left pleural effusion   is grossly unchanged from 2016.    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      Problem/Plan - 1:  ·  Problem: Acute on chronic systolic (congestive) heart failure.  Plan: Lasix 40mg IV daily today. consider switching to home oral dose regimen Monday as she appears euvolemic now  cardiac meds  - F/u TTE     Problem/Plan - 2:  ·  Problem: Pneumonia.  Plan: complicated bacterial health care associated pneumonia, unsepcified type  Abx per ID/primary team     Problem/Plan - 3:  Problem: CKD (chronic kidney disease) stage 4, GFR 15-29 ml/min. Plan: optimize co-morbidities  renally dose rx   avoid nephrotoxic rx.     Problem/Plan - 4:  ·  Problem: HTN Plan: c/w Metoprolol 100mg          Waylon Cates DO [facility]  [facility]  [phone]

## 2019-04-07 NOTE — CONSULT NOTE ADULT - ATTENDING COMMENTS
[facility] NEPHROLOGY  Rad Mojica M.D.  Les Mckeon D.O.  Selene Conn M.D.  Odessa Rodriguez, MSN, ANP-C    Telephone: [phone]  Facsimile: [phone]    [address] [place] [address]

## 2019-04-07 NOTE — CONSULT NOTE ADULT - PROBLEM SELECTOR RECOMMENDATION 9
Anahyhas significant RUL consolidation: She has HCAP: Now she was recently admitted at [facility] for pneumonia: Could this be the same consolidation, she had At [facility]: Itispretty: There is no comparison available: She is on broad spectrum antibiotics: Her RVP is negative : Pd is little acidotic : But she seems to be breathing comfortable: She has small bilateral pleural effusions:

## 2019-04-07 NOTE — CONSULT NOTE ADULT - SUBJECTIVE AND OBJECTIVE BOX
[facility] NEPHROLOGY- CONSULTATION NOTE    87y Female with history of below presents with SOB found to have PNA and volume overload. Nephrology consulted for elevated Scr.    Patient with h/o CKD-3 with baseline Scr 1.7-1.8 as per prior labs. Patient follows with Dr. Manrique as an outpatient and on lasix 40 mg PO daily as an outpatient. Patient with h/o R nephrectomy for CA? 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 Patient denies any NSAID use PTA.    REVIEW OF SYSTEMS:  Gen: no changes in weight  HEENT: no rhinorrhea  Neck: no sore throatCP  Cards: no chest pain  Resp: + dyspnea, + cough  GI: no nausea or vomiting or diarrhea  : no dysuria or hematuria  Vascular: no LE edema  Derm: no rashes  Neuro: no numbness/tingling    amoxicillin (Flushing; Vomiting; Nausea)  lisinopril (Angioedema)  penicillin (Hives)      Home Medications Reviewed  Hospital Medications:   MEDICATIONS  (STANDING):  ALBUTerol/ipratropium for Nebulization 3 milliLiter(s) Nebulizer every 6 hours  amiodarone    Tablet 200 milliGRAM(s) Oral daily  ARIPiprazole 2 milliGRAM(s) Oral daily  cefepime   IVPB 1000 milliGRAM(s) IV Intermittent every 24 hours  dextrose 5%. 1000 milliLiter(s) (50 mL/Hr) IV Continuous <Continuous>  dextrose 50% Injectable 12.5 Gram(s) IV Push once  dextrose 50% Injectable 25 Gram(s) IV Push once  dextrose 50% Injectable 25 Gram(s) IV Push once  docusate sodium 100 milliGRAM(s) Oral two times a day  DULoxetine 60 milliGRAM(s) Oral daily  furosemide   Injectable 40 milliGRAM(s) IV Push daily  heparin  Injectable 5000 Unit(s) SubCutaneous every 8 hours  insulin glargine Injectable (LANTUS) 20 Unit(s) SubCutaneous at bedtime  insulin lispro (HumaLOG) corrective regimen sliding scale   SubCutaneous three times a day before meals  insulin lispro (HumaLOG) corrective regimen sliding scale   SubCutaneous at bedtime  insulin lispro Injectable (HumaLOG) 7 Unit(s) SubCutaneous three times a day before meals  metoprolol succinate  milliGRAM(s) Oral daily  mirtazapine 15 milliGRAM(s) Oral at bedtime  pantoprazole    Tablet 40 milliGRAM(s) Oral before breakfast  simvastatin 20 milliGRAM(s) Oral at bedtime  vancomycin  IVPB 1000 milliGRAM(s) IV Intermittent every 24 hours      PAST MEDICAL & SURGICAL HISTORY:  COPD (chronic obstructive pulmonary disease)  Depression  Anxiety  Atrial fibrillation, unspecified type  Pacemaker  Overweight  PVD (Peripheral Vascular Disease)  Arteriosclerotic Heart Disease (ASHD)  HTN (Hypertension)  Diabetes  S/P carpal tunnel release  S/P left cataract extraction  s/p colon resection  S/P Nephrectomy: right  Hip Replacement  History of Total Knee Replacement  S/P Hernia Surgery  S/P Cholecystectomy      FAMILY HISTORY:  Family history of pancreatic cancer (Sibling)      SOCIAL HISTORY:  Denies toxic substance use     VITALS:  T(F): 97.7 (19 @ 05:10), Max: 98.5 (19 @ 19:39)  HR: 80 (19 @ 05:10)  BP: 144/78 (19 @ 05:10)  RR: 20 (19 @ 05:10)  SpO2: 95% (19 @ 05:10)  Wt(kg): --     @ 07:01  -   @ 07:00  --------------------------------------------------------  IN: 0 mL / OUT: 400 mL / NET: -400 mL      Height (cm): 165.1 ( @ 10:58)  Weight (kg): 68 ( @ 10:58)  BMI (kg/m2): 24.9 ( @ 10:58)  BSA (m2): 1.75 ( @ 10:58)    PHYSICAL EXAM:  Gen: NAD, calm  HEENT: MMM  Neck: no JVD  Cards: RRR, +S1/S2, no M/G/R  Resp: course BS B/L  GI: soft, NT, + ab distention and ventral hernia, NABS  : no CVA tenderness  Vascular: no LE edema B/L  Derm: no rashes  Neuro: non-focal    LABS:      142  |  105  |  28<H>  ----------------------------<  93  3.4<L>   |  25  |  1.54<H>    Ca    9.1      2019 05:31  Phos  3.0     -  Mg     1.9     -    TPro  6.5  /  Alb  2.5<L>  /  TBili  0.3  /  DBili  0.1  /  AST  24  /  ALT  15  /  AlkPhos  73      Creatinine Trend: 1.54 <--, 1.54 <--                        9.9    13.32 )-----------( 421      ( 2019 08:29 )             31.9     Urine Studies:  Urinalysis Basic - ( 2019 12:48 )    Color: Yellow / Appearance: Clear / S.016 / pH:   Gluc:  / Ketone: Negative  / Bili: Negative / Urobili: Negative   Blood:  / Protein: 30 mg/dL / Nitrite: Negative   Leuk Esterase: Moderate / RBC: 2 /hpf / WBC 25 /HPF   Sq Epi:  / Non Sq Epi: 5 / Bacteria: Negative          RADIOLOGY & ADDITIONAL STUDIES:  < from: CT Chest No Cont (19 @ 18:17) >  IMPRESSION:     Right upper lobe consolidation compatible with pneumonia for which   follow-up to resolution is recommended.    Mildly enlarged supraclavicular and mediastinal lymph nodes. Recommend   attention on follow-up.    Small layering right pleural effusion and subsegmental atelectasis of   basilar right lower lobe.     Left basilar pleural thickening and small loculated left pleural effusion   is grossly unchanged from 2016.    < end of copied text >

## 2019-04-07 NOTE — CONSULT NOTE ADULT - ASSESSMENT
87y Female with history of COPD presents with SOB found to have PNA and volume overload. Nephrology consulted for elevated Scr.    1) CKD-3: Baseline Scr 1.7-1.8 as per prior labs with Scr currently below baseline due to volume overload. 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 Defer inpatient work up as patient follows with Dr. Lowry as an outpatient. Monitor electrolytes. Avoid nephrotoxins.  2) HTN with CKD: BP controlled. Continue with current medications and low sodium diet. Monitor BP.  3) LE edema: Agree with lasix 40 mg IV daily (patient on lasix 40 mg PO daily as an outpatient). Monitor UO. Potassium repleted.  4) Acute cystitis without hematuria: Follow up urine culture (P) as patient with h/o recurrent UTI. Abx as per ID. 87y Female with history of COPD presents with SOB found to have PNA and volume overload. Nephrology consulted for elevated Scr.    1) CKD-3: Baseline Scr 1.7-1.8 as per prior labs with Scr currently below baseline due to volume overload. 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 Defer inpatient work up as patient follows with Dr. Lowry as an outpatient. Monitor electrolytes. Avoid nephrotoxins.  2) HTN with CKD: BP controlled. Continue with current medications and low sodium diet. Monitor BP.  3) LE edema: Agree with lasix 40 mg IV daily (patient on lasix 40 mg PO daily as an outpatient). Follow up TTE. Monitor UO. Potassium repleted.  4) Acute cystitis without hematuria: Follow up urine culture (P) as patient with h/o recurrent UTI. Abx as per ID.

## 2019-04-07 NOTE — PROGRESS NOTE ADULT - SUBJECTIVE AND OBJECTIVE BOX
Patient is seen and examined at the bed side, is afebrile. The Leukocytosis is trending down.        REVIEW OF SYSTEMS: All other review systems are negative      ALLERGIES: ? penicillin (Hives), Amoxicillin (Flushing; Vomiting; Nausea)  lisinopril (Angioedema)        Vital Signs Last 24 Hrs  T(C): 36.6 (2019 15:39), Max: 36.9 (2019 19:39)  T(F): 97.8 (2019 15:39), Max: 98.5 (2019 19:39)  HR: 60 (2019 15:39) (60 - 80)  BP: 115/63 (2019 15:39) (115/63 - 144/78)  BP(mean): --  RR: 19 (2019 15:39) (19 - 20)  SpO2: 96% (2019 15:39) (95% - 98%)        PHYSICAL EXAM:  GENERAL: Not in distress, on oxygen via NC  HEENT: Right Lower eye lid red  CHEST/LUNG:  Air entry bilaterally  HEART: s1 and s2 present  ABDOMEN:  Nontender and  Nondistended  EXTREMITIES: No pedal  edema  CNS: Awake and Alert        LABS:                        9.9    13.32 )-----------( 421      ( 2019 08:29 )             31.9                           10.5   15.1  )-----------( 461      ( 2019 11:58 )             32.6             142  |  105  |  28<H>  ----------------------------<  93  3.4<L>   |  25  |  1.54<H>    Ca    9.1      2019 05:31  Phos  3.0       Mg     1.9         TPro  6.5  /  Alb  2.5<L>  /  TBili  0.3  /  DBili  0.1  /  AST  24  /  ALT  15  /  AlkPhos  73  04--    137  |  103  |  30<H>  ----------------------------<  466<HH>  4.6   |  21<L>  |  1.54<H>    Ca    9.2      2019 11:58    TPro  6.9  /  Alb  2.9<L>  /  TBili  0.3  /  DBili  x   /  AST  21  /  ALT  14  /  AlkPhos  81  -      Urinalysis Basic - ( 2019 12:48 )  Color: Yellow / Appearance: Clear / S.016 / pH: x  Gluc: x / Ketone: Negative  / Bili: Negative / Urobili: Negative   Blood: x / Protein: 30 mg/dL / Nitrite: Negative   Leuk Esterase: Moderate / RBC: 2 /hpf / WBC 25 /HPF   Sq Epi: x / Non Sq Epi: 5 / Bacteria: Negative      CAPILLARY BLOOD GLUCOSE  POCT Blood Glucose.: 411 mg/dL (2019 11:43)      MEDICATIONS  (STANDING):  ALBUTerol/ipratropium for Nebulization 3 milliLiter(s) Nebulizer every 6 hours  amiodarone    Tablet 200 milliGRAM(s) Oral daily  ARIPiprazole 2 milliGRAM(s) Oral daily  cefepime   IVPB 1000 milliGRAM(s) IV Intermittent every 24 hours  docusate sodium 100 milliGRAM(s) Oral two times a day  DULoxetine 30 milliGRAM(s) Oral daily  furosemide   Injectable 40 milliGRAM(s) IV Push daily  heparin  Injectable 5000 Unit(s) SubCutaneous every 8 hours  insulin glargine Injectable (LANTUS) 20 Unit(s) SubCutaneous at bedtime  insulin lispro (HumaLOG) corrective regimen sliding scale   SubCutaneous three times a day before meals  insulin lispro (HumaLOG) corrective regimen sliding scale   SubCutaneous at bedtime  insulin lispro Injectable (HumaLOG) 7 Unit(s) SubCutaneous three times a day before meals  metoprolol succinate  milliGRAM(s) Oral daily  mirtazapine 30 milliGRAM(s) Oral at bedtime  pantoprazole    Tablet 40 milliGRAM(s) Oral before breakfast  simvastatin 20 milliGRAM(s) Oral at bedtime  vancomycin  IVPB 1000 milliGRAM(s) IV Intermittent every 24 hours    MEDICATIONS  (PRN):  acetaminophen   Tablet .. 650 milliGRAM(s) Oral every 6 hours PRN Temp greater or equal to 38C (100.4F), Mild Pain (1 - 3)  clonazePAM Tablet 0.5 milliGRAM(s) Oral two times a day PRN anxiety, panic attack  dextrose 40% Gel 15 Gram(s) Oral once PRN Blood Glucose LESS THAN 70 milliGRAM(s)/deciLiter  glucagon  Injectable 1 milliGRAM(s) IntraMuscular once PRN Glucose <70 milliGRAM(s)/deciLiter        RADIOLOGY & ADDITIONAL TESTS:    19  : CT Chest No Cont (19 @ 18:17) Right upper lobe consolidation which may represents  pneumonia. Follow-up to resolution is advised.   - Small bilateral pleural effusions, right greater than left with  associated compressive atelectasis.    19 : Xray Knee w/Patella 4 View, Left (19 @ 14:13) Status post left knee arthroplasty with intact hardware. No periprosthetic lucency to suggest loosening.   Vascular calcifications. No acute fracture or dislocation.  Preserved joint spaces.        MICROBIOLOGY DATA:    Culture - Urine (19 @ 17:11)    Specimen Source: .Urine Catheterized    Culture Results:   <10,000 CFU/mL Normal Urogenital Chelsi      Rapid Respiratory Viral Panel (19 @ 15:35)    Rapid RVP Result: NotDetec: The [employer] RVP Rapid uses polymerase chain reaction (PCR) and melt  curve analysis to screen for adenovirus; coronavirus HKU1, NL63, 229E,  OC43; human metapneumovirus (hMPV); human enterovirus/rhinovirus  (Entero/RV); influenza A; influenza A/H1;influenza A/H3; influenza  A/H1-2009; influenza B; parainfluenza viruses 1, 2, 3, 4; respiratory  syncytial virus; Bordetella pertussis; Mycoplasma pneumoniae; and  Chlamydophila pneumoniae.      Procalcitonin, Serum (19 @ 05:31)    Procalcitonin, Serum: 0.21: This assay is intended for use to determine the change of PCT over time  as an aid in assessing the cumulative 28-day risk of all-cause mortality  for patients diagnosed with severe sepsis or septic shock in the ICU, or  when obtained in the emergency department or other medical wards prior to  ICU admission. This assay was performed by the [employer] PCT  assay. ng/mL Patient is seen and examined at the bed side, is afebrile. She is feeling little better. The Leukocytosis is trending down.         REVIEW OF SYSTEMS: All other review systems are negative        ALLERGIES: ? penicillin (Hives), Amoxicillin (Flushing; Vomiting; Nausea), lisinopril (Angioedema)        Vital Signs Last 24 Hrs  T(C): 36.6 (2019 15:39), Max: 36.9 (2019 19:39)  T(F): 97.8 (2019 15:39), Max: 98.5 (2019 19:39)  HR: 60 (2019 15:39) (60 - 80)  BP: 115/63 (2019 15:39) (115/63 - 144/78)  BP(mean): --  RR: 19 (2019 15:39) (19 - 20)  SpO2: 96% (2019 15:39) (95% - 98%)        PHYSICAL EXAM:  GENERAL: Not in distress, on oxygen via NC  HEENT: Right Lower eye lid redness is less today  CHEST/LUNG:  Air entry bilaterally  HEART: s1 and s2 present  ABDOMEN:  Nontender and  Nondistended  EXTREMITIES: No pedal  edema  CNS: Awake and Alert        LABS:                        9.9    13.32 )-----------( 421      ( 2019 08:29 )             31.9                           10.5   15.1  )-----------( 461      ( 2019 11:58 )             32.6             142  |  105  |  28<H>  ----------------------------<  93  3.4<L>   |  25  |  1.54<H>    Ca    9.1      2019 05:31  Phos  3.0     04-  Mg     1.9         TPro  6.5  /  Alb  2.5<L>  /  TBili  0.3  /  DBili  0.1  /  AST  24  /  ALT  15  /  AlkPhos  73  -07    04-06    137  |  103  |  30<H>  ----------------------------<  466<HH>  4.6   |  21<L>  |  1.54<H>    Ca    9.2      2019 11:58    TPro  6.9  /  Alb  2.9<L>  /  TBili  0.3  /  DBili  x   /  AST  21  /  ALT  14  /  AlkPhos  81  -      Urinalysis Basic - ( 2019 12:48 )  Color: Yellow / Appearance: Clear / S.016 / pH: x  Gluc: x / Ketone: Negative  / Bili: Negative / Urobili: Negative   Blood: x / Protein: 30 mg/dL / Nitrite: Negative   Leuk Esterase: Moderate / RBC: 2 /hpf / WBC 25 /HPF   Sq Epi: x / Non Sq Epi: 5 / Bacteria: Negative      CAPILLARY BLOOD GLUCOSE  POCT Blood Glucose.: 411 mg/dL (2019 11:43)      MEDICATIONS  (STANDING):  ALBUTerol/ipratropium for Nebulization 3 milliLiter(s) Nebulizer every 6 hours  amiodarone    Tablet 200 milliGRAM(s) Oral daily  ARIPiprazole 2 milliGRAM(s) Oral daily  cefepime   IVPB 1000 milliGRAM(s) IV Intermittent every 24 hours  docusate sodium 100 milliGRAM(s) Oral two times a day  DULoxetine 30 milliGRAM(s) Oral daily  furosemide   Injectable 40 milliGRAM(s) IV Push daily  heparin  Injectable 5000 Unit(s) SubCutaneous every 8 hours  insulin glargine Injectable (LANTUS) 20 Unit(s) SubCutaneous at bedtime  insulin lispro (HumaLOG) corrective regimen sliding scale   SubCutaneous three times a day before meals  insulin lispro (HumaLOG) corrective regimen sliding scale   SubCutaneous at bedtime  insulin lispro Injectable (HumaLOG) 7 Unit(s) SubCutaneous three times a day before meals  metoprolol succinate  milliGRAM(s) Oral daily  mirtazapine 30 milliGRAM(s) Oral at bedtime  pantoprazole    Tablet 40 milliGRAM(s) Oral before breakfast  simvastatin 20 milliGRAM(s) Oral at bedtime  vancomycin  IVPB 1000 milliGRAM(s) IV Intermittent every 24 hours    MEDICATIONS  (PRN):  acetaminophen   Tablet .. 650 milliGRAM(s) Oral every 6 hours PRN Temp greater or equal to 38C (100.4F), Mild Pain (1 - 3)  clonazePAM Tablet 0.5 milliGRAM(s) Oral two times a day PRN anxiety, panic attack  dextrose 40% Gel 15 Gram(s) Oral once PRN Blood Glucose LESS THAN 70 milliGRAM(s)/deciLiter  glucagon  Injectable 1 milliGRAM(s) IntraMuscular once PRN Glucose <70 milliGRAM(s)/deciLiter        RADIOLOGY & ADDITIONAL TESTS:    19  : CT Chest No Cont (19 @ 18:17) Right upper lobe consolidation which may represents  pneumonia. Follow-up to resolution is advised.   - Small bilateral pleural effusions, right greater than left with  associated compressive atelectasis.    19 : Xray Knee w/Patella 4 View, Left (19 @ 14:13) Status post left knee arthroplasty with intact hardware. No periprosthetic lucency to suggest loosening.   Vascular calcifications. No acute fracture or dislocation.  Preserved joint spaces.        MICROBIOLOGY DATA:    Culture - Urine (19 @ 17:11)    Specimen Source: .Urine Catheterized    Culture Results:   <10,000 CFU/mL Normal Urogenital Chelsi      Rapid Respiratory Viral Panel (19 @ 15:35)    Rapid RVP Result: NotDetec: The [employer] RVP Rapid uses polymerase chain reaction (PCR) and melt  curve analysis to screen for adenovirus; coronavirus HKU1, NL63, 229E,  OC43; human metapneumovirus (hMPV); human enterovirus/rhinovirus  (Entero/RV); influenza A; influenza A/H1;influenza A/H3; influenza  A/H1-2009; influenza B; parainfluenza viruses 1, 2, 3, 4; respiratory  syncytial virus; Bordetella pertussis; Mycoplasma pneumoniae; and  Chlamydophila pneumoniae.      Procalcitonin, Serum (19 @ 05:31)    Procalcitonin, Serum: 0.21: This assay is intended for use to determine the change of PCT over time  as an aid in assessing the cumulative 28-day risk of all-cause mortality  for patients diagnosed with severe sepsis or septic shock in the ICU, or  when obtained in the emergency department or other medical wards prior to  ICU admission. This assay was performed by the [employer] PCT  assay. ng/mL        MICROBIOLOGY DATA:      In process

## 2019-04-07 NOTE — PROGRESS NOTE ADULT - ASSESSMENT
Assessment  DMT2: 87y Female with DM T2 with hyperglycemia, started on basal bolus insulin , blood sugars trending down now,  no hypoglycemic episode, eating meals,  non compliant with low carb diet.  CHF: on medications, no chest pain, stable, monitored.  COPD: Has SOB, on oxygen, stable now.  HTN: Un Controlled,  on antihypertensive medications.  CKD: Monitor labs/BMP,         Brandon Mayer MD  Cell: [phone]  Office: [phone]

## 2019-04-07 NOTE — CONSULT NOTE ADULT - ASSESSMENT
86 yo F from home, with significant PMHx of COPD on home oxygen qhs, 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, Essential HTN, Systolic CHF, CKD 4, PPM, nephrectomy, UTI's, Major Depression, Anxiety, Arteriosclerotic Heart Disease, Atrial fibrillation, and Peripheral Vascular Disease who was recently hospitalized at [facility] and treated for Pneumonia presently on Doxycycline (unspecified days remaining of tx course), who p/w c/o worsening shortness of breath both at rest and on exertion X 2 days a/w increasing oxygen requirements including during the day time, mild sputum production, decreased exercise tolerance, fatigue, generalized weakness, lethargy, and malaise.

## 2019-04-07 NOTE — PROGRESS NOTE ADULT - ASSESSMENT
A 88 yo Female  from home, with multiple co-morbidities including COPD on home oxygen at night, who was recently hospitalized at [facility] and treated for Pneumonia currently on Doxycycline, presents to the ER for evaluation of worsening shortness of breath and generalized weakness. On admission, she found to have Leukocytosis, positive Urine analysis and RUL consolidation on CT chest.  She has received dose of Cefepime and Vancomycin, cultures pending. The ID consult requested to assist  with further evaluation and antibiotic management.     # RUL Pneumonia- Most likely HCAP in the setting of recently discharged from [facility] - RVP negative  # UTI - culture has no significant  growth   # leukocytosis - trending down    Would recommend:  1. Follow up Blood cultures, still in process  2. Obtain Legionella urine AG and nasal swab for MRSA PCR  3. Continue Cefepime and Vancomycin until work up is done  4. Supplemental oxygenation and bronchodilator  as needed  5. Monitor WBC count and kidney function    d/w Patient and ER Nursing staff    will follow the patient with you A 88 yo Female  from home, with multiple co-morbidities including COPD on home oxygen at night, who was recently hospitalized at [facility] and treated for Pneumonia currently on Doxycycline, presents to the ER for evaluation of worsening shortness of breath and generalized weakness. On admission, she found to have Leukocytosis, positive Urine analysis and RUL consolidation on CT chest.  She has received dose of Cefepime and Vancomycin, cultures pending. The ID consult requested to assist  with further evaluation and antibiotic management.     # RUL Pneumonia- Most likely HCAP in the setting of recently discharged from [facility] - RVP negative  # UTI - culture has no significant  growth   # leukocytosis - trending down    Would recommend:  1. Follow up Blood cultures, still in process  2. Obtain Legionella urine AG and nasal swab for MRSA PCR, if MRSA PCR is negative then discontinue Vancomycin  3. Continue Cefepime and Vancomycin until work up is done  4. Supplemental oxygenation and bronchodilator  as needed  5. Monitor WBC count and kidney function    d/w Patient and ER Nursing staff    will follow the patient with you

## 2019-04-07 NOTE — CONSULT NOTE ADULT - SUBJECTIVE AND OBJECTIVE BOX
Patient is a 87y old  Female who presents with a chief complaint of worsening shortness of breath (2019 18:31)      HPI:  86 yo F from home, with significant PMHx of COPD on home oxygen qhs, 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, Essential HTN, Systolic CHF, CKD 4, PPM, nephrectomy, UTI's, Major Depression, Anxiety, Arteriosclerotic Heart Disease, Atrial fibrillation, and Peripheral Vascular Disease who was recently hospitalized at [facility] and treated for Pneumonia presently on Doxycycline (unspecified days remaining of tx course), who p/w c/o worsening shortness of breath both at rest and on exertion X 2 days a/w increasing oxygen requirements including during the day time, mild sputum production, decreased exercise tolerance, fatigue, generalized weakness, lethargy, and malaise. (2019 16:01)    above confirmed with her daughter: She was recently admitted to hospital for pneumonia at [facility] and now she muriel ere for increasing SOB: At home she is able to walk with the help of walker: She does have copd and uses Advair at home:          ?FOLLOWING PRESENT  [x ] Hx of PE/DVT, [ y] Hx COPD, [ x] Hx of Asthma, [ y] Hx of Hospitalization, [x ]  Hx of BiPAP/CPAP use, [ x] Hx of MICHELLE    Allergies    amoxicillin (Flushing; Vomiting; Nausea)  lisinopril (Angioedema)  penicillin (Hives)    Intolerances        PAST MEDICAL & SURGICAL HISTORY:  COPD (chronic obstructive pulmonary disease)  Depression  Anxiety  Atrial fibrillation, unspecified type  Pacemaker  Overweight  PVD (Peripheral Vascular Disease)  Arteriosclerotic Heart Disease (ASHD)  HTN (Hypertension)  Diabetes  S/P carpal tunnel release  S/P left cataract extraction  s/p colon resection  S/P Nephrectomy: right  Hip Replacement  History of Total Knee Replacement  S/P Hernia Surgery  S/P Cholecystectomy      FAMILY HISTORY:  Family history of pancreatic cancer (Sibling)      Social History: [ > 20 years: 1 pk at day  ] TOBACCO                  [  x] ETOH                                 [  x] IVDA/DRUGS    REVIEW OF SYSTEMS      General:	x    Skin/Breast:x  	  Ophthalmologic:x  	  ENMT:	x    Respiratory and Thorax: cough , SOB  	  Cardiovascular:	x    Gastrointestinal:	x    Genitourinary:	x    Musculoskeletal:	x    Neurological:	x    Psychiatric:	x    Hematology/Lymphatics:	x    Endocrine:x	    Allergic/Immunologic:	x    MEDICATIONS  (STANDING):  ALBUTerol/ipratropium for Nebulization 3 milliLiter(s) Nebulizer every 6 hours  amiodarone    Tablet 200 milliGRAM(s) Oral daily  ARIPiprazole 2 milliGRAM(s) Oral daily  cefepime   IVPB 1000 milliGRAM(s) IV Intermittent every 24 hours  dextrose 5%. 1000 milliLiter(s) (50 mL/Hr) IV Continuous <Continuous>  dextrose 50% Injectable 12.5 Gram(s) IV Push once  dextrose 50% Injectable 25 Gram(s) IV Push once  dextrose 50% Injectable 25 Gram(s) IV Push once  docusate sodium 100 milliGRAM(s) Oral two times a day  DULoxetine 60 milliGRAM(s) Oral daily  furosemide   Injectable 40 milliGRAM(s) IV Push daily  heparin  Injectable 5000 Unit(s) SubCutaneous every 8 hours  insulin glargine Injectable (LANTUS) 20 Unit(s) SubCutaneous at bedtime  insulin lispro (HumaLOG) corrective regimen sliding scale   SubCutaneous three times a day before meals  insulin lispro (HumaLOG) corrective regimen sliding scale   SubCutaneous at bedtime  insulin lispro Injectable (HumaLOG) 7 Unit(s) SubCutaneous three times a day before meals  metoprolol succinate  milliGRAM(s) Oral daily  mirtazapine 15 milliGRAM(s) Oral at bedtime  pantoprazole    Tablet 40 milliGRAM(s) Oral before breakfast  simvastatin 20 milliGRAM(s) Oral at bedtime  vancomycin  IVPB 1000 milliGRAM(s) IV Intermittent every 24 hours    MEDICATIONS  (PRN):  clonazePAM Tablet 0.5 milliGRAM(s) Oral two times a day PRN anxiety, panic attack  dextrose 40% Gel 15 Gram(s) Oral once PRN Blood Glucose LESS THAN 70 milliGRAM(s)/deciLiter  glucagon  Injectable 1 milliGRAM(s) IntraMuscular once PRN Glucose <70 milliGRAM(s)/deciLiter       Vital Signs Last 24 Hrs  T(C): 36.5 (2019 05:10), Max: 36.9 (2019 19:39)  T(F): 97.7 (2019 05:10), Max: 98.5 (2019 19:39)  HR: 80 (2019 05:10) (60 - 80)  BP: 144/78 (2019 05:10) (125/62 - 144/78)  BP(mean): --  RR: 20 (2019 05:10) (20 - 21)  SpO2: 95% (2019 05:10) (95% - 98%)        I&O's Summary    2019 07:01  -  2019 07:00  --------------------------------------------------------  IN: 0 mL / OUT: 400 mL / NET: -400 mL        Physical Exam:   GENERAL: obese  HEENT: MAICO/   Atraumatic, Normocephalic  ENMT: No tonsillar erythema, exudates, or enlargement; Moist mucous membranes, Good dentition, No lesions  NECK: Supple, No JVD, Normal thyroid  CHEST/LUNG: Clear to auscultation bilaterally  CVS: Regular rate and rhythm; No murmurs, rubs, or gallops  GI: : Soft, Nontender, Nondistended; Bowel sounds present  NERVOUS SYSTEM:  Alert & Oriented X3  EXTREMITIES:- edema  LYMPH: No lymphadenopathy noted  SKIN: No rashes or lesions  ENDOCRINOLOGY: No Thyromegaly  PSYCH: Appropriate    Labs:  -1.9<48<4>><20<<7.325>>-1.9<<3><<4><<5<<<209>>  CARDIAC MARKERS ( 2019 18:57 )  x     / x     / 61 U/L / x     / 2.9 ng/mL                            9.9    13.32 )-----------( 421      ( 2019 08:29 )             31.9                         10.5   15.1  )-----------( 461      ( 2019 11:58 )             32.6         142  |  105  |  28<H>  ----------------------------<  93  3.4<L>   |  25  |  1.54<H>      137  |  103  |  30<H>  ----------------------------<  466<HH>  4.6   |  21<L>  |  1.54<H>    Ca    9.1      2019 05:31  Ca    9.2      2019 11:58  Phos  3.0     -  Mg     1.9     -    TPro  6.5  /  Alb  2.5<L>  /  TBili  0.3  /  DBili  0.1  /  AST  24  /  ALT  15  /  AlkPhos  73  -  TPro  6.9  /  Alb  2.9<L>  /  TBili  0.3  /  DBili  x   /  AST  21  /  ALT  14  /  AlkPhos  81  -    CAPILLARY BLOOD GLUCOSE      POCT Blood Glucose.: 109 mg/dL (2019 10:05)  POCT Blood Glucose.: 101 mg/dL (2019 09:01)  POCT Blood Glucose.: 116 mg/dL (2019 22:38)  POCT Blood Glucose.: 233 mg/dL (2019 18:32)  POCT Blood Glucose.: 411 mg/dL (2019 11:43)    LIVER FUNCTIONS - ( 2019 05:31 )  Alb: 2.5 g/dL / Pro: 6.5 g/dL / ALK PHOS: 73 U/L / ALT: 15 U/L / AST: 24 U/L / GGT: x           PT/INR - ( 2019 07:37 )   PT: 13.7 sec;   INR: 1.19 ratio         PTT - ( 2019 07:37 )  PTT:27.3 sec  Urinalysis Basic - ( 2019 12:48 )    Color: Yellow / Appearance: Clear / S.016 / pH: x  Gluc: x / Ketone: Negative  / Bili: Negative / Urobili: Negative   Blood: x / Protein: 30 mg/dL / Nitrite: Negative   Leuk Esterase: Moderate / RBC: 2 /hpf / WBC 25 /HPF   Sq Epi: x / Non Sq Epi: 5 / Bacteria: Negative      D DImerLactate, Blood: 1.2 mmol/L ( @ 05:31)    Procalcitonin, Serum: 0.21 ng/mL ( @ 05:31)  Serum Pro-Brain Natriuretic Peptide: 83786 pg/mL ( @ 11:58)      Studies  Chest X-RAY  CT SCAN Chest   CT Abdomen  Venous Dopplers: LE:   Others          < from: CT Chest No Cont (19 @ 18:17) >  ******PRELIMINARY REPORT******    ******PRELIMINARY REPORT******          EXAM:  CT CHEST                            PROCEDURE DATE:  2019      ******PRELIMINARY REPORT******    ******PRELIMINARY REPORT******              INTERPRETATION:  - Right upper lobe consolidation which may represents   pneumonia. Follow-up to resolution is advised.   - Small bilateral pleural effusions, right greater than left with   associated compressive atelectasis.              ******PRELIMINARY REPORT******    ******PRELIMINARY REPORT******          GUILLERMINA GIRARD M.D., RADIOLOGY RESIDENT              < end of copied text >

## 2019-04-07 NOTE — PROGRESS NOTE ADULT - SUBJECTIVE AND OBJECTIVE BOX
Chief complaint  Patient is a 87y old  Female who presents with a chief complaint of worsening shortness of breath (07 Apr 2019 10:54)   Review of systems  Patient in bed, looks comfortable, no fever, no hypoglycemia.    Labs and Fingersticks  CAPILLARY BLOOD GLUCOSE      POCT Blood Glucose.: 233 mg/dL (07 Apr 2019 17:38)  POCT Blood Glucose.: 180 mg/dL (07 Apr 2019 13:11)  POCT Blood Glucose.: 109 mg/dL (07 Apr 2019 10:05)  POCT Blood Glucose.: 101 mg/dL (07 Apr 2019 09:01)  POCT Blood Glucose.: 116 mg/dL (06 Apr 2019 22:38)  POCT Blood Glucose.: 233 mg/dL (06 Apr 2019 18:32)      Anion Gap, Serum: 12 (04-07 @ 05:31)  Anion Gap, Serum: 13 (04-06 @ 11:58)    Hemoglobin A1C, Whole Blood: 9.6 <H> (04-07 @ 08:29)    Calcium, Total Serum: 9.1 (04-07 @ 05:31)  Calcium, Total Serum: 9.2 (04-06 @ 11:58)  Albumin, Serum: 2.5 <L> (04-07 @ 05:31)  Albumin, Serum: 2.9 <L> (04-06 @ 11:58)    Alanine Aminotransferase (ALT/SGPT): 15 (04-07 @ 05:31)  Alanine Aminotransferase (ALT/SGPT): 14 (04-06 @ 11:58)  Alkaline Phosphatase, Serum: 73 (04-07 @ 05:31)  Alkaline Phosphatase, Serum: 81 (04-06 @ 11:58)  Aspartate Aminotransferase (AST/SGOT): 24 (04-07 @ 05:31)  Aspartate Aminotransferase (AST/SGOT): 21 (04-06 @ 11:58)        04-07    142  |  105  |  28<H>  ----------------------------<  93  3.4<L>   |  25  |  1.54<H>    Ca    9.1      07 Apr 2019 05:31  Phos  3.0     04-07  Mg     1.9     04-07    TPro  6.5  /  Alb  2.5<L>  /  TBili  0.3  /  DBili  0.1  /  AST  24  /  ALT  15  /  AlkPhos  73  04-07                        9.9    13.32 )-----------( 421      ( 07 Apr 2019 08:29 )             31.9     Medications  MEDICATIONS  (STANDING):  ALBUTerol/ipratropium for Nebulization 3 milliLiter(s) Nebulizer every 6 hours  amiodarone    Tablet 200 milliGRAM(s) Oral daily  ARIPiprazole 2 milliGRAM(s) Oral daily  cefepime   IVPB 1000 milliGRAM(s) IV Intermittent every 24 hours  dextrose 5%. 1000 milliLiter(s) (50 mL/Hr) IV Continuous <Continuous>  dextrose 50% Injectable 12.5 Gram(s) IV Push once  dextrose 50% Injectable 25 Gram(s) IV Push once  dextrose 50% Injectable 25 Gram(s) IV Push once  docusate sodium 100 milliGRAM(s) Oral two times a day  DULoxetine 30 milliGRAM(s) Oral daily  furosemide   Injectable 40 milliGRAM(s) IV Push daily  heparin  Injectable 5000 Unit(s) SubCutaneous every 8 hours  insulin glargine Injectable (LANTUS) 20 Unit(s) SubCutaneous at bedtime  insulin lispro (HumaLOG) corrective regimen sliding scale   SubCutaneous three times a day before meals  insulin lispro (HumaLOG) corrective regimen sliding scale   SubCutaneous at bedtime  insulin lispro Injectable (HumaLOG) 7 Unit(s) SubCutaneous three times a day before meals  metoprolol succinate  milliGRAM(s) Oral daily  mirtazapine 30 milliGRAM(s) Oral at bedtime  pantoprazole    Tablet 40 milliGRAM(s) Oral before breakfast  simvastatin 20 milliGRAM(s) Oral at bedtime  vancomycin  IVPB 1000 milliGRAM(s) IV Intermittent every 24 hours      Physical Exam  General: Patient comfortable in bed  Vital Signs Last 12 Hrs  T(F): 97.8 (04-07-19 @ 15:39), Max: 98 (04-07-19 @ 13:56)  HR: 60 (04-07-19 @ 15:39) (60 - 60)  BP: 115/63 (04-07-19 @ 15:39) (115/63 - 125/75)  BP(mean): --  RR: 19 (04-07-19 @ 15:39) (19 - 20)  SpO2: 96% (04-07-19 @ 15:39) (96% - 96%)  Neck: No palpable thyroid nodules.  CVS: S1S2, No murmurs  Respiratory: No wheezing, no crepitations  GI: Abdomen soft, bowel sounds positive  Musculoskeletal:  edema lower extremities.   Skin: No skin rashes, no ecchymosis    Diagnostics    Hemoglobin A1C, Whole Blood: Routine (04-06 @ 14:52)

## 2019-04-08 LAB
24R-OH-CALCIDIOL SERPL-MCNC: 38.3 NG/ML — SIGNIFICANT CHANGE UP (ref 30–80)
MRSA PCR RESULT.: SIGNIFICANT CHANGE UP
S AUREUS DNA NOSE QL NAA+PROBE: SIGNIFICANT CHANGE UP

## 2019-04-08 RX ORDER — OLANZAPINE 15 MG/1
5 TABLET, FILM COATED ORAL AT BEDTIME
Qty: 0 | Refills: 0 | Status: DISCONTINUED | OUTPATIENT
Start: 2019-04-08 | End: 2019-04-11

## 2019-04-08 RX ORDER — INSULIN LISPRO 100/ML
7 VIAL (ML) SUBCUTANEOUS
Qty: 0 | Refills: 0 | Status: DISCONTINUED | OUTPATIENT
Start: 2019-04-08 | End: 2019-04-09

## 2019-04-08 RX ORDER — INSULIN GLARGINE 100 [IU]/ML
20 INJECTION, SOLUTION SUBCUTANEOUS AT BEDTIME
Qty: 0 | Refills: 0 | Status: DISCONTINUED | OUTPATIENT
Start: 2019-04-08 | End: 2019-04-09

## 2019-04-08 RX ADMIN — Medication 650 MILLIGRAM(S): at 16:02

## 2019-04-08 RX ADMIN — ARIPIPRAZOLE 2 MILLIGRAM(S): 15 TABLET ORAL at 15:13

## 2019-04-08 RX ADMIN — Medication 3 MILLILITER(S): at 00:20

## 2019-04-08 RX ADMIN — Medication 650 MILLIGRAM(S): at 15:42

## 2019-04-08 RX ADMIN — Medication 40 MILLIGRAM(S): at 05:10

## 2019-04-08 RX ADMIN — Medication 3 MILLILITER(S): at 05:10

## 2019-04-08 RX ADMIN — Medication 2: at 13:51

## 2019-04-08 RX ADMIN — Medication 3 MILLILITER(S): at 18:48

## 2019-04-08 RX ADMIN — SIMVASTATIN 20 MILLIGRAM(S): 20 TABLET, FILM COATED ORAL at 22:04

## 2019-04-08 RX ADMIN — MIRTAZAPINE 30 MILLIGRAM(S): 45 TABLET, ORALLY DISINTEGRATING ORAL at 22:04

## 2019-04-08 RX ADMIN — OLANZAPINE 5 MILLIGRAM(S): 15 TABLET, FILM COATED ORAL at 22:04

## 2019-04-08 RX ADMIN — HEPARIN SODIUM 5000 UNIT(S): 5000 INJECTION INTRAVENOUS; SUBCUTANEOUS at 15:21

## 2019-04-08 RX ADMIN — Medication 100 MILLIGRAM(S): at 05:10

## 2019-04-08 RX ADMIN — Medication 7 UNIT(S): at 09:44

## 2019-04-08 RX ADMIN — AMIODARONE HYDROCHLORIDE 200 MILLIGRAM(S): 400 TABLET ORAL at 05:10

## 2019-04-08 RX ADMIN — HEPARIN SODIUM 5000 UNIT(S): 5000 INJECTION INTRAVENOUS; SUBCUTANEOUS at 22:04

## 2019-04-08 RX ADMIN — HEPARIN SODIUM 5000 UNIT(S): 5000 INJECTION INTRAVENOUS; SUBCUTANEOUS at 05:10

## 2019-04-08 RX ADMIN — INSULIN GLARGINE 20 UNIT(S): 100 INJECTION, SOLUTION SUBCUTANEOUS at 21:50

## 2019-04-08 RX ADMIN — DULOXETINE HYDROCHLORIDE 30 MILLIGRAM(S): 30 CAPSULE, DELAYED RELEASE ORAL at 15:16

## 2019-04-08 RX ADMIN — Medication 2: at 09:44

## 2019-04-08 RX ADMIN — Medication 7 UNIT(S): at 13:51

## 2019-04-08 RX ADMIN — Medication 7 UNIT(S): at 18:44

## 2019-04-08 RX ADMIN — Medication 3 MILLILITER(S): at 15:14

## 2019-04-08 RX ADMIN — Medication 2: at 18:45

## 2019-04-08 RX ADMIN — PANTOPRAZOLE SODIUM 40 MILLIGRAM(S): 20 TABLET, DELAYED RELEASE ORAL at 05:10

## 2019-04-08 RX ADMIN — Medication 100 MILLIGRAM(S): at 18:44

## 2019-04-08 RX ADMIN — CEFEPIME 100 MILLIGRAM(S): 1 INJECTION, POWDER, FOR SOLUTION INTRAMUSCULAR; INTRAVENOUS at 15:13

## 2019-04-08 NOTE — PROGRESS NOTE ADULT - SUBJECTIVE AND OBJECTIVE BOX
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 (2019 16:01)      PAST MEDICAL & SURGICAL HISTORY:  COPD (chronic obstructive pulmonary disease)  Depression  Anxiety  Atrial fibrillation, unspecified type  Pacemaker  Overweight  PVD (Peripheral Vascular Disease)  Arteriosclerotic Heart Disease (ASHD)  HTN (Hypertension)  Diabetes  S/P carpal tunnel release  S/P left cataract extraction  s/p colon resection  S/P Nephrectomy: right  Hip Replacement  History of Total Knee Replacement  S/P Hernia Surgery  S/P Cholecystectomy      amoxicillin (Flushing; Vomiting; Nausea)  lisinopril (Angioedema)  penicillin (Hives)       MEDICATIONS  (STANDING):  ALBUTerol/ipratropium for Nebulization 3 milliLiter(s) Nebulizer every 6 hours  amiodarone    Tablet 200 milliGRAM(s) Oral daily  ARIPiprazole 2 milliGRAM(s) Oral daily  cefepime   IVPB 1000 milliGRAM(s) IV Intermittent every 24 hours  dextrose 5%. 1000 milliLiter(s) (50 mL/Hr) IV Continuous <Continuous>  dextrose 50% Injectable 12.5 Gram(s) IV Push once  dextrose 50% Injectable 25 Gram(s) IV Push once  dextrose 50% Injectable 25 Gram(s) IV Push once  docusate sodium 100 milliGRAM(s) Oral two times a day  DULoxetine 30 milliGRAM(s) Oral daily  furosemide   Injectable 40 milliGRAM(s) IV Push daily  heparin  Injectable 5000 Unit(s) SubCutaneous every 8 hours  insulin glargine Injectable (LANTUS) 20 Unit(s) SubCutaneous at bedtime  insulin lispro (HumaLOG) corrective regimen sliding scale   SubCutaneous three times a day before meals  insulin lispro (HumaLOG) corrective regimen sliding scale   SubCutaneous at bedtime  insulin lispro Injectable (HumaLOG) 7 Unit(s) SubCutaneous three times a day before meals  metoprolol succinate  milliGRAM(s) Oral daily  mirtazapine 30 milliGRAM(s) Oral at bedtime  pantoprazole    Tablet 40 milliGRAM(s) Oral before breakfast  simvastatin 20 milliGRAM(s) Oral at bedtime  vancomycin  IVPB 1000 milliGRAM(s) IV Intermittent every 24 hours    MEDICATIONS  (PRN):  acetaminophen   Tablet .. 650 milliGRAM(s) Oral every 6 hours PRN Temp greater or equal to 38C (100.4F), Mild Pain (1 - 3)  clonazePAM Tablet 0.5 milliGRAM(s) Oral two times a day PRN anxiety, panic attack  dextrose 40% Gel 15 Gram(s) Oral once PRN Blood Glucose LESS THAN 70 milliGRAM(s)/deciLiter  glucagon  Injectable 1 milliGRAM(s) IntraMuscular once PRN Glucose <70 milliGRAM(s)/deciLiter      REVIEW OF SYSTEMS:  CONSTITUTIONAL: (+) malaise.   EYES: No acute change in vision   ENT:  No tinnitus  NECK: No stiffness  RESPIRATORY: cough. rangel. No hemoptysis  CARDIOVASCULAR: No chest pain, palpitations, syncope  GASTROINTESTINAL: No hematemesis, diarrhea, melena, or hematochezia.  GENITOURINARY: No hematuria  NEUROLOGICAL: No headaches  LYMPH Nodes: No enlarged glands  ENDOCRINE: No heat or cold intolerance	    T(C): 36.6 (19 @ 08:02), Max: 37 (19 @ 20:26)  HR: 58 (19 @ 08:02) (58 - 60)  BP: 147/74 (19 @ 08:02) (115/63 - 147/74)  RR: 18 (19 @ 08:02) (18 - 20)  SpO2: 94% (19 @ 08:02) (94% - 96%)    PHYSICAL EXAMINATION:   Constitutional: WD, NAD  HEENT: NC, AT  Neck:  Supple  Respiratory: mildly improving rhonchi/rales. Adequate airflow b/l. Not using accessory muscles of respiration.  Cardiovascular:  systolic murmur, S1 & S2 intact, no R/G, 2+ radial pulses b/l  Gastrointestinal: Soft, NT, ND, normoactive b.s., no organomegaly/RT/rigidity  Extremities: scattered bruising on extremiteis. WWP  Neurological:  Alert and awake.  No acute focal motor deficits. Crude sensation intact.     Labs and imaging reviewed    LABS:                        9.9    13.32 )-----------( 421      ( 2019 08:29 )             31.9     04-07    142  |  105  |  28<H>  ----------------------------<  93  3.4<L>   |  25  |  1.54<H>    Ca    9.1      2019 05:31  Phos  3.0     04-07  Mg     1.9     04-07    TPro  6.5  /  Alb  2.5<L>  /  TBili  0.3  /  DBili  0.1  /  AST  24  /  ALT  15  /  AlkPhos  73  04-07    CARDIAC MARKERS ( 2019 18:57 )  x     / x     / 61 U/L / x     / 2.9 ng/mL      PT/INR - ( 2019 07:37 )   PT: 13.7 sec;   INR: 1.19 ratio         PTT - ( 2019 07:37 )  PTT:27.3 sec  Urinalysis Basic - ( 2019 12:48 )    Color: Yellow / Appearance: Clear / S.016 / pH: x  Gluc: x / Ketone: Negative  / Bili: Negative / Urobili: Negative   Blood: x / Protein: 30 mg/dL / Nitrite: Negative   Leuk Esterase: Moderate / RBC: 2 /hpf / WBC 25 /HPF   Sq Epi: x / Non Sq Epi: 5 / Bacteria: Negative      CAPILLARY BLOOD GLUCOSE      POCT Blood Glucose.: 201 mg/dL (2019 09:14)  POCT Blood Glucose.: 214 mg/dL (2019 21:53)  POCT Blood Glucose.: 233 mg/dL (2019 17:38)  POCT Blood Glucose.: 180 mg/dL (2019 13:11)  POCT Blood Glucose.: 109 mg/dL (2019 10:05)        LIVER FUNCTIONS - ( 2019 05:31 )  Alb: 2.5 g/dL / Pro: 6.5 g/dL / ALK PHOS: 73 U/L / ALT: 15 U/L / AST: 24 U/L / GGT: x               RADIOLOGY & ADDITIONAL STUDIES:

## 2019-04-08 NOTE — PROGRESS NOTE ADULT - PROBLEM SELECTOR PLAN 1
progressing well  c/w diuresis and cardiac meds  - F/u TTE  tele, strict i/o, daily weight, fluid restrictions  PT, nutrition  all medical personnel and consultants management greatly appreciated

## 2019-04-08 NOTE — PROGRESS NOTE ADULT - SUBJECTIVE AND OBJECTIVE BOX
Patient is a 87y old  Female who presents with a chief complaint of worsening shortness of breath (2019 19:02)      Any change in ROS: my breathing is better and so is my cough:   I felt terrible  in ER last night!    MEDICATIONS  (STANDING):  ALBUTerol/ipratropium for Nebulization 3 milliLiter(s) Nebulizer every 6 hours  amiodarone    Tablet 200 milliGRAM(s) Oral daily  ARIPiprazole 2 milliGRAM(s) Oral daily  cefepime   IVPB 1000 milliGRAM(s) IV Intermittent every 24 hours  dextrose 5%. 1000 milliLiter(s) (50 mL/Hr) IV Continuous <Continuous>  dextrose 50% Injectable 12.5 Gram(s) IV Push once  dextrose 50% Injectable 25 Gram(s) IV Push once  dextrose 50% Injectable 25 Gram(s) IV Push once  docusate sodium 100 milliGRAM(s) Oral two times a day  DULoxetine 30 milliGRAM(s) Oral daily  furosemide   Injectable 40 milliGRAM(s) IV Push daily  heparin  Injectable 5000 Unit(s) SubCutaneous every 8 hours  insulin glargine Injectable (LANTUS) 20 Unit(s) SubCutaneous at bedtime  insulin lispro (HumaLOG) corrective regimen sliding scale   SubCutaneous three times a day before meals  insulin lispro (HumaLOG) corrective regimen sliding scale   SubCutaneous at bedtime  insulin lispro Injectable (HumaLOG) 7 Unit(s) SubCutaneous three times a day before meals  metoprolol succinate  milliGRAM(s) Oral daily  mirtazapine 30 milliGRAM(s) Oral at bedtime  pantoprazole    Tablet 40 milliGRAM(s) Oral before breakfast  simvastatin 20 milliGRAM(s) Oral at bedtime  vancomycin  IVPB 1000 milliGRAM(s) IV Intermittent every 24 hours    MEDICATIONS  (PRN):  acetaminophen   Tablet .. 650 milliGRAM(s) Oral every 6 hours PRN Temp greater or equal to 38C (100.4F), Mild Pain (1 - 3)  clonazePAM Tablet 0.5 milliGRAM(s) Oral two times a day PRN anxiety, panic attack  dextrose 40% Gel 15 Gram(s) Oral once PRN Blood Glucose LESS THAN 70 milliGRAM(s)/deciLiter  glucagon  Injectable 1 milliGRAM(s) IntraMuscular once PRN Glucose <70 milliGRAM(s)/deciLiter    Vital Signs Last 24 Hrs  T(C): 36.6 (2019 08:02), Max: 37 (2019 20:26)  T(F): 97.9 (2019 08:02), Max: 98.6 (2019 20:26)  HR: 58 (2019 08:02) (58 - 60)  BP: 147/74 (2019 08:02) (115/63 - 147/74)  BP(mean): --  RR: 18 (2019 08:02) (18 - 20)  SpO2: 94% (2019 08:02) (94% - 96%)    I&O's Summary        Physical Exam:   GENERAL: alert yesi wake  HEENT: MAICO/   Atraumatic, Normocephalic  ENMT: No tonsillar erythema, exudates, or enlargement; Moist mucous membranes, Good dentition, No lesions  NECK: Supple, No JVD, Normal thyroid  CHEST/LUNG: Bibasilar crackles: No Wheezing  CVS: Regular rate and rhythm; No murmurs, rubs, or gallops  GI: : Soft, Nontender, Nondistended; Bowel sounds present  NERVOUS SYSTEM:  Alert & Oriented X3  EXTREMITIES:  no significant edema  LYMPH: No lymphadenopathy noted  SKIN: No rashes or lesions  ENDOCRINOLOGY: No Thyromegaly  PSYCH: Appropriate    Labs:  24  CARDIAC MARKERS ( 2019 18:57 )  x     / x     / 61 U/L / x     / 2.9 ng/mL                            9.9    13.32 )-----------( 421      ( 2019 08:29 )             31.9                         10.5   15.1  )-----------( 461      ( 2019 11:58 )             32.6         142  |  105  |  28<H>  ----------------------------<  93  3.4<L>   |  25  |  1.54<H>      137  |  103  |  30<H>  ----------------------------<  466<HH>  4.6   |  21<L>  |  1.54<H>    Ca    9.1      2019 05:31  Ca    9.2      2019 11:58  Phos  3.0       Mg     1.9         TPro  6.5  /  Alb  2.5<L>  /  TBili  0.3  /  DBili  0.1  /  AST  24  /  ALT  15  /  AlkPhos  73    TPro  6.9  /  Alb  2.9<L>  /  TBili  0.3  /  DBili  x   /  AST  21  /  ALT  14  /  AlkPhos  81  06    CAPILLARY BLOOD GLUCOSE      POCT Blood Glucose.: 201 mg/dL (2019 09:14)  POCT Blood Glucose.: 214 mg/dL (2019 21:53)  POCT Blood Glucose.: 233 mg/dL (2019 17:38)  POCT Blood Glucose.: 180 mg/dL (2019 13:11)  POCT Blood Glucose.: 109 mg/dL (2019 10:05)      LIVER FUNCTIONS - ( 2019 05:31 )  Alb: 2.5 g/dL / Pro: 6.5 g/dL / ALK PHOS: 73 U/L / ALT: 15 U/L / AST: 24 U/L / GGT: x           PT/INR - ( 2019 07:37 )   PT: 13.7 sec;   INR: 1.19 ratio         PTT - ( 2019 07:37 )  PTT:27.3 sec  Urinalysis Basic - ( 2019 12:48 )    Color: Yellow / Appearance: Clear / S.016 / pH: x  Gluc: x / Ketone: Negative  / Bili: Negative / Urobili: Negative   Blood: x / Protein: 30 mg/dL / Nitrite: Negative   Leuk Esterase: Moderate / RBC: 2 /hpf / WBC 25 /HPF   Sq Epi: x / Non Sq Epi: 5 / Bacteria: Negative      Procalcitonin, Serum: 0.21 ng/mL ( @ 05:31)  Serum Pro-Brain Natriuretic Peptide: 77481 pg/mL ( @ 11:58)  Lactate, Blood: 1.2 mmol/L ( @ 05:31)        RECENT CULTURES:   @ 21:05 .Blood Blood-Peripheral                No growth to date.     @ 17:11 .Urine Catheterized         < from: CT Chest No Cont (19 @ 18:17) >    MEDIASTINUM AND CHRIS: Enlarged supraclavicular and mediastinal lymph   nodes for example: 1.3 cm short axis right lower part tracheal lymph   node; previously 0.2 cm.    CHEST WALL AND LOWER NECK: Within normal limits.    VISUALIZED UPPER ABDOMEN: Cholecystectomy. Mildbiliary ductal dilatation.    BONES: Severe osseous degenerative changes noted.    IMPRESSION:     Right upper lobe consolidation compatible with pneumonia for which   follow-up to resolution is recommended.    Mildly enlarged supraclavicular and mediastinal lymph nodes. Recommend   attention on follow-up.    Small layering right pleural effusion and subsegmental atelectasis of   basilar right lower lobe.     Left basilar pleural thickening and small loculated left pleural effusion   is grossly unchanged from 2016.                    ROSALIE SHIPLEY M.D., RADIOLOGY RESIDENT  This document has been electronically signed.  BRIANNE BAXTER M.D., ATTENDING RADIOLOGIST  This document has been electronically signed. 2019 10:44AM        < end of copied text >         <10,000 CFU/mL Normal Urogenital Chelsi          RESPIRATORY CULTURES:          Studies  Chest X-RAY  CT SCAN Chest   Venous Dopplers: LE:   CT Abdomen  Others

## 2019-04-08 NOTE — DIETITIAN INITIAL EVALUATION ADULT. - ENERGY NEEDS
Ht: 65 inches , Wt: 149.6lbs, BMI: 25kg/m2, IBW: 120lbs +/- 10%, %IBW: 125%  Edema: none, Skin: stage 2 sacral spine   Pertinent Information: This is a 87 year old  F with significant PMHx of COPD on home oxygen, T2DM with long term complications including nephropathy, Essential HTN, Systolic CHF, CKD 4, PPM, nephrectomy, UTI's, Major Depression, Anxiety, Arteriosclerotic Heart Disease, Atrial fibrillation, and Peripheral Vascular Disease. Was recently hospitalized at [facility] and treated for Pneumoni, on antibiotics. Currently presenting with worsening shortness of breath both at rest and on exertion X 2 days, increased oxygen requirements including during the day time and decreased exercise tolerance, fatigue, generalized weakness, lethargy, and malaise. Endocrine following for hyperglycemia.

## 2019-04-08 NOTE — PROGRESS NOTE ADULT - SUBJECTIVE AND OBJECTIVE BOX
Patient is seen and examined at the bed side, is afebrile. She is feeling little better. The blood cultures and MRSA PCR is negative.         REVIEW OF SYSTEMS: All other review systems are negative        ALLERGIES: ? penicillin (Hives), Amoxicillin (Flushing; Vomiting; Nausea), lisinopril (Angioedema)        Vital Signs Last 24 Hrs  T(C): 37 (2019 17:19), Max: 37 (2019 20:26)  T(F): 98.6 (2019 17:19), Max: 98.6 (2019 20:26)  HR: 60 (2019 17:19) (58 - 67)  BP: 138/82 (2019 17:19) (116/66 - 147/74)  BP(mean): --  RR: 18 (2019 17:19) (18 - 20)  SpO2: 97% (2019 17:19) (94% - 97%)        PHYSICAL EXAM:  GENERAL: Not in distress, on oxygen via NC  HEENT: Right Lower eye lid redness is less today  CHEST/LUNG:  Air entry bilaterally  HEART: s1 and s2 present  ABDOMEN:  Nontender and  Nondistended  EXTREMITIES: No pedal  edema  CNS: Awake and Alert        LABS: No new Labs                          9.9    13.32 )-----------( 421      ( 2019 08:29 )             31.9                                     10.5   15.1  )-----------( 461      ( 2019 11:58 )             32.6           142  |  105  |  28<H>  ----------------------------<  93  3.4<L>   |  25  |  1.54<H>    Ca    9.1      2019 05:31  Phos  3.0     04-  Mg     1.9         TPro  6.5  /  Alb  2.5<L>  /  TBili  0.3  /  DBili  0.1  /  AST  24  /  ALT  15  /  AlkPhos  73  04-07      04-06    137  |  103  |  30<H>  ----------------------------<  466<HH>  4.6   |  21<L>  |  1.54<H>    Ca    9.2      2019 11:58    TPro  6.9  /  Alb  2.9<L>  /  TBili  0.3  /  DBili  x   /  AST  21  /  ALT  14  /  AlkPhos  81  -      Urinalysis Basic - ( 2019 12:48 )  Color: Yellow / Appearance: Clear / S.016 / pH: x  Gluc: x / Ketone: Negative  / Bili: Negative / Urobili: Negative   Blood: x / Protein: 30 mg/dL / Nitrite: Negative   Leuk Esterase: Moderate / RBC: 2 /hpf / WBC 25 /HPF   Sq Epi: x / Non Sq Epi: 5 / Bacteria: Negative      CAPILLARY BLOOD GLUCOSE  POCT Blood Glucose.: 411 mg/dL (2019 11:43)        MEDICATIONS  (STANDING):  ALBUTerol/ipratropium for Nebulization 3 milliLiter(s) Nebulizer every 6 hours  amiodarone    Tablet 200 milliGRAM(s) Oral daily  ARIPiprazole 2 milliGRAM(s) Oral daily  cefepime   IVPB 1000 milliGRAM(s) IV Intermittent every 24 hours  dextrose 5%. 1000 milliLiter(s) (50 mL/Hr) IV Continuous <Continuous>  dextrose 50% Injectable 12.5 Gram(s) IV Push once  dextrose 50% Injectable 25 Gram(s) IV Push once  dextrose 50% Injectable 25 Gram(s) IV Push once  docusate sodium 100 milliGRAM(s) Oral two times a day  DULoxetine 30 milliGRAM(s) Oral daily  furosemide   Injectable 40 milliGRAM(s) IV Push daily  heparin  Injectable 5000 Unit(s) SubCutaneous every 8 hours  insulin glargine Injectable (LANTUS) 20 Unit(s) SubCutaneous at bedtime  insulin lispro (HumaLOG) corrective regimen sliding scale   SubCutaneous three times a day before meals  insulin lispro (HumaLOG) corrective regimen sliding scale   SubCutaneous at bedtime  insulin lispro Injectable (HumaLOG) 7 Unit(s) SubCutaneous three times a day before meals  metoprolol succinate  milliGRAM(s) Oral daily  mirtazapine 30 milliGRAM(s) Oral at bedtime  pantoprazole    Tablet 40 milliGRAM(s) Oral before breakfast  simvastatin 20 milliGRAM(s) Oral at bedtime  vancomycin  IVPB 1000 milliGRAM(s) IV Intermittent every 24 hours    MEDICATIONS  (PRN):  acetaminophen   Tablet .. 650 milliGRAM(s) Oral every 6 hours PRN Temp greater or equal to 38C (100.4F), Mild Pain (1 - 3)  clonazePAM Tablet 0.5 milliGRAM(s) Oral two times a day PRN anxiety, panic attack  dextrose 40% Gel 15 Gram(s) Oral once PRN Blood Glucose LESS THAN 70 milliGRAM(s)/deciLiter  glucagon  Injectable 1 milliGRAM(s) IntraMuscular once PRN Glucose <70 milliGRAM(s)/deciLiter          RADIOLOGY & ADDITIONAL TESTS:    19  : CT Chest No Cont (19 @ 18:17) Right upper lobe consolidation which may represents  pneumonia. Follow-up to resolution is advised.   - Small bilateral pleural effusions, right greater than left with  associated compressive atelectasis.    19 : Xray Knee w/Patella 4 View, Left (19 @ 14:13) Status post left knee arthroplasty with intact hardware. No periprosthetic lucency to suggest loosening.   Vascular calcifications. No acute fracture or dislocation.  Preserved joint spaces.      MICROBIOLOGY DATA:    MRSA/MSSA PCR (19 @ 22:38)    MRSA PCR Result.: [employer]: MRSA/MSSA PCR assay is a qualitative in vitro diagnostic test for the  direct detection and differentiation of methicillin-resistant  Staphylococcus aureus (MRSA) from Staphylococcus aureus (SA). The assay  detects DNA from nasal swabs in patients atrisk for nasal colonization.  It is not intended to diagnose MRSA or SA infections nor guide or monitor  treatment for MRSA/SA infections. A negative result does not preclude  nasal colonization. The assay is FDA-approved and its performance has  been established by [employer], [place] and the [facility], [place], [place].    Staph Aureus PCR Result: NotDetec      Culture - Blood (19 @ 21:05)    Specimen Source: .Blood Blood-Peripheral    Culture Results:   No growth to date.      Culture - Blood (19 @ 21:05)    Specimen Source: .Blood Blood-Peripheral    Culture Results:   No growth to date.        Culture - Urine (19 @ 17:11)    Specimen Source: .Urine Catheterized    Culture Results:   <10,000 CFU/mL Normal Urogenital Chelsi      Rapid Respiratory Viral Panel (19 @ 15:35)    Rapid RVP Result: NotDetec: The [employer] RVP Rapid uses polymerase chain reaction (PCR) and melt  curve analysis to screen for adenovirus; coronavirus HKU1, NL63, 229E,  OC43; human metapneumovirus (hMPV); human enterovirus/rhinovirus  (Entero/RV); influenza A; influenza A/H1;influenza A/H3; influenza  A/H1-2009; influenza B; parainfluenza viruses 1, 2, 3, 4; respiratory  syncytial virus; Bordetella pertussis; Mycoplasma pneumoniae; and  Chlamydophila pneumoniae.      Procalcitonin, Serum (19 @ 05:31)    Procalcitonin, Serum: 0.21: This assay is intended for use to determine the change of PCT over time  as an aid in assessing the cumulative 28-day risk of all-cause mortality  for patients diagnosed with severe sepsis or septic shock in the ICU, or  when obtained in the emergency department or other medical wards prior to  ICU admission. This assay was performed by the [employer] PCT  assay. ng/mL        MICROBIOLOGY DATA:      In process Patient is seen and examined at the bed side, is afebrile. She is feeling little better. The blood cultures and MRSA PCR is negative. The Family at the bed side,         REVIEW OF SYSTEMS: All other review systems are negative        ALLERGIES: ? penicillin (Hives), Amoxicillin (Flushing; Vomiting; Nausea), lisinopril (Angioedema)        Vital Signs Last 24 Hrs  T(C): 37 (2019 17:19), Max: 37 (2019 20:26)  T(F): 98.6 (2019 17:19), Max: 98.6 (2019 20:26)  HR: 60 (2019 17:19) (58 - 67)  BP: 138/82 (2019 17:19) (116/66 - 147/74)  BP(mean): --  RR: 18 (2019 17:19) (18 - 20)  SpO2: 97% (2019 17:19) (94% - 97%)        PHYSICAL EXAM:  GENERAL: Not in distress, on oxygen via NC  HEENT: Right Lower eye lid redness is less today  CHEST/LUNG:  Air entry bilaterally  HEART: s1 and s2 present  ABDOMEN:  Nontender and  Nondistended  EXTREMITIES: No pedal  edema  CNS: Awake and Alert        LABS: No new Labs                          9.9    13.32 )-----------( 421      ( 2019 08:29 )             31.9                                     10.5   15.1  )-----------( 461      ( 2019 11:58 )             32.6           142  |  105  |  28<H>  ----------------------------<  93  3.4<L>   |  25  |  1.54<H>    Ca    9.1      2019 05:31  Phos  3.0     -  Mg     1.9         TPro  6.5  /  Alb  2.5<L>  /  TBili  0.3  /  DBili  0.1  /  AST  24  /  ALT  15  /  AlkPhos  73  04-07      04-06    137  |  103  |  30<H>  ----------------------------<  466<HH>  4.6   |  21<L>  |  1.54<H>    Ca    9.2      2019 11:58    TPro  6.9  /  Alb  2.9<L>  /  TBili  0.3  /  DBili  x   /  AST  21  /  ALT  14  /  AlkPhos  81  04-06      Urinalysis Basic - ( 2019 12:48 )  Color: Yellow / Appearance: Clear / S.016 / pH: x  Gluc: x / Ketone: Negative  / Bili: Negative / Urobili: Negative   Blood: x / Protein: 30 mg/dL / Nitrite: Negative   Leuk Esterase: Moderate / RBC: 2 /hpf / WBC 25 /HPF   Sq Epi: x / Non Sq Epi: 5 / Bacteria: Negative      CAPILLARY BLOOD GLUCOSE  POCT Blood Glucose.: 411 mg/dL (2019 11:43)        MEDICATIONS  (STANDING):  ALBUTerol/ipratropium for Nebulization 3 milliLiter(s) Nebulizer every 6 hours  amiodarone    Tablet 200 milliGRAM(s) Oral daily  ARIPiprazole 2 milliGRAM(s) Oral daily  cefepime   IVPB 1000 milliGRAM(s) IV Intermittent every 24 hours  dextrose 5%. 1000 milliLiter(s) (50 mL/Hr) IV Continuous <Continuous>  dextrose 50% Injectable 12.5 Gram(s) IV Push once  dextrose 50% Injectable 25 Gram(s) IV Push once  dextrose 50% Injectable 25 Gram(s) IV Push once  docusate sodium 100 milliGRAM(s) Oral two times a day  DULoxetine 30 milliGRAM(s) Oral daily  furosemide   Injectable 40 milliGRAM(s) IV Push daily  heparin  Injectable 5000 Unit(s) SubCutaneous every 8 hours  insulin glargine Injectable (LANTUS) 20 Unit(s) SubCutaneous at bedtime  insulin lispro (HumaLOG) corrective regimen sliding scale   SubCutaneous three times a day before meals  insulin lispro (HumaLOG) corrective regimen sliding scale   SubCutaneous at bedtime  insulin lispro Injectable (HumaLOG) 7 Unit(s) SubCutaneous three times a day before meals  metoprolol succinate  milliGRAM(s) Oral daily  mirtazapine 30 milliGRAM(s) Oral at bedtime  pantoprazole    Tablet 40 milliGRAM(s) Oral before breakfast  simvastatin 20 milliGRAM(s) Oral at bedtime  vancomycin  IVPB 1000 milliGRAM(s) IV Intermittent every 24 hours    MEDICATIONS  (PRN):  acetaminophen   Tablet .. 650 milliGRAM(s) Oral every 6 hours PRN Temp greater or equal to 38C (100.4F), Mild Pain (1 - 3)  clonazePAM Tablet 0.5 milliGRAM(s) Oral two times a day PRN anxiety, panic attack  dextrose 40% Gel 15 Gram(s) Oral once PRN Blood Glucose LESS THAN 70 milliGRAM(s)/deciLiter  glucagon  Injectable 1 milliGRAM(s) IntraMuscular once PRN Glucose <70 milliGRAM(s)/deciLiter          RADIOLOGY & ADDITIONAL TESTS:    19  : CT Chest No Cont (19 @ 18:17) Right upper lobe consolidation which may represents  pneumonia. Follow-up to resolution is advised.   - Small bilateral pleural effusions, right greater than left with  associated compressive atelectasis.    19 : Xray Knee w/Patella 4 View, Left (19 @ 14:13) Status post left knee arthroplasty with intact hardware. No periprosthetic lucency to suggest loosening.   Vascular calcifications. No acute fracture or dislocation.  Preserved joint spaces.      MICROBIOLOGY DATA:    MRSA/MSSA PCR (19 @ 22:38)    MRSA PCR Result.: [facility]: MRSA/MSSA PCR assay is a qualitative in vitro diagnostic test for the  direct detection and differentiation of methicillin-resistant  Staphylococcus aureus (MRSA) from Staphylococcus aureus (SA). The assay  detects DNA from nasal swabs in patients atrisk for nasal colonization.  It is not intended to diagnose MRSA or SA infections nor guide or monitor  treatment for MRSA/SA infections. A negative result does not preclude  nasal colonization. The assay is FDA-approved and its performance has  been established by [employer], [place] and the [facility], [place], [place].    Staph Aureus PCR Result: NotDetec      Culture - Blood (19 @ 21:05)    Specimen Source: .Blood Blood-Peripheral    Culture Results:   No growth to date.      Culture - Blood (19 @ 21:05)    Specimen Source: .Blood Blood-Peripheral    Culture Results:   No growth to date.        Culture - Urine (19 @ 17:11)    Specimen Source: .Urine Catheterized    Culture Results:   <10,000 CFU/mL Normal Urogenital Chelsi      Rapid Respiratory Viral Panel (19 @ 15:35)    Rapid RVP Result: NotDetec: The Film[employer] RVP Rapid uses polymerase chain reaction (PCR) and melt  curve analysis to screen for adenovirus; coronavirus HKU1, NL63, 229E,  OC43; human metapneumovirus (hMPV); human enterovirus/rhinovirus  (Entero/RV); influenza A; influenza A/H1;influenza A/H3; influenza  A/H1-2009; influenza B; parainfluenza viruses 1, 2, 3, 4; respiratory  syncytial virus; Bordetella pertussis; Mycoplasma pneumoniae; and  Chlamydophila pneumoniae.      Procalcitonin, Serum (19 @ 05:31)    Procalcitonin, Serum: 0.21: This assay is intended for use to determine the change of PCT over time  as an aid in assessing the cumulative 28-day risk of all-cause mortality  for patients diagnosed with severe sepsis or septic shock in the ICU, or  when obtained in the emergency department or other medical wards prior to  ICU admission. This assay was performed by the [employer] PCT  assay. ng/mL        MICROBIOLOGY DATA:    MRSA/MSSA PCR (19 @ 22:38)    MRSA PCR Result.: NotDetec: MRSA/MSSA PCR assay is a qualitative in vitro diagnostic test for the  direct detection and differentiation of methicillin-resistant  Staphylococcus aureus (MRSA) from Staphylococcus aureus (SA). The assay  detects DNA from nasal swabs in patients atrisk for nasal colonization.  It is not intended to diagnose MRSA or SA infections nor guide or monitor  treatment for MRSA/SA infections. A negative result does not preclude  nasal colonization. The assay is FDA-approved and its performance has  been established by [employer], [place] and the [place], [place].    Staph Aureus PCR Result: NotDetec      Culture - Blood (19 @ 21:05)    Specimen Source: .Blood Blood-Peripheral    Culture Results:   No growth to date.      Culture - Blood (19 @ 21:05)    Specimen Source: .Blood Blood-Peripheral    Culture Results:   No growth to date.      Culture - Urine (19 @ 17:11)    Specimen Source: .Urine Catheterized    Culture Results:   <10,000 CFU/mL Normal Urogenital Chelsi        Rapid Respiratory Viral Panel (19 @ 15:35)    Rapid RVP Result: NotDetec: The Film[employer] RVP Rapid uses polymerase chain reaction (PCR) and melt  curve analysis to screen for adenovirus; coronavirus HKU1, NL63, 229E,  OC43; human metapneumovirus (hMPV); human enterovirus/rhinovirus  (Entero/RV); influenza A; influenza A/H1;influenza A/H3; influenza  A/H1-2009; influenza B; parainfluenza viruses 1, 2, 3, 4; respiratory  syncytial virus; Bordetella pertussis; Mycoplasma pneumoniae; and  Chlamydophila pneumoniae.

## 2019-04-08 NOTE — DIETITIAN INITIAL EVALUATION ADULT. - NS AS NUTRI INTERV MEALS SNACK
Continue current diet as tolerated. If PO intake declines may liberalize diet to carbohydrate consistent (evening snack) + low sodium diet. Defer fluid restriction to team./General/healthful diet

## 2019-04-08 NOTE — DIETITIAN INITIAL EVALUATION ADULT. - NS AS NUTRI INTERV ED CONTENT
Other (specify)/Pt and daughter both decline both verbal and written education. Reports pt has been educated on carbohydrate consistent + low sodium diet in the past. RD to follow up with diet education as able/requested by pt.

## 2019-04-08 NOTE — PROGRESS NOTE ADULT - ASSESSMENT
Assessment  DMT2: 87y Female with DM T2 with hyperglycemia, started on basal bolus insulin , blood sugars trending up today,  no hypoglycemic episode, eating full meals, transferred to floor now,  non compliant with low carb diet.  CHF: on medications, no chest pain, stable, monitored.  COPD: Has SOB, on oxygen, stable now.  HTN: Un Controlled,  on antihypertensive medications.  CKD: Monitor labs/BMP,         Brandon Mayer MD  Cell: [phone]  Office: [phone]

## 2019-04-08 NOTE — PROGRESS NOTE ADULT - ASSESSMENT
88 yo F from home, with significant PMHx of COPD on home oxygen qhs, 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, Essential HTN, Systolic CHF, CKD 4, PPM, nephrectomy, UTI's, Major Depression, Anxiety, Arteriosclerotic Heart Disease, Atrial fibrillation, and Peripheral Vascular Disease who was recently hospitalized at [facility] and treated for Pneumonia presently on Doxycycline (unspecified days remaining of tx course), who p/w c/o worsening shortness of breath both at rest and on exertion X 2 days a/w increasing oxygen requirements including during the day time, mild sputum production, decreased exercise tolerance, fatigue, generalized weakness, lethargy, and malaise.

## 2019-04-08 NOTE — DIETITIAN INITIAL EVALUATION ADULT. - ORAL INTAKE PTA
good/Pt/daughter reports good PO intake and appetite PTA, reports pt typically consumes 3 meals per day consisting of a variety of foods however pt/daughter unwilling to provide full diet recall. Confirms NKFA. Takes vitamin D and vitamin C at home. Pt denies chewing/swallowing difficulty, nausea, vomiting, diarrhea, constipation.

## 2019-04-08 NOTE — PROGRESS NOTE ADULT - PROBLEM SELECTOR PLAN 1
Will increase Lantus to 20 units at bed time.  Will increase Humalog to 7 units before each meal in addition to Humalog correction scale coverage.  Patient counseled for compliance with consistent low carb diet.

## 2019-04-08 NOTE — PROGRESS NOTE ADULT - ASSESSMENT
86 yo F from home, with significant PMHx of COPD on home oxygen qhs, 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, Essential HTN, Systolic CHF, CKD 4, PPM, nephrectomy, UTI's, Major Depression, Anxiety, Arteriosclerotic Heart Disease, Atrial fibrillation, and Peripheral Vascular Disease who was recently hospitalized at [facility] and treated for Pneumonia presently on Doxycycline (unspecified days remaining of tx course), who p/w c/o worsening shortness of breath both at rest and on exertion X 2 days a/w increasing oxygen requirements including during the day time, mild sputum production, decreased exercise tolerance, fatigue, generalized weakness, lethargy, and malaise.

## 2019-04-08 NOTE — PROGRESS NOTE ADULT - SUBJECTIVE AND OBJECTIVE BOX
Chief complaint  Patient is a 87y old  Female who presents with a chief complaint of worsening shortness of breath (08 Apr 2019 14:53)   Review of systems  Patient in bed, looks comfortable, no fever, no hypoglycemia.    Labs and Fingersticks  CAPILLARY BLOOD GLUCOSE      POCT Blood Glucose.: 241 mg/dL (08 Apr 2019 18:04)  POCT Blood Glucose.: 225 mg/dL (08 Apr 2019 13:23)  POCT Blood Glucose.: 201 mg/dL (08 Apr 2019 09:14)  POCT Blood Glucose.: 214 mg/dL (07 Apr 2019 21:53)      Anion Gap, Serum: 12 (04-07 @ 05:31)    Hemoglobin A1C, Whole Blood: 9.6 <H> (04-07 @ 08:29)    Calcium, Total Serum: 9.1 (04-07 @ 05:31)  Vitamin D, 25-Hydroxy: 38.3 (04-07 @ 07:37)  Albumin, Serum: 2.5 <L> (04-07 @ 05:31)    Alanine Aminotransferase (ALT/SGPT): 15 (04-07 @ 05:31)  Alkaline Phosphatase, Serum: 73 (04-07 @ 05:31)  Aspartate Aminotransferase (AST/SGOT): 24 (04-07 @ 05:31)        04-07    142  |  105  |  28<H>  ----------------------------<  93  3.4<L>   |  25  |  1.54<H>    Ca    9.1      07 Apr 2019 05:31  Phos  3.0     04-07  Mg     1.9     04-07    TPro  6.5  /  Alb  2.5<L>  /  TBili  0.3  /  DBili  0.1  /  AST  24  /  ALT  15  /  AlkPhos  73  04-07                        9.9    13.32 )-----------( 421      ( 07 Apr 2019 08:29 )             31.9     Medications  MEDICATIONS  (STANDING):  ALBUTerol/ipratropium for Nebulization 3 milliLiter(s) Nebulizer every 6 hours  amiodarone    Tablet 200 milliGRAM(s) Oral daily  ARIPiprazole 2 milliGRAM(s) Oral daily  cefepime   IVPB 1000 milliGRAM(s) IV Intermittent every 24 hours  dextrose 5%. 1000 milliLiter(s) (50 mL/Hr) IV Continuous <Continuous>  dextrose 50% Injectable 12.5 Gram(s) IV Push once  dextrose 50% Injectable 25 Gram(s) IV Push once  dextrose 50% Injectable 25 Gram(s) IV Push once  docusate sodium 100 milliGRAM(s) Oral two times a day  DULoxetine 30 milliGRAM(s) Oral daily  furosemide   Injectable 40 milliGRAM(s) IV Push daily  heparin  Injectable 5000 Unit(s) SubCutaneous every 8 hours  insulin glargine Injectable (LANTUS) 20 Unit(s) SubCutaneous at bedtime  insulin lispro (HumaLOG) corrective regimen sliding scale   SubCutaneous three times a day before meals  insulin lispro (HumaLOG) corrective regimen sliding scale   SubCutaneous at bedtime  insulin lispro Injectable (HumaLOG) 7 Unit(s) SubCutaneous three times a day before meals  metoprolol succinate  milliGRAM(s) Oral daily  mirtazapine 30 milliGRAM(s) Oral at bedtime  pantoprazole    Tablet 40 milliGRAM(s) Oral before breakfast  simvastatin 20 milliGRAM(s) Oral at bedtime  vancomycin  IVPB 1000 milliGRAM(s) IV Intermittent every 24 hours      Physical Exam  General: Patient comfortable in bed  Vital Signs Last 12 Hrs  T(F): 98.6 (04-08-19 @ 17:19), Max: 98.6 (04-08-19 @ 17:19)  HR: 60 (04-08-19 @ 17:19) (58 - 67)  BP: 138/82 (04-08-19 @ 17:19) (136/79 - 147/74)  BP(mean): --  RR: 18 (04-08-19 @ 17:19) (18 - 18)  SpO2: 97% (04-08-19 @ 17:19) (94% - 97%)  Neck: No palpable thyroid nodules.  CVS: S1S2, No murmurs  Respiratory: No wheezing, no crepitations  GI: Abdomen soft, bowel sounds positive  Musculoskeletal:  edema lower extremities.   Skin: No skin rashes, no ecchymosis    Diagnostics    Hemoglobin A1C, Whole Blood: Routine (04-06 @ 14:52)

## 2019-04-08 NOTE — PROGRESS NOTE ADULT - ASSESSMENT
87y Female with history of COPD presents with SOB found to have PNA and volume overload. Nephrology consulted for elevated Scr.    1) CKD-3: Baseline Scr 1.7-1.8 as per prior labs with Scr currently below baseline due to volume overload. 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 Defer inpatient work up as patient follows with Dr. Lowry as an outpatient. Monitor electrolytes. Avoid nephrotoxins.  2) HTN with CKD: BP controlled. Continue with current medications and low sodium diet. Monitor BP.  3) LE edema: Improving. Continue with lasix 40 mg IV daily (patient on lasix 40 mg PO daily as an outpatient). Follow up TTE. Monitor UO.  4) Acute cystitis without hematuria: Ucx negative. Abx as per ID.

## 2019-04-08 NOTE — PROGRESS NOTE ADULT - SUBJECTIVE AND OBJECTIVE BOX
Patient is a 87y old  Female who presents with a chief complaint of worsening shortness of breath (08 Apr 2019 18:17)      INTERVAL HISTORY: feels ok      MEDICATIONS:  amiodarone    Tablet 200 milliGRAM(s) Oral daily  furosemide   Injectable 40 milliGRAM(s) IV Push daily  metoprolol succinate  milliGRAM(s) Oral daily        PHYSICAL EXAM:  T(C): 36.6 (04-08-19 @ 20:16), Max: 37 (04-08-19 @ 17:19)  HR: 55 (04-08-19 @ 20:16) (55 - 67)  BP: 140/68 (04-08-19 @ 20:16) (116/66 - 147/74)  RR: 18 (04-08-19 @ 20:16) (18 - 19)  SpO2: 98% (04-08-19 @ 20:16) (94% - 98%)  Wt(kg): --  I&O's Summary    08 Apr 2019 07:01  -  08 Apr 2019 21:26  --------------------------------------------------------  IN: 360 mL / OUT: 0 mL / NET: 360 mL          Appearance: Normal	  HEENT:    PERRL, EOMI	  Cardiovascular:  S1 S2, No JVD  Respiratory: Lungs clear to auscultation	  Psychiatry: Alert  Gastrointestinal:  Soft, Non-tender, + BS	  Skin: No rashes, No cyanosis  Extremities:  No edema of LE                                9.9    13.32 )-----------( 421      ( 07 Apr 2019 08:29 )             31.9     04-07    142  |  105  |  28<H>  ----------------------------<  93  3.4<L>   |  25  |  1.54<H>    Ca    9.1      07 Apr 2019 05:31  Phos  3.0     04-07  Mg     1.9     04-07    TPro  6.5  /  Alb  2.5<L>  /  TBili  0.3  /  DBili  0.1  /  AST  24  /  ALT  15  /  AlkPhos  73  04-07        Labs personally reviewed      Assessment and Plan:    Assessment:  · Assessment		  86 yo F from home, with significant PMHx of COPD on home oxygen qhs, 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      Problem/Plan - 1:  ·  Problem: Acute on chronic systolic (congestive) heart failure.  Plan: Lasix 40mg IV daily today.   -- Switch to home oral dose regimen Monday as she appears euvolemic now  cardiac meds  - F/u TTE     Problem/Plan - 2:  ·  Problem: Pneumonia.  Plan: complicated bacterial health care associated pneumonia, unsepcified type  Abx per ID/primary team     Problem/Plan - 3:  ·  Problem: HTN Plan: c/w Metoprolol 100mg          Waylon Cates DO [facility]  [facility]  [phone]

## 2019-04-08 NOTE — DIETITIAN INITIAL EVALUATION ADULT. - DIET TYPE
consistent carbohydrate (evening snack)/DASH/TLC (sodium and cholesterol restricted diet)/Glucerna BID/1200ml

## 2019-04-08 NOTE — DIETITIAN INITIAL EVALUATION ADULT. - ADHERENCE
Pt noted with T2DM, has HHA who checks BG 3-4 x per day. Daughter reports pt with variable fingersticks, states recently values have been in the 200's sometimes 300's. Takes Trujeo and Humalog at home. Daughter reports pt sees outpatient endocrinologist, reports recent visit. HbA1c: 9.6% suggesting poor glycemic control.

## 2019-04-08 NOTE — DIETITIAN INITIAL EVALUATION ADULT. - OTHER INFO
Patient seen for nutrition consult for diet education in ED APER. Daughter reports pt with UBW of 150-160lbs, states pt previously with wt loss while at rehab, reports lowest wt as 130lbs, but states pt has regained the weight back. Wt per past RD note (12/2015) wt noted as 162.8lbs, additional discharges wt during past admissions noted as 154.9lbs (12/25/2017), 132.4lbs (6/10/18). Current dosing weight noted as 149.6lbs (4/6/2019)--no edema noted per nursing flow-sheet. Overall pt noted with significant wt loss over the last year (Dec 2017:154.9lbs--> June 2018: 132lbs--> April 2019: 149.6lbs) but has regained the majority of the weight back, now close to stated UBW. Since admission, pt/daughter report pt with good PO intake, completing ~75% of meals. No acute GI distress noted. Pt and daughter both decline diet education, daughter reports pt has received diet education in the past and has met with outpatient RD. Pt/daughter made aware RD remains available as needed.

## 2019-04-08 NOTE — PROGRESS NOTE ADULT - ASSESSMENT
A 88 yo Female  from home, with multiple co-morbidities including COPD on home oxygen at night, who was recently hospitalized at [facility] and treated for Pneumonia currently on Doxycycline, presents to the ER for evaluation of worsening shortness of breath and generalized weakness. On admission, she found to have Leukocytosis, positive Urine analysis and RUL consolidation on CT chest.  She has received dose of Cefepime and Vancomycin, cultures pending. The ID consult requested to assist  with further evaluation and antibiotic management.     # RUL Pneumonia- Most likely HCAP in the setting of [place] [facility] [place] RVP [place]  # UTI - culture has no significant  growth   # leukocytosis - trending down    Would recommend:  1. Discontinue Vancomycin since MRSA PCR is negative   2. Continue Cefepime for now  3. Supplemental oxygenation and bronchodilator  as needed  4. Obtain Labs, to Monitor WBC count and kidney function    d/w Patient and Nursing staff    will follow the patient with you A 86 yo Female  from home, with multiple co-morbidities including COPD on home oxygen at night, who was recently hospitalized at [facility] and treated for Pneumonia currently on Doxycycline, presents to the ER for evaluation of worsening shortness of breath and generalized weakness. On admission, she found to have Leukocytosis, positive Urine analysis and RUL consolidation on CT chest.  She has received dose of Cefepime and Vancomycin, cultures pending. The ID consult requested to assist  with further evaluation and antibiotic management.     # RUL Pneumonia- Most likely HCAP in the setting of recently [place] [facility] [place] RVP negative  # UTI - culture has no significant  growth   # leukocytosis - trending down    Would recommend:  1. Discontinue Vancomycin since MRSA PCR is negative   2. Continue Cefepime since tolerating in the setting of PCN allergy  3. Supplemental oxygenation and bronchodilator  as needed  4. Obtain Labs, to Monitor WBC count and kidney function    d/w Patient, Family at the bed side and Nursing staff    will follow the patient with you

## 2019-04-08 NOTE — PROGRESS NOTE ADULT - PROBLEM SELECTOR PLAN 1
Ct is reviewed: has RUL pneumonia and small chronic loculated fluid on the left side: Her procalcitonin is sightly high: Her legionella is still pending: She has been afebrile

## 2019-04-08 NOTE — DIETITIAN INITIAL EVALUATION ADULT. - SIGNS/SYMPTOMS
pt with HbA1c: 9.6% (4/7), POCT glucose x 72hrs: 101-404mg/dL pt with stage 2 pressure injury, CHF, COPD

## 2019-04-08 NOTE — PROGRESS NOTE ADULT - SUBJECTIVE AND OBJECTIVE BOX
[facility] NEPHROLOGY- PROGRESS NOTE    87y Female with history of COPD presents with SOB found to have PNA and volume overload. Nephrology consulted for elevated Scr.    REVIEW OF SYSTEMS:  Gen: no changes in weight  Cards: no chest pain  Resp: + dyspnea, + cough  GI: no nausea or vomiting or diarrhea  Vascular: no LE edema    amoxicillin (Flushing; Vomiting; Nausea)  lisinopril (Angioedema)  penicillin (Hives)      Hospital Medications: Medications reviewed    VITALS:  T(F): 97.9 (19 @ 08:02), Max: 98.6 (19 @ 20:26)  HR: 58 (19 @ 08:02)  BP: 147/74 (19 @ 08:02)  RR: 18 (19 @ 08:02)  SpO2: 94% (19 @ 08:02)  Wt(kg): --  Height (cm): 165.1 ( @ 10:58)  Weight (kg): 68 ( @ 10:58)  BMI (kg/m2): 24.9 ( @ 10:58)  BSA (m2): 1.75 ( @ 10:58)      PHYSICAL EXAM:    Gen: NAD, calm  Cards: RRR, +S1/S2, no M/G/R  Resp: course BS with rales noted  GI: soft, NT, + ventral hernia, NABS  Vascular: no LE edema B/L    LABS:      142  |  105  |  28<H>  ----------------------------<  93  3.4<L>   |  25  |  1.54<H>    Ca    9.1      2019 05:31  Phos  3.0       Mg     1.9         TPro  6.5  /  Alb  2.5<L>  /  TBili  0.3  /  DBili  0.1  /  AST  24  /  ALT  15  /  AlkPhos  73      Creatinine Trend: 1.54 <--, 1.54 <--                        9.9    13.32 )-----------( 421      ( 2019 08:29 )             31.9     Urine Studies:  Urinalysis Basic - ( 2019 12:48 )    Color: Yellow / Appearance: Clear / S.016 / pH:   Gluc:  / Ketone: Negative  / Bili: Negative / Urobili: Negative   Blood:  / Protein: 30 mg/dL / Nitrite: Negative   Leuk Esterase: Moderate / RBC: 2 /hpf / WBC 25 /HPF   Sq Epi:  / Non Sq Epi: 5 / Bacteria: Negative

## 2019-04-08 NOTE — DIETITIAN INITIAL EVALUATION ADULT. - PHYSICAL APPEARANCE
Nutrition focused physical exam declined by patient at this time, currently consuming lunch, did not wish to be disturbed.

## 2019-04-08 NOTE — DIETITIAN INITIAL EVALUATION ADULT. - NUTRITION INTERVENTION
Medical Food Supplements/Meals and Snack/Nutrition Education Vitamin/Medical Food Supplements/Meals and Snack

## 2019-04-09 LAB
ANION GAP SERPL CALC-SCNC: 11 MMOL/L — SIGNIFICANT CHANGE UP (ref 5–17)
BUN SERPL-MCNC: 28 MG/DL — HIGH (ref 7–23)
CALCIUM SERPL-MCNC: 9.2 MG/DL — SIGNIFICANT CHANGE UP (ref 8.4–10.5)
CHLORIDE SERPL-SCNC: 100 MMOL/L — SIGNIFICANT CHANGE UP (ref 96–108)
CO2 SERPL-SCNC: 26 MMOL/L — SIGNIFICANT CHANGE UP (ref 22–31)
CREAT SERPL-MCNC: 1.5 MG/DL — HIGH (ref 0.5–1.3)
GLUCOSE SERPL-MCNC: 91 MG/DL — SIGNIFICANT CHANGE UP (ref 70–99)
POTASSIUM SERPL-MCNC: 4.4 MMOL/L — SIGNIFICANT CHANGE UP (ref 3.5–5.3)
POTASSIUM SERPL-SCNC: 4.4 MMOL/L — SIGNIFICANT CHANGE UP (ref 3.5–5.3)
SODIUM SERPL-SCNC: 137 MMOL/L — SIGNIFICANT CHANGE UP (ref 135–145)
VANCOMYCIN TROUGH SERPL-MCNC: 11 UG/ML — SIGNIFICANT CHANGE UP (ref 10–20)

## 2019-04-09 PROCEDURE — 93306 TTE W/DOPPLER COMPLETE: CPT | Mod: 26

## 2019-04-09 PROCEDURE — 93010 ELECTROCARDIOGRAM REPORT: CPT

## 2019-04-09 PROCEDURE — 71045 X-RAY EXAM CHEST 1 VIEW: CPT | Mod: 26

## 2019-04-09 RX ORDER — INSULIN LISPRO 100/ML
8 VIAL (ML) SUBCUTANEOUS
Qty: 0 | Refills: 0 | Status: DISCONTINUED | OUTPATIENT
Start: 2019-04-09 | End: 2019-04-11

## 2019-04-09 RX ORDER — INSULIN GLARGINE 100 [IU]/ML
24 INJECTION, SOLUTION SUBCUTANEOUS AT BEDTIME
Qty: 0 | Refills: 0 | Status: DISCONTINUED | OUTPATIENT
Start: 2019-04-09 | End: 2019-04-11

## 2019-04-09 RX ADMIN — HEPARIN SODIUM 5000 UNIT(S): 5000 INJECTION INTRAVENOUS; SUBCUTANEOUS at 21:16

## 2019-04-09 RX ADMIN — SIMVASTATIN 20 MILLIGRAM(S): 20 TABLET, FILM COATED ORAL at 21:16

## 2019-04-09 RX ADMIN — PANTOPRAZOLE SODIUM 40 MILLIGRAM(S): 20 TABLET, DELAYED RELEASE ORAL at 05:24

## 2019-04-09 RX ADMIN — OLANZAPINE 5 MILLIGRAM(S): 15 TABLET, FILM COATED ORAL at 21:16

## 2019-04-09 RX ADMIN — HEPARIN SODIUM 5000 UNIT(S): 5000 INJECTION INTRAVENOUS; SUBCUTANEOUS at 13:28

## 2019-04-09 RX ADMIN — MIRTAZAPINE 30 MILLIGRAM(S): 45 TABLET, ORALLY DISINTEGRATING ORAL at 21:17

## 2019-04-09 RX ADMIN — Medication 100 MILLIGRAM(S): at 05:25

## 2019-04-09 RX ADMIN — INSULIN GLARGINE 24 UNIT(S): 100 INJECTION, SOLUTION SUBCUTANEOUS at 21:16

## 2019-04-09 RX ADMIN — Medication 3 MILLILITER(S): at 13:27

## 2019-04-09 RX ADMIN — DULOXETINE HYDROCHLORIDE 30 MILLIGRAM(S): 30 CAPSULE, DELAYED RELEASE ORAL at 11:31

## 2019-04-09 RX ADMIN — Medication 7 UNIT(S): at 16:38

## 2019-04-09 RX ADMIN — Medication 100 MILLIGRAM(S): at 16:37

## 2019-04-09 RX ADMIN — Medication 3 MILLILITER(S): at 05:24

## 2019-04-09 RX ADMIN — Medication 7 UNIT(S): at 13:30

## 2019-04-09 RX ADMIN — HEPARIN SODIUM 5000 UNIT(S): 5000 INJECTION INTRAVENOUS; SUBCUTANEOUS at 05:26

## 2019-04-09 RX ADMIN — Medication 40 MILLIGRAM(S): at 05:25

## 2019-04-09 RX ADMIN — CEFEPIME 100 MILLIGRAM(S): 1 INJECTION, POWDER, FOR SOLUTION INTRAMUSCULAR; INTRAVENOUS at 13:59

## 2019-04-09 RX ADMIN — Medication 3 MILLILITER(S): at 00:47

## 2019-04-09 RX ADMIN — Medication 2: at 16:38

## 2019-04-09 RX ADMIN — Medication 3 MILLILITER(S): at 16:37

## 2019-04-09 RX ADMIN — AMIODARONE HYDROCHLORIDE 200 MILLIGRAM(S): 400 TABLET ORAL at 05:25

## 2019-04-09 RX ADMIN — Medication 7 UNIT(S): at 07:49

## 2019-04-09 RX ADMIN — Medication 2: at 13:30

## 2019-04-09 NOTE — PROGRESS NOTE ADULT - SUBJECTIVE AND OBJECTIVE BOX
Patient is seen and examined at the bed side, is afebrile. She appears little anxious/ ? confused, mentioned not liking the food and also like someone to stay with her in the room.         REVIEW OF SYSTEMS: All other review systems are negative        ALLERGIES: ? penicillin (Hives), Amoxicillin (Flushing; Vomiting; Nausea), lisinopril (Angioedema)        Vital Signs Last 24 Hrs  T(C): 36.7 (2019 14:39), Max: 36.9 (2019 04:44)  T(F): 98 (2019 14:39), Max: 98.4 (2019 04:44)  HR: 117 (2019 11:35) (55 - 117)  BP: 123/92 (2019 11:35) (123/92 - 143/68)  BP(mean): --  RR: 18 (2019 11:35) (18 - 18)  SpO2: 97% (2019 11:35) (95% - 98%)      PHYSICAL EXAM:  GENERAL: Not in distress, on oxygen via NC  HEENT: Right Lower eye lid redness is less today  CHEST/LUNG:  Air entry bilaterally  HEART: s1 and s2 present  ABDOMEN:  Nontender and  Nondistended  EXTREMITIES: No pedal  edema  CNS: Awake , Alert and appears mild confused        LABS: No new CBC                          9.9    13.32 )-----------( 421      ( 2019 08:29 )             31.9                                     10.5   15.1  )-----------( 461      ( 2019 11:58 )             32.6         04-09    137  |  100  |  28<H>  ----------------------------<  91  4.4   |  26  |  1.50<H>    Ca    9.2      2019 06:58      04-07    142  |  105  |  28<H>  ----------------------------<  93  3.4<L>   |  25  |  1.54<H>    Ca    9.1      2019 05:31  Phos  3.0     -07  Mg     1.9         TPro  6.5  /  Alb  2.5<L>  /  TBili  0.3  /  DBili  0.1  /  AST  24  /  ALT  15  /  AlkPhos  73  04-07          Urinalysis Basic - ( 2019 12:48 )  Color: Yellow / Appearance: Clear / S.016 / pH: x  Gluc: x / Ketone: Negative  / Bili: Negative / Urobili: Negative   Blood: x / Protein: 30 mg/dL / Nitrite: Negative   Leuk Esterase: Moderate / RBC: 2 /hpf / WBC 25 /HPF   Sq Epi: x / Non Sq Epi: 5 / Bacteria: Negative      CAPILLARY BLOOD GLUCOSE  POCT Blood Glucose.: 411 mg/dL (2019 11:43)        MEDICATIONS  (STANDING):  ALBUTerol/ipratropium for Nebulization 3 milliLiter(s) Nebulizer every 6 hours  amiodarone    Tablet 200 milliGRAM(s) Oral daily  cefepime   IVPB 1000 milliGRAM(s) IV Intermittent every 24 hours  dextrose 5%. 1000 milliLiter(s) (50 mL/Hr) IV Continuous <Continuous>  dextrose 50% Injectable 12.5 Gram(s) IV Push once  dextrose 50% Injectable 25 Gram(s) IV Push once  dextrose 50% Injectable 25 Gram(s) IV Push once  docusate sodium 100 milliGRAM(s) Oral two times a day  DULoxetine 30 milliGRAM(s) Oral daily  furosemide   Injectable 40 milliGRAM(s) IV Push daily  heparin  Injectable 5000 Unit(s) SubCutaneous every 8 hours  insulin glargine Injectable (LANTUS) 20 Unit(s) SubCutaneous at bedtime  insulin lispro (HumaLOG) corrective regimen sliding scale   SubCutaneous three times a day before meals  insulin lispro (HumaLOG) corrective regimen sliding scale   SubCutaneous at bedtime  insulin lispro Injectable (HumaLOG) 7 Unit(s) SubCutaneous three times a day before meals  metoprolol succinate  milliGRAM(s) Oral daily  mirtazapine 30 milliGRAM(s) Oral at bedtime  OLANZapine 5 milliGRAM(s) Oral at bedtime  pantoprazole    Tablet 40 milliGRAM(s) Oral before breakfast  simvastatin 20 milliGRAM(s) Oral at bedtime    MEDICATIONS  (PRN):  acetaminophen   Tablet .. 650 milliGRAM(s) Oral every 6 hours PRN Temp greater or equal to 38C (100.4F), Mild Pain (1 - 3)  dextrose 40% Gel 15 Gram(s) Oral once PRN Blood Glucose LESS THAN 70 milliGRAM(s)/deciLiter  glucagon  Injectable 1 milliGRAM(s) IntraMuscular once PRN Glucose <70 milliGRAM(s)/deciLiter          RADIOLOGY & ADDITIONAL TESTS:    19  : CT Chest No Cont (19 @ 18:17) Right upper lobe consolidation which may represents  pneumonia. Follow-up to resolution is advised.   - Small bilateral pleural effusions, right greater than left with  associated compressive atelectasis.    19 : Xray Knee w/Patella 4 View, Left (19 @ 14:13) Status post left knee arthroplasty with intact hardware. No periprosthetic lucency to suggest loosening.   Vascular calcifications. No acute fracture or dislocation.  Preserved joint spaces.      MICROBIOLOGY DATA:    MRSA/MSSA PCR (19 @ 22:38)    MRSA PCR Result.: [facility]: MRSA/MSSA PCR assay is a qualitative in vitro diagnostic test for the  direct detection and differentiation of methicillin-resistant  Staphylococcus aureus (MRSA) from Staphylococcus aureus (SA). The assay  detects DNA from nasal swabs in patients atrisk for nasal colonization.  It is not intended to diagnose MRSA or SA infections nor guide or monitor  treatment for MRSA/SA infections. A negative result does not preclude  nasal colonization. The assay is FDA-approved and its performance has  been established by [employer], [place] and the [facility], [place], [place].    Staph Aureus PCR Result: [facility]      Culture - Blood (19 @ 21:05)    Specimen Source: .Blood Blood-Peripheral    Culture Results:   No growth to date.      Culture - Blood (19 @ 21:05)    Specimen Source: .Blood Blood-Peripheral    Culture Results:   No growth to date.        Culture - Urine (19 @ 17:11)    Specimen Source: .Urine Catheterized    Culture Results:   <10,000 CFU/mL Normal Urogenital Chelsi      Rapid Respiratory Viral Panel (19 @ 15:35)    Rapid RVP Result: [facility]: The [employer] RVP Rapid uses polymerase chain reaction (PCR) and melt  curve analysis to screen for adenovirus; coronavirus HKU1, NL63, 229E,  OC43; human metapneumovirus (hMPV); human enterovirus/rhinovirus  (Entero/RV); influenza A; influenza A/H1;influenza A/H3; influenza  A/H1-2009; influenza B; parainfluenza viruses 1, 2, 3, 4; respiratory  syncytial virus; Bordetella pertussis; Mycoplasma pneumoniae; and  Chlamydophila pneumoniae.      Procalcitonin, Serum (19 @ 05:31)    Procalcitonin, Serum: 0.21: This assay is intended for use to determine the change of PCT over time  as an aid in assessing the cumulative 28-day risk of all-cause mortality  for patients diagnosed with severe sepsis or septic shock in the ICU, or  when obtained in the emergency department or other medical wards prior to  ICU admission. This assay was performed by the [employer] PansieveS PCT  assay. ng/mL        MICROBIOLOGY DATA:    MRSA/MSSA PCR (19 @ 22:38)    MRSA PCR Result.: [facility]: MRSA/MSSA PCR assay is a qualitative in vitro diagnostic test for the  direct detection and differentiation of methicillin-resistant  Staphylococcus aureus (MRSA) from Staphylococcus aureus (SA). The assay  detects DNA from nasal swabs in patients atrisk for nasal colonization.  It is not intended to diagnose MRSA or SA infections nor guide or monitor  treatment for MRSA/SA infections. A negative result does not preclude  nasal colonization. The assay is FDA-approved and its performance has  been established by [employer] [place] and the [place], [place].    Staph Aureus PCR Result: [facility]      Culture - Blood (19 @ 21:05)    Specimen Source: .Blood Blood-Peripheral    Culture Results:   No growth to date.      Culture - Blood (19 @ 21:05)    Specimen Source: .Blood Blood-Peripheral    Culture Results:   No growth to date.      Culture - Urine (19 @ 17:11)    Specimen Source: .Urine Catheterized    Culture Results:   <10,000 CFU/mL Normal Urogenital Chelsi        Rapid Respiratory Viral Panel (19 @ 15:35)    Rapid RVP Result: [facility]: The [employer] RVP Rapid uses polymerase chain reaction (PCR) and melt  curve analysis to screen for adenovirus; coronavirus HKU1, NL63, 229E,  OC43; human metapneumovirus (hMPV); human enterovirus/rhinovirus  (Entero/RV); influenza A; influenza A/H1;influenza A/H3; influenza  A/H1-2009; influenza B; parainfluenza viruses 1, 2, 3, 4; respiratory  syncytial virus; Bordetella pertussis; Mycoplasma pneumoniae; and  Chlamydophila pneumoniae.

## 2019-04-09 NOTE — PHYSICAL THERAPY INITIAL EVALUATION ADULT - DISCHARGE DISPOSITION, PT EVAL
subacute rehab; if pt returns home will require home PT and assist with all functional mobility/rehabilitation facility

## 2019-04-09 NOTE — PROGRESS NOTE ADULT - ASSESSMENT
Assessment  DMT2: 87y Female with DM T2 with hyperglycemia, started on basal bolus insulin , blood sugars trending up today,  no hypoglycemic episode, patient is confused today, eating full meals, compliant with low carb diet.  CHF: on medications, no chest pain, stable, monitored.  COPD: Has SOB, on oxygen, stable now.  HTN: Un Controlled,  on antihypertensive medications.  CKD: Monitor labs/BMP,         Brandon Mayer MD  Cell: [phone]  Office: [phone]

## 2019-04-09 NOTE — PROGRESS NOTE ADULT - PROBLEM SELECTOR PLAN 1
improving, c/w diuresis and cardiac meds  - F/u TTE  -> Plan to adjust to po diures and po abx tmrw for safe discharge

## 2019-04-09 NOTE — PROGRESS NOTE ADULT - ASSESSMENT
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

## 2019-04-09 NOTE — PROGRESS NOTE ADULT - SUBJECTIVE AND OBJECTIVE BOX
Patient is a 87y old  Female who presents with a chief complaint of worsening shortness of breath (09 Apr 2019 21:44)      INTERVAL HISTORY: feels ok    TELEMETRY: AT vs less likely AF  	  MEDICATIONS:  amiodarone    Tablet 200 milliGRAM(s) Oral daily  furosemide   Injectable 40 milliGRAM(s) IV Push daily  metoprolol succinate  milliGRAM(s) Oral daily        PHYSICAL EXAM:  T(C): 37 (04-09-19 @ 19:46), Max: 37 (04-09-19 @ 19:46)  HR: 118 (04-09-19 @ 19:46) (62 - 118)  BP: 104/73 (04-09-19 @ 19:46) (104/73 - 143/68)  RR: 18 (04-09-19 @ 19:46) (18 - 18)  SpO2: 97% (04-09-19 @ 19:46) (95% - 97%)  Wt(kg): --  I&O's Summary    08 Apr 2019 07:01  -  09 Apr 2019 07:00  --------------------------------------------------------  IN: 360 mL / OUT: 850 mL / NET: -490 mL    09 Apr 2019 07:01  -  09 Apr 2019 22:17  --------------------------------------------------------  IN: 770 mL / OUT: 500 mL / NET: 270 mL          Appearance: Normal	  HEENT:    PERRL, EOMI	  Cardiovascular:  S1 S2, No JVD  Respiratory: Lungs clear to auscultation	  Psychiatry: Alert  Gastrointestinal:  Soft, Non-tender, + BS	  Skin: No rashes, No cyanosis  Extremities:  No edema of LE            04-09    137  |  100  |  28<H>  ----------------------------<  91  4.4   |  26  |  1.50<H>    Ca    9.2      09 Apr 2019 06:58          Labs personally reviewed      Assessment and Plan:    Assessment:  · Assessment		  86 yo F from home, with significant PMHx of COPD on home oxygen qhs, 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      Problem/Plan - 1:  ·  Problem: Acute on chronic systolic (congestive) heart failure.  Plan: Lasix 40mg IV daily today.   -- Switch to home oral dose regimen Monday as she appears euvolemic now  cardiac meds  - F/u TTE     Problem/Plan - 2:  ·  Problem: Pneumonia.  Plan: complicated bacterial health care associated pneumonia, unsepcified type  Abx per ID/primary team     Problem/Plan - 3:  ·  Problem: HTN Plan: c/w Metoprolol 100mg     Problem/Plan - 4:  ·  Problem: PAT vs less likely PAF on tele, consult house EP for evaluation and PPM check if deemed needed by EP        Waylon Cates DO [facility]  [facility]  [phone]

## 2019-04-09 NOTE — PHYSICAL THERAPY INITIAL EVALUATION ADULT - PERTINENT HX OF CURRENT PROBLEM, REHAB EVAL
88 yo F from home recently hospitalized at [facility] and treated for Pneumonia presently on Doxycycline (unspecified days remaining of tx course), who p/w c/o worsening SOB both at rest and on exertion X 2 days a/w increasing oxygen requirements including during the day time, mild sputum production, decreased exercise tolerance, fatigue, generalized weakness, lethargy, and malaise.

## 2019-04-09 NOTE — PHYSICAL THERAPY INITIAL EVALUATION ADULT - ADDITIONAL COMMENTS
As per care coordination notes pt lives in private home with 24/7 HHA. Pt ambulates with assistance and RW and receives home PT. Pt states she lives with granddaughter.

## 2019-04-09 NOTE — PROGRESS NOTE ADULT - PROBLEM SELECTOR PLAN 1
Ct is reviewed: has RUL pneumonia and small chronic loculated fluid on the left side: Her procalcitonin is sightly high: Her legionella is still pending: She has been afebrile  4/9: on antibiotics: Blood cultures have been negative: RVP is negative: legionella is pending: MRSA PCR is negative too! She has mild mediastinal lymphadenopathy: likely secondary to infection: Needs to be followed up as an outpatient:

## 2019-04-09 NOTE — PHYSICAL THERAPY INITIAL EVALUATION ADULT - PRECAUTIONS/LIMITATIONS, REHAB EVAL
CTChest:Right upper lobe consolidation compatible with pneumonia for which follow-up to resolution is recommended.Mildly enlarged supraclavicular and mediastinal lymph nodes. Small layering right pleural effusion and subsegmental atelectasis of basilar right lower lobe. Left basilar pleural thickening and small loculated left pleural effusion is grossly unchanged from 2016. XRayLPatella:(-) CXR:  Changes consistent with CHF with right-sided predominance.Small bilateral pleural effusions. Bilateral atelectasis and/or pneumonia, as above - with right-sided predominance.. fall precautions/CTChest:Right upper lobe consolidation compatible with pneumonia for which follow-up to resolution is recommended.Mildly enlarged supraclavicular and mediastinal lymph nodes. Small layering right pleural effusion and subsegmental atelectasis of basilar right lower lobe. Left basilar pleural thickening and small loculated left pleural effusion is grossly unchanged from 2016. XRayLPatella:(-) CXR:  Changes consistent with CHF with right-sided predominance.Small bilateral pleural effusions. Bilateral atelectasis and/or pneumonia, as above - with right-sided predominance../oxygen therapy device and L/min

## 2019-04-09 NOTE — PROGRESS NOTE ADULT - SUBJECTIVE AND OBJECTIVE BOX
Chief complaint  Patient is a 87y old  Female who presents with a chief complaint of worsening shortness of breath (09 Apr 2019 18:13)   Review of systems  Patient in bed, looks comfortable, no fever, no hypoglycemia.    Labs and Fingersticks  CAPILLARY BLOOD GLUCOSE      POCT Blood Glucose.: 151 mg/dL (09 Apr 2019 21:05)  POCT Blood Glucose.: 204 mg/dL (09 Apr 2019 16:30)  POCT Blood Glucose.: 244 mg/dL (09 Apr 2019 13:25)  POCT Blood Glucose.: 125 mg/dL (09 Apr 2019 07:26)      Anion Gap, Serum: 11 (04-09 @ 06:58)      Calcium, Total Serum: 9.2 (04-09 @ 06:58)    04-09    137  |  100  |  28<H>  ----------------------------<  91  4.4   |  26  |  1.50<H>    Ca    9.2      09 Apr 2019 06:58      Medications  MEDICATIONS  (STANDING):  ALBUTerol/ipratropium for Nebulization 3 milliLiter(s) Nebulizer every 6 hours  amiodarone    Tablet 200 milliGRAM(s) Oral daily  cefepime   IVPB 1000 milliGRAM(s) IV Intermittent every 24 hours  dextrose 5%. 1000 milliLiter(s) (50 mL/Hr) IV Continuous <Continuous>  dextrose 50% Injectable 12.5 Gram(s) IV Push once  dextrose 50% Injectable 25 Gram(s) IV Push once  dextrose 50% Injectable 25 Gram(s) IV Push once  docusate sodium 100 milliGRAM(s) Oral two times a day  DULoxetine 30 milliGRAM(s) Oral daily  furosemide   Injectable 40 milliGRAM(s) IV Push daily  heparin  Injectable 5000 Unit(s) SubCutaneous every 8 hours  insulin glargine Injectable (LANTUS) 24 Unit(s) SubCutaneous at bedtime  insulin lispro (HumaLOG) corrective regimen sliding scale   SubCutaneous three times a day before meals  insulin lispro (HumaLOG) corrective regimen sliding scale   SubCutaneous at bedtime  insulin lispro Injectable (HumaLOG) 8 Unit(s) SubCutaneous three times a day before meals  metoprolol succinate  milliGRAM(s) Oral daily  mirtazapine 30 milliGRAM(s) Oral at bedtime  OLANZapine 5 milliGRAM(s) Oral at bedtime  pantoprazole    Tablet 40 milliGRAM(s) Oral before breakfast  simvastatin 20 milliGRAM(s) Oral at bedtime      Physical Exam  General: Patient comfortable in bed  Vital Signs Last 12 Hrs  T(F): 98.6 (04-09-19 @ 19:46), Max: 98.6 (04-09-19 @ 19:46)  HR: 118 (04-09-19 @ 19:46) (117 - 118)  BP: 104/73 (04-09-19 @ 19:46) (104/73 - 123/92)  BP(mean): --  RR: 18 (04-09-19 @ 19:46) (18 - 18)  SpO2: 97% (04-09-19 @ 19:46) (97% - 97%)  Neck: No palpable thyroid nodules.  CVS: S1S2, No murmurs  Respiratory: No wheezing, no crepitations  GI: Abdomen soft, bowel sounds positive  Musculoskeletal:  edema lower extremities.   Skin: No skin rashes, no ecchymosis    Diagnostics    Hemoglobin A1C, Whole Blood: Routine (04-06 @ 14:52)

## 2019-04-09 NOTE — PROGRESS NOTE ADULT - SUBJECTIVE AND OBJECTIVE BOX
[facility] NEPHROLOGY- PROGRESS NOTE    87y Female with history of COPD presents with SOB found to have PNA and volume overload. Nephrology consulted for elevated Scr.    REVIEW OF SYSTEMS:  Gen: no changes in weight  Cards: no chest pain  Resp: + dyspnea, + cough  GI: no nausea or vomiting or diarrhea  Vascular: no LE edema    amoxicillin (Flushing; Vomiting; Nausea)  lisinopril (Angioedema)  penicillin (Hives)      Hospital Medications: Medications reviewed      VITALS:  T(F): 98.4 (19 @ 04:44), Max: 98.6 (19 @ 17:19)  HR: 83 (19 @ 08:31)  BP: 140/68 (19 @ 08:31)  RR: 18 (19 @ 04:44)  SpO2: 95% (19 @ 08:31)  Wt(kg): --     @ 07:01  -   @ 07:00  --------------------------------------------------------  IN: 360 mL / OUT: 850 mL / NET: -490 mL      PHYSICAL EXAM:    Gen: NAD, calm  Cards: RRR, +S1/S2, no M/G/R  Resp: course BS with rales noted  GI: soft, NT, + ventral hernia, NABS  Vascular: no LE edema B/L      LABS:      137  |  100  |  28<H>  ----------------------------<  91  4.4   |  26  |  1.50<H>    Ca    9.2      2019 06:58      Creatinine Trend: 1.50 <--, 1.54 <--, 1.54 <--    Urine Studies:  Urinalysis Basic - ( 2019 12:48 )    Color: Yellow / Appearance: Clear / S.016 / pH:   Gluc:  / Ketone: Negative  / Bili: Negative / Urobili: Negative   Blood:  / Protein: 30 mg/dL / Nitrite: Negative   Leuk Esterase: Moderate / RBC: 2 /hpf / WBC 25 /HPF   Sq Epi:  / Non Sq Epi: 5 / Bacteria: Negative

## 2019-04-09 NOTE — PROGRESS NOTE ADULT - SUBJECTIVE AND OBJECTIVE BOX
Patient is a 87y old  Female who presents with a chief complaint of worsening shortness of breath (08 Apr 2019 18:25)      Any change in ROS: Says my breathing is OK :       MEDICATIONS  (STANDING):  ALBUTerol/ipratropium for Nebulization 3 milliLiter(s) Nebulizer every 6 hours  amiodarone    Tablet 200 milliGRAM(s) Oral daily  cefepime   IVPB 1000 milliGRAM(s) IV Intermittent every 24 hours  dextrose 5%. 1000 milliLiter(s) (50 mL/Hr) IV Continuous <Continuous>  dextrose 50% Injectable 12.5 Gram(s) IV Push once  dextrose 50% Injectable 25 Gram(s) IV Push once  dextrose 50% Injectable 25 Gram(s) IV Push once  docusate sodium 100 milliGRAM(s) Oral two times a day  DULoxetine 30 milliGRAM(s) Oral daily  furosemide   Injectable 40 milliGRAM(s) IV Push daily  heparin  Injectable 5000 Unit(s) SubCutaneous every 8 hours  insulin glargine Injectable (LANTUS) 20 Unit(s) SubCutaneous at bedtime  insulin lispro (HumaLOG) corrective regimen sliding scale   SubCutaneous three times a day before meals  insulin lispro (HumaLOG) corrective regimen sliding scale   SubCutaneous at bedtime  insulin lispro Injectable (HumaLOG) 7 Unit(s) SubCutaneous three times a day before meals  metoprolol succinate  milliGRAM(s) Oral daily  mirtazapine 30 milliGRAM(s) Oral at bedtime  OLANZapine 5 milliGRAM(s) Oral at bedtime  pantoprazole    Tablet 40 milliGRAM(s) Oral before breakfast  simvastatin 20 milliGRAM(s) Oral at bedtime    MEDICATIONS  (PRN):  acetaminophen   Tablet .. 650 milliGRAM(s) Oral every 6 hours PRN Temp greater or equal to 38C (100.4F), Mild Pain (1 - 3)  dextrose 40% Gel 15 Gram(s) Oral once PRN Blood Glucose LESS THAN 70 milliGRAM(s)/deciLiter  glucagon  Injectable 1 milliGRAM(s) IntraMuscular once PRN Glucose <70 milliGRAM(s)/deciLiter    Vital Signs Last 24 Hrs  T(C): 36.9 (09 Apr 2019 04:44), Max: 37 (08 Apr 2019 17:19)  T(F): 98.4 (09 Apr 2019 04:44), Max: 98.6 (08 Apr 2019 17:19)  HR: 83 (09 Apr 2019 08:31) (55 - 83)  BP: 140/68 (09 Apr 2019 08:31) (136/79 - 143/68)  BP(mean): --  RR: 18 (09 Apr 2019 04:44) (18 - 18)  SpO2: 95% (09 Apr 2019 08:31) (95% - 98%)    I&O's Summary    08 Apr 2019 07:01  -  09 Apr 2019 07:00  --------------------------------------------------------  IN: 360 mL / OUT: 850 mL / NET: -490 mL          Physical Exam:   GENERAL: NAD, well-groomed, well-developed  HEENT: MAICO/   Atraumatic, Normocephalic  ENMT: No tonsillar erythema, exudates, or enlargement; Moist mucous membranes, Good dentition, No lesions  NECK: Supple, No JVD, Normal thyroid  CHEST/LUNG: CNo Wheezing  CVS: Regular rate and rhythm; No murmurs, rubs, or gallops  GI: : Soft, Nontender, Nondistended; Bowel sounds present  NERVOUS SYSTEM:  Alert & Oriented X3  EXTREMITIES:  -edema  LYMPH: No lymphadenopathy noted  SKIN: No rashes or lesions  ENDOCRINOLOGY: No Thyromegaly  PSYCH: Appropriate    Labs:  24                            9.9    13.32 )-----------( 421      ( 07 Apr 2019 08:29 )             31.9                         10.5   15.1  )-----------( 461      ( 06 Apr 2019 11:58 )             32.6     04-09    137  |  100  |  28<H>  ----------------------------<  91  4.4   |  26  |  1.50<H>  04-07    142  |  105  |  28<H>  ----------------------------<  93  3.4<L>   |  25  |  1.54<H>  04-06    137  |  103  |  30<H>  ----------------------------<  466<HH>  4.6   |  21<L>  |  1.54<H>    Ca    9.2      09 Apr 2019 06:58    TPro  6.5  /  Alb  2.5<L>  /  TBili  0.3  /  DBili  0.1  /  AST  24  /  ALT  15  /  AlkPhos  73  04-07  TPro  6.9  /  Alb  2.9<L>  /  TBili  0.3  /  DBili  x   /  AST  21  /  ALT  14  /  AlkPhos  81  04-06    CAPILLARY BLOOD GLUCOSE      POCT Blood Glucose.: 125 mg/dL (09 Apr 2019 07:26)  POCT Blood Glucose.: 190 mg/dL (08 Apr 2019 21:05)  POCT Blood Glucose.: 241 mg/dL (08 Apr 2019 18:04)  POCT Blood Glucose.: 225 mg/dL (08 Apr 2019 13:23)  POCT Blood Glucose.: 201 mg/dL (08 Apr 2019 09:14)            Procalcitonin, Serum: 0.21 ng/mL (04-07 @ 05:31)  Serum Pro-Brain Natriuretic Peptide: 89652 pg/mL (04-06 @ 11:58)  Lactate, Blood: 1.2 mmol/L (04-07 @ 05:31)        RECENT CULTURES:  04-06 @ 21:05 .Blood Blood-Peripheral                No growth to date.    04-06 @ 17:11 .Urine Catheterized     < from: CT Chest No Cont (04.06.19 @ 18:17) >  VESSELS: There is coronary arterial and aorta calcific atherosclerosis     HEART: The heart is enlarged. Patient is status post chest wall ICD/pacer   with lead tips in the right atrium and ventricle. No pericardial effusion.    MEDIASTINUM AND CHRIS: Enlarged supraclavicular and mediastinal lymph   nodes for example: 1.3 cm short axis right lower part tracheal lymph   node; previously 0.2 cm.    CHEST WALL AND LOWER NECK: Within normal limits.    VISUALIZED UPPER ABDOMEN: Cholecystectomy. Mildbiliary ductal dilatation.    BONES: Severe osseous degenerative changes noted.    IMPRESSION:     Right upper lobe consolidation compatible with pneumonia for which   follow-up to resolution is recommended.    Mildly enlarged supraclavicular and mediastinal lymph nodes. Recommend   attention on follow-up.    Small layering right pleural effusion and subsegmental atelectasis of   basilar right lower lobe.     Left basilar pleural thickening and small loculated left pleural effusion   is grossly unchanged from 2016.                    ROSALIE SHIPLEY M.D., RADIOLOGY RESIDENT  This document has been electronically signed.  BRIANNE BAXTER M.D., ATTENDING RADIOLOGIST  This document has been electronically signed. Apr 7 2019 10:44AM              < end of copied text >             <10,000 CFU/mL Normal Urogenital Chelsi          RESPIRATORY CULTURES:          Studies  Chest X-RAY  CT SCAN Chest   Venous Dopplers: LE:   CT Abdomen  Others

## 2019-04-09 NOTE — PROGRESS NOTE ADULT - ASSESSMENT
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

## 2019-04-09 NOTE — PHYSICAL THERAPY INITIAL EVALUATION ADULT - GENERAL OBSERVATIONS, REHAB EVAL
pt received semi-supine in bed +tele monitor, IV lock, primafit, z-float boots, bed alarm set, 2L O2 NC

## 2019-04-09 NOTE — PROGRESS NOTE ADULT - SUBJECTIVE AND OBJECTIVE BOX
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 (06 Apr 2019 16:01)      PAST MEDICAL & SURGICAL HISTORY:  COPD (chronic obstructive pulmonary disease)  Depression  Anxiety  Atrial fibrillation, unspecified type  Pacemaker  Overweight  PVD (Peripheral Vascular Disease)  Arteriosclerotic Heart Disease (ASHD)  HTN (Hypertension)  Diabetes  S/P carpal tunnel release  S/P left cataract extraction  s/p colon resection  S/P Nephrectomy: right  Hip Replacement  History of Total Knee Replacement  S/P Hernia Surgery  S/P Cholecystectomy      amoxicillin (Flushing; Vomiting; Nausea)  lisinopril (Angioedema)  penicillin (Hives)       MEDICATIONS  (STANDING):  ALBUTerol/ipratropium for Nebulization 3 milliLiter(s) Nebulizer every 6 hours  amiodarone    Tablet 200 milliGRAM(s) Oral daily  cefepime   IVPB 1000 milliGRAM(s) IV Intermittent every 24 hours  dextrose 5%. 1000 milliLiter(s) (50 mL/Hr) IV Continuous <Continuous>  dextrose 50% Injectable 12.5 Gram(s) IV Push once  dextrose 50% Injectable 25 Gram(s) IV Push once  dextrose 50% Injectable 25 Gram(s) IV Push once  docusate sodium 100 milliGRAM(s) Oral two times a day  DULoxetine 30 milliGRAM(s) Oral daily  furosemide   Injectable 40 milliGRAM(s) IV Push daily  heparin  Injectable 5000 Unit(s) SubCutaneous every 8 hours  insulin glargine Injectable (LANTUS) 20 Unit(s) SubCutaneous at bedtime  insulin lispro (HumaLOG) corrective regimen sliding scale   SubCutaneous three times a day before meals  insulin lispro (HumaLOG) corrective regimen sliding scale   SubCutaneous at bedtime  insulin lispro Injectable (HumaLOG) 7 Unit(s) SubCutaneous three times a day before meals  metoprolol succinate  milliGRAM(s) Oral daily  mirtazapine 30 milliGRAM(s) Oral at bedtime  OLANZapine 5 milliGRAM(s) Oral at bedtime  pantoprazole    Tablet 40 milliGRAM(s) Oral before breakfast  simvastatin 20 milliGRAM(s) Oral at bedtime    MEDICATIONS  (PRN):  acetaminophen   Tablet .. 650 milliGRAM(s) Oral every 6 hours PRN Temp greater or equal to 38C (100.4F), Mild Pain (1 - 3)  dextrose 40% Gel 15 Gram(s) Oral once PRN Blood Glucose LESS THAN 70 milliGRAM(s)/deciLiter  glucagon  Injectable 1 milliGRAM(s) IntraMuscular once PRN Glucose <70 milliGRAM(s)/deciLiter      REVIEW OF SYSTEMS:  CONSTITUTIONAL: (+) malaise.   EYES: No acute change in vision   ENT:  No tinnitus  NECK: No stiffness  RESPIRATORY: cough. improving rangel. No hemoptysis  CARDIOVASCULAR: No chest pain, palpitations, syncope  GASTROINTESTINAL: No hematemesis, diarrhea, melena, or hematochezia.  GENITOURINARY: No hematuria  NEUROLOGICAL: No headaches  LYMPH Nodes: No enlarged glands  ENDOCRINE: No heat or cold intolerance	    T(C): 36.7 (04-09-19 @ 14:39), Max: 37 (04-08-19 @ 17:19)  HR: 117 (04-09-19 @ 11:35) (55 - 117)  BP: 123/92 (04-09-19 @ 11:35) (123/92 - 143/68)  RR: 18 (04-09-19 @ 11:35) (18 - 18)  SpO2: 97% (04-09-19 @ 11:35) (95% - 98%)    PHYSICAL EXAMINATION:   Constitutional: WD, NAD  HEENT: NC, AT  Neck:  Supple  Respiratory: improving rhonchi. Adequate airflow b/l. Not using accessory muscles of respiration.  Cardiovascular:  S1 & S2 intact, no R/G, 2+ radial pulses b/l  Gastrointestinal: Soft, NT, ND, normoactive b.s., no organomegaly/RT/rigidity  Extremities: WWP  Neurological:  Alert and awake.  No acute focal motor deficits. Crude sensation intact.     Labs and imaging reviewed    LABS:    04-09    137  |  100  |  28<H>  ----------------------------<  91  4.4   |  26  |  1.50<H>    Ca    9.2      09 Apr 2019 06:58              CAPILLARY BLOOD GLUCOSE      POCT Blood Glucose.: 244 mg/dL (09 Apr 2019 13:25)  POCT Blood Glucose.: 125 mg/dL (09 Apr 2019 07:26)  POCT Blood Glucose.: 190 mg/dL (08 Apr 2019 21:05)  POCT Blood Glucose.: 241 mg/dL (08 Apr 2019 18:04)              RADIOLOGY & ADDITIONAL STUDIES:

## 2019-04-09 NOTE — PROGRESS NOTE ADULT - PROBLEM SELECTOR PLAN 1
Will increase Lantus to 24 units at bed time.  Will increase Humalog to 8 units before each meal in addition to Humalog correction scale coverage.  Patient counseled for compliance with consistent low carb diet.

## 2019-04-10 DIAGNOSIS — I47.1 SUPRAVENTRICULAR TACHYCARDIA: ICD-10-CM

## 2019-04-10 LAB
ANION GAP SERPL CALC-SCNC: 11 MMOL/L — SIGNIFICANT CHANGE UP (ref 5–17)
BUN SERPL-MCNC: 34 MG/DL — HIGH (ref 7–23)
CALCIUM SERPL-MCNC: 8.8 MG/DL — SIGNIFICANT CHANGE UP (ref 8.4–10.5)
CHLORIDE SERPL-SCNC: 104 MMOL/L — SIGNIFICANT CHANGE UP (ref 96–108)
CO2 SERPL-SCNC: 26 MMOL/L — SIGNIFICANT CHANGE UP (ref 22–31)
CREAT SERPL-MCNC: 1.65 MG/DL — HIGH (ref 0.5–1.3)
GLUCOSE SERPL-MCNC: 125 MG/DL — HIGH (ref 70–99)
HCT VFR BLD CALC: 33.7 % — LOW (ref 34.5–45)
HGB BLD-MCNC: 10.2 G/DL — LOW (ref 11.5–15.5)
MCHC RBC-ENTMCNC: 27.9 PG — SIGNIFICANT CHANGE UP (ref 27–34)
MCHC RBC-ENTMCNC: 30.3 GM/DL — LOW (ref 32–36)
MCV RBC AUTO: 92.1 FL — SIGNIFICANT CHANGE UP (ref 80–100)
PLATELET # BLD AUTO: 458 K/UL — HIGH (ref 150–400)
POTASSIUM SERPL-MCNC: 4.1 MMOL/L — SIGNIFICANT CHANGE UP (ref 3.5–5.3)
POTASSIUM SERPL-SCNC: 4.1 MMOL/L — SIGNIFICANT CHANGE UP (ref 3.5–5.3)
RBC # BLD: 3.66 M/UL — LOW (ref 3.8–5.2)
RBC # FLD: 16.4 % — HIGH (ref 10.3–14.5)
SODIUM SERPL-SCNC: 141 MMOL/L — SIGNIFICANT CHANGE UP (ref 135–145)
WBC # BLD: 12.22 K/UL — HIGH (ref 3.8–10.5)
WBC # FLD AUTO: 12.22 K/UL — HIGH (ref 3.8–10.5)

## 2019-04-10 PROCEDURE — 93010 ELECTROCARDIOGRAM REPORT: CPT

## 2019-04-10 RX ORDER — CEFEPIME 1 G/1
1000 INJECTION, POWDER, FOR SOLUTION INTRAMUSCULAR; INTRAVENOUS ONCE
Qty: 0 | Refills: 0 | Status: COMPLETED | OUTPATIENT
Start: 2019-04-10 | End: 2019-04-10

## 2019-04-10 RX ORDER — LEVALBUTEROL 1.25 MG/.5ML
0.63 SOLUTION, CONCENTRATE RESPIRATORY (INHALATION) EVERY 6 HOURS
Qty: 0 | Refills: 0 | Status: DISCONTINUED | OUTPATIENT
Start: 2019-04-10 | End: 2019-04-11

## 2019-04-10 RX ORDER — BUMETANIDE 0.25 MG/ML
1 INJECTION INTRAMUSCULAR; INTRAVENOUS DAILY
Qty: 0 | Refills: 0 | Status: DISCONTINUED | OUTPATIENT
Start: 2019-04-11 | End: 2019-04-11

## 2019-04-10 RX ORDER — CEFEPIME 1 G/1
INJECTION, POWDER, FOR SOLUTION INTRAMUSCULAR; INTRAVENOUS
Qty: 0 | Refills: 0 | Status: DISCONTINUED | OUTPATIENT
Start: 2019-04-10 | End: 2019-04-11

## 2019-04-10 RX ORDER — CEFEPIME 1 G/1
1000 INJECTION, POWDER, FOR SOLUTION INTRAMUSCULAR; INTRAVENOUS EVERY 12 HOURS
Qty: 0 | Refills: 0 | Status: DISCONTINUED | OUTPATIENT
Start: 2019-04-11 | End: 2019-04-11

## 2019-04-10 RX ADMIN — LEVALBUTEROL 0.63 MILLIGRAM(S): 1.25 SOLUTION, CONCENTRATE RESPIRATORY (INHALATION) at 18:17

## 2019-04-10 RX ADMIN — Medication 8 UNIT(S): at 12:08

## 2019-04-10 RX ADMIN — HEPARIN SODIUM 5000 UNIT(S): 5000 INJECTION INTRAVENOUS; SUBCUTANEOUS at 13:32

## 2019-04-10 RX ADMIN — Medication 1: at 12:08

## 2019-04-10 RX ADMIN — Medication 40 MILLIGRAM(S): at 05:45

## 2019-04-10 RX ADMIN — OLANZAPINE 5 MILLIGRAM(S): 15 TABLET, FILM COATED ORAL at 21:36

## 2019-04-10 RX ADMIN — HEPARIN SODIUM 5000 UNIT(S): 5000 INJECTION INTRAVENOUS; SUBCUTANEOUS at 05:45

## 2019-04-10 RX ADMIN — Medication 3 MILLILITER(S): at 05:45

## 2019-04-10 RX ADMIN — Medication 3 MILLILITER(S): at 11:06

## 2019-04-10 RX ADMIN — INSULIN GLARGINE 24 UNIT(S): 100 INJECTION, SOLUTION SUBCUTANEOUS at 21:36

## 2019-04-10 RX ADMIN — Medication 8 UNIT(S): at 08:00

## 2019-04-10 RX ADMIN — Medication 100 MILLIGRAM(S): at 05:45

## 2019-04-10 RX ADMIN — HEPARIN SODIUM 5000 UNIT(S): 5000 INJECTION INTRAVENOUS; SUBCUTANEOUS at 21:37

## 2019-04-10 RX ADMIN — Medication 650 MILLIGRAM(S): at 14:56

## 2019-04-10 RX ADMIN — SIMVASTATIN 20 MILLIGRAM(S): 20 TABLET, FILM COATED ORAL at 21:37

## 2019-04-10 RX ADMIN — Medication 100 MILLIGRAM(S): at 17:05

## 2019-04-10 RX ADMIN — CEFEPIME 100 MILLIGRAM(S): 1 INJECTION, POWDER, FOR SOLUTION INTRAMUSCULAR; INTRAVENOUS at 15:30

## 2019-04-10 RX ADMIN — PANTOPRAZOLE SODIUM 40 MILLIGRAM(S): 20 TABLET, DELAYED RELEASE ORAL at 05:45

## 2019-04-10 RX ADMIN — AMIODARONE HYDROCHLORIDE 200 MILLIGRAM(S): 400 TABLET ORAL at 05:45

## 2019-04-10 RX ADMIN — Medication 650 MILLIGRAM(S): at 13:29

## 2019-04-10 RX ADMIN — DULOXETINE HYDROCHLORIDE 30 MILLIGRAM(S): 30 CAPSULE, DELAYED RELEASE ORAL at 11:07

## 2019-04-10 RX ADMIN — MIRTAZAPINE 30 MILLIGRAM(S): 45 TABLET, ORALLY DISINTEGRATING ORAL at 21:36

## 2019-04-10 NOTE — PROGRESS NOTE ADULT - ASSESSMENT
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

## 2019-04-10 NOTE — PROGRESS NOTE ADULT - SUBJECTIVE AND OBJECTIVE BOX
Patient is a 87y old  Female who presents with a chief complaint of worsening shortness of breath (10 Apr 2019 16:37)      INTERVAL HISTORY: feels ok    MEDICATIONS:  amiodarone    Tablet 200 milliGRAM(s) Oral daily  metoprolol succinate  milliGRAM(s) Oral daily        PHYSICAL EXAM:  T(C): 36.8 (04-10-19 @ 20:00), Max: 36.8 (04-10-19 @ 04:27)  HR: 82 (04-10-19 @ 20:00) (77 - 118)  BP: 108/69 (04-10-19 @ 20:00) (102/62 - 116/80)  RR: 18 (04-10-19 @ 20:00) (18 - 18)  SpO2: 95% (04-10-19 @ 20:00) (95% - 97%)  Wt(kg): --  I&O's Summary    09 Apr 2019 07:01  -  10 Apr 2019 07:00  --------------------------------------------------------  IN: 770 mL / OUT: 700 mL / NET: 70 mL    10 Apr 2019 07:01  -  10 Apr 2019 21:55  --------------------------------------------------------  IN: 890 mL / OUT: 700 mL / NET: 190 mL          Appearance: Normal	  HEENT:    PERRL, EOMI	  Cardiovascular:  S1 S2, No JVD  Respiratory: Lungs clear to auscultation	  Psychiatry: Alert  Gastrointestinal:  Soft, Non-tender, + BS	  Skin: No rashes, No cyanosis  Extremities:  No edema of LE                                10.2   12.22 )-----------( 458      ( 10 Apr 2019 09:22 )             33.7     04-10    141  |  104  |  34<H>  ----------------------------<  125<H>  4.1   |  26  |  1.65<H>    Ca    8.8      10 Apr 2019 07:36          Labs personally reviewed      < from: Transthoracic Echocardiogram (04.09.19 @ 10:41) >  Conclusions:  Technically diffcult study.  1. Mitral annular calcification. Tethered mitral valve  leaflets. Moderate mitral regurgitation.  2. Aortic valve not well visualized; appears calcified with  decreased opening. Mild aortic regurgitation.  3. Endocardium not well visualized; grossly moderate to  severe left ventricular systolic dysfunction. Paradoxical  septal motion consistent with paced rhythm/conduction  defect. Consider limited repeat imaging with intravenous  echo contrast to better visualize the LV if clinically  indicated.  4. The right ventricle is not well visualized. A device  wire is noted in the right heart.  *** Compared with echocardiogram of 9/18/2017, moderate to  severe left ventricular systolic dysfunction was seen on  the prior study.    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, Essential HTN, Systolic CHF, CKD 4, PPM, nephrectomy, UTI's, Major Depression, Anxiety, Arteriosclerotic Heart Disease, Atrial fibrillation, and Peripheral Vascular Disease who was recently hospitalized at [facility] and treated for Pneumonia presently on Doxycycline (unspecified days remaining of tx course), who p/w c/o worsening shortness of breath both at rest and on exertion X 2 days a/w increasing oxygen requirements including during the day time, mild sputum production, decreased exercise tolerance, fatigue, generalized weakness, lethargy, and malaise.      Problem/Plan - 1:  ·  Problem: Acute on chronic systolic (congestive) heart failure.  Plan: Lasix 40mg IV daily today.   -- Switch to home oral dose regimen Monday as she appears euvolemic now  cardiac meds  - TTE unchanged     Problem/Plan - 2:  ·  Problem: Pneumonia.  Plan: complicated bacterial health care associated pneumonia, unsepcified type  Abx per ID/primary team     Problem/Plan - 3:  ·  Problem: HTN Plan: c/w Metoprolol 100mg     Problem/Plan - 4:  ·  Problem: PAT vs less likely PAF on tele, EP simi noted, no AF          Waylon Cates DO [facility]  Cardiovascular Medicine  [phone] Patient is a 87y old  Female who presents with a chief complaint of worsening shortness of breath (10 Apr 2019 16:37)      INTERVAL HISTORY: feels ok    MEDICATIONS:  amiodarone    Tablet 200 milliGRAM(s) Oral daily  metoprolol succinate  milliGRAM(s) Oral daily        PHYSICAL EXAM:  T(C): 36.8 (04-10-19 @ 20:00), Max: 36.8 (04-10-19 @ 04:27)  HR: 82 (04-10-19 @ 20:00) (77 - 118)  BP: 108/69 (04-10-19 @ 20:00) (102/62 - 116/80)  RR: 18 (04-10-19 @ 20:00) (18 - 18)  SpO2: 95% (04-10-19 @ 20:00) (95% - 97%)  Wt(kg): --  I&O's Summary    09 Apr 2019 07:01  -  10 Apr 2019 07:00  --------------------------------------------------------  IN: 770 mL / OUT: 700 mL / NET: 70 mL    10 Apr 2019 07:01  -  10 Apr 2019 21:55  --------------------------------------------------------  IN: 890 mL / OUT: 700 mL / NET: 190 mL          Appearance: Normal	  HEENT:    PERRL, EOMI	  Cardiovascular:  S1 S2, No JVD  Respiratory: Lungs clear to auscultation	  Psychiatry: Alert  Gastrointestinal:  Soft, Non-tender, + BS	  Skin: No rashes, No cyanosis  Extremities:  No edema of LE                                10.2   12.22 )-----------( 458      ( 10 Apr 2019 09:22 )             33.7     04-10    141  |  104  |  34<H>  ----------------------------<  125<H>  4.1   |  26  |  1.65<H>    Ca    8.8      10 Apr 2019 07:36          Labs personally reviewed      < from: Transthoracic Echocardiogram (04.09.19 @ 10:41) >  Conclusions:  Technically diffcult study.  1. Mitral annular calcification. Tethered mitral valve  leaflets. Moderate mitral regurgitation.  2. Aortic valve not well visualized; appears calcified with  decreased opening. Mild aortic regurgitation.  3. Endocardium not well visualized; grossly moderate to  severe left ventricular systolic dysfunction. Paradoxical  septal motion consistent with paced rhythm/conduction  defect. Consider limited repeat imaging with intravenous  echo contrast to better visualize the LV if clinically  indicated.  4. The right ventricle is not well visualized. A device  wire is noted in the right heart.  *** Compared with echocardiogram of 9/18/2017, moderate to  severe left ventricular systolic dysfunction was seen on  the prior study.    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      Problem/Plan - 1:  ·  Problem: Acute on chronic systolic (congestive) heart failure.  Plan: PO Bumex  cardiac meds  - TTE unchanged     Problem/Plan - 2:  ·  Problem: Pneumonia.  Plan: complicated bacterial health care associated pneumonia, unsepcified type  Abx per ID/primary team     Problem/Plan - 3:  ·  Problem: HTN Plan: c/w Metoprolol 100mg     Problem/Plan - 4:  ·  Problem: PAT vs less likely PAF on tele, EP eval noted, no AF          Waylon Cates DO [facility]  Cardiovascular Medicine  [phone]

## 2019-04-10 NOTE — CONSULT NOTE ADULT - ASSESSMENT
87 year old female from home, former smoker with PMHx of Major Depression, Anxiety, COPD on home oxygen qhs, T2DM , Diabetic Nephropathy, CKD 4, s/p Right nephrectomy, HTN, PVD  Systolic CHF s/p  PPM LBBB, s/p  PPM SSS Afib RVR /sinus Bradycardia- TBS, recurrent UTI's, ASHD, Afib on Amiodarone  who was recently hospitalized at [facility] and treated for Pneumonia, discharged on readmit RUL pneumonia with PAT to 120's

## 2019-04-10 NOTE — PROGRESS NOTE ADULT - SUBJECTIVE AND OBJECTIVE BOX
Patient seen and examined at bedside  No acute events noted overnight  Case discussed with medical team    HPI:  88 yo F from home, with significant PMHx of COPD on home oxygen qhs, 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, Essential HTN, Systolic CHF, CKD 4, PPM, nephrectomy, UTI's, Major Depression, Anxiety, Arteriosclerotic Heart Disease, Atrial fibrillation, and Peripheral Vascular Disease who was recently hospitalized at [facility] and treated for Pneumonia presently on Doxycycline (unspecified days remaining of tx course), who p/w c/o worsening shortness of breath both at rest and on exertion X 2 days a/w increasing oxygen requirements including during the day time, mild sputum production, decreased exercise tolerance, fatigue, generalized weakness, lethargy, and malaise. (06 Apr 2019 16:01)      PAST MEDICAL & SURGICAL HISTORY:  COPD (chronic obstructive pulmonary disease)  Depression  Anxiety  Atrial fibrillation, unspecified type  Pacemaker  Overweight  PVD (Peripheral Vascular Disease)  Arteriosclerotic Heart Disease (ASHD)  HTN (Hypertension)  Diabetes  S/P carpal tunnel release  S/P left cataract extraction  s/p colon resection  S/P Nephrectomy: right  Hip Replacement  History of Total Knee Replacement  S/P Hernia Surgery  S/P Cholecystectomy      amoxicillin (Flushing; Vomiting; Nausea)  lactose (Diarrhea)  lisinopril (Angioedema)  penicillin (Hives)       MEDICATIONS  (STANDING):  ALBUTerol/ipratropium for Nebulization 3 milliLiter(s) Nebulizer every 6 hours  amiodarone    Tablet 200 milliGRAM(s) Oral daily  cefepime   IVPB 1000 milliGRAM(s) IV Intermittent every 24 hours  dextrose 5%. 1000 milliLiter(s) (50 mL/Hr) IV Continuous <Continuous>  dextrose 50% Injectable 12.5 Gram(s) IV Push once  dextrose 50% Injectable 25 Gram(s) IV Push once  dextrose 50% Injectable 25 Gram(s) IV Push once  docusate sodium 100 milliGRAM(s) Oral two times a day  DULoxetine 30 milliGRAM(s) Oral daily  furosemide   Injectable 40 milliGRAM(s) IV Push daily  heparin  Injectable 5000 Unit(s) SubCutaneous every 8 hours  insulin glargine Injectable (LANTUS) 24 Unit(s) SubCutaneous at bedtime  insulin lispro (HumaLOG) corrective regimen sliding scale   SubCutaneous three times a day before meals  insulin lispro (HumaLOG) corrective regimen sliding scale   SubCutaneous at bedtime  insulin lispro Injectable (HumaLOG) 8 Unit(s) SubCutaneous three times a day before meals  metoprolol succinate  milliGRAM(s) Oral daily  mirtazapine 30 milliGRAM(s) Oral at bedtime  OLANZapine 5 milliGRAM(s) Oral at bedtime  pantoprazole    Tablet 40 milliGRAM(s) Oral before breakfast  simvastatin 20 milliGRAM(s) Oral at bedtime    MEDICATIONS  (PRN):  acetaminophen   Tablet .. 650 milliGRAM(s) Oral every 6 hours PRN Temp greater or equal to 38C (100.4F), Mild Pain (1 - 3)  dextrose 40% Gel 15 Gram(s) Oral once PRN Blood Glucose LESS THAN 70 milliGRAM(s)/deciLiter  glucagon  Injectable 1 milliGRAM(s) IntraMuscular once PRN Glucose <70 milliGRAM(s)/deciLiter      REVIEW OF SYSTEMS:  CONSTITUTIONAL: (+) malaise.   EYES: No acute change in vision   ENT:  No tinnitus  NECK: No stiffness  RESPIRATORY: No hemoptysis  CARDIOVASCULAR: No chest pain, palpitations, syncope  GASTROINTESTINAL: No hematemesis, diarrhea, melena, or hematochezia.  GENITOURINARY: No hematuria  NEUROLOGICAL: No headaches  LYMPH Nodes: No enlarged glands  ENDOCRINE: No heat or cold intolerance	    T(C): 36.8 (04-10-19 @ 04:27), Max: 37 (04-09-19 @ 19:46)  HR: 118 (04-10-19 @ 04:27) (117 - 118)  BP: 116/80 (04-10-19 @ 04:27) (104/73 - 123/92)  RR: 18 (04-10-19 @ 04:27) (18 - 18)  SpO2: 96% (04-10-19 @ 04:27) (96% - 97%)    PHYSICAL EXAMINATION:   Constitutional: WD, NAD  HEENT: NC, AT  Neck:  Supple  Respiratory:  Adequate airflow b/l. Not using accessory muscles of respiration.  Cardiovascular:  S1 & S2 intact, no R/G, 2+ radial pulses b/l  Gastrointestinal: Soft, NT, ND, normoactive b.s., no organomegaly/RT/rigidity  Extremities: WWP  Neurological:  Alert and awake.  No acute focal motor deficits. Crude sensation intact.     Labs and imaging reviewed    LABS:                        10.2   12.22 )-----------( 458      ( 10 Apr 2019 09:22 )             33.7     04-10    141  |  104  |  34<H>  ----------------------------<  125<H>  4.1   |  26  |  1.65<H>    Ca    8.8      10 Apr 2019 07:36              CAPILLARY BLOOD GLUCOSE      POCT Blood Glucose.: 130 mg/dL (10 Apr 2019 07:38)  POCT Blood Glucose.: 151 mg/dL (09 Apr 2019 21:05)  POCT Blood Glucose.: 204 mg/dL (09 Apr 2019 16:30)  POCT Blood Glucose.: 244 mg/dL (09 Apr 2019 13:25)              RADIOLOGY & ADDITIONAL STUDIES:

## 2019-04-10 NOTE — CONSULT NOTE ADULT - REASON FOR ADMISSION
worsening shortness of breath

## 2019-04-10 NOTE — PROGRESS NOTE ADULT - SUBJECTIVE AND OBJECTIVE BOX
Chief complaint  Patient is a 87y old  Female who presents with a chief complaint of worsening shortness of breath (10 Apr 2019 14:13)   Review of systems  Patient in bed, looks comfortable, no fever, no hypoglycemia.    Labs and Fingersticks  CAPILLARY BLOOD GLUCOSE      POCT Blood Glucose.: 184 mg/dL (10 Apr 2019 11:40)  POCT Blood Glucose.: 130 mg/dL (10 Apr 2019 07:38)  POCT Blood Glucose.: 151 mg/dL (09 Apr 2019 21:05)      Anion Gap, Serum: 11 (04-10 @ 07:36)  Anion Gap, Serum: 11 (04-09 @ 06:58)      Calcium, Total Serum: 8.8 (04-10 @ 07:36)  Calcium, Total Serum: 9.2 (04-09 @ 06:58)          04-10    141  |  104  |  34<H>  ----------------------------<  125<H>  4.1   |  26  |  1.65<H>    Ca    8.8      10 Apr 2019 07:36                          10.2   12.22 )-----------( 458      ( 10 Apr 2019 09:22 )             33.7     Medications  MEDICATIONS  (STANDING):  amiodarone    Tablet 200 milliGRAM(s) Oral daily  cefepime   IVPB      dextrose 5%. 1000 milliLiter(s) (50 mL/Hr) IV Continuous <Continuous>  dextrose 50% Injectable 12.5 Gram(s) IV Push once  dextrose 50% Injectable 25 Gram(s) IV Push once  dextrose 50% Injectable 25 Gram(s) IV Push once  docusate sodium 100 milliGRAM(s) Oral two times a day  DULoxetine 30 milliGRAM(s) Oral daily  heparin  Injectable 5000 Unit(s) SubCutaneous every 8 hours  insulin glargine Injectable (LANTUS) 24 Unit(s) SubCutaneous at bedtime  insulin lispro (HumaLOG) corrective regimen sliding scale   SubCutaneous three times a day before meals  insulin lispro (HumaLOG) corrective regimen sliding scale   SubCutaneous at bedtime  insulin lispro Injectable (HumaLOG) 8 Unit(s) SubCutaneous three times a day before meals  levalbuterol Inhalation 0.63 milliGRAM(s) Inhalation every 6 hours  metoprolol succinate  milliGRAM(s) Oral daily  mirtazapine 30 milliGRAM(s) Oral at bedtime  OLANZapine 5 milliGRAM(s) Oral at bedtime  pantoprazole    Tablet 40 milliGRAM(s) Oral before breakfast  simvastatin 20 milliGRAM(s) Oral at bedtime      Physical Exam  General: Patient comfortable in bed  Vital Signs Last 12 Hrs  T(F): 97.7 (04-10-19 @ 11:58), Max: 97.7 (04-10-19 @ 11:58)  HR: 77 (04-10-19 @ 11:58) (77 - 77)  BP: 102/62 (04-10-19 @ 11:58) (102/62 - 102/62)  BP(mean): --  RR: 18 (04-10-19 @ 11:58) (18 - 18)  SpO2: 97% (04-10-19 @ 11:58) (97% - 97%)  Neck: No palpable thyroid nodules.  CVS: S1S2, No murmurs  Respiratory: No wheezing, no crepitations  GI: Abdomen soft, bowel sounds positive  Musculoskeletal:  edema lower extremities.   Skin: No skin rashes, no ecchymosis    Diagnostics    Hemoglobin A1C, Whole Blood: Routine  Cancel Reason: Lab Operations Cancel (04-06 @ 14:52)

## 2019-04-10 NOTE — PROGRESS NOTE ADULT - SUBJECTIVE AND OBJECTIVE BOX
Patient is seen and examined at the bed side, is afebrile. She has no new complaints.  But the heart rate is fluctuating, Cardiac EP evaluation  is pending. The Leukocytosis is trending down,        REVIEW OF SYSTEMS: All other review systems are negative        ALLERGIES: ? penicillin (Hives), Amoxicillin (Flushing; Vomiting; Nausea), lisinopril (Angioedema)        Vital Signs Last 24 Hrs  T(C): 36.5 (10 Apr 2019 11:58), Max: 37 (2019 19:46)  T(F): 97.7 (10 Apr 2019 11:58), Max: 98.6 (2019 19:46)  HR: 77 (10 Apr 2019 11:58) (77 - 118)  BP: 102/62 (10 Apr 2019 11:58) (102/62 - 116/80)  BP(mean): --  RR: 18 (10 Apr 2019 11:58) (18 - 18)  SpO2: 97% (10 Apr 2019 11:58) (96% - 97%)      PHYSICAL EXAM:  GENERAL: Not in distress, on oxygen via NC  HEENT: Right Lower eye lid redness is less today  CHEST/LUNG:  Air entry bilaterally  HEART: s1 and s2 present  ABDOMEN:  Nontender and  Nondistended  EXTREMITIES: No pedal  edema  CNS: Awake , Alert and appears mild confused        LABS:                         10.2   12.22 )-----------( 458      ( 10 Apr 2019 09:22 )             33.7                           9.9    13.32 )-----------( 421      ( 2019 08:29 )             31.9                               10.5   15.1  )-----------( 461      ( 2019 11:58 )             32.6         04-10    141  |  104  |  34<H>  ----------------------------<  125<H>  4.1   |  26  |  1.65<H>    Ca    8.8      10 Apr 2019 07:36      04-09    137  |  100  |  28<H>  ----------------------------<  91  4.4   |  26  |  1.50<H>    Ca    9.2      2019 06:58    TPro  6.5  /  Alb  2.5<L>  /  TBili  0.3  /  DBili  0.1  /  AST  24  /  ALT  15  /  AlkPhos  73  04-07        Urinalysis Basic - ( 2019 12:48 )  Color: Yellow / Appearance: Clear / S.016 / pH: x  Gluc: x / Ketone: Negative  / Bili: Negative / Urobili: Negative   Blood: x / Protein: 30 mg/dL / Nitrite: Negative   Leuk Esterase: Moderate / RBC: 2 /hpf / WBC 25 /HPF   Sq Epi: x / Non Sq Epi: 5 / Bacteria: Negative      CAPILLARY BLOOD GLUCOSE  POCT Blood Glucose.: 411 mg/dL (2019 11:43)        MEDICATIONS  (STANDING):  ALBUTerol/ipratropium for Nebulization 3 milliLiter(s) Nebulizer every 6 hours  amiodarone    Tablet 200 milliGRAM(s) Oral daily  cefepime   IVPB 1000 milliGRAM(s) IV Intermittent every 24 hours  dextrose 5%. 1000 milliLiter(s) (50 mL/Hr) IV Continuous <Continuous>  dextrose 50% Injectable 12.5 Gram(s) IV Push once  dextrose 50% Injectable 25 Gram(s) IV Push once  dextrose 50% Injectable 25 Gram(s) IV Push once  docusate sodium 100 milliGRAM(s) Oral two times a day  DULoxetine 30 milliGRAM(s) Oral daily  heparin  Injectable 5000 Unit(s) SubCutaneous every 8 hours  insulin glargine Injectable (LANTUS) 24 Unit(s) SubCutaneous at bedtime  insulin lispro (HumaLOG) corrective regimen sliding scale   SubCutaneous three times a day before meals  insulin lispro (HumaLOG) corrective regimen sliding scale   SubCutaneous at bedtime  insulin lispro Injectable (HumaLOG) 8 Unit(s) SubCutaneous three times a day before meals  metoprolol succinate  milliGRAM(s) Oral daily  mirtazapine 30 milliGRAM(s) Oral at bedtime  OLANZapine 5 milliGRAM(s) Oral at bedtime  pantoprazole    Tablet 40 milliGRAM(s) Oral before breakfast  simvastatin 20 milliGRAM(s) Oral at bedtime    MEDICATIONS  (PRN):  acetaminophen   Tablet .. 650 milliGRAM(s) Oral every 6 hours PRN Temp greater or equal to 38C (100.4F), Mild Pain (1 - 3)  dextrose 40% Gel 15 Gram(s) Oral once PRN Blood Glucose LESS THAN 70 milliGRAM(s)/deciLiter  glucagon  Injectable 1 milliGRAM(s) IntraMuscular once PRN Glucose <70 milliGRAM(s)/deciLiter        RADIOLOGY & ADDITIONAL TESTS:    19  : Xray Chest 1 View- PORTABLE-Routine (19 @ 08:37) The left pacer is unchanged. Patchy opacity within the right upper lung  is unchanged. No pleural effusion or pneumothorax.  No pulmonary edema.      19  : CT Chest No Cont (19 @ 18:17) Right upper lobe consolidation which may represents  pneumonia. Follow-up to resolution is advised.   - Small bilateral pleural effusions, right greater than left with  associated compressive atelectasis.    19 : Xray Knee w/Patella 4 View, Left (19 @ 14:13) Status post left knee arthroplasty with intact hardware. No periprosthetic lucency to suggest loosening.   Vascular calcifications. No acute fracture or dislocation.  Preserved joint spaces.      MICROBIOLOGY DATA:    MRSA/MSSA PCR (19 @ 22:38)    MRSA PCR Result.: Rommelte: MRSA/MSSA PCR assay is a qualitative in vitro diagnostic test for the  direct detection and differentiation of methicillin-resistant  Staphylococcus aureus (MRSA) from Staphylococcus aureus (SA). The assay  detects DNA from nasal swabs in patients atrisk for nasal colonization.  It is not intended to diagnose MRSA or SA infections nor guide or monitor  treatment for MRSA/SA infections. A negative result does not preclude  nasal colonization. The assay is FDA-approved and its performance has  been established by [employer], [place] and the [place], [place].    Staph Aureus PCR Result: Rommelte      Culture - Blood (19 @ 21:05)    Specimen Source: .Blood Blood-Peripheral    Culture Results:   No growth to date.      Culture - Blood (19 @ 21:05)    Specimen Source: .Blood Blood-Peripheral    Culture Results:   No growth to date.        Culture - Urine (19 @ 17:11)    Specimen Source: .Urine Catheterized    Culture Results:   <10,000 CFU/mL Normal Urogenital Chelsi      Rapid Respiratory Viral Panel (19 @ 15:35)    Rapid RVP Result: NotDete: The [employer] RVP Rapid uses polymerase chain reaction (PCR) and melt  curve analysis to screen for adenovirus; coronavirus HKU1, NL63, 229E,  OC43; human metapneumovirus (hMPV); human enterovirus/rhinovirus  (Entero/RV); influenza A; influenza A/H1;influenza A/H3; influenza  A/H1-2009; influenza B; parainfluenza viruses 1, 2, 3, 4; respiratory  syncytial virus; Bordetella pertussis; Mycoplasma pneumoniae; and  Chlamydophila pneumoniae.      Procalcitonin, Serum (19 @ 05:31)    Procalcitonin, Serum: 0.21: This assay is intended for use to determine the change of PCT over time  as an aid in assessing the cumulative 28-day risk of all-cause mortality  for patients diagnosed with severe sepsis or septic shock in the ICU, or  when obtained in the emergency department or other medical wards prior to  ICU admission. This assay was performed by the Roche ZipanoS PCT  assay. ng/mL        MICROBIOLOGY DATA:    MRSA/MSSA PCR (19 @ 22:38)    MRSA PCR Result.: NotDetec: MRSA/MSSA PCR assay is a qualitative in vitro diagnostic test for the  direct detection and differentiation of methicillin-resistant  Staphylococcus aureus (MRSA) from Staphylococcus aureus (SA). The assay  detects DNA from nasal swabs in patients atrisk for nasal colonization.  It is not intended to diagnose MRSA or SA infections nor guide or monitor  treatment for MRSA/SA infections. A negative result does not preclude  nasal colonization. The assay is FDA-approved and its performance has  been established by [employer], [place] and the [place], [place].    Staph Aureus PCR Result: NotDetec      Culture - Blood (19 @ 21:05)    Specimen Source: .Blood Blood-Peripheral    Culture Results:   No growth to date.      Culture - Blood (19 @ 21:05)    Specimen Source: .Blood Blood-Peripheral    Culture Results:   No growth to date.      Culture - Urine (19 @ 17:11)    Specimen Source: .Urine Catheterized    Culture Results:   <10,000 CFU/mL Normal Urogenital Chelsi        Rapid Respiratory Viral Panel (19 @ 15:35)    Rapid RVP Result: NotDetec: The [employer] RVP Rapid uses polymerase chain reaction (PCR) and melt  curve analysis to screen for adenovirus; coronavirus HKU1, NL63, 229E,  OC43; human metapneumovirus (hMPV); human enterovirus/rhinovirus  (Entero/RV); influenza A; influenza A/H1;influenza A/H3; influenza  A/H1-2009; influenza B; parainfluenza viruses 1, 2, 3, 4; respiratory  syncytial virus; Bordetella pertussis; Mycoplasma pneumoniae; and  Chlamydophila pneumoniae.

## 2019-04-10 NOTE — PROCEDURE NOTE - ADDITIONAL PROCEDURE DETAILS
PPM interrogation due to noted  atrial tachycardia on telemetry  normal sensing stable lead impedence , pacing thresholds deferred due to tachycardia  no stored episodes for review since last interrogation on January 30 , 2019 PPM interrogation due to noted  atrial tachycardia on telemetry  normal sensing stable lead impedence , pacing thresholds deferred due to tachycardia  no stored episodes for review since last interrogation on January 30 , 2019  AT/AF burden 0%

## 2019-04-10 NOTE — PROGRESS NOTE ADULT - ASSESSMENT
A 88 yo Female  from home, with multiple co-morbidities including COPD on home oxygen at night, who was recently hospitalized at [facility] and treated for Pneumonia currently on Doxycycline, presents to the ER for evaluation of worsening shortness of breath and generalized weakness. On admission, she found to have Leukocytosis, positive Urine analysis and RUL consolidation on CT chest.  She has received dose of Cefepime and Vancomycin, cultures pending. The ID consult requested to assist  with further evaluation and antibiotic management.     # RUL Pneumonia- Most likely HCAP in the setting of recently discharged from [facility] [place] - RVP negative  # UTI - culture has no significant  growth   # leukocytosis - trending down    Would recommend:  1. Follow up the Cardiac EP recommendation   2. Continue Cefepime and adjust the doses based on Crcl  3. May change to oral Levaquin if QTc is less than 500,  on discharge to complete the 7 days course  4. Supplemental oxygenation and bronchodilator  as needed  5. Aspiration  precaution     d/w Dr. Chris, covering NP and Nursing staff    will follow the patient with you

## 2019-04-10 NOTE — CONSULT NOTE ADULT - SUBJECTIVE AND OBJECTIVE BOX
Chief Complaint : AT on telemetry     HPI:  87 year old female from home, former smoker with PMHx of Major Depression, Anxiety, COPD on home oxygen qhs, T2DM , Diabetic Nephropathy, CKD 4, s/p Right nephrectomy, HTN, PVD  Systolic CHF, LBBB, s/p  PPM SSS Afib RVR /sinus Bradycardia- TBS recurrent UTI's, ASHD, Atrial fibrillation,  who was recently hospitalized at [facility] and treated for Pneumonia, received  Doxycycline course p/w c/o worsening shortness of breath both at rest and on exertion X 2 days a/w increasing oxygen requirements including during the day time, mild sputum production, decreased exercise tolerance, fatigue, generalized weakness, lethargy, and malaise.     PAST MEDICAL & SURGICAL HISTORY:  frequent UTI  COPD (chronic obstructive pulmonary disease)  Depression  Anxiety  Atrial fibrillation, unspecified type  Pacemaker  Overweight  PVD (Peripheral Vascular Disease)  Arteriosclerotic Heart Disease (ASHD)  HTN (Hypertension)  Diabetes  S/P carpal tunnel release  S/P left cataract extraction  s/p colon resection  S/P Nephrectomy: right  Hip Replacement  History of Total Knee Replacement  S/P Hernia Surgery  S/P Cholecystectomy    SOCIAL HISTORY: Former 1 ppd x 65 years smoker, stopped 2 years ago  	No alcohol use.Lives at home     FAMILY HISTORY:  Family history of pancreatic cancer (Sibling)    MEDICATIONS  (STANDING):  ALBUTerol/ipratropium for Nebulization 3 milliLiter(s) Nebulizer every 6 hours  amiodarone    Tablet 200 milliGRAM(s) Oral daily  cefepime   IVPB      dextrose 5%. 1000 milliLiter(s) (50 mL/Hr) IV Continuous <Continuous>  dextrose 50% Injectable 12.5 Gram(s) IV Push once  dextrose 50% Injectable 25 Gram(s) IV Push once  dextrose 50% Injectable 25 Gram(s) IV Push once  docusate sodium 100 milliGRAM(s) Oral two times a day  DULoxetine 30 milliGRAM(s) Oral daily  heparin  Injectable 5000 Unit(s) SubCutaneous every 8 hours  insulin glargine Injectable (LANTUS) 24 Unit(s) SubCutaneous at bedtime  insulin lispro (HumaLOG) corrective regimen sliding scale   SubCutaneous three times a day before meals  insulin lispro (HumaLOG) corrective regimen sliding scale   SubCutaneous at bedtime  insulin lispro Injectable (HumaLOG) 8 Unit(s) SubCutaneous three times a day before meals  metoprolol succinate  milliGRAM(s) Oral daily  mirtazapine 30 milliGRAM(s) Oral at bedtime  OLANZapine 5 milliGRAM(s) Oral at bedtime  pantoprazole    Tablet 40 milliGRAM(s) Oral before breakfast  simvastatin 20 milliGRAM(s) Oral at bedtime    MEDICATIONS  (PRN):  acetaminophen   Tablet .. 650 milliGRAM(s) Oral every 6 hours PRN Temp greater or equal to 38C (100.4F), Mild Pain (1 - 3)  dextrose 40% Gel 15 Gram(s) Oral once PRN Blood Glucose LESS THAN 70 milliGRAM(s)/deciLiter  glucagon  Injectable 1 milliGRAM(s) IntraMuscular once PRN Glucose <70 milliGRAM(s)/deciLiter    Allergies  amoxicillin (Flushing; Vomiting; Nausea)  lactose (Diarrhea)  lisinopril (Angioedema)  penicillin (Hives)    Intolerances    REVIEW OF SYSTEM:    Constitutional: denies fever, chills,+ fatigue  Neuro: denies headache, numbness, + weakness, dizziness  Resp: + cough, wheezing, shortness of breath  CVS: denies chest pain, palpitations, leg swelling  GI: denies abdominal pain, nausea, vomiting, diarrhea   : denies dysuria, frequency, incontinence  Skin: denies itching, burning, rashes, or lesions   Msk: denies joint pain or swelling     Vital Signs Last 24 Hrs  T(C): 36.5 (10 Apr 2019 11:58), Max: 37 (09 Apr 2019 19:46)  T(F): 97.7 (10 Apr 2019 11:58), Max: 98.6 (09 Apr 2019 19:46)  HR: 77 (10 Apr 2019 11:58) (77 - 118)  BP: 102/62 (10 Apr 2019 11:58) (102/62 - 116/80)  RR: 18 (10 Apr 2019 11:58) (18 - 18)  SpO2: 97% (10 Apr 2019 11:58) (96% - 97%)    Physical Exam:  General : Elderly frail well nourished,  and no acute distress  Neuro : Alert and oriented x 3, dozing earlier in day during exam now awake daughter at bedside   HEENT : Sclera clear, no JVD, no Lymphadenopathy no carotid bruits, neck supple  Lungs: Clear to Ascultation, diminished   Cardiovascular : tachycardic + 1 +2, RRR, no murmurs, no rubs  GI : obese abdomen soft, NT, ND, + BS   : no suprapubic tenderness  Extremities : No edema,  +2 PT, feet warm   Skin : warm and dry     TELE: Atrial tachycardia to 120's now in SR   EKG: SR LBBB 176 ms    LABS:Thyroid Stimulating Hormone, Serum (04.07.19 @ 07:37)    Thyroid Stimulating Hormone, Serum: 0.74 uIU/mL  Hepatic Function Panel (04.07.19 @ 05:31)    Aspartate Aminotransferase (AST/SGOT): 24 U/L  Alanine Aminotransferase (ALT/SGPT): 15 U/L (04.07.19 @ 05:31)                       10.2   12.22 )-----------( 458      ( 10 Apr 2019 09:22 )             33.7     04-10    141  |  104  |  34<H>  ----------------------------<  125<H>  4.1   |  26  |  1.65<H>    Ca    8.8      10 Apr 2019 07:36  RADIOLOGY & ADDITIONAL STUDIES:  < from: Transthoracic Echocardiogram (04.09.19 @ 10:41) >  ------------------------------------------------------------------------  PROCEDURE: Transthoracic echocardiogram with 2-D, M-Mode  and complete spectral and color flow Doppler.  INDICATION: Dyspnea, unspecified (R06.00)  ------------------------------------------------------------------------  Observations:  Mitral Valve: Mitral annular calcification. Tethered mitral  valve leaflets. Moderate mitral regurgitation.  Aortic Valve/Aorta: Aortic valve not well visualized;  appears calcified with decreased opening. Peak transaortic  valve gradient equals 9 mm Hg, mean transaortic valve  gradient equals 5 mm Hg, aortic valve velocity time  integral equals 18 cm. Mild aortic regurgitation. Peak left  ventricular outflow tract gradient equals 3 mm Hg, mean  gradient is equal to 1 mm Hg, LVOT velocity time integral  equals 8 cm.  LVOT diameter: 2 cm.  Left Atrium: Severely dilated left atrium.  LA volume index  = 50 cc/m2.  Left Ventricle: Endocardium not well visualized; grossly  moderate to severe left ventricular systolic dysfunction.  Paradoxical septal motion consistent with paced  rhythm/conduction defect. Consider limited repeat imaging  with intravenous echo contrast to better visualize the LV  if clinically indicated. Concentric left ventricular  hypertrophy with basal septal bulge. Basal septal  thickness=1.7 cm. Normal diastolic function  Right Heart: The right ventricle is not well visualized. A  device wire is noted in the right heart. Tricuspid valve  not well visualized, probably normal. Minimal tricuspid  regurgitation. Normal pulmonic valve.  Pericardium/Pleura: Normal pericardium with no pericardial  effusion.  Hemodynamic: Inadequate tricuspid regurgitation Doppler  envelope precludes estimation of RVSP.  ------------------------------------------------------------------------  Conclusions:  Technically diffcult study.  1. Mitral annular calcification. Tethered mitral valve  leaflets. Moderate mitral regurgitation.  2. Aortic valve not well visualized; appears calcified with  decreased opening. Mild aortic regurgitation.  3. Endocardium not well visualized; grossly moderate to  severe left ventricular systolic dysfunction. Paradoxical  septal motion consistent with paced rhythm/conduction  defect. Consider limited repeat imaging with intravenous  echo contrast to better visualize the LV if clinically  indicated.  4. The right ventricle is not well visualized. A device  wire is noted in the right heart.  *** Compared with echocardiogram of 9/18/2017, moderate to  severe left ventricular systolic dysfunction was seen on  the prior study.  ------------------------------------------------------------------------  Confirmed on  4/9/2019 - 14:07:25 by Saaron Laighold, M.D.  ------------------------------------------------------------------------    < from: CT Chest No Cont (04.06.19 @ 18:17) >  IMPRESSION:     Right upper lobe consolidation compatible with pneumonia for which   follow-up to resolution is recommended.    Mildly enlarged supraclavicular and mediastinal lymph nodes. Recommend   attention on follow-up.    Small layering right pleural effusion and subsegmental atelectasis of   basilar right lower lobe.     Left basilar pleural thickening and small loculated left pleural effusion   is grossly unchanged from 2016.

## 2019-04-10 NOTE — CONSULT NOTE ADULT - PROBLEM SELECTOR RECOMMENDATION 9
AT in the setting of Pneumonia on cefepime , leukocytosis, negative Blood cultures, negative Urine culture, negative viral swab  monitor telemetry   Keep K+>4, MG++>2  contiue Amiodarone 200 mg daily  Titrate BB as B/P permits  change duoneb to xopenex /atovent  PPM interrogation see report   [phone]

## 2019-04-10 NOTE — PROGRESS NOTE ADULT - PROBLEM SELECTOR PLAN 1
improved, c/w cardiac meds  ep eval  pending no additional inpt intervention then discharge to [facility]

## 2019-04-10 NOTE — PROGRESS NOTE ADULT - ASSESSMENT
87y Female with history of COPD presents with SOB found to have PNA and volume overload. Nephrology consulted for elevated Scr.    1) CKD-3: Baseline Scr 1.7-1.8 as per prior labs with Scr currently below baseline due to volume overload. 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 Defer inpatient work up as patient follows with Dr. Lowry as an outpatient. Monitor electrolytes. Avoid nephrotoxins.  2) HTN with CKD: BP controlled. Rate control as per cardiology. Monitor BP.  3) LE edema: Improving with repeat CXR negative for pulmonary edema. Plan to change lasix 40 mg IV daily to bumex 1 mg PO daily. TTE with severe LVSD. Monitor UO.  4) Acute cystitis without hematuria: Ucx negative. Abx as per ID.

## 2019-04-10 NOTE — PROGRESS NOTE ADULT - ASSESSMENT
Assessment  DMT2: 87y Female with DM T2 with hyperglycemia, on basal bolus insulin , blood sugars improving, no hypoglycemic episode, patient is awake, eating meals now, compliant with low carb diet.  CHF: on medications, no chest pain, stable, monitored.  COPD: Has SOB, on oxygen, stable now.  HTN: Un Controlled,  on antihypertensive medications.  CKD: Monitor labs/BMP,         Brandon Mayer MD  Cell: [phone]  Office: [phone]

## 2019-04-10 NOTE — PROGRESS NOTE ADULT - ASSESSMENT
88 yo F from home, with significant PMHx of COPD on home oxygen qhs, 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, Essential HTN, Systolic CHF, CKD 4, PPM, nephrectomy, UTI's, Major Depression, Anxiety, Arteriosclerotic Heart Disease, Atrial fibrillation, and Peripheral Vascular Disease who was recently hospitalized at [facility] and treated for Pneumonia presently on Doxycycline (unspecified days remaining of tx course), who p/w c/o worsening shortness of breath both at rest and on exertion X 2 days a/w increasing oxygen requirements including during the day time, mild sputum production, decreased exercise tolerance, fatigue, generalized weakness, lethargy, and malaise.

## 2019-04-10 NOTE — PROGRESS NOTE ADULT - SUBJECTIVE AND OBJECTIVE BOX
[facility] NEPHROLOGY- PROGRESS NOTE    87y Female with history of COPD presents with SOB found to have PNA and volume overload. Nephrology consulted for elevated Scr.    REVIEW OF SYSTEMS:  Gen: no changes in weight  Cards: no chest pain  Resp: + dyspnea, + cough  GI: no nausea or vomiting or diarrhea  Vascular: no LE edema    amoxicillin (Flushing; Vomiting; Nausea)  lisinopril (Angioedema)  penicillin (Hives)      Hospital Medications: Medications reviewed      VITALS:  T(F): 98.2 (04-10-19 @ 04:27), Max: 98.6 (19 @ 19:46)  HR: 118 (04-10-19 @ 04:27)  BP: 116/80 (04-10-19 @ 04:27)  RR: 18 (04-10-19 @ 04:27)  SpO2: 96% (04-10-19 @ 04:27)  Wt(kg): --     @ 07:01  -  04-10 @ 07:00  --------------------------------------------------------  IN: 770 mL / OUT: 700 mL / NET: 70 mL      PHYSICAL EXAM:    Gen: NAD, calm  Cards: + tachycardia, +S1/S2, no M/G/R  Resp: course BS with rales noted  GI: soft, NT, + ventral hernia, NABS  Vascular: no LE edema B/L      LABS:  04-10    141  |  104  |  34<H>  ----------------------------<  125<H>  4.1   |  26  |  1.65<H>    Ca    8.8      10 Apr 2019 07:36      Creatinine Trend: 1.65 <--, 1.50 <--, 1.54 <--, 1.54 <--                        10.2   12.22 )-----------( 458      ( 10 Apr 2019 09:22 )             33.7     Urine Studies:  Urinalysis Basic - ( 2019 12:48 )    Color: Yellow / Appearance: Clear / S.016 / pH:   Gluc:  / Ketone: Negative  / Bili: Negative / Urobili: Negative   Blood:  / Protein: 30 mg/dL / Nitrite: Negative   Leuk Esterase: Moderate / RBC: 2 /hpf / WBC 25 /HPF   Sq Epi:  / Non Sq Epi: 5 / Bacteria: Negative          < from: Xray Chest 1 View- PORTABLE-Routine (19 @ 08:37) >  FINDINGS/  IMPRESSION:    The left pacer is unchanged. Patchy opacity within the right upper lung   is unchanged. No pleural effusion or pneumothorax.    No pulmonary edema.    < end of copied text >

## 2019-04-10 NOTE — PROGRESS NOTE ADULT - SUBJECTIVE AND OBJECTIVE BOX
Patient is a 87y old  Female who presents with a chief complaint of worsening shortness of breath (09 Apr 2019 21:44)      Any change in ROS: She is feeling ok : no SOB :       MEDICATIONS  (STANDING):  ALBUTerol/ipratropium for Nebulization 3 milliLiter(s) Nebulizer every 6 hours  amiodarone    Tablet 200 milliGRAM(s) Oral daily  cefepime   IVPB 1000 milliGRAM(s) IV Intermittent every 24 hours  dextrose 5%. 1000 milliLiter(s) (50 mL/Hr) IV Continuous <Continuous>  dextrose 50% Injectable 12.5 Gram(s) IV Push once  dextrose 50% Injectable 25 Gram(s) IV Push once  dextrose 50% Injectable 25 Gram(s) IV Push once  docusate sodium 100 milliGRAM(s) Oral two times a day  DULoxetine 30 milliGRAM(s) Oral daily  furosemide   Injectable 40 milliGRAM(s) IV Push daily  heparin  Injectable 5000 Unit(s) SubCutaneous every 8 hours  insulin glargine Injectable (LANTUS) 24 Unit(s) SubCutaneous at bedtime  insulin lispro (HumaLOG) corrective regimen sliding scale   SubCutaneous three times a day before meals  insulin lispro (HumaLOG) corrective regimen sliding scale   SubCutaneous at bedtime  insulin lispro Injectable (HumaLOG) 8 Unit(s) SubCutaneous three times a day before meals  metoprolol succinate  milliGRAM(s) Oral daily  mirtazapine 30 milliGRAM(s) Oral at bedtime  OLANZapine 5 milliGRAM(s) Oral at bedtime  pantoprazole    Tablet 40 milliGRAM(s) Oral before breakfast  simvastatin 20 milliGRAM(s) Oral at bedtime    MEDICATIONS  (PRN):  acetaminophen   Tablet .. 650 milliGRAM(s) Oral every 6 hours PRN Temp greater or equal to 38C (100.4F), Mild Pain (1 - 3)  dextrose 40% Gel 15 Gram(s) Oral once PRN Blood Glucose LESS THAN 70 milliGRAM(s)/deciLiter  glucagon  Injectable 1 milliGRAM(s) IntraMuscular once PRN Glucose <70 milliGRAM(s)/deciLiter    Vital Signs Last 24 Hrs  T(C): 36.8 (10 Apr 2019 04:27), Max: 37 (09 Apr 2019 19:46)  T(F): 98.2 (10 Apr 2019 04:27), Max: 98.6 (09 Apr 2019 19:46)  HR: 118 (10 Apr 2019 04:27) (117 - 118)  BP: 116/80 (10 Apr 2019 04:27) (104/73 - 123/92)  BP(mean): --  RR: 18 (10 Apr 2019 04:27) (18 - 18)  SpO2: 96% (10 Apr 2019 04:27) (96% - 97%)    I&O's Summary    09 Apr 2019 07:01  -  10 Apr 2019 07:00  --------------------------------------------------------  IN: 770 mL / OUT: 700 mL / NET: 70 mL          Physical Exam:   GENERAL: NAD, well-groomed, well-developed  HEENT: MAICO/   Atraumatic, Normocephalic  ENMT: No tonsillar erythema, exudates, or enlargement; Moist mucous membranes, Good dentition, No lesions  NECK: Supple, No JVD, Normal thyroid  CHEST/LUNG: Clear to auscultaion  CVS: Regular rate and rhythm; No murmurs, rubs, or gallops  GI: : Soft, Nontender, Nondistended; Bowel sounds present  NERVOUS SYSTEM:  Alert & Oriented X3  EXTREMITIES:  - edema  LYMPH: No lymphadenopathy noted  SKIN: No rashes or lesions  ENDOCRINOLOGY: No Thyromegaly  PSYCH: Appropriate    Labs:  24                            10.2   12.22 )-----------( 458      ( 10 Apr 2019 09:22 )             33.7                         9.9    13.32 )-----------( 421      ( 07 Apr 2019 08:29 )             31.9                         10.5   15.1  )-----------( 461      ( 06 Apr 2019 11:58 )             32.6     04-10    141  |  104  |  34<H>  ----------------------------<  125<H>  4.1   |  26  |  1.65<H>  04-09    137  |  100  |  28<H>  ----------------------------<  91  4.4   |  26  |  1.50<H>  04-07    142  |  105  |  28<H>  ----------------------------<  93  3.4<L>   |  25  |  1.54<H>  04-06    137  |  103  |  30<H>  ----------------------------<  466<HH>  4.6   |  21<L>  |  1.54<H>    Ca    8.8      10 Apr 2019 07:36  Ca    9.2      09 Apr 2019 06:58    TPro  6.5  /  Alb  2.5<L>  /  TBili  0.3  /  DBili  0.1  /  AST  24  /  ALT  15  /  AlkPhos  73  04-07  TPro  6.9  /  Alb  2.9<L>  /  TBili  0.3  /  DBili  x   /  AST  21  /  ALT  14  /  AlkPhos  81  04-06    CAPILLARY BLOOD GLUCOSE      POCT Blood Glucose.: 130 mg/dL (10 Apr 2019 07:38)  POCT Blood Glucose.: 151 mg/dL (09 Apr 2019 21:05)  POCT Blood Glucose.: 204 mg/dL (09 Apr 2019 16:30)  POCT Blood Glucose.: 244 mg/dL (09 Apr 2019 13:25)      < from: Xray Chest 1 View- PORTABLE-Routine (04.09.19 @ 08:37) >        INTERPRETATION:  XR CHEST PORTABLE ROUTINE 1V    INDICATION: Abnormal Chest Sounds, pulmonary edema    COMPARISON: 4/6/2019    FINDINGS/  IMPRESSION:    The left pacer is unchanged. Patchy opacity within the right upper lung   is unchanged. No pleural effusion or pneumothorax.    No pulmonary edema.                    BRIANNE BAXTER M.D., ATTENDING RADIOLOGIST  This document has been electronically signed. Apr 9 2019 11:16AM        < end of copied text >        Procalcitonin, Serum: 0.21 ng/mL (04-07 @ 05:31)  Lactate, Blood: 1.2 mmol/L (04-07 @ 05:31)        RECENT CULTURES:  04-06 @ 21:05 .Blood Blood-Peripheral                No growth to date.    04-06 @ 17:11 .Urine Catheterized                <10,000 CFU/mL Normal Urogenital Chelsi          RESPIRATORY CULTURES:          Studies  Chest X-RAY  CT SCAN Chest   Venous Dopplers: LE:   CT Abdomen  Others

## 2019-04-10 NOTE — PROGRESS NOTE ADULT - PROBLEM SELECTOR PLAN 1
Ct is reviewed: has RUL pneumonia and small chronic loculated fluid on the left side: Her procalcitonin is sightly high: Her legionella is still pending: She has been afebrile  4/9: on antibiotics: Blood cultures have been negative: RVP is negative: legionella is pending: MRSA PCR is negative too! She has mild mediastinal lymphadenopathy: likely secondary to infection: Needs to be followed up as an outpatient:  4/10: she is doing much better: She is more alert and awake today : no SOB : on antibiotics: chest xray yesterday reviewed: no change:

## 2019-04-11 ENCOUNTER — TRANSCRIPTION ENCOUNTER (OUTPATIENT)
Age: 84
End: 2019-04-11

## 2019-04-11 VITALS
RESPIRATION RATE: 18 BRPM | TEMPERATURE: 98 F | DIASTOLIC BLOOD PRESSURE: 61 MMHG | SYSTOLIC BLOOD PRESSURE: 128 MMHG | OXYGEN SATURATION: 93 % | HEART RATE: 105 BPM

## 2019-04-11 LAB
ANION GAP SERPL CALC-SCNC: 11 MMOL/L — SIGNIFICANT CHANGE UP (ref 5–17)
BUN SERPL-MCNC: 33 MG/DL — HIGH (ref 7–23)
CALCIUM SERPL-MCNC: 9 MG/DL — SIGNIFICANT CHANGE UP (ref 8.4–10.5)
CHLORIDE SERPL-SCNC: 104 MMOL/L — SIGNIFICANT CHANGE UP (ref 96–108)
CO2 SERPL-SCNC: 27 MMOL/L — SIGNIFICANT CHANGE UP (ref 22–31)
CREAT SERPL-MCNC: 1.64 MG/DL — HIGH (ref 0.5–1.3)
CULTURE RESULTS: SIGNIFICANT CHANGE UP
CULTURE RESULTS: SIGNIFICANT CHANGE UP
GLUCOSE SERPL-MCNC: 89 MG/DL — SIGNIFICANT CHANGE UP (ref 70–99)
HCT VFR BLD CALC: 34.3 % — LOW (ref 34.5–45)
HGB BLD-MCNC: 10.4 G/DL — LOW (ref 11.5–15.5)
MCHC RBC-ENTMCNC: 28 PG — SIGNIFICANT CHANGE UP (ref 27–34)
MCHC RBC-ENTMCNC: 30.3 GM/DL — LOW (ref 32–36)
MCV RBC AUTO: 92.2 FL — SIGNIFICANT CHANGE UP (ref 80–100)
PLATELET # BLD AUTO: 477 K/UL — HIGH (ref 150–400)
POTASSIUM SERPL-MCNC: 4.4 MMOL/L — SIGNIFICANT CHANGE UP (ref 3.5–5.3)
POTASSIUM SERPL-SCNC: 4.4 MMOL/L — SIGNIFICANT CHANGE UP (ref 3.5–5.3)
RBC # BLD: 3.72 M/UL — LOW (ref 3.8–5.2)
RBC # FLD: 16.5 % — HIGH (ref 10.3–14.5)
SODIUM SERPL-SCNC: 142 MMOL/L — SIGNIFICANT CHANGE UP (ref 135–145)
SPECIMEN SOURCE: SIGNIFICANT CHANGE UP
SPECIMEN SOURCE: SIGNIFICANT CHANGE UP
WBC # BLD: 10.13 K/UL — SIGNIFICANT CHANGE UP (ref 3.8–10.5)
WBC # FLD AUTO: 10.13 K/UL — SIGNIFICANT CHANGE UP (ref 3.8–10.5)

## 2019-04-11 PROCEDURE — 87798 DETECT AGENT NOS DNA AMP: CPT

## 2019-04-11 PROCEDURE — 83540 ASSAY OF IRON: CPT

## 2019-04-11 PROCEDURE — 82947 ASSAY GLUCOSE BLOOD QUANT: CPT

## 2019-04-11 PROCEDURE — 80048 BASIC METABOLIC PNL TOTAL CA: CPT

## 2019-04-11 PROCEDURE — 82728 ASSAY OF FERRITIN: CPT

## 2019-04-11 PROCEDURE — 85610 PROTHROMBIN TIME: CPT

## 2019-04-11 PROCEDURE — 83615 LACTATE (LD) (LDH) ENZYME: CPT

## 2019-04-11 PROCEDURE — 94640 AIRWAY INHALATION TREATMENT: CPT

## 2019-04-11 PROCEDURE — 87641 MR-STAPH DNA AMP PROBE: CPT

## 2019-04-11 PROCEDURE — 84550 ASSAY OF BLOOD/URIC ACID: CPT

## 2019-04-11 PROCEDURE — 97162 PT EVAL MOD COMPLEX 30 MIN: CPT

## 2019-04-11 PROCEDURE — 80076 HEPATIC FUNCTION PANEL: CPT

## 2019-04-11 PROCEDURE — 87581 M.PNEUMON DNA AMP PROBE: CPT

## 2019-04-11 PROCEDURE — 83036 HEMOGLOBIN GLYCOSYLATED A1C: CPT

## 2019-04-11 PROCEDURE — 71250 CT THORAX DX C-: CPT

## 2019-04-11 PROCEDURE — 87631 RESP VIRUS 3-5 TARGETS: CPT

## 2019-04-11 PROCEDURE — 85652 RBC SED RATE AUTOMATED: CPT

## 2019-04-11 PROCEDURE — 85014 HEMATOCRIT: CPT

## 2019-04-11 PROCEDURE — 87633 RESP VIRUS 12-25 TARGETS: CPT

## 2019-04-11 PROCEDURE — 82607 VITAMIN B-12: CPT

## 2019-04-11 PROCEDURE — 83550 IRON BINDING TEST: CPT

## 2019-04-11 PROCEDURE — 93005 ELECTROCARDIOGRAM TRACING: CPT

## 2019-04-11 PROCEDURE — 84132 ASSAY OF SERUM POTASSIUM: CPT

## 2019-04-11 PROCEDURE — 83880 ASSAY OF NATRIURETIC PEPTIDE: CPT

## 2019-04-11 PROCEDURE — 71045 X-RAY EXAM CHEST 1 VIEW: CPT

## 2019-04-11 PROCEDURE — 84443 ASSAY THYROID STIM HORMONE: CPT

## 2019-04-11 PROCEDURE — 85730 THROMBOPLASTIN TIME PARTIAL: CPT

## 2019-04-11 PROCEDURE — 99285 EMERGENCY DEPT VISIT HI MDM: CPT | Mod: 25

## 2019-04-11 PROCEDURE — 87040 BLOOD CULTURE FOR BACTERIA: CPT

## 2019-04-11 PROCEDURE — 93306 TTE W/DOPPLER COMPLETE: CPT

## 2019-04-11 PROCEDURE — 83735 ASSAY OF MAGNESIUM: CPT

## 2019-04-11 PROCEDURE — 80053 COMPREHEN METABOLIC PANEL: CPT

## 2019-04-11 PROCEDURE — 73564 X-RAY EXAM KNEE 4 OR MORE: CPT

## 2019-04-11 PROCEDURE — 87486 CHLMYD PNEUM DNA AMP PROBE: CPT

## 2019-04-11 PROCEDURE — 87640 STAPH A DNA AMP PROBE: CPT

## 2019-04-11 PROCEDURE — 87086 URINE CULTURE/COLONY COUNT: CPT

## 2019-04-11 PROCEDURE — 85045 AUTOMATED RETICULOCYTE COUNT: CPT

## 2019-04-11 PROCEDURE — 84295 ASSAY OF SERUM SODIUM: CPT

## 2019-04-11 PROCEDURE — 82550 ASSAY OF CK (CPK): CPT

## 2019-04-11 PROCEDURE — 82330 ASSAY OF CALCIUM: CPT

## 2019-04-11 PROCEDURE — 81001 URINALYSIS AUTO W/SCOPE: CPT

## 2019-04-11 PROCEDURE — 84145 PROCALCITONIN (PCT): CPT

## 2019-04-11 PROCEDURE — 83605 ASSAY OF LACTIC ACID: CPT

## 2019-04-11 PROCEDURE — 82435 ASSAY OF BLOOD CHLORIDE: CPT

## 2019-04-11 PROCEDURE — 80061 LIPID PANEL: CPT

## 2019-04-11 PROCEDURE — 82746 ASSAY OF FOLIC ACID SERUM: CPT

## 2019-04-11 PROCEDURE — 80202 ASSAY OF VANCOMYCIN: CPT

## 2019-04-11 PROCEDURE — 82803 BLOOD GASES ANY COMBINATION: CPT

## 2019-04-11 PROCEDURE — 83930 ASSAY OF BLOOD OSMOLALITY: CPT

## 2019-04-11 PROCEDURE — 84484 ASSAY OF TROPONIN QUANT: CPT

## 2019-04-11 PROCEDURE — 85027 COMPLETE CBC AUTOMATED: CPT

## 2019-04-11 PROCEDURE — 84100 ASSAY OF PHOSPHORUS: CPT

## 2019-04-11 PROCEDURE — 82306 VITAMIN D 25 HYDROXY: CPT

## 2019-04-11 PROCEDURE — 82553 CREATINE MB FRACTION: CPT

## 2019-04-11 PROCEDURE — 82962 GLUCOSE BLOOD TEST: CPT

## 2019-04-11 RX ORDER — INSULIN GLARGINE 100 [IU]/ML
18 INJECTION, SOLUTION SUBCUTANEOUS AT BEDTIME
Qty: 0 | Refills: 0 | Status: DISCONTINUED | OUTPATIENT
Start: 2019-04-11 | End: 2019-04-11

## 2019-04-11 RX ORDER — LEVALBUTEROL 1.25 MG/.5ML
3 SOLUTION, CONCENTRATE RESPIRATORY (INHALATION)
Qty: 0 | Refills: 0 | DISCHARGE
Start: 2019-04-11

## 2019-04-11 RX ORDER — POLYETHYLENE GLYCOL 3350 17 G/17G
17 POWDER, FOR SOLUTION ORAL
Qty: 0 | Refills: 0 | COMMUNITY

## 2019-04-11 RX ORDER — ASPIRIN/CALCIUM CARB/MAGNESIUM 324 MG
1 TABLET ORAL
Qty: 0 | Refills: 0 | COMMUNITY

## 2019-04-11 RX ORDER — LACTULOSE 10 G/15ML
15 SOLUTION ORAL
Qty: 0 | Refills: 0 | COMMUNITY

## 2019-04-11 RX ORDER — FLUTICASONE PROPIONATE AND SALMETEROL 50; 250 UG/1; UG/1
1 POWDER ORAL; RESPIRATORY (INHALATION)
Qty: 0 | Refills: 0 | COMMUNITY

## 2019-04-11 RX ORDER — BUMETANIDE 0.25 MG/ML
1 INJECTION INTRAMUSCULAR; INTRAVENOUS
Qty: 0 | Refills: 0 | DISCHARGE
Start: 2019-04-11

## 2019-04-11 RX ORDER — ACETAMINOPHEN 500 MG
2 TABLET ORAL
Qty: 0 | Refills: 0 | DISCHARGE
Start: 2019-04-11

## 2019-04-11 RX ORDER — DEXTROSE 50 % IN WATER 50 %
50 SYRINGE (ML) INTRAVENOUS ONCE
Qty: 0 | Refills: 0 | Status: COMPLETED | OUTPATIENT
Start: 2019-04-11 | End: 2019-04-11

## 2019-04-11 RX ORDER — DOCUSATE SODIUM 100 MG
1 CAPSULE ORAL
Qty: 0 | Refills: 0 | DISCHARGE
Start: 2019-04-11

## 2019-04-11 RX ORDER — INSULIN LISPRO 100/ML
5 VIAL (ML) SUBCUTANEOUS
Qty: 0 | Refills: 0 | Status: DISCONTINUED | OUTPATIENT
Start: 2019-04-11 | End: 2019-04-11

## 2019-04-11 RX ORDER — PANTOPRAZOLE SODIUM 20 MG/1
1 TABLET, DELAYED RELEASE ORAL
Qty: 0 | Refills: 0 | DISCHARGE
Start: 2019-04-11

## 2019-04-11 RX ORDER — DEXTROSE 50 % IN WATER 50 %
15 SYRINGE (ML) INTRAVENOUS ONCE
Qty: 0 | Refills: 0 | Status: COMPLETED | OUTPATIENT
Start: 2019-04-11 | End: 2019-04-11

## 2019-04-11 RX ADMIN — Medication 100 MILLIGRAM(S): at 06:11

## 2019-04-11 RX ADMIN — DULOXETINE HYDROCHLORIDE 30 MILLIGRAM(S): 30 CAPSULE, DELAYED RELEASE ORAL at 12:14

## 2019-04-11 RX ADMIN — PANTOPRAZOLE SODIUM 40 MILLIGRAM(S): 20 TABLET, DELAYED RELEASE ORAL at 06:12

## 2019-04-11 RX ADMIN — LEVALBUTEROL 0.63 MILLIGRAM(S): 1.25 SOLUTION, CONCENTRATE RESPIRATORY (INHALATION) at 06:12

## 2019-04-11 RX ADMIN — Medication 8 UNIT(S): at 08:23

## 2019-04-11 RX ADMIN — Medication 100 MILLIGRAM(S): at 06:12

## 2019-04-11 RX ADMIN — HEPARIN SODIUM 5000 UNIT(S): 5000 INJECTION INTRAVENOUS; SUBCUTANEOUS at 06:11

## 2019-04-11 RX ADMIN — BUMETANIDE 1 MILLIGRAM(S): 0.25 INJECTION INTRAMUSCULAR; INTRAVENOUS at 06:12

## 2019-04-11 RX ADMIN — LEVALBUTEROL 0.63 MILLIGRAM(S): 1.25 SOLUTION, CONCENTRATE RESPIRATORY (INHALATION) at 12:14

## 2019-04-11 RX ADMIN — AMIODARONE HYDROCHLORIDE 200 MILLIGRAM(S): 400 TABLET ORAL at 06:11

## 2019-04-11 RX ADMIN — CEFEPIME 100 MILLIGRAM(S): 1 INJECTION, POWDER, FOR SOLUTION INTRAMUSCULAR; INTRAVENOUS at 06:11

## 2019-04-11 RX ADMIN — Medication 2: at 12:14

## 2019-04-11 RX ADMIN — HEPARIN SODIUM 5000 UNIT(S): 5000 INJECTION INTRAVENOUS; SUBCUTANEOUS at 14:22

## 2019-04-11 RX ADMIN — Medication 15 GRAM(S): at 15:55

## 2019-04-11 RX ADMIN — Medication 50 MILLILITER(S): at 16:12

## 2019-04-11 RX ADMIN — Medication 8 UNIT(S): at 12:14

## 2019-04-11 RX ADMIN — LEVALBUTEROL 0.63 MILLIGRAM(S): 1.25 SOLUTION, CONCENTRATE RESPIRATORY (INHALATION) at 00:58

## 2019-04-11 NOTE — PROGRESS NOTE ADULT - SUBJECTIVE AND OBJECTIVE BOX
Patient seen and examined at bedside  No acute events noted overnight  Case discussed with medical team    HPI:  88 yo F from home, with significant PMHx of COPD on home oxygen qhs, 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, Essential HTN, Systolic CHF, CKD 4, PPM, nephrectomy, UTI's, Major Depression, Anxiety, Arteriosclerotic Heart Disease, Atrial fibrillation, and Peripheral Vascular Disease who was recently hospitalized at [facility] and treated for Pneumonia presently on Doxycycline (unspecified days remaining of tx course), who p/w c/o worsening shortness of breath both at rest and on exertion X 2 days a/w increasing oxygen requirements including during the day time, mild sputum production, decreased exercise tolerance, fatigue, generalized weakness, lethargy, and malaise. (06 Apr 2019 16:01)      PAST MEDICAL & SURGICAL HISTORY:  COPD (chronic obstructive pulmonary disease)  Depression  Anxiety  Atrial fibrillation, unspecified type  Pacemaker  Overweight  PVD (Peripheral Vascular Disease)  Arteriosclerotic Heart Disease (ASHD)  HTN (Hypertension)  Diabetes  S/P carpal tunnel release  S/P left cataract extraction  s/p colon resection  S/P Nephrectomy: right  Hip Replacement  History of Total Knee Replacement  S/P Hernia Surgery  S/P Cholecystectomy      amoxicillin (Flushing; Vomiting; Nausea)  lactose (Diarrhea)  lisinopril (Angioedema)  penicillin (Hives)       MEDICATIONS  (STANDING):  amiodarone    Tablet 200 milliGRAM(s) Oral daily  buMETAnide 1 milliGRAM(s) Oral daily  cefepime   IVPB      cefepime   IVPB 1000 milliGRAM(s) IV Intermittent every 12 hours  dextrose 5%. 1000 milliLiter(s) (50 mL/Hr) IV Continuous <Continuous>  dextrose 50% Injectable 12.5 Gram(s) IV Push once  dextrose 50% Injectable 25 Gram(s) IV Push once  dextrose 50% Injectable 25 Gram(s) IV Push once  docusate sodium 100 milliGRAM(s) Oral two times a day  DULoxetine 30 milliGRAM(s) Oral daily  heparin  Injectable 5000 Unit(s) SubCutaneous every 8 hours  insulin glargine Injectable (LANTUS) 24 Unit(s) SubCutaneous at bedtime  insulin lispro (HumaLOG) corrective regimen sliding scale   SubCutaneous three times a day before meals  insulin lispro (HumaLOG) corrective regimen sliding scale   SubCutaneous at bedtime  insulin lispro Injectable (HumaLOG) 8 Unit(s) SubCutaneous three times a day before meals  levalbuterol Inhalation 0.63 milliGRAM(s) Inhalation every 6 hours  metoprolol succinate  milliGRAM(s) Oral daily  mirtazapine 30 milliGRAM(s) Oral at bedtime  OLANZapine 5 milliGRAM(s) Oral at bedtime  pantoprazole    Tablet 40 milliGRAM(s) Oral before breakfast  simvastatin 20 milliGRAM(s) Oral at bedtime    MEDICATIONS  (PRN):  acetaminophen   Tablet .. 650 milliGRAM(s) Oral every 6 hours PRN Temp greater or equal to 38C (100.4F), Mild Pain (1 - 3)  dextrose 40% Gel 15 Gram(s) Oral once PRN Blood Glucose LESS THAN 70 milliGRAM(s)/deciLiter  glucagon  Injectable 1 milliGRAM(s) IntraMuscular once PRN Glucose <70 milliGRAM(s)/deciLiter      REVIEW OF SYSTEMS:  CONSTITUTIONAL: (+) malaise.   EYES: No acute change in vision   ENT:  No tinnitus  NECK: No stiffness  RESPIRATORY: No hemoptysis  CARDIOVASCULAR: No chest pain, palpitations, syncope  GASTROINTESTINAL: No hematemesis, diarrhea, melena, or hematochezia.  GENITOURINARY: No hematuria  NEUROLOGICAL: No headaches  LYMPH Nodes: No enlarged glands  ENDOCRINE: No heat or cold intolerance	    T(C): 36.6 (04-11-19 @ 04:30), Max: 36.8 (04-10-19 @ 20:00)  HR: 98 (04-11-19 @ 04:30) (77 - 98)  BP: 136/80 (04-11-19 @ 04:30) (102/62 - 136/80)  RR: 18 (04-11-19 @ 04:30) (18 - 18)  SpO2: 97% (04-11-19 @ 04:30) (95% - 97%)    PHYSICAL EXAMINATION:   Constitutional: WD, NAD  HEENT: NC, AT  Neck:  Supple  Respiratory:  Adequate airflow b/l. Not using accessory muscles of respiration.  Cardiovascular:  S1 & S2 intact, no R/G, 2+ radial pulses b/l  Gastrointestinal: Soft, NT, ND, normoactive b.s., no organomegaly/RT/rigidity  Extremities: WWP  Neurological:  Alert and awake.  No acute focal motor deficits. Crude sensation intact.     Labs and imaging reviewed    LABS:                        10.4   10.13 )-----------( 477      ( 11 Apr 2019 08:28 )             34.3     04-11    142  |  104  |  33<H>  ----------------------------<  89  4.4   |  27  |  1.64<H>    Ca    9.0      11 Apr 2019 06:36              CAPILLARY BLOOD GLUCOSE      POCT Blood Glucose.: 98 mg/dL (11 Apr 2019 07:53)  POCT Blood Glucose.: 175 mg/dL (10 Apr 2019 21:06)  POCT Blood Glucose.: 87 mg/dL (10 Apr 2019 18:31)  POCT Blood Glucose.: 77 mg/dL (10 Apr 2019 16:49)  POCT Blood Glucose.: 184 mg/dL (10 Apr 2019 11:40)              RADIOLOGY & ADDITIONAL STUDIES:

## 2019-04-11 NOTE — PROGRESS NOTE ADULT - PROBLEM SELECTOR PROBLEM 8
Moderate protein-calorie malnutrition

## 2019-04-11 NOTE — PROGRESS NOTE ADULT - PROBLEM SELECTOR PLAN 2
improved  adjust to po levaquin when discharged
improved  po levaquin
progressing well, 2/2 complicated bacterial health care associated pneumonia, +/- aspiration pna, gram negative +/- gram + organism   -broad spectrum abx renally dosed  plan to adjust to po abx tmrw and d/c planning  f/u cultures  nebs, o2, maintain spo2 > 90%  adjust management per consultants
Continue treatment, monitoring, primary team FU
Her probnp is pretty high: On IV lasix
Her probnp is pretty high: On IV lasix  4/9: She has small pl effusion on the right side: The left side is loculated and is chronic:
Her probnp is pretty high: On IV lasix  4/9: She has small pl effusion on the right side: The left side is loculated and is chronic:  4/10: per annel team
improved sepsis criteria, improving pna, plan to switch to po abx 4/10/19 and then discharge

## 2019-04-11 NOTE — PROGRESS NOTE ADULT - PROBLEM SELECTOR PLAN 3
progressing well   po levaquin when discharged
progressing well   as above  f/u cultures  nebs, o2, maintain spo2 > 90%
progressing well   po levaquin
Blood glucose in 200's" cont to control!
Blood glucose in 200's" cont to control!  4/9: per PMD
Blood glucose in 200's" cont to control!  4/9: per PMD  1/10: getting better
Controlled, asymptomatic, on medication. Suggest to continue medications, monitoring, FU primary team recommendations. .
progressing well   as above

## 2019-04-11 NOTE — PROGRESS NOTE ADULT - PROBLEM SELECTOR PROBLEM 6
CKD (chronic kidney disease) stage 4, GFR 15-29 ml/min

## 2019-04-11 NOTE — PROGRESS NOTE ADULT - SUBJECTIVE AND OBJECTIVE BOX
Follow-up Pulm Progress Note  Mamadou Moreno MD  [phone]    No new respiratory events overnight.    Breathing comfortably on nasasl oxygen at rest    Medications:  Vital Signs Last 24 Hrs  T(C): 36.9 (11 Apr 2019 11:51), Max: 36.9 (11 Apr 2019 11:51)  T(F): 98.5 (11 Apr 2019 11:51), Max: 98.5 (11 Apr 2019 11:51)  HR: 105 (11 Apr 2019 11:51) (82 - 105)  BP: 128/61 (11 Apr 2019 11:51) (108/69 - 136/80)  BP(mean): --  RR: 18 (11 Apr 2019 11:51) (18 - 18)  SpO2: 93% (11 Apr 2019 11:51) (93% - 97%)      04-10 @ 07:01  -  04-11 @ 07:00  --------------------------------------------------------  IN: 890 mL / OUT: 1300 mL / NET: -410 mL        LABS:                        10.4   10.13 )-----------( 477      ( 11 Apr 2019 08:28 )             34.3     04-11    142  |  104  |  33<H>  ----------------------------<  89  4.4   |  27  |  1.64<H>    Ca    9.0      11 Apr 2019 06:36      CULTURES:  Culture Results:   No growth to date. (04-06 @ 21:05)  Culture Results:   No growth to date. (04-06 @ 21:05)  Culture Results:   <10,000 CFU/mL Normal Urogenital Chelsi (04-06 @ 17:11)    Most recent blood culture -- 04-06 @ 21:05   -- -- .Blood Blood-Peripheral 04-06 @ 21:05  Most recent blood culture -- 04-06 @ 17:11   -- -- .Urine Catheterized 04-06 @ 17:11      Physical Examination:  PULM:   CVS: Regular rate and rhythm, no murmurs, rubs, or gallops  ABD: Soft, non-tender  EXT:  No clubbing, cyanosis, or edema    RADIOLOGY REVIEWED  CXR:    CT chest:    TTE:

## 2019-04-11 NOTE — PROGRESS NOTE ADULT - SUBJECTIVE AND OBJECTIVE BOX
Chief complaint  Patient is a 87y old  Female who presents with a chief complaint of worsening shortness of breath (11 Apr 2019 15:57)   Review of systems  Patient in bed, looks comfortable, no fever,  had hypoglycemia.    Labs and Fingersticks  CAPILLARY BLOOD GLUCOSE      POCT Blood Glucose.: 116 mg/dL (11 Apr 2019 16:28)  POCT Blood Glucose.: 58 mg/dL (11 Apr 2019 16:08)  POCT Blood Glucose.: 49 mg/dL (11 Apr 2019 15:51)  POCT Blood Glucose.: 223 mg/dL (11 Apr 2019 11:26)  POCT Blood Glucose.: 98 mg/dL (11 Apr 2019 07:53)  POCT Blood Glucose.: 175 mg/dL (10 Apr 2019 21:06)  POCT Blood Glucose.: 87 mg/dL (10 Apr 2019 18:31)      Anion Gap, Serum: 11 (04-11 @ 06:36)  Anion Gap, Serum: 11 (04-10 @ 07:36)      Calcium, Total Serum: 9.0 (04-11 @ 06:36)  Calcium, Total Serum: 8.8 (04-10 @ 07:36)          04-11    142  |  104  |  33<H>  ----------------------------<  89  4.4   |  27  |  1.64<H>    Ca    9.0      11 Apr 2019 06:36                          10.4   10.13 )-----------( 477      ( 11 Apr 2019 08:28 )             34.3     Medications  MEDICATIONS  (STANDING):  amiodarone    Tablet 200 milliGRAM(s) Oral daily  buMETAnide 1 milliGRAM(s) Oral daily  cefepime   IVPB      cefepime   IVPB 1000 milliGRAM(s) IV Intermittent every 12 hours  dextrose 5%. 1000 milliLiter(s) (50 mL/Hr) IV Continuous <Continuous>  dextrose 50% Injectable 12.5 Gram(s) IV Push once  dextrose 50% Injectable 25 Gram(s) IV Push once  dextrose 50% Injectable 25 Gram(s) IV Push once  docusate sodium 100 milliGRAM(s) Oral two times a day  DULoxetine 30 milliGRAM(s) Oral daily  heparin  Injectable 5000 Unit(s) SubCutaneous every 8 hours  insulin glargine Injectable (LANTUS) 18 Unit(s) SubCutaneous at bedtime  insulin lispro (HumaLOG) corrective regimen sliding scale   SubCutaneous three times a day before meals  insulin lispro (HumaLOG) corrective regimen sliding scale   SubCutaneous at bedtime  insulin lispro Injectable (HumaLOG) 5 Unit(s) SubCutaneous three times a day before meals  levalbuterol Inhalation 0.63 milliGRAM(s) Inhalation every 6 hours  metoprolol succinate  milliGRAM(s) Oral daily  mirtazapine 30 milliGRAM(s) Oral at bedtime  OLANZapine 5 milliGRAM(s) Oral at bedtime  pantoprazole    Tablet 40 milliGRAM(s) Oral before breakfast  simvastatin 20 milliGRAM(s) Oral at bedtime      Physical Exam  General: Patient comfortable in bed  Vital Signs Last 12 Hrs  T(F): 98.5 (04-11-19 @ 11:51), Max: 98.5 (04-11-19 @ 11:51)  HR: 105 (04-11-19 @ 11:51) (105 - 105)  BP: 128/61 (04-11-19 @ 11:51) (128/61 - 128/61)  BP(mean): --  RR: 18 (04-11-19 @ 11:51) (18 - 18)  SpO2: 93% (04-11-19 @ 11:51) (93% - 93%)  Neck: No palpable thyroid nodules.  CVS: S1S2, No murmurs  Respiratory: No wheezing, no crepitations  GI: Abdomen soft, bowel sounds positive  Musculoskeletal:  edema lower extremities.   Skin: No skin rashes, no ecchymosis    Diagnostics    Hemoglobin A1C, Whole Blood: Routine  Cancel Reason: Lab Operations Cancel (04-06 @ 14:52)

## 2019-04-11 NOTE — PROGRESS NOTE ADULT - ASSESSMENT
87y Female with history of COPD presents with SOB found to have PNA and volume overload. Nephrology consulted for elevated Scr.    1) CKD-3: Baseline Scr 1.7-1.8 as per prior labs with Scr currently below baseline due to volume overload. 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 Defer inpatient work up as patient follows with Dr. Lowry as an outpatient. Monitor electrolytes. Avoid nephrotoxins.  2) HTN with CKD: BP controlled. Rate control as per cardiology. Monitor BP.  3) LE edema: Improving with repeat CXR negative for pulmonary edema. Continue with bumex 1 mg PO daily. TTE with severe LVSD. Monitor UO.  4) Acute cystitis without hematuria: Ucx negative. Abx as per ID.

## 2019-04-11 NOTE — PROGRESS NOTE ADULT - SUBJECTIVE AND OBJECTIVE BOX
[facility] NEPHROLOGY- PROGRESS NOTE    87y Female with history of COPD presents with SOB found to have PNA and volume overload. Nephrology consulted for elevated Scr.    REVIEW OF SYSTEMS:  Gen: no changes in weight  Cards: no chest pain  Resp: + dyspnea, + cough  GI: no nausea or vomiting or diarrhea  Vascular: no LE edema    amoxicillin (Flushing; Vomiting; Nausea)  lisinopril (Angioedema)  penicillin (Hives)      Hospital Medications: Medications reviewed      VITALS:  T(F): 98.5 (19 @ 11:51), Max: 98.5 (19 @ 11:51)  HR: 105 (19 @ 11:51)  BP: 128/61 (19 @ 11:51)  RR: 18 (19 @ 11:51)  SpO2: 93% (19 @ 11:51)  Wt(kg): --    04-10 @ 07:  -   @ 07:00  --------------------------------------------------------  IN: 890 mL / OUT: 1300 mL / NET: -410 mL     @ 07:  -   @ 15:58  --------------------------------------------------------  IN: 480 mL / OUT: 850 mL / NET: -370 mL        PHYSICAL EXAM:    Gen: NAD, calm  Cards: + tachycardia, +S1/S2, no M/G/R  Resp: CTA B/L  GI: soft, NT, + ventral hernia, NABS  Vascular: no LE edema B/L      LABS:      142  |  104  |  33<H>  ----------------------------<  89  4.4   |  27  |  1.64<H>    Ca    9.0      2019 06:36      Creatinine Trend: 1.64 <--, 1.65 <--, 1.50 <--, 1.54 <--, 1.54 <--                        10.4   10.13 )-----------( 477      ( 2019 08:28 )             34.3     Urine Studies:  Urinalysis Basic - ( 2019 12:48 )    Color: Yellow / Appearance: Clear / S.016 / pH:   Gluc:  / Ketone: Negative  / Bili: Negative / Urobili: Negative   Blood:  / Protein: 30 mg/dL / Nitrite: Negative   Leuk Esterase: Moderate / RBC: 2 /hpf / WBC 25 /HPF   Sq Epi:  / Non Sq Epi: 5 / Bacteria: Negative

## 2019-04-11 NOTE — DISCHARGE NOTE NURSING/CASE MANAGEMENT/SOCIAL WORK - NSDCDPATPORTLINK_GEN_ALL_CORE
You can access the [facility] Patient Portal, offered by [facility], by registering with the following website: [email or web address]

## 2019-04-11 NOTE — PROGRESS NOTE ADULT - PROBLEM SELECTOR PLAN 8
supplement diet   nutrition

## 2019-04-11 NOTE — PROGRESS NOTE ADULT - PROBLEM SELECTOR PROBLEM 4
Uncontrolled diabetes with stage 4 chronic kidney disease GFR 15-29
Acute pulmonary edema with congestive heart failure
CKD (chronic kidney disease) stage 4, GFR 15-29 ml/min
Uncontrolled diabetes with stage 4 chronic kidney disease GFR 15-29

## 2019-04-11 NOTE — PROVIDER CONTACT NOTE (OTHER) - ASSESSMENT
Patient complaining of being hot. Diaphoretic & flushed. Pt stated she felt weak and "like I'm gonna pass out". Blood sugar found to be 49.

## 2019-04-11 NOTE — PROGRESS NOTE ADULT - PROBLEM SELECTOR PLAN 5
improved
clinically improving   c/w tx of underlying acute chf and complicated bacterial pneumonia   diuresis   monitor clinical status closely
improved
improved
monitor:

## 2019-04-11 NOTE — DISCHARGE NOTE PROVIDER - CARE PROVIDERS DIRECT ADDRESSES
,DirectAddress_Unknown,DirectAddress_Unknown,[email or web address]

## 2019-04-11 NOTE — PROGRESS NOTE ADULT - PROBLEM SELECTOR PROBLEM 3
Pneumonia
HTN (Hypertension)
Pneumonia
Uncontrolled diabetes with stage 4 chronic kidney disease GFR 15-29

## 2019-04-11 NOTE — PROGRESS NOTE ADULT - PROBLEM SELECTOR PLAN 1
Will decrease Lantus to 18 units at bed time.  Will decrease Humalog to 5 units before each meal in addition to Humalog correction scale coverage.  Patient counseled for compliance with consistent low carb diet.

## 2019-04-11 NOTE — DISCHARGE NOTE PROVIDER - NSDCCPCAREPLAN_GEN_ALL_CORE_FT
PRINCIPAL DISCHARGE DIAGNOSIS  Diagnosis: Acute congestive heart failure, unspecified heart failure type  Assessment and Plan of Treatment: Weigh yourself daily.  If you gain 3lbs in 3 days, or 5lbs in a week call your Health Care Provider.  Do not eat or drink foods containing more than 2000mg of salt (sodium) in your diet every day.  Call your Health Care Provider if you have any swelling or increased swelling in your feet, ankles, and/or stomach.  The Pt was provided with CHF diet instruction (low sodium diet, daily weights, label reading, Heart Healthy Cooking Tips & Heart Healthy shopping Tips).  Take all of your medication as directed.  If you become dizzy call your Health Care Provider.      SECONDARY DISCHARGE DIAGNOSES  Diagnosis: Atrial tachycardia  Assessment and Plan of Treatment: Continue current medications  Follow up in EP clinic upon discharge for rehab    Diagnosis: Aspiration pneumonia of right lung, unspecified aspiration pneumonia type, unspecified part of lung  Assessment and Plan of Treatment: Pneumonia is a lung infection that can cause a fever, cough, and trouble breathing.  You completed a course of antibiotics.  Nutrition is important, eat small frequent meals.  Get lots of rest and drink fluids.  Call your health care provider upon arrival home from hospital and make a follow up appointment for one week.  If your cough worsens, you develop fever greater than 101', you have shaking chills, a fast heartbeat, trouble breathing and/or feel your are breathing much faster than usual, call your healthcare provider.  Make sure you wash your hands frequently.

## 2019-04-11 NOTE — DISCHARGE NOTE PROVIDER - CARE PROVIDER_API CALL
Reggie Rhoades)  Gastroenterology; Internal Medicine  [address], [address]  [place], [place] [id number]  Phone: [phone]  Fax: [phone]  Follow Up Time:     Waylon Cates (DO)  Cardiovascular Disease; Internal Medicine; Nuclear Cardiology  [address], [address]  [place] [address]  Phone: [phone]  Fax: [phone]  Follow Up Time:     Kendall Mon)  Cardiac Electrophysiology; Cardiology  [address] [place] [address]  Phone: [phone]  Fax: [phone]  Follow Up Time:

## 2019-04-11 NOTE — DISCHARGE NOTE PROVIDER - PROVIDER TOKENS
PROVIDER:[TOKEN:[[id number]]],PROVIDER:[TOKEN:[[id number]]],PROVIDER:[TOKEN:[[id number]]]

## 2019-04-11 NOTE — PROGRESS NOTE ADULT - ASSESSMENT
Assessment  DMT2: 87y Female with DM T2 with hyperglycemia, on basal bolus insulin , blood sugars fluctuating, had hypoglycemic episode, patient is awake, eating inconsistent meals.  CHF: on medications, no chest pain, stable, monitored.  COPD: Has SOB, on oxygen, stable now.  HTN: Un Controlled,  on antihypertensive medications.  CKD: Monitor labs/BMP,         Brandon Mayer MD  Cell: [phone]  Office: [phone]

## 2019-04-11 NOTE — PROGRESS NOTE ADULT - PROVIDER SPECIALTY LIST ADULT
Cardiology
Endocrinology
Infectious Disease
Internal Medicine
Nephrology
Pulmonology
Electrophysiology
Internal Medicine

## 2019-04-11 NOTE — PROGRESS NOTE ADULT - PROBLEM SELECTOR PLAN 6
stable renal function  renally dose rx   avoid nephrotoxic rx
optimize co-morbidities  renally dose rx   avoid nephrotoxic rx
stable renal function  renally dose rx   avoid nephrotoxic rx
stable renal function  renally dose rx   avoid nephrotoxic rx

## 2019-04-11 NOTE — DISCHARGE NOTE PROVIDER - HOSPITAL COURSE
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         Problem: Acute on chronic systolic (congestive) heart failure.  Plan: improved, c/w cardiac meds    - Discharge to [facility].         Problem: Sepsis.  Plan: improved    po levaquin.         Problem: Pneumonia.  Plan: progressing well     po levaquin.         Problem: Uncontrolled diabetes with stage 4 chronic kidney disease GFR 15-29.  Plan: improved    lantus, premeal humalog, ihss    dm education    monitor acks, adjust rx prn.         Problem: Acute pulmonary edema with congestive heart failure.  Plan: improved.         Problem: CKD (chronic kidney disease) stage 4, GFR 15-29 ml/min. Plan: stable renal function    renally dose rx     avoid nephrotoxic rx.     Problem: Anemia.  Plan: stable.      Problem: Moderate protein-calorie malnutrition.  Plan: supplement diet     nutrition.     Atrial tachycardia, paroxysmal.  Plan: AT in the setting of Pneumonia on cefepime , leukocytosis, negative Blood cultures, negative Urine culture, negative viral swab    monitor telemetry     follow up in EP clinic for regular PPM interrogations    Keep K+>4, MG++>2    continue Amiodarone 200 mg daily, monitor LFTs TFTS as out patient yearly PFTs and Opthalmology exams     Titrate BB as B/P permits    will consider BIV PPM as out patient after patient recovers from Pneumonia 88 yo F from home, with significant PMHx of COPD on home oxygen qhs, 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, Essential HTN, Systolic CHF, CKD 4, PPM, nephrectomy, UTI's, Major Depression, Anxiety, Arteriosclerotic Heart Disease, Atrial fibrillation, and Peripheral Vascular Disease who was recently hospitalized at [facility] and treated for Pneumonia presently on Doxycycline (unspecified days remaining of tx course), who p/w c/o worsening shortness of breath both at rest and on exertion X 2 days a/w increasing oxygen requirements including during the day time, mild sputum production, decreased exercise tolerance, fatigue, generalized weakness, lethargy, and malaise    Atrial tachycardia, paroxysmal.  Plan: AT in the setting of Pneumonia on cefepime , leukocytosis, negative Blood cultures, negative Urine culture, negative viral swab    monitor telemetry     follow up in EP clinic for regular PPM interrogations    Keep K+>4, MG++>2    continue Amiodarone 200 mg daily, monitor LFTs TFTS as out patient yearly PFTs and Opthalmology exams     Titrate BB as B/P permits    will consider BIV PPM as out patient after patient recovers from Pneumonia

## 2019-04-11 NOTE — PROGRESS NOTE ADULT - ASSESSMENT
87 year old female from home, former smoker with PMHx of Major Depression, Anxiety, COPD on home oxygen qhs, T2DM , Diabetic Nephropathy, CKD 4, s/p Right nephrectomy, HTN, PVD  Systolic CHF s/p  PPM LBBB, s/p  PPM SSS Afib RVR /sinus Bradycardia- TBS, recurrent UTI's, ASHD, Afib on Amiodarone  who was recently hospitalized at [facility] and treated for Pneumonia, discharged on readmit RUL pneumonia with PAT to 120's

## 2019-04-11 NOTE — PROGRESS NOTE ADULT - ASSESSMENT
Systolic and diastolic CHF - acute exacerbation with mod MR  Bilateral pleural effusions due to CHG  RUL pneumonia, clinically resolved, afebrile on cefepime  CKD 4    REC    Awaiting DC today to [facility]  Continue nasal oxygen  COmplete antibiotics per primry team  Can convert to duoneb

## 2019-04-11 NOTE — PROGRESS NOTE ADULT - SUBJECTIVE AND OBJECTIVE BOX
HPI: feels better today     MEDICATIONS  (STANDING):  amiodarone    Tablet 200 milliGRAM(s) Oral daily  buMETAnide 1 milliGRAM(s) Oral daily  cefepime   IVPB      cefepime   IVPB 1000 milliGRAM(s) IV Intermittent every 12 hours  dextrose 5%. 1000 milliLiter(s) (50 mL/Hr) IV Continuous <Continuous>  dextrose 50% Injectable 12.5 Gram(s) IV Push once  dextrose 50% Injectable 25 Gram(s) IV Push once  dextrose 50% Injectable 25 Gram(s) IV Push once  docusate sodium 100 milliGRAM(s) Oral two times a day  DULoxetine 30 milliGRAM(s) Oral daily  heparin  Injectable 5000 Unit(s) SubCutaneous every 8 hours  insulin glargine Injectable (LANTUS) 24 Unit(s) SubCutaneous at bedtime  insulin lispro (HumaLOG) corrective regimen sliding scale   SubCutaneous three times a day before meals  insulin lispro (HumaLOG) corrective regimen sliding scale   SubCutaneous at bedtime  insulin lispro Injectable (HumaLOG) 8 Unit(s) SubCutaneous three times a day before meals  levalbuterol Inhalation 0.63 milliGRAM(s) Inhalation every 6 hours  metoprolol succinate  milliGRAM(s) Oral daily  mirtazapine 30 milliGRAM(s) Oral at bedtime  OLANZapine 5 milliGRAM(s) Oral at bedtime  pantoprazole    Tablet 40 milliGRAM(s) Oral before breakfast  simvastatin 20 milliGRAM(s) Oral at bedtime    MEDICATIONS  (PRN):  acetaminophen   Tablet .. 650 milliGRAM(s) Oral every 6 hours PRN Temp greater or equal to 38C (100.4F), Mild Pain (1 - 3)  dextrose 40% Gel 15 Gram(s) Oral once PRN Blood Glucose LESS THAN 70 milliGRAM(s)/deciLiter  glucagon  Injectable 1 milliGRAM(s) IntraMuscular once PRN Glucose <70 milliGRAM(s)/deciLiter    Allergies amoxicillin (Flushing; Vomiting; Nausea)  lactose (Diarrhea)  lisinopril (Angioedema)  penicillin (Hives)  REVIEW OF SYSTEM:    Constitutional: denies fever, chills,+ fatigue states feels better today   Neuro: denies headache, numbness, + weakness, dizziness  Resp: + cough, wheezing, shortness of breath  CVS: denies chest pain, palpitations, leg swelling  GI: denies abdominal pain, nausea, vomiting, diarrhea   : denies dysuria, frequency, incontinence  Skin: denies itching, burning, rashes, or lesions   Msk: denies joint pain or swelling     Vital Signs Last 24 Hrs  T(C): 36.6 (11 Apr 2019 04:30), Max: 36.8 (10 Apr 2019 20:00)  T(F): 97.8 (11 Apr 2019 04:30), Max: 98.3 (10 Apr 2019 20:00)  HR: 98 (11 Apr 2019 04:30) (77 - 98)  BP: 136/80 (11 Apr 2019 04:30) (102/62 - 136/80)  BP(mean): --  RR: 18 (11 Apr 2019 04:30) (18 - 18)  SpO2: 97% (11 Apr 2019 04:30) (95% - 97%)    Physical Exam:  General : Elderly frail well nourished,  and no acute distress  Neuro : Alert and oriented x 3, dozing earlier in day during exam now awake daughter at bedside   HEENT : Sclera clear, no JVD, no Lymphadenopathy no carotid bruits, neck supple  Lungs: Clear to Ascultation, diminished   Cardiovascular : tachycardic + 1 +2, RRR, no murmurs, no rubs  GI : obese abdomen soft, NT, ND, + BS   : no suprapubic tenderness  Extremities : No edema,  +2 PT, feet warm   Skin : warm and dry     TELE: -120   EKG:    LABS:Thyroid Stimulating Hormone, Serum (04.07.19 @ 07:37)    Thyroid Stimulating Hormone, Serum: 0.74 uIU/mL  Hepatic Function Panel (04.07.19 @ 05:31)    Aspartate Aminotransferase (AST/SGOT): 24 U/L  Alanine Aminotransferase (ALT/SGPT): 15 U/L (04.07.19 @ 05:31)                            10.4   10.13 )-----------( 477      ( 11 Apr 2019 08:28 )             34.3     04-11    142  |  104  |  33<H>  ----------------------------<  89  4.4   |  27  |  1.64<H>    Ca    9.0      11 Apr 2019 06:36  RADIOLOGY & ADDITIONAL TESTS:  < from: Transthoracic Echocardiogram (04.09.19 @ 10:41) >  ------------------------------------------------------------------------  PROCEDURE: Transthoracic echocardiogram with 2-D, M-Mode  and complete spectral and color flow Doppler.  INDICATION: Dyspnea, unspecified (R06.00)  ------------------------------------------------------------------------  Observations:  Mitral Valve: Mitral annular calcification. Tethered mitral  valve leaflets. Moderate mitral regurgitation.  Aortic Valve/Aorta: Aortic valve not well visualized;  appears calcified with decreased opening. Peak transaortic  valve gradient equals 9 mm Hg, mean transaortic valve  gradient equals 5 mm Hg, aortic valve velocity time  integral equals 18 cm. Mild aortic regurgitation. Peak left  ventricular outflow tract gradient equals 3 mm Hg, mean  gradient is equal to 1 mm Hg, LVOT velocity time integral  equals 8 cm.  LVOT diameter: 2 cm.  Left Atrium: Severely dilated left atrium.  LA volume index  = 50 cc/m2.  Left Ventricle: Endocardium not well visualized; grossly  moderate to severe left ventricular systolic dysfunction.  Paradoxical septal motion consistent with paced  rhythm/conduction defect. Consider limited repeat imaging  with intravenous echo contrast to better visualize the LV  if clinically indicated. Concentric left ventricular  hypertrophy with basal septal bulge. Basal septal  thickness=1.7 cm. Normal diastolic function  Right Heart: The right ventricle is not well visualized. A  device wire is noted in the right heart. Tricuspid valve  not well visualized, probably normal. Minimal tricuspid  regurgitation. Normal pulmonic valve.  Pericardium/Pleura: Normal pericardium with no pericardial  effusion.  Hemodynamic: Inadequate tricuspid regurgitation Doppler  envelope precludes estimation of RVSP.  ------------------------------------------------------------------------  Conclusions:  Technically diffcult study.  1. Mitral annular calcification. Tethered mitral valve  leaflets. Moderate mitral regurgitation.  2. Aortic valve not well visualized; appears calcified with  decreased opening. Mild aortic regurgitation.  3. Endocardium not well visualized; grossly moderate to  severe left ventricular systolic dysfunction. Paradoxical  septal motion consistent with paced rhythm/conduction  defect. Consider limited repeat imaging with intravenous  echo contrast to better visualize the LV if clinically  indicated.  4. The right ventricle is not well visualized. A device  wire is noted in the right heart.  *** Compared with echocardiogram of 9/18/2017, moderate to  severe left ventricular systolic dysfunction was seen on  the prior study.  ------------------------------------------------------------------------  Confirmed on  4/9/2019 - 14:07:25 by Dana Saleem M.D.  ------------------------------------------------------------------------    < from: CT Chest No Cont (04.06.19 @ 18:17) >  IMPRESSION:     Right upper lobe consolidation compatible with pneumonia for which   follow-up to resolution is recommended.    Mildly enlarged supraclavicular and mediastinal lymph nodes. Recommend   attention on follow-up.    Small layering right pleural effusion and subsegmental atelectasis of   basilar right lower lobe.     Left basilar pleural thickening and small loculated left pleural effusion   is grossly unchanged from 2016.

## 2019-04-11 NOTE — PROGRESS NOTE ADULT - PROBLEM SELECTOR PLAN 4
improved  lantus, premeal humalog, ihss  dm education  monitor acks, adjust rx prn
improved  lantus, premeal humalog, ihss  dm education  monitor acks, adjust rx prn
improving   lantus, premeal humalog, ihss  dm education  monitor acks, adjust rx prn
Monitor labs, Renal FU
cont IVlasix:
cont IVlasix:  4/9: Cont IV lasix
cont IVlasix:  4/9: Cont IV lasix  4/10: Cont diuretics:
improved  lantus, premeal humalog, ihss  dm education  monitor acks, adjust rx prn

## 2019-04-11 NOTE — PROGRESS NOTE ADULT - SUBJECTIVE AND OBJECTIVE BOX
Patient is a 87y old  Female who presents with a chief complaint of worsening shortness of breath (11 Apr 2019 09:26)      INTERVAL HISTORY: feels ok     	  MEDICATIONS:  amiodarone    Tablet 200 milliGRAM(s) Oral daily  buMETAnide 1 milliGRAM(s) Oral daily  metoprolol succinate  milliGRAM(s) Oral daily        PHYSICAL EXAM:  T(C): 36.6 (04-11-19 @ 04:30), Max: 36.8 (04-10-19 @ 20:00)  HR: 98 (04-11-19 @ 04:30) (77 - 98)  BP: 136/80 (04-11-19 @ 04:30) (102/62 - 136/80)  RR: 18 (04-11-19 @ 04:30) (18 - 18)  SpO2: 97% (04-11-19 @ 04:30) (95% - 97%)  Wt(kg): --  I&O's Summary    10 Apr 2019 07:01  -  11 Apr 2019 07:00  --------------------------------------------------------  IN: 890 mL / OUT: 1300 mL / NET: -410 mL    11 Apr 2019 07:01  -  11 Apr 2019 11:34  --------------------------------------------------------  IN: 240 mL / OUT: 0 mL / NET: 240 mL          Appearance: Normal	  HEENT:    PERRL, EOMI	  Cardiovascular:  S1 S2, No JVD  Respiratory: Lungs clear to auscultation	  Psychiatry: Alert  Gastrointestinal:  Soft, Non-tender, + BS	  Skin: No rashes, No cyanosis  Extremities:  No edema of LE                                10.4   10.13 )-----------( 477      ( 11 Apr 2019 08:28 )             34.3     04-11    142  |  104  |  33<H>  ----------------------------<  89  4.4   |  27  |  1.64<H>    Ca    9.0      11 Apr 2019 06:36          Labs personally reviewed      Assessment and Plan:    Assessment:  · Assessment		  86 yo F from home, with significant PMHx of COPD on home oxygen qhs, 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, Essential HTN, Systolic CHF, CKD 4, PPM, nephrectomy, UTI's, Major Depression, Anxiety, Arteriosclerotic Heart Disease, Atrial fibrillation, and Peripheral Vascular Disease who was recently hospitalized at [facility] and treated for Pneumonia presently on Doxycycline (unspecified days remaining of tx course), who p/w c/o worsening shortness of breath both at rest and on exertion X 2 days a/w increasing oxygen requirements including during the day time, mild sputum production, decreased exercise tolerance, fatigue, generalized weakness, lethargy, and malaise.      Problem/Plan - 1:  ·  Problem: Acute on chronic systolic (congestive) heart failure.  Plan: PO Bumex  cardiac meds  - TTE unchanged     Problem/Plan - 2:  ·  Problem: Pneumonia.  Plan: complicated bacterial health care associated pneumonia, unsepcified type  Abx per ID/primary team     Problem/Plan - 3:  ·  Problem: HTN Plan: c/w Metoprolol 100mg     Problem/Plan - 4:  ·  Problem: PAT vs less likely PAF on tele, EP simi noted, no AF, plan for BiV upgrade as outpt         Waylon Cates DO [facility]  Cardiovascular Medicine  [phone]

## 2019-04-11 NOTE — PROGRESS NOTE ADULT - ATTENDING COMMENTS
[facility] NEPHROLOGY  Rad Mojica M.D.  Les Mckeon D.O.  Selene Conn M.D.  Odessa Rodriguez, MSN, ANP-C    Telephone: [phone]  Facsimile: [phone]    [address] [place] [address]
[facility] NEPHROLOGY  Rad Mojica M.D.  Les Mckeon D.O.  Selene Conn M.D.  Odessa Rodriguez, MSN, ANP-C    Telephone: [phone]  Facsimile: [phone]    [address] [place] [address]
[facility] NEPHROLOGY  Rad Mojica M.D.  Les Mckeon D.O.  Selene Conn M.D.  Odessa Rodriguez, MSN, ANP-C    Telephone: [phone]  Facsimile: [phone]    [address] [place] [address]
[facility] NEPHROLOGY  aRd Mojica M.D.  Les Mckeon D.O.  Selene Conn M.D.  Odessa Rodriguez, MSN, ANP-C    Telephone: [phone]  Facsimile: [phone]    [address] [place] [address]
Pt seems to eb doing ok from resp point of view:
Pt seems to eb doing ok from resp point of view:  4/10: Doingbetter:
atrial tachycardia - c/w cardiac meds, ep eval appreciated

## 2019-04-11 NOTE — PROGRESS NOTE ADULT - PROBLEM SELECTOR PLAN 1
AT in the setting of Pneumonia on cefepime , leukocytosis, negative Blood cultures, negative Urine culture, negative viral swab  monitor telemetry   Keep K+>4, MG++>2  continue Amiodarone 200 mg daily, monitor LFTs TFTS as out patient yearly PFTs and Opthalmology exams   Titrate BB as B/P permits  will consider BIV PPM as out patient after patient recovers from Pneumonia     [id number] AT in the setting of Pneumonia on cefepime , leukocytosis, negative Blood cultures, negative Urine culture, negative viral swab  monitor telemetry   follow up in EP clinic for regular PPM interrogations  Keep K+>4, MG++>2  continue Amiodarone 200 mg daily, monitor LFTs TFTS as out patient yearly PFTs and Opthalmology exams   Titrate BB as B/P permits  will consider BIV PPM as out patient after patient recovers from Pneumonia     [id number]

## 2019-04-11 NOTE — PROGRESS NOTE ADULT - PROBLEM SELECTOR PROBLEM 1
Atrial tachycardia, paroxysmal
Acute on chronic systolic (congestive) heart failure
Pneumonia
Uncontrolled diabetes with stage 4 chronic kidney disease GFR 15-29
Acute on chronic systolic (congestive) heart failure

## 2019-04-11 NOTE — PROGRESS NOTE ADULT - REASON FOR ADMISSION
worsening shortness of breath

## 2019-04-11 NOTE — PROGRESS NOTE ADULT - PROBLEM SELECTOR PROBLEM 5
Acute pulmonary edema with congestive heart failure
CKD (chronic kidney disease) stage 4, GFR 15-29 ml/min

## 2019-04-11 NOTE — PROGRESS NOTE ADULT - PROBLEM SELECTOR PROBLEM 2
Sepsis
Sepsis
Acute on chronic systolic (congestive) heart failure
COPD (chronic obstructive pulmonary disease)
Sepsis
Sepsis

## 2019-04-18 ENCOUNTER — INBOUND DOCUMENT (OUTPATIENT)
Age: 84
End: 2019-04-18

## 2019-04-28 NOTE — ED ADULT NURSE NOTE - CAS TRG GENERAL AIRWAY, MLM
Patent Scribe Attestation (For Scribes USE Only)... Attending Attestation (For Attendings USE Only).../Scribe Attestation (For Scribes USE Only)...

## 2019-05-22 NOTE — CONSULT NOTE ADULT - EXTREMITIES
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 Patient had sudden onset pain and then fast growing mass and swelling with involvement of overlying skin. Denies any fever, chills, nausea, vomiting , abdominal pain, nipple discharge.   Admits to have exertional sob. Patient is being followed up on op basis by  who did surgery and Dr. Brantley.     In Ed, BP: 174/107 mm hg, HR: 110, Temp: 100.2 F  cbc wnl   bmp shows k of 3.2   CT shows complete white out of left lung   Marked masslike thickening of the outer lower quadrant of the left breast with underlying confluent breast mass measuring up to 4.8 x 3.6 cm compatible with carcinoma.  Large left pleural effusion with complete collapse of the left lower lobe and near complete collapse of the left upper lobe, presumably malignant effusion.  Several sclerotic lesions within the spine most likely representing bony metastatic disease. Compression deformities of T3, T7, and T10 likely representing pathologic compression deformities.    Will admit to medicine for Left breast mass, likely overlying cellulitis, Malignant pleural effusion.     Pt. seen and examined.  Pt. reports being comfortable except occasional left breast pain which resolves with meds.  Pt. with no c/o CP, SOB or other discomfort.
No cyanosis, clubbing or edema
No cyanosis, clubbing or edema

## 2019-05-30 ENCOUNTER — APPOINTMENT (OUTPATIENT)
Dept: ELECTROPHYSIOLOGY | Facility: CLINIC | Age: 84
End: 2019-05-30

## 2019-08-05 ENCOUNTER — APPOINTMENT (OUTPATIENT)
Dept: PULMONOLOGY | Facility: CLINIC | Age: 84
End: 2019-08-05
Payer: MEDICARE

## 2019-08-05 VITALS
TEMPERATURE: 97.9 F | DIASTOLIC BLOOD PRESSURE: 73 MMHG | RESPIRATION RATE: 16 BRPM | WEIGHT: 146 LBS | BODY MASS INDEX: 24.92 KG/M2 | HEIGHT: 64 IN | SYSTOLIC BLOOD PRESSURE: 127 MMHG | OXYGEN SATURATION: 97 % | HEART RATE: 58 BPM

## 2019-08-05 DIAGNOSIS — N28.9 DISORDER OF KIDNEY AND URETER, UNSPECIFIED: ICD-10-CM

## 2019-08-05 LAB — POCT - HEMOGLOBIN (HGB), QUANTITATIVE, TRANSCUTANEOUS: 10.8

## 2019-08-05 PROCEDURE — 71046 X-RAY EXAM CHEST 2 VIEWS: CPT

## 2019-08-05 PROCEDURE — 94060 EVALUATION OF WHEEZING: CPT

## 2019-08-05 PROCEDURE — 88738 HGB QUANT TRANSCUTANEOUS: CPT

## 2019-08-05 PROCEDURE — 94727 GAS DIL/WSHOT DETER LNG VOL: CPT

## 2019-08-05 PROCEDURE — 94729 DIFFUSING CAPACITY: CPT

## 2019-08-05 PROCEDURE — 99214 OFFICE O/P EST MOD 30 MIN: CPT | Mod: 25

## 2019-08-05 PROCEDURE — 36415 COLL VENOUS BLD VENIPUNCTURE: CPT

## 2019-08-05 RX ORDER — CLONAZEPAM 0.5 MG/1
0.5 TABLET ORAL
Refills: 0 | Status: DISCONTINUED | COMMUNITY
End: 2019-08-05

## 2019-08-05 RX ORDER — METOPROLOL SUCCINATE 25 MG/1
25 TABLET, EXTENDED RELEASE ORAL
Qty: 30 | Refills: 0 | Status: DISCONTINUED | COMMUNITY
Start: 2017-12-04 | End: 2019-08-05

## 2019-08-06 PROBLEM — N28.9 MILD RENAL INSUFFICIENCY: Status: ACTIVE | Noted: 2019-08-06

## 2019-08-06 LAB
ANION GAP SERPL CALC-SCNC: 13 MMOL/L
BASOPHILS # BLD AUTO: 0.07 K/UL
BASOPHILS NFR BLD AUTO: 0.8 %
BUN SERPL-MCNC: 34 MG/DL
CALCIUM SERPL-MCNC: 9.3 MG/DL
CHLORIDE SERPL-SCNC: 99 MMOL/L
CO2 SERPL-SCNC: 27 MMOL/L
CREAT SERPL-MCNC: 2.17 MG/DL
EOSINOPHIL # BLD AUTO: 0.51 K/UL
EOSINOPHIL NFR BLD AUTO: 5.8 %
GLUCOSE SERPL-MCNC: 111 MG/DL
HCT VFR BLD CALC: 36.6 %
HGB BLD-MCNC: 10.8 G/DL
IMM GRANULOCYTES NFR BLD AUTO: 1.7 %
LYMPHOCYTES # BLD AUTO: 1.91 K/UL
LYMPHOCYTES NFR BLD AUTO: 21.7 %
MAN DIFF?: NORMAL
MCHC RBC-ENTMCNC: 27.7 PG
MCHC RBC-ENTMCNC: 29.5 GM/DL
MCV RBC AUTO: 93.8 FL
MONOCYTES # BLD AUTO: 0.87 K/UL
MONOCYTES NFR BLD AUTO: 9.9 %
NEUTROPHILS # BLD AUTO: 5.3 K/UL
NEUTROPHILS NFR BLD AUTO: 60.1 %
PLATELET # BLD AUTO: 463 K/UL
POTASSIUM SERPL-SCNC: 4.7 MMOL/L
PROCALCITONIN SERPL-MCNC: 0.06 NG/ML
RBC # BLD: 3.9 M/UL
RBC # FLD: 14.6 %
SODIUM SERPL-SCNC: 139 MMOL/L
WBC # FLD AUTO: 8.81 K/UL

## 2019-08-06 NOTE — HISTORY OF PRESENT ILLNESS
[Cigarettes ___ /Day] : reports smoking [unfilled] packs of cigarettes per day [Former] : is a former smoker [___ Year Quit] : ~He/She~ quit smoking in [unfilled] [FreeTextEntry1] : Patient states admission to [facility] April 2019 diagnosed with pneumonia treated and subsequently was sent home\par She then returned to 3 days later to [facility] for incomplete treatment of the pneumonia and subsequently was discharged following antibiotics with clearing reported.  She was discharged to rehab with a did a follow-up CT scan of the chest.  It was abnormal and referred for pulmonary evaluation.  She describes very minimal shortness of breath on exertion.\par Denies any wheezing significant cough purulent sputum hemoptysis.  Denies any lower extremity edema.  There is no chest pain pleuritic chest pain reported.  Denies nausea vomiting diarrhea constipation.\par She was last seen November 30, 2017 in the office.  She does have a component of mild obstructive airway disease known renal malignancy and active nocturnal hypoxemia

## 2019-08-06 NOTE — PHYSICAL EXAM
[General Appearance - Well Developed] : well developed [Normal Appearance] : normal appearance [Well Groomed] : well groomed [General Appearance - Well Nourished] : well nourished [No Deformities] : no deformities [General Appearance - In No Acute Distress] : no acute distress [Normal Conjunctiva] : the conjunctiva exhibited no abnormalities [Eyelids - No Xanthelasma] : the eyelids demonstrated no xanthelasmas [Normal Oropharynx] : normal oropharynx [I] : I [Neck Appearance] : the appearance of the neck was normal [Neck Cervical Mass (___cm)] : no neck mass was observed [Jugular Venous Distention Increased] : there was no jugular-venous distention [Thyroid Diffuse Enlargement] : the thyroid was not enlarged [Heart Rate And Rhythm] : heart rate and rhythm were normal [Murmurs] : no murmurs present [Heart Sounds] : normal S1 and S2 [Arterial Pulses Normal] : the arterial pulses were normal [Edema] : no peripheral edema present [Veins - Varicosity Changes] : no varicosital changes were noted in the lower extremities [Respiration, Rhythm And Depth] : normal respiratory rhythm and effort [Exaggerated Use Of Accessory Muscles For Inspiration] : no accessory muscle use [Chest Palpation] : palpation of the chest revealed no abnormalities [Lungs Percussion] : the lungs were normal to percussion [Abdomen Tenderness] : non-tender [Abdomen Soft] : soft [Abdomen Mass (___ Cm)] : no abdominal mass palpated [Cyanosis, Localized] : no localized cyanosis [Nail Clubbing] : no clubbing of the fingernails [Petechial Hemorrhages (___cm)] : no petechial hemorrhages [Deep Tendon Reflexes (DTR)] : deep tendon reflexes were 2+ and symmetric [] : no ischemic changes [Sensation] : the sensory exam was normal to light touch and pinprick [No Focal Deficits] : no focal deficits [Affect] : the affect was normal [Oriented To Time, Place, And Person] : oriented to person, place, and time [Impaired Insight] : insight and judgment were intact [FreeTextEntry1] : ambulates with cane

## 2019-08-06 NOTE — REVIEW OF SYSTEMS
[Dysrhythmia] : dysrhythmia [As Noted in HPI] : as noted in HPI [Depression] : depression [Anxiety] : anxiety [Diabetes] : diabetes mellitus [Negative] : Neurologic [Fever] : no fever [Chills] : no chills [Fatigue] : no fatigue [Poor Appetite] : normal appetite  [Panic Attacks] : no panic attacks [Thyroid Problem] : no thyroid problem [FreeTextEntry9] : P A FIB Sinus  node  dysfx [de-identified] : PCN allergy [FreeTextEntry5] : s/p UTI

## 2019-08-06 NOTE — DISCUSSION/SUMMARY
[FreeTextEntry1] : Patient is relatively asymptomatic with this to be a recurrent pneumonia\par Rule out cryptogenic organizing pneumonia with waxing and waning pulmonary opacities.\par Blood work pending with CBC serum electrolytes and procalcitonin.\par Hold steroids at the present time.\par We will treat with antibiotic at present with doxycycline 100 mg 1 tablet p.o. twice daily x7 days\par Will request repeat chest x-ray in1 -2 week.\par Will need follow-up serial chest x-rays and if recurrence in this older patient will discuss the issue of breath steroids but it should be noted She does have diabetes mellitus which could complicate with use of oral steroids\par remains on Nocturnal\par  Oxygen 2 liters,.\par  Remains on Advair Diskus\par \par Addendum\par Reviewed blood work with patient and daughter\par Known history of renal insufficiency and is under care of nephrology

## 2019-08-06 NOTE — PROCEDURE
[FreeTextEntry1] : Chest x-ray PA lateral August 5, 2019\par Cardiomegaly\par Dual-chamber pacemaker\par Patchy infiltrate left mid to upper lung zone\par Calcification aortic knob\par No evidence of pleural effusions\par Well-circumscribed nodular density right lower lung zone\par Subcentimeter in size\par Pneumothorax not evident\par Impression\par Left upper lobe parenchymal infiltrate faint\par \par PFT August 5, 2019\par Flow rates normal\par Lung volumes normal with total lung capacity 91% predicted.\par Diffusion moderate reduction 59% predicted with a loss of functioning alveolocapillary units\par Hemoglobin 10.8\par \par CT chest [facility]\par Impression left upper lobe almost 5 cm opacity with air bronchograms surrounding groundglass opacity new compared to a CT of March 31, 2015 and on an outside CT chest report from May 6, 2019.  At that time they reported a 3.5 cm opacity at the right upper lobe that is resolved with some minimal residual platelike atelectasis fibrotic findings.  Other small scattered pulmonary nodules largest nodule right lower lobe measuring 7 x 6 mm felt possibly due to mucoid impaction\par \par ADDENDUM\par Data reviewed\par CBC White blood count 8.81 \par Hemoglobin 10.8 hematocrit 36.6\par Platelet count mildly elevated 463\par Immature granulocytes 1.7%\par Procalcitonin 0.06\par Renal function\par BUN 34 creatinine 2.17

## 2019-08-12 ENCOUNTER — APPOINTMENT (OUTPATIENT)
Dept: ELECTROPHYSIOLOGY | Facility: CLINIC | Age: 84
End: 2019-08-12

## 2019-08-19 ENCOUNTER — APPOINTMENT (OUTPATIENT)
Dept: ELECTROPHYSIOLOGY | Facility: CLINIC | Age: 84
End: 2019-08-19
Payer: MEDICARE

## 2019-08-19 VITALS
WEIGHT: 146 LBS | BODY MASS INDEX: 24.92 KG/M2 | DIASTOLIC BLOOD PRESSURE: 73 MMHG | HEART RATE: 61 BPM | HEIGHT: 64 IN | SYSTOLIC BLOOD PRESSURE: 129 MMHG | OXYGEN SATURATION: 96 %

## 2019-08-19 PROCEDURE — 93280 PM DEVICE PROGR EVAL DUAL: CPT

## 2019-08-21 ENCOUNTER — APPOINTMENT (OUTPATIENT)
Dept: PULMONOLOGY | Facility: CLINIC | Age: 84
End: 2019-08-21
Payer: MEDICARE

## 2019-08-21 DIAGNOSIS — R91.8 OTHER NONSPECIFIC ABNORMAL FINDING OF LUNG FIELD: ICD-10-CM

## 2019-08-21 PROCEDURE — 94060 EVALUATION OF WHEEZING: CPT

## 2019-08-21 PROCEDURE — 99214 OFFICE O/P EST MOD 30 MIN: CPT | Mod: 25

## 2019-08-21 PROCEDURE — 71046 X-RAY EXAM CHEST 2 VIEWS: CPT

## 2019-08-21 NOTE — PHYSICAL EXAM
[General Appearance - Well Developed] : well developed [Normal Appearance] : normal appearance [Well Groomed] : well groomed [No Deformities] : no deformities [General Appearance - Well Nourished] : well nourished [General Appearance - In No Acute Distress] : no acute distress [Normal Conjunctiva] : the conjunctiva exhibited no abnormalities [Normal Oropharynx] : normal oropharynx [Eyelids - No Xanthelasma] : the eyelids demonstrated no xanthelasmas [I] : I [Neck Appearance] : the appearance of the neck was normal [Neck Cervical Mass (___cm)] : no neck mass was observed [Jugular Venous Distention Increased] : there was no jugular-venous distention [Thyroid Diffuse Enlargement] : the thyroid was not enlarged [Heart Rate And Rhythm] : heart rate and rhythm were normal [Heart Sounds] : normal S1 and S2 [Murmurs] : no murmurs present [Edema] : no peripheral edema present [Arterial Pulses Normal] : the arterial pulses were normal [Veins - Varicosity Changes] : no varicosital changes were noted in the lower extremities [Respiration, Rhythm And Depth] : normal respiratory rhythm and effort [Exaggerated Use Of Accessory Muscles For Inspiration] : no accessory muscle use [Chest Palpation] : palpation of the chest revealed no abnormalities [Lungs Percussion] : the lungs were normal to percussion [Abdomen Soft] : soft [Abdomen Tenderness] : non-tender [Abdomen Mass (___ Cm)] : no abdominal mass palpated [Nail Clubbing] : no clubbing of the fingernails [Cyanosis, Localized] : no localized cyanosis [Petechial Hemorrhages (___cm)] : no petechial hemorrhages [] : no ischemic changes [Deep Tendon Reflexes (DTR)] : deep tendon reflexes were 2+ and symmetric [Sensation] : the sensory exam was normal to light touch and pinprick [No Focal Deficits] : no focal deficits [Oriented To Time, Place, And Person] : oriented to person, place, and time [Impaired Insight] : insight and judgment were intact [Affect] : the affect was normal [FreeTextEntry1] : ambulates with cane

## 2019-08-21 NOTE — HISTORY OF PRESENT ILLNESS
[Former] : is a former smoker [Cigarettes ___ /Day] : reports smoking [unfilled] packs of cigarettes per day [___ Year Quit] : ~He/She~ quit smoking in [unfilled]

## 2019-08-21 NOTE — DISCUSSION/SUMMARY
[FreeTextEntry1] : Chest x-ray demonstrates straight clearing of the left mid to upper lung zone infiltrate/opacity\par Rule out cryptogenic organizing pneumonia with waxing and waning pulmonary opacities without treatment with steroids versus true left upper lung zone pneumonia\par We will treat with antibiotic at present with doxycycline 100 mg 1 tablet p.o. twice daily x7 days pleated\par Will request repeat chest x-ray in1 -2 week.\par Will need follow-up serial chest x-rays and if recurrence in this older patient will discuss the issue of  possible steroids but it should be noted She does have diabetes mellitus which could complicate with use of oral steroids\par remains on Nocturnal\par  Oxygen 2 liters,.\par  Remains on Advair discus\par Repeat chest x-ray 1 month\par Delay timing of CT of the chest as there is clear interval improvement of the opacity based on chest x-ray alone\par Watch PFT data

## 2019-08-21 NOTE — PROCEDURE
[FreeTextEntry1] : A PA lateral August 21, 2019\par Borderline cardiomegaly dual-chamber pacemaker\par Aortic knob calcification\par Lamination right hemidiaphragm\par Tiny well-circumscribed subcentimeter nodule right lower lung zone\par No significant pleural effusions which is some minimal blunting versus pleural fibrosis\par No clearly identifiable pulmonary opacity.  Daily chest x-ray of August 5, 2019 there is definitive clearing although there is some technical limitations because of the battery of the pacemaker but as noted clearly the left mid upper lung zone opacity has resolved \par \par Spirometry August 21, 2019\par Well-preserved flow rates\par Minimal obstructive pattern with FEV1 FVC 72\par No significant response to bronchodilator at the FEV1\par \par \par PFT August 5, 2019\par Flow rates normal\par Lung volumes normal with total lung capacity 91% predicted.\par Diffusion moderate reduction 59% predicted with a loss of functioning alveolocapillary units\par Hemoglobin 10.8\par \par CT chest [facility] July 25, 2019\par Impression left upper lobe almost 5 cm opacity with air bronchograms surrounding groundglass opacity new compared to a CT of March 31, 2015 and on an outside CT chest report from May 6, 2019.  At that time they reported a 3.5 cm opacity at the right upper lobe that is resolved with some minimal residual platelike atelectasis fibrotic findings.  Other small scattered pulmonary nodules largest nodule right lower lobe measuring 7 x 6 mm felt possibly due to mucoid impaction\par \par ADDENDUM\par Data reviewed\par CBC White blood count 8.81 \par Hemoglobin 10.8 hematocrit 36.6\par Platelet count mildly elevated 463\par Immature granulocytes 1.7%\par Procalcitonin 0.06\par Renal function\par BUN 34 creatinine 2.17

## 2019-08-21 NOTE — REVIEW OF SYSTEMS
[As Noted in HPI] : as noted in HPI [Dysrhythmia] : dysrhythmia [Depression] : depression [Anxiety] : anxiety [Diabetes] : diabetes mellitus [Negative] : Gastrointestinal [Fever] : no fever [Fatigue] : no fatigue [Chills] : no chills [Poor Appetite] : normal appetite  [Panic Attacks] : no panic attacks [Thyroid Problem] : no thyroid problem [FreeTextEntry9] : P A FIB Sinus  node  dysfx [de-identified] : PCN allergy [FreeTextEntry5] : s/p UTI

## 2019-09-09 NOTE — PROCEDURE NOTE - INTERROGATION NOTE: R-WAVE AMPLITUDE (MV)
Take your medicines every day, as directed    Changes made today:  o Increase Lasix back to 20 mg twice daily.   o Labs in one week   o    Monitor Your Weight and Symptoms    Contact us if you:      Gain 2 pounds in one day or 5 pounds in one week    Feel more short of breath    Notice more leg swelling    Feel lightheadeded   Change your lifestyle    Limit Salt or Sodium:    2000 mg  Limit Fluids:    2000 mL or approximately 64 ounces  Eat a Heart Healthy Diet    Low in saturated fats  Stay Active:    Aim to move at least 150 minutes every  week         To Contact us    During Business Hours:  [phone], option # 1 (University)  Then option # 4 (medical questions)     After hours, weekends or holidays:   [phone], Option #4  Ask to speak to the On-Call Cardiologist. Inform them you are a CORE/heart failure patient at the [facility].     Use [employer] allows you to communicate directly with your heart team through secure messaging.    [employer] can be accessed any time on your phone, computer, or tablet.    If you need assistance, we'd be happy to help!         Keep your Heart Appointments:    Follow up as needed in [facility] for symptoms.         
25

## 2019-10-07 ENCOUNTER — MOBILE ON CALL (OUTPATIENT)
Age: 84
End: 2019-10-07

## 2019-10-07 DIAGNOSIS — R05 COUGH: ICD-10-CM

## 2019-10-07 RX ORDER — BENZONATATE 100 MG/1
100 CAPSULE ORAL 3 TIMES DAILY
Qty: 30 | Refills: 0 | Status: ACTIVE | COMMUNITY
Start: 2019-10-07 | End: 1900-01-01

## 2019-10-24 ENCOUNTER — APPOINTMENT (OUTPATIENT)
Dept: PULMONOLOGY | Facility: CLINIC | Age: 84
End: 2019-10-24
Payer: MEDICARE

## 2019-10-24 VITALS
SYSTOLIC BLOOD PRESSURE: 119 MMHG | HEIGHT: 64 IN | RESPIRATION RATE: 16 BRPM | OXYGEN SATURATION: 96 % | BODY MASS INDEX: 24.92 KG/M2 | DIASTOLIC BLOOD PRESSURE: 85 MMHG | HEART RATE: 60 BPM | TEMPERATURE: 97.9 F | WEIGHT: 146 LBS

## 2019-10-24 DIAGNOSIS — Z79.899 OTHER LONG TERM (CURRENT) DRUG THERAPY: ICD-10-CM

## 2019-10-24 DIAGNOSIS — R93.89 ABNORMAL FINDINGS ON DIAGNOSTIC IMAGING OF OTHER SPECIFIED BODY STRUCTURES: ICD-10-CM

## 2019-10-24 DIAGNOSIS — J44.9 CHRONIC OBSTRUCTIVE PULMONARY DISEASE, UNSPECIFIED: ICD-10-CM

## 2019-10-24 PROCEDURE — 99214 OFFICE O/P EST MOD 30 MIN: CPT | Mod: 25

## 2019-10-24 PROCEDURE — 94060 EVALUATION OF WHEEZING: CPT

## 2019-10-24 PROCEDURE — 71046 X-RAY EXAM CHEST 2 VIEWS: CPT

## 2019-10-24 RX ORDER — DOXYCYCLINE HYCLATE 100 MG/1
100 CAPSULE ORAL
Qty: 14 | Refills: 0 | Status: DISCONTINUED | COMMUNITY
Start: 2019-08-05 | End: 2019-10-24

## 2019-10-24 NOTE — HISTORY OF PRESENT ILLNESS
[Cigarettes ___ /Day] : reports smoking [unfilled] packs of cigarettes per day [Former] : is a former smoker [___ Year Quit] : ~He/She~ quit smoking in [unfilled]

## 2019-10-24 NOTE — DISCUSSION/SUMMARY
[FreeTextEntry1] :  recurrent pneumonia\par Rule out cryptogenic organizing pneumonia with waxing and waning pulmonary opacities..\par Will need follow-up serial chest x-rays and if recurrence in this older patient will discuss the issue of oraL steroids but it should be noted She does have diabetes mellitus which could complicate with use of oral steroids\par remains on Nocturnal\par  Oxygen 2 liters,.\par  Remains on  Advair Diskus\par states  received  flu  vaccine  with  PMD and  will  receive pneumonia vaccine

## 2019-10-24 NOTE — PROCEDURE
[FreeTextEntry1] : Spirometry October 24, 2019\par Mild reduction in flow rates\par No response to bronchodilator at the FEV1\par Noted decline compared to August 21, 2019\par \par Chest x-ray PA lateral October 24, 2019\par Cardiomegaly\par Dual-chamber permanent pacemaker\par Mild calcification aortic knob\par No parenchymal infiltrates pleural effusions dominant pulmonary nodules\par Left central lung zone intrapulmonary lymph node\par No interval change compared to chest x-ray of August 21, 2019 lamination right hemidiaphragm exclude a tiny well-circumscribed subcentimeter nodule right lower lung zone right pleural fibrosis\par \par \par Spirometry August 21, 2019\par Well-preserved flow rates\par Minimal obstructive pattern with FEV1 FVC 72\par No significant response to bronchodilator at the FEV1\par \par \par PFT August 5, 2019\par Flow rates normal\par Lung volumes normal with total lung capacity 91% predicted.\par Diffusion moderate reduction 59% predicted with a loss of functioning alveolocapillary units\par Hemoglobin 10.8\par \par CT chest [facility] July 25, 2019\par Impression left upper lobe almost 5 cm opacity with air bronchograms surrounding groundglass opacity new compared to a CT of March 31, 2015 and on an outside CT chest report from May 6, 2019.  At that time they reported a 3.5 cm opacity at the right upper lobe that is resolved with some minimal residual platelike atelectasis fibrotic findings.  Other small scattered pulmonary nodules largest nodule right lower lobe measuring 7 x 6 mm felt possibly due to mucoid impaction\par \par ADDENDUM\par Data reviewed\par CBC White blood count 8.81 \par Hemoglobin 10.8 hematocrit 36.6\par Platelet count mildly elevated 463\par Immature granulocytes 1.7%\par Procalcitonin 0.06\par Renal function\par BUN 34 creatinine 2.17

## 2019-10-24 NOTE — REVIEW OF SYSTEMS
[As Noted in HPI] : as noted in HPI [Dysrhythmia] : dysrhythmia [Depression] : depression [Anxiety] : anxiety [Diabetes] : diabetes mellitus [Negative] : Neurologic [Fever] : no fever [Chills] : no chills [Poor Appetite] : normal appetite  [Fatigue] : no fatigue [Thyroid Problem] : no thyroid problem [Panic Attacks] : no panic attacks [FreeTextEntry9] : P A FIB Sinus  node  dysfx [de-identified] : PCN allergy [FreeTextEntry5] : s/p UTI

## 2019-10-24 NOTE — PHYSICAL EXAM
[General Appearance - Well Developed] : well developed [Normal Appearance] : normal appearance [Well Groomed] : well groomed [General Appearance - Well Nourished] : well nourished [No Deformities] : no deformities [General Appearance - In No Acute Distress] : no acute distress [Normal Conjunctiva] : the conjunctiva exhibited no abnormalities [Eyelids - No Xanthelasma] : the eyelids demonstrated no xanthelasmas [Normal Oropharynx] : normal oropharynx [I] : I [Neck Appearance] : the appearance of the neck was normal [Neck Cervical Mass (___cm)] : no neck mass was observed [Jugular Venous Distention Increased] : there was no jugular-venous distention [Thyroid Diffuse Enlargement] : the thyroid was not enlarged [Heart Rate And Rhythm] : heart rate and rhythm were normal [Heart Sounds] : normal S1 and S2 [Murmurs] : no murmurs present [Arterial Pulses Normal] : the arterial pulses were normal [Edema] : no peripheral edema present [Veins - Varicosity Changes] : no varicosital changes were noted in the lower extremities [Respiration, Rhythm And Depth] : normal respiratory rhythm and effort [Exaggerated Use Of Accessory Muscles For Inspiration] : no accessory muscle use [Chest Palpation] : palpation of the chest revealed no abnormalities [Lungs Percussion] : the lungs were normal to percussion [Abdomen Soft] : soft [Abdomen Tenderness] : non-tender [Abdomen Mass (___ Cm)] : no abdominal mass palpated [Nail Clubbing] : no clubbing of the fingernails [Cyanosis, Localized] : no localized cyanosis [Petechial Hemorrhages (___cm)] : no petechial hemorrhages [] : no ischemic changes [Deep Tendon Reflexes (DTR)] : deep tendon reflexes were 2+ and symmetric [Sensation] : the sensory exam was normal to light touch and pinprick [No Focal Deficits] : no focal deficits [Oriented To Time, Place, And Person] : oriented to person, place, and time [Impaired Insight] : insight and judgment were intact [Affect] : the affect was normal [FreeTextEntry1] : ambulates with cane

## 2019-12-15 ENCOUNTER — INPATIENT (INPATIENT)
Facility: HOSPITAL | Age: 84
LOS: 3 days | Discharge: HOME | DRG: 683 | End: 2019-12-19
Attending: INTERNAL MEDICINE | Admitting: INTERNAL MEDICINE
Payer: MEDICARE

## 2019-12-15 VITALS
TEMPERATURE: 97 F | RESPIRATION RATE: 20 BRPM | OXYGEN SATURATION: 99 % | HEART RATE: 103 BPM | SYSTOLIC BLOOD PRESSURE: 165 MMHG | DIASTOLIC BLOOD PRESSURE: 84 MMHG

## 2019-12-15 DIAGNOSIS — N17.9 ACUTE KIDNEY FAILURE, UNSPECIFIED: ICD-10-CM

## 2019-12-15 DIAGNOSIS — Z79.899 OTHER LONG TERM (CURRENT) DRUG THERAPY: ICD-10-CM
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
## 2019-12-15 DIAGNOSIS — I50.9 HEART FAILURE, UNSPECIFIED: ICD-10-CM

## 2019-12-15 DIAGNOSIS — E11.9 TYPE 2 DIABETES MELLITUS WITHOUT COMPLICATIONS: ICD-10-CM

## 2019-12-15 DIAGNOSIS — J44.9 CHRONIC OBSTRUCTIVE PULMONARY DISEASE, UNSPECIFIED: ICD-10-CM

## 2019-12-15 DIAGNOSIS — Z29.9 ENCOUNTER FOR PROPHYLACTIC MEASURES, UNSPECIFIED: ICD-10-CM

## 2019-12-15 DIAGNOSIS — I48.91 UNSPECIFIED ATRIAL FIBRILLATION: ICD-10-CM

## 2019-12-15 DIAGNOSIS — F32.9 MAJOR DEPRESSIVE DISORDER, SINGLE EPISODE, UNSPECIFIED: ICD-10-CM

## 2019-12-15 LAB
ALBUMIN SERPL ELPH-MCNC: 3.6 G/DL — SIGNIFICANT CHANGE UP (ref 3.3–5)
ALBUMIN SERPL ELPH-MCNC: 3.9 G/DL — SIGNIFICANT CHANGE UP (ref 3.3–5)
ALP SERPL-CCNC: 87 U/L — SIGNIFICANT CHANGE UP (ref 40–120)
ALP SERPL-CCNC: 91 U/L — SIGNIFICANT CHANGE UP (ref 40–120)
ALT FLD-CCNC: 19 U/L — SIGNIFICANT CHANGE UP (ref 10–45)
ALT FLD-CCNC: 23 U/L — SIGNIFICANT CHANGE UP (ref 10–45)
ANION GAP SERPL CALC-SCNC: 15 MMOL/L — SIGNIFICANT CHANGE UP (ref 5–17)
ANION GAP SERPL CALC-SCNC: 17 MMOL/L — SIGNIFICANT CHANGE UP (ref 5–17)
APPEARANCE UR: CLEAR — SIGNIFICANT CHANGE UP
APTT BLD: 29 SEC — SIGNIFICANT CHANGE UP (ref 27.5–36.3)
AST SERPL-CCNC: 35 U/L — SIGNIFICANT CHANGE UP (ref 10–40)
AST SERPL-CCNC: 37 U/L — SIGNIFICANT CHANGE UP (ref 10–40)
BACTERIA # UR AUTO: ABNORMAL
BASOPHILS # BLD AUTO: 0.04 K/UL — SIGNIFICANT CHANGE UP (ref 0–0.2)
BASOPHILS # BLD AUTO: 0.05 K/UL — SIGNIFICANT CHANGE UP (ref 0–0.2)
BASOPHILS NFR BLD AUTO: 0.5 % — SIGNIFICANT CHANGE UP (ref 0–2)
BASOPHILS NFR BLD AUTO: 0.7 % — SIGNIFICANT CHANGE UP (ref 0–2)
BILIRUB SERPL-MCNC: 0.3 MG/DL — SIGNIFICANT CHANGE UP (ref 0.2–1.2)
BILIRUB SERPL-MCNC: 0.3 MG/DL — SIGNIFICANT CHANGE UP (ref 0.2–1.2)
BILIRUB UR-MCNC: NEGATIVE — SIGNIFICANT CHANGE UP
BUN SERPL-MCNC: 38 MG/DL — HIGH (ref 7–23)
BUN SERPL-MCNC: 38 MG/DL — HIGH (ref 7–23)
CALCIUM SERPL-MCNC: 8.7 MG/DL — SIGNIFICANT CHANGE UP (ref 8.4–10.5)
CALCIUM SERPL-MCNC: 9.2 MG/DL — SIGNIFICANT CHANGE UP (ref 8.4–10.5)
CHLORIDE SERPL-SCNC: 95 MMOL/L — LOW (ref 96–108)
CHLORIDE SERPL-SCNC: 97 MMOL/L — SIGNIFICANT CHANGE UP (ref 96–108)
CO2 SERPL-SCNC: 21 MMOL/L — LOW (ref 22–31)
CO2 SERPL-SCNC: 24 MMOL/L — SIGNIFICANT CHANGE UP (ref 22–31)
COLOR SPEC: SIGNIFICANT CHANGE UP
CREAT SERPL-MCNC: 2.79 MG/DL — HIGH (ref 0.5–1.3)
CREAT SERPL-MCNC: 2.93 MG/DL — HIGH (ref 0.5–1.3)
DIFF PNL FLD: NEGATIVE — SIGNIFICANT CHANGE UP
EOSINOPHIL # BLD AUTO: 0.23 K/UL — SIGNIFICANT CHANGE UP (ref 0–0.5)
EOSINOPHIL # BLD AUTO: 0.27 K/UL — SIGNIFICANT CHANGE UP (ref 0–0.5)
EOSINOPHIL NFR BLD AUTO: 2.9 % — SIGNIFICANT CHANGE UP (ref 0–6)
EOSINOPHIL NFR BLD AUTO: 3.6 % — SIGNIFICANT CHANGE UP (ref 0–6)
EPI CELLS # UR: 1 /HPF — SIGNIFICANT CHANGE UP
GLUCOSE BLDC GLUCOMTR-MCNC: 145 MG/DL — HIGH (ref 70–99)
GLUCOSE BLDC GLUCOMTR-MCNC: 146 MG/DL — HIGH (ref 70–99)
GLUCOSE BLDC GLUCOMTR-MCNC: 149 MG/DL — HIGH (ref 70–99)
GLUCOSE BLDC GLUCOMTR-MCNC: 220 MG/DL — HIGH (ref 70–99)
GLUCOSE BLDC GLUCOMTR-MCNC: 232 MG/DL — HIGH (ref 70–99)
GLUCOSE SERPL-MCNC: 174 MG/DL — HIGH (ref 70–99)
GLUCOSE SERPL-MCNC: 237 MG/DL — HIGH (ref 70–99)
GLUCOSE UR QL: NEGATIVE — SIGNIFICANT CHANGE UP
HCT VFR BLD CALC: 36.9 % — SIGNIFICANT CHANGE UP (ref 34.5–45)
HCT VFR BLD CALC: 38.7 % — SIGNIFICANT CHANGE UP (ref 34.5–45)
HGB BLD-MCNC: 11.5 G/DL — SIGNIFICANT CHANGE UP (ref 11.5–15.5)
HGB BLD-MCNC: 12 G/DL — SIGNIFICANT CHANGE UP (ref 11.5–15.5)
HYALINE CASTS # UR AUTO: 0 /LPF — SIGNIFICANT CHANGE UP (ref 0–2)
IMM GRANULOCYTES NFR BLD AUTO: 0.5 % — SIGNIFICANT CHANGE UP (ref 0–1.5)
IMM GRANULOCYTES NFR BLD AUTO: 0.7 % — SIGNIFICANT CHANGE UP (ref 0–1.5)
INR BLD: 0.92 RATIO — SIGNIFICANT CHANGE UP (ref 0.88–1.16)
KETONES UR-MCNC: NEGATIVE — SIGNIFICANT CHANGE UP
LEUKOCYTE ESTERASE UR-ACNC: ABNORMAL
LIDOCAIN IGE QN: 44 U/L — SIGNIFICANT CHANGE UP (ref 7–60)
LYMPHOCYTES # BLD AUTO: 1.37 K/UL — SIGNIFICANT CHANGE UP (ref 1–3.3)
LYMPHOCYTES # BLD AUTO: 1.56 K/UL — SIGNIFICANT CHANGE UP (ref 1–3.3)
LYMPHOCYTES # BLD AUTO: 17.1 % — SIGNIFICANT CHANGE UP (ref 13–44)
LYMPHOCYTES # BLD AUTO: 20.7 % — SIGNIFICANT CHANGE UP (ref 13–44)
MCHC RBC-ENTMCNC: 28.5 PG — SIGNIFICANT CHANGE UP (ref 27–34)
MCHC RBC-ENTMCNC: 29 PG — SIGNIFICANT CHANGE UP (ref 27–34)
MCHC RBC-ENTMCNC: 31 GM/DL — LOW (ref 32–36)
MCHC RBC-ENTMCNC: 31.2 GM/DL — LOW (ref 32–36)
MCV RBC AUTO: 91.9 FL — SIGNIFICANT CHANGE UP (ref 80–100)
MCV RBC AUTO: 92.9 FL — SIGNIFICANT CHANGE UP (ref 80–100)
MONOCYTES # BLD AUTO: 0.83 K/UL — SIGNIFICANT CHANGE UP (ref 0–0.9)
MONOCYTES # BLD AUTO: 0.87 K/UL — SIGNIFICANT CHANGE UP (ref 0–0.9)
MONOCYTES NFR BLD AUTO: 10.3 % — SIGNIFICANT CHANGE UP (ref 2–14)
MONOCYTES NFR BLD AUTO: 11.6 % — SIGNIFICANT CHANGE UP (ref 2–14)
NEUTROPHILS # BLD AUTO: 4.72 K/UL — SIGNIFICANT CHANGE UP (ref 1.8–7.4)
NEUTROPHILS # BLD AUTO: 5.51 K/UL — SIGNIFICANT CHANGE UP (ref 1.8–7.4)
NEUTROPHILS NFR BLD AUTO: 62.7 % — SIGNIFICANT CHANGE UP (ref 43–77)
NEUTROPHILS NFR BLD AUTO: 68.7 % — SIGNIFICANT CHANGE UP (ref 43–77)
NITRITE UR-MCNC: NEGATIVE — SIGNIFICANT CHANGE UP
NRBC # BLD: 0 /100 WBCS — SIGNIFICANT CHANGE UP (ref 0–0)
PH UR: 6.5 — SIGNIFICANT CHANGE UP (ref 5–8)
PLATELET # BLD AUTO: 278 K/UL — SIGNIFICANT CHANGE UP (ref 150–400)
PLATELET # BLD AUTO: 306 K/UL — SIGNIFICANT CHANGE UP (ref 150–400)
POTASSIUM SERPL-MCNC: 4 MMOL/L — SIGNIFICANT CHANGE UP (ref 3.5–5.3)
POTASSIUM SERPL-MCNC: 4.1 MMOL/L — SIGNIFICANT CHANGE UP (ref 3.5–5.3)
POTASSIUM SERPL-SCNC: 4 MMOL/L — SIGNIFICANT CHANGE UP (ref 3.5–5.3)
POTASSIUM SERPL-SCNC: 4.1 MMOL/L — SIGNIFICANT CHANGE UP (ref 3.5–5.3)
PROT SERPL-MCNC: 7.9 G/DL — SIGNIFICANT CHANGE UP (ref 6–8.3)
PROT SERPL-MCNC: 8.6 G/DL — HIGH (ref 6–8.3)
PROT UR-MCNC: ABNORMAL
PROTHROM AB SERPL-ACNC: 10.5 SEC — SIGNIFICANT CHANGE UP (ref 10–12.9)
RBC # BLD: 3.97 M/UL — SIGNIFICANT CHANGE UP (ref 3.8–5.2)
RBC # BLD: 4.21 M/UL — SIGNIFICANT CHANGE UP (ref 3.8–5.2)
RBC # FLD: 14.3 % — SIGNIFICANT CHANGE UP (ref 10.3–14.5)
RBC # FLD: 14.5 % — SIGNIFICANT CHANGE UP (ref 10.3–14.5)
RBC CASTS # UR COMP ASSIST: 4 /HPF — SIGNIFICANT CHANGE UP (ref 0–4)
SODIUM SERPL-SCNC: 133 MMOL/L — LOW (ref 135–145)
SODIUM SERPL-SCNC: 136 MMOL/L — SIGNIFICANT CHANGE UP (ref 135–145)
SP GR SPEC: 1.01 — SIGNIFICANT CHANGE UP (ref 1.01–1.02)
TROPONIN T, HIGH SENSITIVITY RESULT: 41 NG/L — SIGNIFICANT CHANGE UP (ref 0–51)
TROPONIN T, HIGH SENSITIVITY RESULT: 44 NG/L — SIGNIFICANT CHANGE UP (ref 0–51)
UROBILINOGEN FLD QL: NEGATIVE — SIGNIFICANT CHANGE UP
WBC # BLD: 7.52 K/UL — SIGNIFICANT CHANGE UP (ref 3.8–10.5)
WBC # BLD: 8.02 K/UL — SIGNIFICANT CHANGE UP (ref 3.8–10.5)
WBC # FLD AUTO: 7.52 K/UL — SIGNIFICANT CHANGE UP (ref 3.8–10.5)
WBC # FLD AUTO: 8.02 K/UL — SIGNIFICANT CHANGE UP (ref 3.8–10.5)
WBC UR QL: 6 /HPF — HIGH (ref 0–5)

## 2019-12-15 PROCEDURE — 70450 CT HEAD/BRAIN W/O DYE: CPT | Mod: 26

## 2019-12-15 PROCEDURE — 99285 EMERGENCY DEPT VISIT HI MDM: CPT

## 2019-12-15 PROCEDURE — 99223 1ST HOSP IP/OBS HIGH 75: CPT

## 2019-12-15 PROCEDURE — 71045 X-RAY EXAM CHEST 1 VIEW: CPT | Mod: 26

## 2019-12-15 PROCEDURE — 72170 X-RAY EXAM OF PELVIS: CPT | Mod: 26

## 2019-12-15 PROCEDURE — 72125 CT NECK SPINE W/O DYE: CPT | Mod: 26

## 2019-12-15 RX ORDER — FLUTICASONE PROPIONATE AND SALMETEROL 50; 250 UG/1; UG/1
1 POWDER ORAL; RESPIRATORY (INHALATION)
Qty: 0 | Refills: 0 | DISCHARGE

## 2019-12-15 RX ORDER — ALPRAZOLAM 0.25 MG
0.25 TABLET ORAL ONCE
Refills: 0 | Status: DISCONTINUED | OUTPATIENT
Start: 2019-12-15 | End: 2019-12-15

## 2019-12-15 RX ORDER — GLUCAGON INJECTION, SOLUTION 0.5 MG/.1ML
1 INJECTION, SOLUTION SUBCUTANEOUS ONCE
Refills: 0 | Status: DISCONTINUED | OUTPATIENT
Start: 2019-12-15 | End: 2019-12-19

## 2019-12-15 RX ORDER — IPRATROPIUM/ALBUTEROL SULFATE 18-103MCG
3 AEROSOL WITH ADAPTER (GRAM) INHALATION EVERY 6 HOURS
Refills: 0 | Status: DISCONTINUED | OUTPATIENT
Start: 2019-12-15 | End: 2019-12-19

## 2019-12-15 RX ORDER — PANTOPRAZOLE SODIUM 20 MG/1
40 TABLET, DELAYED RELEASE ORAL
Refills: 0 | Status: DISCONTINUED | OUTPATIENT
Start: 2019-12-15 | End: 2019-12-19

## 2019-12-15 RX ORDER — ASCORBIC ACID 60 MG
500 TABLET,CHEWABLE ORAL DAILY
Refills: 0 | Status: DISCONTINUED | OUTPATIENT
Start: 2019-12-15 | End: 2019-12-19

## 2019-12-15 RX ORDER — ACETAMINOPHEN 500 MG
650 TABLET ORAL EVERY 4 HOURS
Refills: 0 | Status: DISCONTINUED | OUTPATIENT
Start: 2019-12-15 | End: 2019-12-19

## 2019-12-15 RX ORDER — INSULIN LISPRO 100/ML
4 VIAL (ML) SUBCUTANEOUS
Refills: 0 | Status: DISCONTINUED | OUTPATIENT
Start: 2019-12-15 | End: 2019-12-16

## 2019-12-15 RX ORDER — SODIUM CHLORIDE 9 MG/ML
1000 INJECTION, SOLUTION INTRAVENOUS
Refills: 0 | Status: DISCONTINUED | OUTPATIENT
Start: 2019-12-15 | End: 2019-12-19

## 2019-12-15 RX ORDER — INSULIN GLARGINE 100 [IU]/ML
12 INJECTION, SOLUTION SUBCUTANEOUS
Qty: 0 | Refills: 0 | DISCHARGE

## 2019-12-15 RX ORDER — DEXTROSE 50 % IN WATER 50 %
25 SYRINGE (ML) INTRAVENOUS ONCE
Refills: 0 | Status: DISCONTINUED | OUTPATIENT
Start: 2019-12-15 | End: 2019-12-19

## 2019-12-15 RX ORDER — ASCORBIC ACID 60 MG
1 TABLET,CHEWABLE ORAL
Qty: 0 | Refills: 0 | DISCHARGE

## 2019-12-15 RX ORDER — DEXTROSE 50 % IN WATER 50 %
12.5 SYRINGE (ML) INTRAVENOUS ONCE
Refills: 0 | Status: DISCONTINUED | OUTPATIENT
Start: 2019-12-15 | End: 2019-12-19

## 2019-12-15 RX ORDER — SODIUM CHLORIDE 9 MG/ML
1000 INJECTION INTRAMUSCULAR; INTRAVENOUS; SUBCUTANEOUS ONCE
Refills: 0 | Status: DISCONTINUED | OUTPATIENT
Start: 2019-12-15 | End: 2019-12-15

## 2019-12-15 RX ORDER — INSULIN GLARGINE 100 [IU]/ML
6 INJECTION, SOLUTION SUBCUTANEOUS AT BEDTIME
Refills: 0 | Status: DISCONTINUED | OUTPATIENT
Start: 2019-12-15 | End: 2019-12-16

## 2019-12-15 RX ORDER — INSULIN LISPRO 100/ML
VIAL (ML) SUBCUTANEOUS AT BEDTIME
Refills: 0 | Status: DISCONTINUED | OUTPATIENT
Start: 2019-12-15 | End: 2019-12-19

## 2019-12-15 RX ORDER — SODIUM CHLORIDE 9 MG/ML
500 INJECTION INTRAMUSCULAR; INTRAVENOUS; SUBCUTANEOUS ONCE
Refills: 0 | Status: COMPLETED | OUTPATIENT
Start: 2019-12-15 | End: 2019-12-15

## 2019-12-15 RX ORDER — CHOLECALCIFEROL (VITAMIN D3) 125 MCG
1 CAPSULE ORAL
Qty: 0 | Refills: 0 | DISCHARGE

## 2019-12-15 RX ORDER — METOPROLOL TARTRATE 50 MG
1 TABLET ORAL
Qty: 0 | Refills: 0 | DISCHARGE

## 2019-12-15 RX ORDER — HEPARIN SODIUM 5000 [USP'U]/ML
5000 INJECTION INTRAVENOUS; SUBCUTANEOUS EVERY 12 HOURS
Refills: 0 | Status: DISCONTINUED | OUTPATIENT
Start: 2019-12-15 | End: 2019-12-19

## 2019-12-15 RX ORDER — POLYETHYLENE GLYCOL 3350 17 G/17G
17 POWDER, FOR SOLUTION ORAL DAILY
Refills: 0 | Status: DISCONTINUED | OUTPATIENT
Start: 2019-12-15 | End: 2019-12-19

## 2019-12-15 RX ORDER — MIRTAZAPINE 45 MG/1
30 TABLET, ORALLY DISINTEGRATING ORAL AT BEDTIME
Refills: 0 | Status: DISCONTINUED | OUTPATIENT
Start: 2019-12-15 | End: 2019-12-19

## 2019-12-15 RX ORDER — SIMVASTATIN 20 MG/1
20 TABLET, FILM COATED ORAL AT BEDTIME
Refills: 0 | Status: DISCONTINUED | OUTPATIENT
Start: 2019-12-15 | End: 2019-12-19

## 2019-12-15 RX ORDER — INSULIN LISPRO 100/ML
VIAL (ML) SUBCUTANEOUS AT BEDTIME
Refills: 0 | Status: DISCONTINUED | OUTPATIENT
Start: 2019-12-15 | End: 2019-12-16

## 2019-12-15 RX ORDER — DULOXETINE HYDROCHLORIDE 30 MG/1
30 CAPSULE, DELAYED RELEASE ORAL DAILY
Refills: 0 | Status: DISCONTINUED | OUTPATIENT
Start: 2019-12-15 | End: 2019-12-19

## 2019-12-15 RX ORDER — CHOLECALCIFEROL (VITAMIN D3) 125 MCG
1000 CAPSULE ORAL
Refills: 0 | Status: DISCONTINUED | OUTPATIENT
Start: 2019-12-15 | End: 2019-12-19

## 2019-12-15 RX ORDER — OLANZAPINE 15 MG/1
5 TABLET, FILM COATED ORAL AT BEDTIME
Refills: 0 | Status: DISCONTINUED | OUTPATIENT
Start: 2019-12-15 | End: 2019-12-19

## 2019-12-15 RX ORDER — AMIODARONE HYDROCHLORIDE 400 MG/1
200 TABLET ORAL DAILY
Refills: 0 | Status: DISCONTINUED | OUTPATIENT
Start: 2019-12-15 | End: 2019-12-19

## 2019-12-15 RX ORDER — INSULIN LISPRO 100/ML
VIAL (ML) SUBCUTANEOUS
Refills: 0 | Status: DISCONTINUED | OUTPATIENT
Start: 2019-12-15 | End: 2019-12-19

## 2019-12-15 RX ORDER — INSULIN LISPRO 100/ML
VIAL (ML) SUBCUTANEOUS
Refills: 0 | Status: DISCONTINUED | OUTPATIENT
Start: 2019-12-15 | End: 2019-12-16

## 2019-12-15 RX ORDER — DEXTROSE 50 % IN WATER 50 %
15 SYRINGE (ML) INTRAVENOUS ONCE
Refills: 0 | Status: DISCONTINUED | OUTPATIENT
Start: 2019-12-15 | End: 2019-12-19

## 2019-12-15 RX ORDER — METOPROLOL TARTRATE 50 MG
100 TABLET ORAL DAILY
Refills: 0 | Status: DISCONTINUED | OUTPATIENT
Start: 2019-12-15 | End: 2019-12-19

## 2019-12-15 RX ADMIN — Medication 1000 UNIT(S): at 17:55

## 2019-12-15 RX ADMIN — SODIUM CHLORIDE 500 MILLILITER(S): 9 INJECTION INTRAMUSCULAR; INTRAVENOUS; SUBCUTANEOUS at 03:00

## 2019-12-15 RX ADMIN — Medication 100 MILLIGRAM(S): at 17:55

## 2019-12-15 RX ADMIN — Medication 0.25 MILLIGRAM(S): at 16:13

## 2019-12-15 RX ADMIN — Medication 2: at 17:54

## 2019-12-15 RX ADMIN — INSULIN GLARGINE 6 UNIT(S): 100 INJECTION, SOLUTION SUBCUTANEOUS at 22:27

## 2019-12-15 RX ADMIN — PANTOPRAZOLE SODIUM 40 MILLIGRAM(S): 20 TABLET, DELAYED RELEASE ORAL at 07:29

## 2019-12-15 RX ADMIN — POLYETHYLENE GLYCOL 3350 17 GRAM(S): 17 POWDER, FOR SOLUTION ORAL at 12:59

## 2019-12-15 RX ADMIN — AMIODARONE HYDROCHLORIDE 200 MILLIGRAM(S): 400 TABLET ORAL at 17:55

## 2019-12-15 RX ADMIN — Medication 4 UNIT(S): at 08:25

## 2019-12-15 RX ADMIN — Medication 2: at 12:41

## 2019-12-15 RX ADMIN — OLANZAPINE 5 MILLIGRAM(S): 15 TABLET, FILM COATED ORAL at 22:25

## 2019-12-15 RX ADMIN — MIRTAZAPINE 30 MILLIGRAM(S): 45 TABLET, ORALLY DISINTEGRATING ORAL at 22:25

## 2019-12-15 RX ADMIN — Medication 500 MILLIGRAM(S): at 12:13

## 2019-12-15 RX ADMIN — SIMVASTATIN 20 MILLIGRAM(S): 20 TABLET, FILM COATED ORAL at 22:25

## 2019-12-15 RX ADMIN — DULOXETINE HYDROCHLORIDE 30 MILLIGRAM(S): 30 CAPSULE, DELAYED RELEASE ORAL at 12:13

## 2019-12-15 RX ADMIN — HEPARIN SODIUM 5000 UNIT(S): 5000 INJECTION INTRAVENOUS; SUBCUTANEOUS at 17:55

## 2019-12-15 NOTE — ED ADULT NURSE REASSESSMENT NOTE - NS ED NURSE REASSESS COMMENT FT1
received report from jesus burgos. patient resting comfortably on stretcher. patient denies pain/discomfort at this time. patient is aaox3, lung sounds CTA bilaterally, abd soft, nondistended, nontender, cap refill <3, radial pulses +2 bilaterally. cling wrap around right forearm abrasions. dressing clean, dry intact. bruising noted to left leg. ambulatory with walker as per patient. insulin held for parameter. meds given as per MD order. patient denies chest pain, HA, shortness of breath, dizziness, n/v/d, cough, weakness, numbness or tingling, changes in bowel or bladder, vision changes. will continue to monitor. call bell in reach. safety maintained.

## 2019-12-15 NOTE — ED ADULT NURSE NOTE - NSIMPLEMENTINTERV_GEN_ALL_ED
Implemented All Fall Risk Interventions:  [place] to call system. Call bell, personal items and telephone within reach. Instruct patient to call for assistance. Room bathroom lighting operational. Non-slip footwear when patient is off stretcher. Physically safe environment: no spills, clutter or unnecessary equipment. Stretcher in lowest position, wheels locked, appropriate side rails in place. Provide visual cue, wrist band, yellow gown, etc. Monitor gait and stability. Monitor for mental status changes and reorient to person, place, and time. Review medications for side effects contributing to fall risk. Reinforce activity limits and safety measures with patient and family.

## 2019-12-15 NOTE — ED ADULT NURSE REASSESSMENT NOTE - NS ED NURSE REASSESS COMMENT FT1
patient resting comfortably on stretcher. denies any pain/discomfort at this time. will continue to monitor. patient comfort and safety maintained.

## 2019-12-15 NOTE — ED PROVIDER NOTE - OBJECTIVE STATEMENT
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 Was using walker when episode happened. Down maybe 15 minutes. +slight HA to the back of head without active laceration or bleeding. +R forearm abrasion. Denies any cp, sob, dizziness, facial droop, slurred speech, difficulty speaking or word finding, difficulty swallowing, focal weakness/numbess/tingling, imbalance or other neuro deficits.

## 2019-12-15 NOTE — ED PROVIDER NOTE - NS ED ROS FT
Constitutional: no fevers, no chills.  Eyes: no visual changes.  Ears: no ear drainage, no ear pain.  Nose: no nasal congestion.  Mouth/Throat: no sore throat.  Cardiovascular: no chest pain.  Respiratory: no shortness of breath, no wheezing, no cough  Gastrointestinal: no nausea, no vomiting, no diarrhea, no abdominal pain.  MSK: no flank pain, no back pain.  Genitourinary: no dysuria, no hematuria.  Skin: no rashes.  Neuro: +headache,   Psychiatric: no known mental health issues.

## 2019-12-15 NOTE — CONSULT NOTE ADULT - SUBJECTIVE AND OBJECTIVE BOX
CARDIOLOGY CONSULT NOTE - DR. ACKERMAN    HPI:    Patient is an 88 year old woman with history of COPD on home oxygen, DM II with long term complications including nephropathy, Essential HTN, Systolic CHF, CKD 4, PPM, nephrectomy, and Atrial fibrillation not on a/c admitted s/p fall    Per patient, nech fall after slip.   + head trauma  Patient denies any chest pain, dyspnea, palpitations, cough, syncope, edema, exertional symptoms, nausea, abdominal pain, fever, chills,  or rash.        PAST MEDICAL & SURGICAL HISTORY:  COPD (chronic obstructive pulmonary disease)  Depression  Anxiety  Atrial fibrillation, unspecified type  Pacemaker  Overweight  PVD (Peripheral Vascular Disease)  Arteriosclerotic Heart Disease (ASHD)  HTN (Hypertension)  Diabetes  S/P carpal tunnel release  S/P left cataract extraction  s/p colon resection  S/P Nephrectomy: right  Hip Replacement  History of Total Knee Replacement  S/P Hernia Surgery  S/P Cholecystectomy        PREVIOUS DIAGNOSTIC TESTING:    [ ] Echocardiogram:  [ ]  Catheterization:  [ ] Stress Test:  	    MEDICATIONS:    Home Medications:  Advair Diskus 250 mcg-50 mcg inhalation powder: 1 puff(s) inhaled 2 times a day (15 Dec 2019 10:05)  amiodarone 200 mg oral tablet: 1 tab(s) orally once a day (15 Dec 2019 10:05)  Cymbalta 30 mg oral delayed release capsule: 1 cap(s) orally once a day (15 Dec 2019 10:05)  HumaLOG 100 units/mL subcutaneous solution: 6 unit(s) subcutaneous 3 times a day (before meals) (15 Dec 2019 10:05)  lactulose 10 g/15 mL oral solution: 30 milliliter(s) orally once a day at supper (15 Dec 2019 10:05)  Lasix 40 mg oral tablet: 1 tab(s) orally once a day (15 Dec 2019 10:05)  methenamine hippurate 1 g oral tablet: 1 tab(s) orally 2 times a day (15 Dec 2019 10:05)  OLANZapine 5 mg oral tablet: 1 tab(s) orally once a day (at bedtime) (15 Dec 2019 10:05)  Remeron 30 mg oral tablet: 1 tab(s) orally once a day (at bedtime) (15 Dec 2019 10:05)  simvastatin 20 mg oral tablet: 1 tab(s) orally once a day (at bedtime) (15 Dec 2019 10:05)  Toprol- mg oral tablet, extended release: 1 tab(s) orally once a day (15 Dec 2019 10:05)  Toujeo SoloStar 300 units/mL subcutaneous solution: 20 unit(s) subcutaneous once a day (15 Dec 2019 10:05)      MEDICATIONS  (STANDING):  aMIOdarone    Tablet 200 milliGRAM(s) Oral daily  ascorbic acid 500 milliGRAM(s) Oral daily  cholecalciferol 1000 Unit(s) Oral two times a day  dextrose 5%. 1000 milliLiter(s) (50 mL/Hr) IV Continuous <Continuous>  dextrose 5%. 1000 milliLiter(s) (50 mL/Hr) IV Continuous <Continuous>  dextrose 50% Injectable 12.5 Gram(s) IV Push once  dextrose 50% Injectable 25 Gram(s) IV Push once  dextrose 50% Injectable 25 Gram(s) IV Push once  dextrose 50% Injectable 12.5 Gram(s) IV Push once  dextrose 50% Injectable 25 Gram(s) IV Push once  DULoxetine 30 milliGRAM(s) Oral daily  heparin  Injectable 5000 Unit(s) SubCutaneous every 12 hours  insulin glargine Injectable (LANTUS) 6 Unit(s) SubCutaneous at bedtime  insulin lispro (HumaLOG) corrective regimen sliding scale   SubCutaneous three times a day before meals  insulin lispro (HumaLOG) corrective regimen sliding scale   SubCutaneous at bedtime  insulin lispro (HumaLOG) corrective regimen sliding scale   SubCutaneous three times a day before meals  insulin lispro (HumaLOG) corrective regimen sliding scale   SubCutaneous at bedtime  insulin lispro Injectable (HumaLOG) 4 Unit(s) SubCutaneous three times a day before meals  metoprolol succinate  milliGRAM(s) Oral daily  mirtazapine 30 milliGRAM(s) Oral at bedtime  OLANZapine 5 milliGRAM(s) Oral at bedtime  pantoprazole    Tablet 40 milliGRAM(s) Oral before breakfast  polyethylene glycol 3350 17 Gram(s) Oral daily  simvastatin 20 milliGRAM(s) Oral at bedtime      FAMILY HISTORY:  Family history of pancreatic cancer (Sibling)      SOCIAL HISTORY:    [x ] Non-smoker  [ ] Smoker  [ ] Alcohol    Allergies    amoxicillin (Flushing; Vomiting; Nausea)  lactose (Diarrhea)  lisinopril (Angioedema)  penicillin (Hives)    Intolerances    	    REVIEW OF SYSTEMS:  CONSTITUTIONAL: No fever, weight loss, or fatigue  EYES: No eye pain, visual disturbances, or discharge  ENMT:  No difficulty hearing, tinnitus, vertigo; No sinus or throat pain  NECK: No pain or stiffness  RESPIRATORY: No cough, wheezing, chills or hemoptysis; No Shortness of Breath  CARDIOVASCULAR: as HPI  GASTROINTESTINAL: No abdominal or epigastric pain. No nausea, vomiting, or hematemesis; No diarrhea or constipation. No melena or hematochezia.  GENITOURINARY: No dysuria, frequency, hematuria, or incontinence  NEUROLOGICAL: No headaches, memory loss, loss of strength, numbness, or tremors  SKIN: No itching, burning, rashes, or lesions   	  [ ] All others negative	  [ ] Unable to obtain    PHYSICAL EXAM:    T(C): 37.1 (12-15-19 @ 11:03), Max: 37.1 (12-15-19 @ 11:03)  HR: 65 (12-15-19 @ 11:03) (59 - 103)  BP: 137/65 (12-15-19 @ 11:03) (137/65 - 194/98)  RR: 18 (12-15-19 @ 11:03) (17 - 20)  SpO2: 100% (12-15-19 @ 11:03) (99% - 100%)  Wt(kg): --  I&O's Summary    Daily     Daily     Appearance: Normal	  Psychiatry: A & O x 3, Mood & affect appropriate  HEENT:   Normal oral mucosa, PERRL, EOMI	  Lymphatic: No lymphadenopathy  Cardiovascular: Normal S1 S2,RRR, No JVD, No murmurs  Respiratory: Lungs clear to auscultation	  Gastrointestinal:  Soft, Non-tender, + BS	  Skin: No rashes, No ecchymoses, No cyanosis	  Neurologic: Non-focal  Extremities: Normal range of motion, No clubbing, cyanosis or edema  Vascular: Peripheral pulses palpable 2+ bilaterally    TELEMETRY: 	    ECG:  	  RADIOLOGY:  OTHER: 	  	  LABS:	 	    CARDIAC MARKERS:        proBNP:     Lipid Profile:   HgA1c:   TSH:                           11.5   8.02  )-----------( 306      ( 15 Dec 2019 11:43 )             36.9     12-15    136  |  97  |  38<H>  ----------------------------<  237<H>  4.0   |  24  |  2.79<H>    Ca    8.7      15 Dec 2019 09:34    TPro  7.9  /  Alb  3.6  /  TBili  0.3  /  DBili  x   /  AST  35  /  ALT  19  /  AlkPhos  87  12-15    PT/INR - ( 15 Dec 2019 02:29 )   PT: 10.5 sec;   INR: 0.92 ratio         PTT - ( 15 Dec 2019 02:29 )  PTT:29.0 sec    Creatinine, Serum: 2.79 mg/dL (12-15-19 @ 09:34)  Creatinine, Serum: 2.93 mg/dL (12-15-19 @ 02:29)        ASSESSMENT/PLAN:

## 2019-12-15 NOTE — ED PROVIDER NOTE - PHYSICAL EXAMINATION
GEN: Well appearing, well nourished, in no apparent distress.  HEAD: NCAT  HEENT: PERRL, EOMI, no nystagmus, no depressed skull,  no facial droop, Airway patent, uvula midline, MMM, neck supple, no LAD, no JVD, no raccoon sign, no blas sign   LUNG: CTAB, no adventitious sounds, no retractions, no nasal flaring  CV: RRR, no murmurs,   Abd: soft, NTND, no rebound or guarding, BS+ in all quadrants, no CVAT  MSK: WWP, Pulses 2+ in extremities, no visible deformities, strength 5/5 in all extremities, FROM, no midspine TTP  Neuro:  CN II-XII in tact. AAOx3, sensations in tact in all extremities, neg pronator drift  Skin: Warm and dry, ecchymosis to b/L forearms (pt says she bruises easily)  Psych: normal mood and affect

## 2019-12-15 NOTE — ED ADULT NURSE REASSESSMENT NOTE - NS ED NURSE REASSESS COMMENT FT1
patient resting comfortably on stretcher. patient denies pain/discomfort at this time. patient is aware of plan of care. will continue to monitor. patient pending bed on floor. VSS.

## 2019-12-15 NOTE — ED PROVIDER NOTE - CARE PLAN
Principal Discharge DX:	Fall  Secondary Diagnosis:	Headache Principal Discharge DX:	Fall  Secondary Diagnosis:	Headache  Secondary Diagnosis:	ARUN (acute kidney injury)

## 2019-12-15 NOTE — H&P ADULT - ASSESSMENT
88 F w/ PMHx of COPD on home oxygen,DM II,  HTN, Systolic CHF, CKD 4, PPM, nephrectomy, Atrial fibrillation presents after fall , labs with ARUN.

## 2019-12-15 NOTE — H&P ADULT - PROBLEM SELECTOR PLAN 1
appears hypovolemic , s/p IV fluid   - hold diuretics  - strict ins and outs   - UA   - US renal   - monitor renal function   - renally dose medications   - avoid nephro toxins   - IF no improvement , renal consult can be obtained by day team

## 2019-12-15 NOTE — H&P ADULT - NSHPPHYSICALEXAM_GEN_ALL_CORE
Vital Signs Last 24 Hrs  T(C): 36.7 (15 Dec 2019 03:53), Max: 36.7 (15 Dec 2019 02:15)  T(F): 98 (15 Dec 2019 03:53), Max: 98.1 (15 Dec 2019 02:15)  HR: 60 (15 Dec 2019 05:38) (60 - 103)  BP: 140/83 (15 Dec 2019 05:38) (140/83 - 194/98)  BP(mean): --  RR: 20 (15 Dec 2019 05:38) (17 - 20)  SpO2: 100% (15 Dec 2019 05:38) (99% - 100%) Vital Signs Last 24 Hrs  T(C): 36.7 (15 Dec 2019 03:53), Max: 36.7 (15 Dec 2019 02:15)  T(F): 98 (15 Dec 2019 03:53), Max: 98.1 (15 Dec 2019 02:15)  HR: 60 (15 Dec 2019 05:38) (60 - 103)  BP: 140/83 (15 Dec 2019 05:38) (140/83 - 194/98)  BP(mean): --  RR: 20 (15 Dec 2019 05:38) (17 - 20)  SpO2: 100% (15 Dec 2019 05:38) (99% - 100%)    GENERAL: No acute distress, breathing non labored on NC , mild resting tremor , scattered ecchymosis throughout   HEAD:  mild swelling on occiput no lacerations ,  Normocephalic  ENT: EOMI, PERRLA, conjunctiva and sclera clear,  dry mucosa no pharyngeal erythema or exudates   NECK: supple , no JVD   CHEST/LUNG: Clear to auscultation bilaterally; No wheeze, equal breath sounds bilaterally   BACK: No spinal tenderness,  No CVA tenderness   HEART: Regular rate and rhythm; No murmurs, rubs, or gallops  ABDOMEN: Soft, Nontender, Nondistended; Bowel sounds present  EXTREMITIES:  No clubbing, cyanosis, trace edema  MSK: No joint swelling or effusions, ROM intact   PSYCH: Normal behavior/affect  NEUROLOGY: AAOx3, non-focal, cranial nerves intact  SKIN: scattered ecchymosis , abrasion on right forearm in guaze dressing

## 2019-12-15 NOTE — H&P ADULT - NSHPLABSRESULTS_GEN_ALL_CORE
Labs personally reviewed:                          12.0   7.52  )-----------( 278      ( 15 Dec 2019 02:29 )             38.7     12-15    133<L>  |  95<L>  |  38<H>  ----------------------------<  174<H>  4.1   |  21<L>  |  2.93<H>    Ca    9.2      15 Dec 2019 02:29    TPro  8.6<H>  /  Alb  3.9  /  TBili  0.3  /  DBili  x   /  AST  37  /  ALT  23  /  AlkPhos  91  12-15        LIVER FUNCTIONS - ( 15 Dec 2019 02:29 )  Alb: 3.9 g/dL / Pro: 8.6 g/dL / ALK PHOS: 91 U/L / ALT: 23 U/L / AST: 37 U/L / GGT: x           PT/INR - ( 15 Dec 2019 02:29 )   PT: 10.5 sec;   INR: 0.92 ratio         PTT - ( 15 Dec 2019 02:29 )  PTT:29.0 sec    CAPILLARY BLOOD GLUCOSE          Imaging:  CXR personally reviewed: no focal opacity    CT head: No acute intracranial hemorrhage, mass effect, or acute osseous fracture.   Mild right posterior parietal scalp soft tissue swelling.    Extensive chronic small vessel ischemic changes in the frontoparietal   white matter and combination of dilated perivascular spaces and lacunar   infarcts in the bilateral basal ganglia and thalami.    CT spine: No acute cervical spine fracture or evidence of traumatic malalignment.   Multilevel cervical spondylosis.    XRAY pelvis: no displaced fractures: preliminary report - follow up final report       EKG personally reviewed: Labs personally reviewed:                          12.0   7.52  )-----------( 278      ( 15 Dec 2019 02:29 )             38.7     12-15    133<L>  |  95<L>  |  38<H>  ----------------------------<  174<H>  4.1   |  21<L>  |  2.93<H>    Ca    9.2      15 Dec 2019 02:29    TPro  8.6<H>  /  Alb  3.9  /  TBili  0.3  /  DBili  x   /  AST  37  /  ALT  23  /  AlkPhos  91  12-15        LIVER FUNCTIONS - ( 15 Dec 2019 02:29 )  Alb: 3.9 g/dL / Pro: 8.6 g/dL / ALK PHOS: 91 U/L / ALT: 23 U/L / AST: 37 U/L / GGT: x           PT/INR - ( 15 Dec 2019 02:29 )   PT: 10.5 sec;   INR: 0.92 ratio         PTT - ( 15 Dec 2019 02:29 )  PTT:29.0 sec    CAPILLARY BLOOD GLUCOSE          Imaging:  CXR personally reviewed: no focal opacity    CT head: No acute intracranial hemorrhage, mass effect, or acute osseous fracture.   Mild right posterior parietal scalp soft tissue swelling.    Extensive chronic small vessel ischemic changes in the frontoparietal   white matter and combination of dilated perivascular spaces and lacunar   infarcts in the bilateral basal ganglia and thalami.    CT spine: No acute cervical spine fracture or evidence of traumatic malalignment.   Multilevel cervical spondylosis.    XRAY pelvis: no displaced fractures: preliminary report - follow up final report       EKG personally reviewed: a paced at 60 bpm

## 2019-12-15 NOTE — H&P ADULT - NSHPREVIEWOFSYSTEMS_GEN_ALL_CORE
CONSTITUTIONAL: No weakness, fevers or chills  EYES/ENT: No visual changes;  No dysphagia  NECK: No pain or stiffness  RESPIRATORY: No cough, wheezing, hemoptysis; No shortness of breath  CARDIOVASCULAR: No chest pain or palpitations; No lower extremity edema  EXTREMITIES: no le edema, cyanosis, clubbing  GASTROINTESTINAL: No abdominal or epigastric pain. No nausea, vomiting, or hematemesis; No diarrhea or constipation. No melena or hematochezia.  BACK: No back pain  GENITOURINARY: No dysuria, frequency or hematuria  NEUROLOGICAL: No numbness or weakness  MSK: no joint swelling or pain  SKIN: + bruising , No itching, burning, rashes, or lesions   PSYCH: no agitation  All other review of systems is negative unless indicated above.

## 2019-12-15 NOTE — CONSULT NOTE ADULT - ASSESSMENT
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 PAF  -stable, continue amio, beta blocker  -not on a/c, not good candidate due to fall history   -cont asa    2. Chronic CHF  -does not appear volume overloaded  -hold diuretics   -check echo to eval lv fxn, mr    3. Fall, r/o syncope  -mechanical by report  -check ppm interrogation    4. ARUN  -renal eval

## 2019-12-15 NOTE — H&P ADULT - NSICDXPASTSURGICALHX_GEN_ALL_CORE_FT
PAST SURGICAL HISTORY:  Hip Replacement     History of Total Knee Replacement     S/P carpal tunnel release     S/P Cholecystectomy     s/p colon resection     S/P Hernia Surgery     S/P left cataract extraction     S/P Nephrectomy right

## 2019-12-15 NOTE — ED ADULT NURSE REASSESSMENT NOTE - NS ED NURSE REASSESS COMMENT FT1
patient states she hasn't had a BM in two days, RN spoke to Admitting MD for laxative. will continue to monitor. patient comfort and safety maintained.

## 2019-12-15 NOTE — H&P ADULT - PROBLEM SELECTOR PLAN 5
rate controlled , Home medications need to be verified , will continue medications listed in chart from  prior admission   - continue amiodarone   - continue metoprolol

## 2019-12-15 NOTE — H&P ADULT - PROBLEM SELECTOR PLAN 7
Home medications need to be verified , will continue medications listed in chart from  prior admission   - continue Olanzapine   - continue Duloxetine   - continue Remaron

## 2019-12-15 NOTE — ED PROVIDER NOTE - CLINICAL SUMMARY MEDICAL DECISION MAKING FREE TEXT BOX
Mechanical fall in elderly lady with many co-morbidities, on aspirin. trauma scan CTH, cspine, cxr and pelvis. tylenol for pain, Plan: Cardiac Monitor, EKG, Labs/cardiac enzymes, lipase

## 2019-12-15 NOTE — ED ADULT NURSE REASSESSMENT NOTE - NS ED NURSE REASSESS COMMENT FT1
RN spoke to pharmacy about 12 o'clock meds. will send them down. will continue to monitor. patient safety and comfort maintained.

## 2019-12-15 NOTE — H&P ADULT - HISTORY OF PRESENT ILLNESS
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   Patient reports slipping on a loose carpet ,falling backwards while ambulating with her walker ,  she sustaining trauma to the back of her head, she reports no LOC. She was immediately attended to by her home health aid , and was assisted back on her feet. She had no preceding chest pain or shortness of breath. NO focal motor weakness or loss of sensation. No recent infection , no fever or chills.  She reports normal appetite and PO intake.

## 2019-12-15 NOTE — H&P ADULT - NSICDXPASTMEDICALHX_GEN_ALL_CORE_FT
PAST MEDICAL HISTORY:  Anxiety     Arteriosclerotic Heart Disease (ASHD)     Atrial fibrillation, unspecified type     COPD (chronic obstructive pulmonary disease)     Depression     Diabetes     HTN (Hypertension)     Overweight     Pacemaker     PVD (Peripheral Vascular Disease)

## 2019-12-15 NOTE — ED PROVIDER NOTE - ATTENDING CONTRIBUTION TO CARE
MD Carty:  patient seen and evaluated personally.   I agree with the History & Physical,  Impression & Plan other than what was detailed in my note.  MD Carty    Pt w/ dementia, presenting s/p fall, reports mechanical .was walking slipped and fell. no f/c, n/v, no chest pain. currently has no pain. afebrile, vitals stable, well appearing.  lungs clear, appears dehydrated, no c spine ttp. plan for trauma workup, cbc, cmp, ct head c spine, pelvic x ray, cxr.

## 2019-12-15 NOTE — H&P ADULT - PROBLEM SELECTOR PLAN 8
Home medications need to be verified , will continue medications listed in chart from  prior admission , daughter unavailable overnight to confirm medications   - Med reconciliation to be completed by day team - pharm tech emailed

## 2019-12-15 NOTE — ED ADULT NURSE NOTE - OBJECTIVE STATEMENT
89 y/o female A&OX3 with hx of pacemaker, anxiety and HTN presents to ED via EMS s/p fall at home. As per EMS, pt was getting out of bed and did not use walker causing her to fall backward on to her head and right arm. +aspirin use, no LOC. +hematoma to back of head, no open wound or blood noted. +PERRLA 3mm, +ROmx4 extremities, +strong hand  b/l, +sensation to touch x4 extremities. Multiple skin tears on right arm, bandage placed by EMS, no active bleeding or deformity of bone noted. Multiple bruises to extremities due to blood thinner use and daughter reports that pt bruises very easily. Denies CP, SOB, weakness, numbness and tingling. No fevers, chills, n/v/d. Safety measures maintained with bed in low position and side rails up. 89 y/o female A&OX3 with hx of pacemaker, anxiety and HTN presents to ED via EMS s/p fall at home. As per EMS, pt was getting out of bed and did not use walker causing her to fall backward on to her head and right arm. +aspirin use, +headache, no LOC. +hematoma to back of head, no open wound or blood noted. +PERRLA 3mm, +ROMx4 extremities, +strong hand  b/l, +sensation to touch x4 extremities. Two 1 inch skin tears on right arm, +bruising and dried blood, bandage placed by EMS, no active bleeding or deformity of bone noted. Multiple bruises to extremities due to blood thinner use and daughter reports that pt bruises very easily. Home aid at bedside lives with pt 24hours/day but pt is requesting that medical information not be shared with her. Pt denies CP, SOB, weakness, numbness and tingling. No fevers, chills, n/v/d. Safety measures maintained with bed in low position and side rails up.

## 2019-12-15 NOTE — H&P ADULT - PROBLEM SELECTOR PLAN 6
- holding Bumetanide in setting of ARUN   - strict ins and outs   - daily weights   - continue statin

## 2019-12-15 NOTE — H&P ADULT - PROBLEM SELECTOR PLAN 2
history suggestive of mechanical fall , no evidence of cardiac ischemia, no major injuries noted on CTH and c spine , PPM appears to be capturing on cardiac tracing   - f/u pelvis Xray final report   - PPM interrogation - to  be arranged by day team

## 2019-12-15 NOTE — ED ADULT NURSE REASSESSMENT NOTE - NS ED NURSE REASSESS COMMENT FT1
patient resting comfortably on stretcher. repeat FS done before breakfast. given 4 units HUMALOG. patient given breakfast tray after insulin. will continue to monitor. VSS.

## 2019-12-16 DIAGNOSIS — I10 ESSENTIAL (PRIMARY) HYPERTENSION: ICD-10-CM

## 2019-12-16 LAB
ANION GAP SERPL CALC-SCNC: 11 MMOL/L — SIGNIFICANT CHANGE UP (ref 5–17)
BASE EXCESS BLDV CALC-SCNC: -2 MMOL/L — SIGNIFICANT CHANGE UP (ref -2–2)
BUN SERPL-MCNC: 40 MG/DL — HIGH (ref 7–23)
CALCIUM SERPL-MCNC: 8.9 MG/DL — SIGNIFICANT CHANGE UP (ref 8.4–10.5)
CHLORIDE SERPL-SCNC: 101 MMOL/L — SIGNIFICANT CHANGE UP (ref 96–108)
CK SERPL-CCNC: 74 U/L — SIGNIFICANT CHANGE UP (ref 25–170)
CO2 BLDV-SCNC: 27 MMOL/L — SIGNIFICANT CHANGE UP (ref 22–30)
CO2 SERPL-SCNC: 24 MMOL/L — SIGNIFICANT CHANGE UP (ref 22–31)
CREAT SERPL-MCNC: 3.01 MG/DL — HIGH (ref 0.5–1.3)
GAS PNL BLDV: SIGNIFICANT CHANGE UP
GLUCOSE BLDC GLUCOMTR-MCNC: 128 MG/DL — HIGH (ref 70–99)
GLUCOSE BLDC GLUCOMTR-MCNC: 171 MG/DL — HIGH (ref 70–99)
GLUCOSE BLDC GLUCOMTR-MCNC: 187 MG/DL — HIGH (ref 70–99)
GLUCOSE BLDC GLUCOMTR-MCNC: 286 MG/DL — HIGH (ref 70–99)
GLUCOSE BLDC GLUCOMTR-MCNC: 47 MG/DL — LOW (ref 70–99)
GLUCOSE BLDC GLUCOMTR-MCNC: 49 MG/DL — LOW (ref 70–99)
GLUCOSE BLDC GLUCOMTR-MCNC: 50 MG/DL — LOW (ref 70–99)
GLUCOSE BLDC GLUCOMTR-MCNC: 62 MG/DL — LOW (ref 70–99)
GLUCOSE SERPL-MCNC: 128 MG/DL — HIGH (ref 70–99)
HBA1C BLD-MCNC: 9.4 % — HIGH (ref 4–5.6)
HCO3 BLDV-SCNC: 25 MMOL/L — SIGNIFICANT CHANGE UP (ref 21–29)
HCT VFR BLD CALC: 36.2 % — SIGNIFICANT CHANGE UP (ref 34.5–45)
HGB BLD-MCNC: 11 G/DL — LOW (ref 11.5–15.5)
MCHC RBC-ENTMCNC: 28.1 PG — SIGNIFICANT CHANGE UP (ref 27–34)
MCHC RBC-ENTMCNC: 30.4 GM/DL — LOW (ref 32–36)
MCV RBC AUTO: 92.6 FL — SIGNIFICANT CHANGE UP (ref 80–100)
PCO2 BLDV: 56 MMHG — HIGH (ref 35–50)
PH BLDV: 7.27 — LOW (ref 7.35–7.45)
PLATELET # BLD AUTO: 256 K/UL — SIGNIFICANT CHANGE UP (ref 150–400)
PO2 BLDV: 70 MMHG — HIGH (ref 25–45)
POTASSIUM SERPL-MCNC: 3.9 MMOL/L — SIGNIFICANT CHANGE UP (ref 3.5–5.3)
POTASSIUM SERPL-SCNC: 3.9 MMOL/L — SIGNIFICANT CHANGE UP (ref 3.5–5.3)
RBC # BLD: 3.91 M/UL — SIGNIFICANT CHANGE UP (ref 3.8–5.2)
RBC # FLD: 14.6 % — HIGH (ref 10.3–14.5)
SAO2 % BLDV: 93 % — HIGH (ref 67–88)
SODIUM SERPL-SCNC: 136 MMOL/L — SIGNIFICANT CHANGE UP (ref 135–145)
WBC # BLD: 8.22 K/UL — SIGNIFICANT CHANGE UP (ref 3.8–10.5)
WBC # FLD AUTO: 8.22 K/UL — SIGNIFICANT CHANGE UP (ref 3.8–10.5)

## 2019-12-16 PROCEDURE — 93280 PM DEVICE PROGR EVAL DUAL: CPT | Mod: 26

## 2019-12-16 PROCEDURE — 93306 TTE W/DOPPLER COMPLETE: CPT | Mod: 26

## 2019-12-16 RX ORDER — INSULIN LISPRO 100/ML
6 VIAL (ML) SUBCUTANEOUS
Refills: 0 | Status: DISCONTINUED | OUTPATIENT
Start: 2019-12-16 | End: 2019-12-16

## 2019-12-16 RX ORDER — INSULIN GLARGINE 100 [IU]/ML
10 INJECTION, SOLUTION SUBCUTANEOUS AT BEDTIME
Refills: 0 | Status: DISCONTINUED | OUTPATIENT
Start: 2019-12-16 | End: 2019-12-16

## 2019-12-16 RX ORDER — INSULIN GLARGINE 100 [IU]/ML
6 INJECTION, SOLUTION SUBCUTANEOUS AT BEDTIME
Refills: 0 | Status: DISCONTINUED | OUTPATIENT
Start: 2019-12-16 | End: 2019-12-18

## 2019-12-16 RX ORDER — DEXTROSE 50 % IN WATER 50 %
50 SYRINGE (ML) INTRAVENOUS ONCE
Refills: 0 | Status: DISCONTINUED | OUTPATIENT
Start: 2019-12-16 | End: 2019-12-19

## 2019-12-16 RX ADMIN — HEPARIN SODIUM 5000 UNIT(S): 5000 INJECTION INTRAVENOUS; SUBCUTANEOUS at 04:41

## 2019-12-16 RX ADMIN — INSULIN GLARGINE 6 UNIT(S): 100 INJECTION, SOLUTION SUBCUTANEOUS at 22:22

## 2019-12-16 RX ADMIN — AMIODARONE HYDROCHLORIDE 200 MILLIGRAM(S): 400 TABLET ORAL at 04:41

## 2019-12-16 RX ADMIN — Medication 4 UNIT(S): at 11:21

## 2019-12-16 RX ADMIN — HEPARIN SODIUM 5000 UNIT(S): 5000 INJECTION INTRAVENOUS; SUBCUTANEOUS at 17:43

## 2019-12-16 RX ADMIN — MIRTAZAPINE 30 MILLIGRAM(S): 45 TABLET, ORALLY DISINTEGRATING ORAL at 22:22

## 2019-12-16 RX ADMIN — Medication 3: at 13:18

## 2019-12-16 RX ADMIN — DULOXETINE HYDROCHLORIDE 30 MILLIGRAM(S): 30 CAPSULE, DELAYED RELEASE ORAL at 11:27

## 2019-12-16 RX ADMIN — Medication 100 MILLIGRAM(S): at 04:41

## 2019-12-16 RX ADMIN — Medication 500 MILLIGRAM(S): at 11:26

## 2019-12-16 RX ADMIN — PANTOPRAZOLE SODIUM 40 MILLIGRAM(S): 20 TABLET, DELAYED RELEASE ORAL at 04:41

## 2019-12-16 RX ADMIN — Medication 1000 UNIT(S): at 17:44

## 2019-12-16 RX ADMIN — SIMVASTATIN 20 MILLIGRAM(S): 20 TABLET, FILM COATED ORAL at 22:22

## 2019-12-16 RX ADMIN — OLANZAPINE 5 MILLIGRAM(S): 15 TABLET, FILM COATED ORAL at 22:22

## 2019-12-16 RX ADMIN — Medication 1000 UNIT(S): at 04:41

## 2019-12-16 NOTE — PROGRESS NOTE ADULT - SUBJECTIVE AND OBJECTIVE BOX
CARDIOLOGY FOLLOW UP - Dr. Garcia    CC no acute events       PHYSICAL EXAM:  T(C): 36.8 (12-16-19 @ 13:39), Max: 36.8 (12-16-19 @ 13:39)  HR: 70 (12-16-19 @ 13:39) (58 - 70)  BP: 107/71 (12-16-19 @ 13:39) (107/71 - 180/62)  RR: 18 (12-16-19 @ 13:39) (18 - 18)  SpO2: 97% (12-16-19 @ 13:39) (95% - 100%)  Wt(kg): --  I&O's Summary    16 Dec 2019 07:01  -  16 Dec 2019 14:10  --------------------------------------------------------  IN: 360 mL / OUT: 0 mL / NET: 360 mL        Appearance: Normal	  Cardiovascular: Normal S1 S2,RRR, No JVD, No murmurs  Respiratory: Lungs clear to auscultation	  Gastrointestinal:  Soft, Non-tender, + BS	  Extremities: Normal range of motion, No clubbing, cyanosis or edema        MEDICATIONS  (STANDING):  aMIOdarone    Tablet 200 milliGRAM(s) Oral daily  ascorbic acid 500 milliGRAM(s) Oral daily  cholecalciferol 1000 Unit(s) Oral two times a day  dextrose 5%. 1000 milliLiter(s) (50 mL/Hr) IV Continuous <Continuous>  dextrose 5%. 1000 milliLiter(s) (50 mL/Hr) IV Continuous <Continuous>  dextrose 50% Injectable 12.5 Gram(s) IV Push once  dextrose 50% Injectable 25 Gram(s) IV Push once  dextrose 50% Injectable 25 Gram(s) IV Push once  dextrose 50% Injectable 12.5 Gram(s) IV Push once  dextrose 50% Injectable 25 Gram(s) IV Push once  DULoxetine 30 milliGRAM(s) Oral daily  heparin  Injectable 5000 Unit(s) SubCutaneous every 12 hours  insulin glargine Injectable (LANTUS) 6 Unit(s) SubCutaneous at bedtime  insulin lispro (HumaLOG) corrective regimen sliding scale   SubCutaneous three times a day before meals  insulin lispro (HumaLOG) corrective regimen sliding scale   SubCutaneous at bedtime  insulin lispro Injectable (HumaLOG) 4 Unit(s) SubCutaneous three times a day before meals  levoFLOXacin IVPB      metoprolol succinate  milliGRAM(s) Oral daily  mirtazapine 30 milliGRAM(s) Oral at bedtime  OLANZapine 5 milliGRAM(s) Oral at bedtime  pantoprazole    Tablet 40 milliGRAM(s) Oral before breakfast  polyethylene glycol 3350 17 Gram(s) Oral daily  simvastatin 20 milliGRAM(s) Oral at bedtime      TELEMETRY: 	    ECG:  	  RADIOLOGY:   DIAGNOSTIC TESTING:  [ ] Echocardiogram:  [ ]  Catheterization:  [ ] Stress Test:    OTHER: 	    LABS:	 	                                11.0   8.22  )-----------( 256      ( 16 Dec 2019 08:18 )             36.2     12-16    136  |  101  |  40<H>  ----------------------------<  128<H>  3.9   |  24  |  3.01<H>    Ca    8.9      16 Dec 2019 06:58    TPro  7.9  /  Alb  3.6  /  TBili  0.3  /  DBili  x   /  AST  35  /  ALT  19  /  AlkPhos  87  12-15    PT/INR - ( 15 Dec 2019 02:29 )   PT: 10.5 sec;   INR: 0.92 ratio         PTT - ( 15 Dec 2019 02:29 )  PTT:29.0 sec

## 2019-12-16 NOTE — CONSULT NOTE ADULT - SUBJECTIVE AND OBJECTIVE BOX
[facility] NEPHROLOGY- CONSULTATION NOTE    87y Female with history of below presents with a fall. Nephrology consulted for elevated Scr.    Patient with h/o CKD-3 with baseline Scr 1.5-1.6 as per prior labs. Patient follows with Dr. Manrique as an outpatient and on bumex 1 mg PO daily as an outpatient. Patient with h/o R nephrectomy for CA? 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     REVIEW OF SYSTEMS:  Gen: no changes in weight  HEENT: no rhinorrhea  Neck: no sore throat  Cards: no chest pain  Resp: no dyspnea, no cough  GI: no nausea or vomiting or diarrhea  : no dysuria or hematuria  Vascular: no LE edema  Derm: no rashes  Neuro: no numbness/tingling    amoxicillin (Flushing; Vomiting; Nausea)  lisinopril (Angioedema)  penicillin (Hives)      Home Medications Reviewed  Hospital Medications:   MEDICATIONS  (STANDING):  ALBUTerol/ipratropium for Nebulization 3 milliLiter(s) Nebulizer every 6 hours  amiodarone    Tablet 200 milliGRAM(s) Oral daily  ARIPiprazole 2 milliGRAM(s) Oral daily  cefepime   IVPB 1000 milliGRAM(s) IV Intermittent every 24 hours  dextrose 5%. 1000 milliLiter(s) (50 mL/Hr) IV Continuous <Continuous>  dextrose 50% Injectable 12.5 Gram(s) IV Push once  dextrose 50% Injectable 25 Gram(s) IV Push once  dextrose 50% Injectable 25 Gram(s) IV Push once  docusate sodium 100 milliGRAM(s) Oral two times a day  DULoxetine 60 milliGRAM(s) Oral daily  furosemide   Injectable 40 milliGRAM(s) IV Push daily  heparin  Injectable 5000 Unit(s) SubCutaneous every 8 hours  insulin glargine Injectable (LANTUS) 20 Unit(s) SubCutaneous at bedtime  insulin lispro (HumaLOG) corrective regimen sliding scale   SubCutaneous three times a day before meals  insulin lispro (HumaLOG) corrective regimen sliding scale   SubCutaneous at bedtime  insulin lispro Injectable (HumaLOG) 7 Unit(s) SubCutaneous three times a day before meals  metoprolol succinate  milliGRAM(s) Oral daily  mirtazapine 15 milliGRAM(s) Oral at bedtime  pantoprazole    Tablet 40 milliGRAM(s) Oral before breakfast  simvastatin 20 milliGRAM(s) Oral at bedtime  vancomycin  IVPB 1000 milliGRAM(s) IV Intermittent every 24 hours      PAST MEDICAL & SURGICAL HISTORY:  COPD (chronic obstructive pulmonary disease)  Depression  Anxiety  Atrial fibrillation, unspecified type  Pacemaker  Overweight  PVD (Peripheral Vascular Disease)  Arteriosclerotic Heart Disease (ASHD)  HTN (Hypertension)  Diabetes  S/P carpal tunnel release  S/P left cataract extraction  s/p colon resection  S/P Nephrectomy: right  Hip Replacement  History of Total Knee Replacement  S/P Hernia Surgery  S/P Cholecystectomy      FAMILY HISTORY:  Family history of pancreatic cancer (Sibling)      SOCIAL HISTORY:  Denies toxic substance use       VITALS:  T(F): 97.4 (19 @ 08:46), Max: 98 (12-15-19 @ 22:01)  HR: 60 (19 @ 08:46)  BP: 137/64 (19 @ 08:46)  RR: 18 (19 @ 08:46)  SpO2: 100% (19 @ 08:46)  Wt(kg): --     @ 07:01  -   @ 12:34  --------------------------------------------------------  IN: 240 mL / OUT: 0 mL / NET: 240 mL        PHYSICAL EXAM:  Gen: NAD, drowsy  HEENT: dry MM  Neck: no JVD  Cards: RRR, +S1/S2, + ZAKI  Resp: course BS B/L  GI: soft, NT, + ab distention and ventral hernia, NABS  : no CVA tenderness  Vascular: no LE edema B/L  Derm: no rashes  Neuro: non-focal      LABS:      136  |  101  |  40<H>  ----------------------------<  128<H>  3.9   |  24  |  3.01<H>    Ca    8.9      16 Dec 2019 06:58    TPro  7.9  /  Alb  3.6  /  TBili  0.3  /  DBili      /  AST  35  /  ALT  19  /  AlkPhos  87  12-15    Creatinine Trend: 3.01 <--, 2.79 <--, 2.93 <--                        11.0   8.22  )-----------( 256      ( 16 Dec 2019 08:18 )             36.2     Urine Studies:  Urinalysis Basic - ( 15 Dec 2019 06:22 )    Color: Light Yellow / Appearance: Clear / S.010 / pH:   Gluc:  / Ketone: Negative  / Bili: Negative / Urobili: Negative   Blood:  / Protein: 30 mg/dL / Nitrite: Negative   Leuk Esterase: Small / RBC: 4 /hpf / WBC 6 /HPF   Sq Epi:  / Non Sq Epi: 1 /hpf / Bacteria: Many      < from: CT Head No Cont (12.15.19 @ 02:55) >  IMPRESSION:    No acute intracranial hemorrhage, mass effect, or acute osseous fracture.   Mild right posterior parietal scalp soft tissue swelling.    Extensive chronic small vessel ischemic changes in the frontoparietal   white matter and combination of dilated perivascular spaces and lacunar   infarcts in the bilateral basal ganglia and thalami.    < end of copied text >        < from: Xray Chest 1 View AP/PA (12.15.19 @ 02:41) >  IMPRESSION:     Clear lungs.    < end of copied text >

## 2019-12-16 NOTE — CONSULT NOTE ADULT - SUBJECTIVE AND OBJECTIVE BOX
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   Patient reports slipping on a loose carpet ,falling backwards while ambulating with her walker ,  she sustaining trauma to the back of her head, she reports no LOC. She was immediately attended to by her home health aid , and was assisted back on her feet. She had no preceding chest pain or shortness of breath. NO focal motor weakness or loss of sensation. No recent infection , no fever or chills.  She reports normal appetite and PO intake. (15 Dec 2019 05:35)    Pt a/w syncope, undergoing cardiology w/u.     ER vss, afebrile.  No leukocytosis.  UA small LE, (-) nit, > 100K GNR.  Pt given one dose of levaquin on .  ID consult called for further abx managment.     REVIEW OF SYSTEMS:    CONSTITUTIONAL: No fever, weight loss, or fatigue  EYES: No eye pain, visual disturbances, or discharge  ENMT:  No sore throat  NECK: No pain or stiffness  RESPIRATORY: No cough, wheezing, chills or hemoptysis; No shortness of breath  CARDIOVASCULAR: No chest pain, palpitations, dizziness, or leg swelling  GASTROINTESTINAL: No abdominal or epigastric pain. No nausea, vomiting, or hematemesis; No diarrhea or constipation. No melena or hematochezia.  GENITOURINARY: No dysuria, frequency, hematuria, or incontinence  NEUROLOGICAL: No headaches, memory loss, loss of strength, numbness, or tremors  SKIN: No itching, burning, rashes, or lesions   LYMPH NODES: No enlarged glands  MUSCULOSKELETAL: No joint pain or swelling; No muscle, back, or extremity pain      PAST MEDICAL & SURGICAL HISTORY:  COPD (chronic obstructive pulmonary disease)  Depression  Anxiety  Atrial fibrillation, unspecified type  Pacemaker  Overweight  PVD (Peripheral Vascular Disease)  Arteriosclerotic Heart Disease (ASHD)  HTN (Hypertension)  Diabetes  S/P carpal tunnel release  S/P left cataract extraction  s/p colon resection  S/P Nephrectomy: right  Hip Replacement  History of Total Knee Replacement  S/P Hernia Surgery  S/P Cholecystectomy      Allergies    amoxicillin (Flushing; Vomiting; Nausea)  lactose (Diarrhea)  lisinopril (Angioedema)  penicillin (Hives)    Intolerances        FAMILY HISTORY:  Family history of pancreatic cancer (Sibling)      SOCIAL HISTORY:        MEDICATIONS  (STANDING):  aMIOdarone    Tablet 200 milliGRAM(s) Oral daily  ascorbic acid 500 milliGRAM(s) Oral daily  cholecalciferol 1000 Unit(s) Oral two times a day  dextrose 5%. 1000 milliLiter(s) (50 mL/Hr) IV Continuous <Continuous>  dextrose 5%. 1000 milliLiter(s) (50 mL/Hr) IV Continuous <Continuous>  dextrose 50% Injectable 12.5 Gram(s) IV Push once  dextrose 50% Injectable 25 Gram(s) IV Push once  dextrose 50% Injectable 25 Gram(s) IV Push once  dextrose 50% Injectable 12.5 Gram(s) IV Push once  dextrose 50% Injectable 25 Gram(s) IV Push once  DULoxetine 30 milliGRAM(s) Oral daily  heparin  Injectable 5000 Unit(s) SubCutaneous every 12 hours  insulin glargine Injectable (LANTUS) 10 Unit(s) SubCutaneous at bedtime  insulin lispro (HumaLOG) corrective regimen sliding scale   SubCutaneous three times a day before meals  insulin lispro (HumaLOG) corrective regimen sliding scale   SubCutaneous at bedtime  insulin lispro Injectable (HumaLOG) 6 Unit(s) SubCutaneous three times a day before meals  metoprolol succinate  milliGRAM(s) Oral daily  mirtazapine 30 milliGRAM(s) Oral at bedtime  OLANZapine 5 milliGRAM(s) Oral at bedtime  pantoprazole    Tablet 40 milliGRAM(s) Oral before breakfast  polyethylene glycol 3350 17 Gram(s) Oral daily  simvastatin 20 milliGRAM(s) Oral at bedtime    MEDICATIONS  (PRN):  acetaminophen   Tablet .. 650 milliGRAM(s) Oral every 4 hours PRN Mild Pain (1 - 3)  albuterol/ipratropium for Nebulization 3 milliLiter(s) Nebulizer every 6 hours PRN Shortness of Breath and/or Wheezing  dextrose 40% Gel 15 Gram(s) Oral once PRN Blood Glucose LESS THAN 70 milliGRAM(s)/deciliter  dextrose 40% Gel 15 Gram(s) Oral once PRN Blood Glucose LESS THAN 70 milliGRAM(s)/deciliter  glucagon  Injectable 1 milliGRAM(s) IntraMuscular once PRN Glucose LESS THAN 70 milligrams/deciliter  glucagon  Injectable 1 milliGRAM(s) IntraMuscular once PRN Glucose LESS THAN 70 milligrams/deciliter      Vital Signs Last 24 Hrs  T(C): 36.8 (16 Dec 2019 13:39), Max: 36.8 (16 Dec 2019 13:39)  T(F): 98.3 (16 Dec 2019 13:39), Max: 98.3 (16 Dec 2019 13:39)  HR: 70 (16 Dec 2019 13:39) (58 - 70)  BP: 107/71 (16 Dec 2019 13:39) (107/71 - 180/62)  BP(mean): --  RR: 18 (16 Dec 2019 13:39) (18 - 18)  SpO2: 97% (16 Dec 2019 13:39) (95% - 100%)    PHYSICAL EXAM:    GENERAL: NAD, well-groomed  HEAD:  Atraumatic, Normocephalic  EYES: EOMI, PERRLA, conjunctiva and sclera clear  ENMT: No tonsillar erythema, exudates, or enlargement; Moist mucous membranes  NECK: Supple, No JVD  CHEST/LUNG: Clear to percussion bilaterally; No rales, rhonchi, wheezing, or rubs  HEART: Regular rate and rhythm; No murmurs, rubs, or gallops  ABDOMEN: Soft, Nontender, Nondistended; Bowel sounds present  EXTREMITIES:  2+ Peripheral Pulses, No clubbing, cyanosis, or edema  LYMPH: No lymphadenopathy noted  SKIN: No rashes or lesions    LABS:  CBC Full  -  ( 16 Dec 2019 08:18 )  WBC Count : 8.22 K/uL  RBC Count : 3.91 M/uL  Hemoglobin : 11.0 g/dL  Hematocrit : 36.2 %  Platelet Count - Automated : 256 K/uL  Mean Cell Volume : 92.6 fl  Mean Cell Hemoglobin : 28.1 pg  Mean Cell Hemoglobin Concentration : 30.4 gm/dL  Auto Neutrophil # : x  Auto Lymphocyte # : x  Auto Monocyte # : x  Auto Eosinophil # : x  Auto Basophil # : x  Auto Neutrophil % : x  Auto Lymphocyte % : x  Auto Monocyte % : x  Auto Eosinophil % : x  Auto Basophil % : x      12-16    136  |  101  |  40<H>  ----------------------------<  128<H>  3.9   |  24  |  3.01<H>    Ca    8.9      16 Dec 2019 06:58    TPro  7.9  /  Alb  3.6  /  TBili  0.3  /  DBili  x   /  AST  35  /  ALT  19  /  AlkPhos  87  12-15      LIVER FUNCTIONS - ( 15 Dec 2019 09:34 )  Alb: 3.6 g/dL / Pro: 7.9 g/dL / ALK PHOS: 87 U/L / ALT: 19 U/L / AST: 35 U/L / GGT: x                               MICROBIOLOGY:        Urinalysis Basic - ( 15 Dec 2019 06:22 )    Color: Light Yellow / Appearance: Clear / S.010 / pH: x  Gluc: x / Ketone: Negative  / Bili: Negative / Urobili: Negative   Blood: x / Protein: 30 mg/dL / Nitrite: Negative   Leuk Esterase: Small / RBC: 4 /hpf / WBC 6 /HPF   Sq Epi: x / Non Sq Epi: 1 /hpf / Bacteria: Many      Culture - Urine (12.15.19 @ 10:00)    Specimen Source: .Urine Clean Catch (Midstream)    Culture Results:   >100,000 CFU/ml Gram Negative Rods              RADIOLOGY:    < from: CT Cervical Spine No Cont (12.15.19 @ 02:55) >  FINDINGS:    There is nonspecific straightening of the cervical spine lordosis. There   is no acute cervical spine fracture or evidence of traumatic   malalignment. There is no significant prevertebral soft tissue   swelling/hematoma. There are multilevel degenerative changes with   multilevel intervertebral disc space narrowing, disc osteophyte   complexes, and facet and uncinate hypertrophy contributing to varying   degrees of segmental canal stenosis and neural foraminal stenosis. There   is fusion of the right-sided C2-C3 facets. The regional soft tissues of   the neck are otherwise unremarkable apart from mild vascular   atherosclerotic calcifications. There is a 6 mm nodule at the right lung   apex which is unchanged compared to prior studies dated back to 2017.      IMPRESSION:    No acute cervical spine fracture or evidence of traumatic malalignment.   Multilevel cervical spondylosis.    < end of copied text >        < from: CT Head No Cont (12.15.19 @ 02:55) >  FINDINGS:    There is no acute intra-axial or extra-axial hemorrhage. There is no mass   effect or shift of the midline. The basal cisterns are not effaced. There   is cerebral volume loss with prominence of the ventricles and sulci.   There is near symmetric prominence of the extra-axial CSF spaces. There   are extensive chronic small vessel ischemic changes in the frontoparietal   white matter and combination of dilated perivascular spaces and lacunar   infarcts in the bilateral basal ganglia and thalami. There are   atherosclerotic calcifications of the intracranial carotid arteries and   intradural vertebral arteries.    There is mild right posterior parietal scalp soft tissue swelling. The   skull base and bony calvarium are intact. The visualized paranasal   sinuses and tympanic/mastoid cavities are clear apart from minimal   bilateral ethmoid mucosal thickening and trace bilateral mastoid   effusions.     IMPRESSION:    No acute intracranial hemorrhage, mass effect, or acute osseous fracture.   Mild right posterior parietal scalp soft tissue swelling.    Extensive chronic small vessel ischemic changes in the frontoparietal   white matter and combination of dilated perivascular spaces and lacunar   infarcts in the bilateral basal ganglia and thalami.    < end of copied text >          < from: Xray Pelvis AP only (12.15.19 @ 02:41) >  FINDINGS:  Status post right total hip arthroplasty with intact hardware. The tip of   the femoral stem is not included on the pelvic radiograph.  No acute displaced fracture.  Moderate arthrosis of the pubic symphysis.   Mild to moderate left hip arthritis with medial joint space narrowing.  Vascular calcifications.  Surgical clips in the right lower quadrant and right hemipelvis.    IMPRESSION:  No acute displaced fracture.    < end of copied text >

## 2019-12-16 NOTE — CONSULT NOTE ADULT - SUBJECTIVE AND OBJECTIVE BOX
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   Patient reports slipping on a loose carpet ,falling backwards while ambulating with her walker ,  she sustaining trauma to the back of her head, she reports no LOC. She was immediately attended to by her home health aid , and was assisted back on her feet. She had no preceding chest pain or shortness of breath. NO focal motor weakness or loss of sensation. No recent infection , no fever or chills.  She reports normal appetite and PO intake. (15 Dec 2019 05:35)    Patient has history of diabetes, A1C 9.4%, on insulin at home (Toujeo 12u AM, Humalog 10/4/8 before meals), no recent hypoglycemic episodes, no polyuria polydipsia. Patient follows up with PCP for diabetes management.  Endo was consulted for glycemic control.    PAST MEDICAL & SURGICAL HISTORY:  COPD (chronic obstructive pulmonary disease)  Depression  Anxiety  Atrial fibrillation, unspecified type  Pacemaker  Overweight  PVD (Peripheral Vascular Disease)  Arteriosclerotic Heart Disease (ASHD)  HTN (Hypertension)  Diabetes  S/P carpal tunnel release  S/P left cataract extraction  s/p colon resection  S/P Nephrectomy: right  Hip Replacement  History of Total Knee Replacement  S/P Hernia Surgery  S/P Cholecystectomy      FAMILY HISTORY:  Family history of pancreatic cancer (Sibling)      Social History:    Outpatient Medications:    MEDICATIONS  (STANDING):  aMIOdarone    Tablet 200 milliGRAM(s) Oral daily  ascorbic acid 500 milliGRAM(s) Oral daily  cholecalciferol 1000 Unit(s) Oral two times a day  dextrose 5%. 1000 milliLiter(s) (50 mL/Hr) IV Continuous <Continuous>  dextrose 5%. 1000 milliLiter(s) (50 mL/Hr) IV Continuous <Continuous>  dextrose 50% Injectable 12.5 Gram(s) IV Push once  dextrose 50% Injectable 25 Gram(s) IV Push once  dextrose 50% Injectable 25 Gram(s) IV Push once  dextrose 50% Injectable 12.5 Gram(s) IV Push once  dextrose 50% Injectable 25 Gram(s) IV Push once  DULoxetine 30 milliGRAM(s) Oral daily  heparin  Injectable 5000 Unit(s) SubCutaneous every 12 hours  insulin glargine Injectable (LANTUS) 10 Unit(s) SubCutaneous at bedtime  insulin lispro (HumaLOG) corrective regimen sliding scale   SubCutaneous three times a day before meals  insulin lispro (HumaLOG) corrective regimen sliding scale   SubCutaneous at bedtime  insulin lispro Injectable (HumaLOG) 6 Unit(s) SubCutaneous three times a day before meals  levoFLOXacin IVPB      metoprolol succinate  milliGRAM(s) Oral daily  mirtazapine 30 milliGRAM(s) Oral at bedtime  OLANZapine 5 milliGRAM(s) Oral at bedtime  pantoprazole    Tablet 40 milliGRAM(s) Oral before breakfast  polyethylene glycol 3350 17 Gram(s) Oral daily  simvastatin 20 milliGRAM(s) Oral at bedtime    MEDICATIONS  (PRN):  acetaminophen   Tablet .. 650 milliGRAM(s) Oral every 4 hours PRN Mild Pain (1 - 3)  albuterol/ipratropium for Nebulization 3 milliLiter(s) Nebulizer every 6 hours PRN Shortness of Breath and/or Wheezing  dextrose 40% Gel 15 Gram(s) Oral once PRN Blood Glucose LESS THAN 70 milliGRAM(s)/deciliter  dextrose 40% Gel 15 Gram(s) Oral once PRN Blood Glucose LESS THAN 70 milliGRAM(s)/deciliter  glucagon  Injectable 1 milliGRAM(s) IntraMuscular once PRN Glucose LESS THAN 70 milligrams/deciliter  glucagon  Injectable 1 milliGRAM(s) IntraMuscular once PRN Glucose LESS THAN 70 milligrams/deciliter      Allergies    amoxicillin (Flushing; Vomiting; Nausea)  lactose (Diarrhea)  lisinopril (Angioedema)  penicillin (Hives)    Intolerances      Review of Systems:  Constitutional: No fever, no chills  Eyes: No blurry vision  Neuro: No tremors  HEENT: No pain, no neck swelling  Cardiovascular: No chest pain, no palpitations  Respiratory: Has SOB, no cough  GI: No nausea, vomiting, abdominal pain  : No dysuria  Skin: no rash  MSK: Has leg swelling.  Psych: no depression  Endocrine: no polyuria, polydipsia    ALL OTHER SYSTEMS REVIEWED AND NEGATIVE    UNABLE TO OBTAIN    PHYSICAL EXAM:  VITALS: T(C): 36.8 (12-16-19 @ 13:39)  T(F): 98.3 (12-16-19 @ 13:39), Max: 98.3 (12-16-19 @ 13:39)  HR: 70 (12-16-19 @ 13:39) (58 - 70)  BP: 107/71 (12-16-19 @ 13:39) (107/71 - 180/62)  RR:  (18 - 18)  SpO2:  (95% - 100%)  Wt(kg): --  GENERAL: NAD, well-groomed, well-developed  EYES: No proptosis, no lid lag  HEENT:  Atraumatic, Normocephalic  THYROID: Normal size, no palpable nodules  RESPIRATORY: Clear to auscultation bilaterally; No rales, rhonchi, wheezing  CARDIOVASCULAR: Si S2, No murmurs;  GI: Soft, non distended, normal bowel sounds  SKIN: Dry, intact, No rashes or lesions  MUSCULOSKELETAL: Has BL lower extremity edema.  NEURO:  no tremor, sensation decreased in feet BL,    POCT Blood Glucose.: 286 mg/dL (12-16-19 @ 12:53)  POCT Blood Glucose.: 128 mg/dL (12-16-19 @ 10:08)  POCT Blood Glucose.: 149 mg/dL (12-15-19 @ 22:00)  POCT Blood Glucose.: 232 mg/dL (12-15-19 @ 17:39)  POCT Blood Glucose.: 220 mg/dL (12-15-19 @ 12:23)  POCT Blood Glucose.: 145 mg/dL (12-15-19 @ 08:23)  POCT Blood Glucose.: 146 mg/dL (12-15-19 @ 07:33)                            11.0   8.22  )-----------( 256      ( 16 Dec 2019 08:18 )             36.2       12-16    136  |  101  |  40<H>  ----------------------------<  128<H>  3.9   |  24  |  3.01<H>    EGFR if : 15<L>  EGFR if non : 13<L>    Ca    8.9      12-16    TPro  7.9  /  Alb  3.6  /  TBili  0.3  /  DBili  x   /  AST  35  /  ALT  19  /  AlkPhos  87  12-15      Thyroid Function Tests:      Hemoglobin A1C, Whole Blood: 9.4 % <H> [4.0 - 5.6] (12-16-19 @ 08:20)          Radiology:

## 2019-12-16 NOTE — CHART NOTE - NSCHARTNOTEFT_GEN_A_CORE
Patient with T2DM. HA1C 9.4. Endocrine consulted. Meds adjusted. I was called by nurse for symptomatic hypoglycemia of 49. Protocol followed. Pt had oral juice and Dextrose gel. She felt better but glucose still 62. 1 amp D50 given, repeat BS per protocol. Endocrine reduced Lantus and discontinued pre meal Humalog. Monitor.

## 2019-12-16 NOTE — CONSULT NOTE ADULT - PROBLEM SELECTOR RECOMMENDATION 9
Will increase Lantus to 10u at bedtime.  Will increase Humalog to 6u before each meal and continue Humalog correction scale coverage. Will continue monitoring FS, log, and FU.  Patient counseled for compliance with consistent low carb diet and exercise as tolerated outpatient. Patient counseled for compliance with consistent low carb diet and exercise as tolerated outpatient.

## 2019-12-16 NOTE — PROGRESS NOTE ADULT - ASSESSMENT
88 year old woman with history of COPD on home oxygen, DM II with long term complications including nephropathy, Essential HTN, Systolic CHF, CKD 4, PPM, nephrectomy, and Atrial fibrillation, s/p PPM, not on a/c admitted s/p fall.     1. PAF  -stable, continue amio, beta blocker  -not on a/c, not good candidate due to fall history   -cont asa    2. Chronic CHF  -does not appear volume overloaded  -hold diuretics   -check echo to eval lv fxn, mr    3. Fall, r/o syncope  -mechanical by report  -PPM check w/ normal function, no episodes correlating w/ syncope     4. ARUN  -renal f/u , diuretics on hold

## 2019-12-16 NOTE — PROCEDURE NOTE - ADDITIONAL PROCEDURE DETAILS
call to check ppm for syncope   normal sensing and pacing thresholds, stable lead impedence  review of stored data reveal no episodes to correlate with syncope   [id number]

## 2019-12-16 NOTE — PROGRESS NOTE ADULT - SUBJECTIVE AND OBJECTIVE BOX
Patient is a 88y old  Female who presents with a chief complaint of Fall x one episode (16 Dec 2019 14:10)      INTERVAL HPI/OVERNIGHT EVENTS:  T(C): 36.8 (19 @ 13:39), Max: 36.8 (19 @ 13:39)  HR: 70 (19 @ 13:39) (58 - 70)  BP: 107/71 (19 @ 13:39) (107/71 - 180/62)  RR: 18 (19 @ 13:39) (18 - 18)  SpO2: 97% (19 @ 13:39) (95% - 100%)  Wt(kg): --  I&O's Summary    16 Dec 2019 07:01  -  16 Dec 2019 14:24  --------------------------------------------------------  IN: 360 mL / OUT: 0 mL / NET: 360 mL        LABS:                        11.0   8.22  )-----------( 256      ( 16 Dec 2019 08:18 )             36.2     12-16    136  |  101  |  40<H>  ----------------------------<  128<H>  3.9   |  24  |  3.01<H>    Ca    8.9      16 Dec 2019 06:58    TPro  7.9  /  Alb  3.6  /  TBili  0.3  /  DBili  x   /  AST  35  /  ALT  19  /  AlkPhos  87  12-15    PT/INR - ( 15 Dec 2019 02:29 )   PT: 10.5 sec;   INR: 0.92 ratio         PTT - ( 15 Dec 2019 02:29 )  PTT:29.0 sec  Urinalysis Basic - ( 15 Dec 2019 06:22 )    Color: Light Yellow / Appearance: Clear / S.010 / pH: x  Gluc: x / Ketone: Negative  / Bili: Negative / Urobili: Negative   Blood: x / Protein: 30 mg/dL / Nitrite: Negative   Leuk Esterase: Small / RBC: 4 /hpf / WBC 6 /HPF   Sq Epi: x / Non Sq Epi: 1 /hpf / Bacteria: Many      CAPILLARY BLOOD GLUCOSE      POCT Blood Glucose.: 286 mg/dL (16 Dec 2019 12:53)  POCT Blood Glucose.: 128 mg/dL (16 Dec 2019 10:08)  POCT Blood Glucose.: 149 mg/dL (15 Dec 2019 22:00)  POCT Blood Glucose.: 232 mg/dL (15 Dec 2019 17:39)        Urinalysis Basic - ( 15 Dec 2019 06:22 )    Color: Light Yellow / Appearance: Clear / S.010 / pH: x  Gluc: x / Ketone: Negative  / Bili: Negative / Urobili: Negative   Blood: x / Protein: 30 mg/dL / Nitrite: Negative   Leuk Esterase: Small / RBC: 4 /hpf / WBC 6 /HPF   Sq Epi: x / Non Sq Epi: 1 /hpf / Bacteria: Many        MEDICATIONS  (STANDING):  aMIOdarone    Tablet 200 milliGRAM(s) Oral daily  ascorbic acid 500 milliGRAM(s) Oral daily  cholecalciferol 1000 Unit(s) Oral two times a day  dextrose 5%. 1000 milliLiter(s) (50 mL/Hr) IV Continuous <Continuous>  dextrose 5%. 1000 milliLiter(s) (50 mL/Hr) IV Continuous <Continuous>  dextrose 50% Injectable 12.5 Gram(s) IV Push once  dextrose 50% Injectable 25 Gram(s) IV Push once  dextrose 50% Injectable 25 Gram(s) IV Push once  dextrose 50% Injectable 12.5 Gram(s) IV Push once  dextrose 50% Injectable 25 Gram(s) IV Push once  DULoxetine 30 milliGRAM(s) Oral daily  heparin  Injectable 5000 Unit(s) SubCutaneous every 12 hours  insulin glargine Injectable (LANTUS) 6 Unit(s) SubCutaneous at bedtime  insulin lispro (HumaLOG) corrective regimen sliding scale   SubCutaneous three times a day before meals  insulin lispro (HumaLOG) corrective regimen sliding scale   SubCutaneous at bedtime  insulin lispro Injectable (HumaLOG) 4 Unit(s) SubCutaneous three times a day before meals  levoFLOXacin IVPB      metoprolol succinate  milliGRAM(s) Oral daily  mirtazapine 30 milliGRAM(s) Oral at bedtime  OLANZapine 5 milliGRAM(s) Oral at bedtime  pantoprazole    Tablet 40 milliGRAM(s) Oral before breakfast  polyethylene glycol 3350 17 Gram(s) Oral daily  simvastatin 20 milliGRAM(s) Oral at bedtime    MEDICATIONS  (PRN):  acetaminophen   Tablet .. 650 milliGRAM(s) Oral every 4 hours PRN Mild Pain (1 - 3)  albuterol/ipratropium for Nebulization 3 milliLiter(s) Nebulizer every 6 hours PRN Shortness of Breath and/or Wheezing  dextrose 40% Gel 15 Gram(s) Oral once PRN Blood Glucose LESS THAN 70 milliGRAM(s)/deciliter  dextrose 40% Gel 15 Gram(s) Oral once PRN Blood Glucose LESS THAN 70 milliGRAM(s)/deciliter  glucagon  Injectable 1 milliGRAM(s) IntraMuscular once PRN Glucose LESS THAN 70 milligrams/deciliter  glucagon  Injectable 1 milliGRAM(s) IntraMuscular once PRN Glucose LESS THAN 70 milligrams/deciliter          PHYSICAL EXAM:  GENERAL: NAD, well-groomed, well-developed  HEAD:  Atraumatic, Normocephalic  CHEST/LUNG: Clear to percussion bilaterally; No rales, rhonchi, wheezing, or rubs  HEART: Regular rate and rhythm; No murmurs, rubs, or gallops  ABDOMEN: Soft, Nontender, Nondistended; Bowel sounds present  EXTREMITIES:  2+ Peripheral Pulses, No clubbing, cyanosis, or edema  LYMPH: No lymphadenopathy noted  SKIN: No rashes or lesions    Care Discussed with Consultants/Other Providers [ ] YES  [ ] NO

## 2019-12-16 NOTE — PROGRESS NOTE ADULT - ASSESSMENT
88 F w/ PMHx of COPD on home oxygen,DM II,  HTN, Systolic CHF, CKD 4, PPM, nephrectomy, Atrial fibrillation presents after fall , labs with ARUN.        Problem/Plan - 1:  ·  Problem: ARUN (acute kidney injury).  Plan: appears hypovolemic , s/p IV fluid   - hold diuretics  - strict ins and outs   - monitor renal function   - renally dose medications       Problem/Plan - 2:  ·  Problem: Fall.  Plan: history suggestive of mechanical fall , no evidence of cardiac ischemia, no major injuries noted on CTH and c spine , PPM appears to be capturing on cardiac tracing   - PPM interrogation - to  be arranged by day team.     Problem/Plan - 3:  ·  Problem: COPD (chronic obstructive pulmonary disease).  Plan: no active symptoms   - continue oxygen supplementation   - PRN nebulizers.     Problem/Plan - 4:  ·  Problem: Diabetes.  Plan: - basal, bolus   - consistent carb diet.     Problem/Plan - 5:  ·  Problem: Atrial fibrillation, unspecified type.  Plan: rate controlled , Home medications need to be verified , will continue medications listed in chart from  prior admission   - continue amiodarone   - continue metoprolol.     Problem/Plan - 6:  Problem: Heart failure. Plan: - holding Bumetanide in setting of ARUN   - strict ins and outs   - daily weights - continue statin.    Problem/Plan - 7:  ·  Problem: Depression.  Plan: Home medications need to be verified , will continue medications listed in chart from  prior admission   - continue Olanzapine   - continue Duloxetine   - continue Remaron.     ACP - case dw son for GOC and all necessary forms filled for [facility]

## 2019-12-16 NOTE — CONSULT NOTE ADULT - ASSESSMENT
Assessment  DMT2: 88y Female with DM T2 with hyperglycemia, A1C 9.4% , was on insulin at home, now on low-dose basal bolus insulin, blood sugars improving though still elevated, fluctuating, no hypoglycemic episode, eating meals, spoke with  by the bedside.  HF: on medications, no chest pain, stable, monitored.  A.Fib: On meds, stable, monitored.  HTN: Controlled,  on antihypertensive medications.  CKD: Monitor labs/BMP          Brandon Mayer MD  Cell: [phone]  Office: [phone] Assessment  DMT2: 88y Female with DM T2 with hyperglycemia, A1C 9.4% , was on insulin at home, now on low-dose basal bolus insulin, blood sugars improving though still elevated, fluctuating, no hypoglycemic episode, eating meals,  spoke with  by the bedside.  HF: on medications, no chest pain, stable, monitored.  A.Fib: On meds, stable, monitored.  HTN: Controlled,  on antihypertensive medications.  CKD: Monitor labs/BMP          Brandon Mayer MD  Cell: [phone]  Office: [phone]

## 2019-12-16 NOTE — CONSULT NOTE ADULT - ASSESSMENT
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   Patient reports slipping on a loose carpet ,falling backwards while ambulating with her walker ,  she sustaining trauma to the back of her head, she reports no LOC. She was immediately attended to by her home health aid , and was assisted back on her feet. She had no preceding chest pain or shortness of breath. NO focal motor weakness or loss of sensation. No recent infection , no fever or chills.  She reports normal appetite and PO intake. (15 Dec 2019 05:35)    Pt a/w syncope, undergoing cardiology w/u.     ER vss, afebrile.  No leukocytosis.  UA small LE, (-) nit, > 100K GNR.  Pt given one dose of levaquin on 12/16.  ID consult called for further abx managment.     Asymptomatic bacteruria:    - UA unremarkable.  Positive ucx represent colonization, not infection.  No indication for further abx at this time.      - Cont to monitor for new fever, wbc, or urinary symptoms (frequency, burning, urgency).        Will follow,    Emilee Jacobo  [phone]

## 2019-12-16 NOTE — CONSULT NOTE ADULT - ASSESSMENT
87y Female with history of COPD presents with a fall. Nephrology consulted for elevated Scr.      1) ARUN: suspect hemodynamically mediated in setting of infection and diuretic use. UA relatively bland. Check urine sodium and urine creatinine. Check renal US. Check CK given fall r/o rhabdo. Continue holding outpatient bumex 1 mg daily for now. Avoid nephrotoxins.    2) CKD-3: Baseline Scr 1.5-1.6 as per prior labs. 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 Defer inpatient work up as patient follows with Dr. Manrique as an outpatient. Monitor electrolytes. Avoid nephrotoxins.    3) HTN with CKD: BP controlled. Continue with current medications and low sodium diet. Monitor BP.    4) LE edema: Patient appears dry. Holding home bumex 1 mg daily. F/U TTE. Monitor UO.    5) Acute cystitis without hematuria: Urine culture positive for which patient started on empiric abx as per primary team.    Check blood gas as patient lethargic r/o hypercapnia.

## 2019-12-16 NOTE — CONSULT NOTE ADULT - ATTENDING COMMENTS
[facility] NEPHROLOGY  Rad Mojica M.D.  Les Mckeon D.O.  Selene Conn M.D.  Odessa Rodriguez, MSN, ANP-C    Telephone: [phone]  Facsimile: [phone]    [address] [place] [address]
Will increase Lantus to 10u at bedtime.  Will increase Humalog to 6u before each meal and continue Humalog correction scale coverage. Will continue monitoring FS, log, and FU.

## 2019-12-17 LAB
-  AMIKACIN: SIGNIFICANT CHANGE UP
-  AMPICILLIN/SULBACTAM: SIGNIFICANT CHANGE UP
-  AMPICILLIN: SIGNIFICANT CHANGE UP
-  AZTREONAM: SIGNIFICANT CHANGE UP
-  CEFAZOLIN: SIGNIFICANT CHANGE UP
-  CEFEPIME: SIGNIFICANT CHANGE UP
-  CEFOXITIN: SIGNIFICANT CHANGE UP
-  CEFTRIAXONE: SIGNIFICANT CHANGE UP
-  CIPROFLOXACIN: SIGNIFICANT CHANGE UP
-  GENTAMICIN: SIGNIFICANT CHANGE UP
-  IMIPENEM: SIGNIFICANT CHANGE UP
-  LEVOFLOXACIN: SIGNIFICANT CHANGE UP
-  MEROPENEM: SIGNIFICANT CHANGE UP
-  NITROFURANTOIN: SIGNIFICANT CHANGE UP
-  PIPERACILLIN/TAZOBACTAM: SIGNIFICANT CHANGE UP
-  TIGECYCLINE: SIGNIFICANT CHANGE UP
-  TOBRAMYCIN: SIGNIFICANT CHANGE UP
-  TRIMETHOPRIM/SULFAMETHOXAZOLE: SIGNIFICANT CHANGE UP
ALBUMIN SERPL ELPH-MCNC: 3.2 G/DL — LOW (ref 3.3–5)
ALP SERPL-CCNC: 91 U/L — SIGNIFICANT CHANGE UP (ref 40–120)
ALT FLD-CCNC: 17 U/L — SIGNIFICANT CHANGE UP (ref 10–45)
ANION GAP SERPL CALC-SCNC: 13 MMOL/L — SIGNIFICANT CHANGE UP (ref 5–17)
AST SERPL-CCNC: 32 U/L — SIGNIFICANT CHANGE UP (ref 10–40)
BASOPHILS # BLD AUTO: 0.06 K/UL — SIGNIFICANT CHANGE UP (ref 0–0.2)
BASOPHILS NFR BLD AUTO: 0.7 % — SIGNIFICANT CHANGE UP (ref 0–2)
BILIRUB SERPL-MCNC: 0.4 MG/DL — SIGNIFICANT CHANGE UP (ref 0.2–1.2)
BUN SERPL-MCNC: 37 MG/DL — HIGH (ref 7–23)
CALCIUM SERPL-MCNC: 8.9 MG/DL — SIGNIFICANT CHANGE UP (ref 8.4–10.5)
CHLORIDE SERPL-SCNC: 100 MMOL/L — SIGNIFICANT CHANGE UP (ref 96–108)
CO2 SERPL-SCNC: 22 MMOL/L — SIGNIFICANT CHANGE UP (ref 22–31)
CREAT ?TM UR-MCNC: 65 MG/DL — SIGNIFICANT CHANGE UP
CREAT SERPL-MCNC: 2.54 MG/DL — HIGH (ref 0.5–1.3)
CULTURE RESULTS: SIGNIFICANT CHANGE UP
EOSINOPHIL # BLD AUTO: 0.41 K/UL — SIGNIFICANT CHANGE UP (ref 0–0.5)
EOSINOPHIL NFR BLD AUTO: 4.8 % — SIGNIFICANT CHANGE UP (ref 0–6)
GLUCOSE BLDC GLUCOMTR-MCNC: 136 MG/DL — HIGH (ref 70–99)
GLUCOSE BLDC GLUCOMTR-MCNC: 248 MG/DL — HIGH (ref 70–99)
GLUCOSE BLDC GLUCOMTR-MCNC: 252 MG/DL — HIGH (ref 70–99)
GLUCOSE BLDC GLUCOMTR-MCNC: 282 MG/DL — HIGH (ref 70–99)
GLUCOSE SERPL-MCNC: 163 MG/DL — HIGH (ref 70–99)
HCT VFR BLD CALC: 40.3 % — SIGNIFICANT CHANGE UP (ref 34.5–45)
HGB BLD-MCNC: 12.4 G/DL — SIGNIFICANT CHANGE UP (ref 11.5–15.5)
IMM GRANULOCYTES NFR BLD AUTO: 0.6 % — SIGNIFICANT CHANGE UP (ref 0–1.5)
LYMPHOCYTES # BLD AUTO: 1.35 K/UL — SIGNIFICANT CHANGE UP (ref 1–3.3)
LYMPHOCYTES # BLD AUTO: 15.9 % — SIGNIFICANT CHANGE UP (ref 13–44)
MAGNESIUM SERPL-MCNC: 2.4 MG/DL — SIGNIFICANT CHANGE UP (ref 1.6–2.6)
MCHC RBC-ENTMCNC: 28.6 PG — SIGNIFICANT CHANGE UP (ref 27–34)
MCHC RBC-ENTMCNC: 30.8 GM/DL — LOW (ref 32–36)
MCV RBC AUTO: 92.9 FL — SIGNIFICANT CHANGE UP (ref 80–100)
METHOD TYPE: SIGNIFICANT CHANGE UP
MONOCYTES # BLD AUTO: 0.89 K/UL — SIGNIFICANT CHANGE UP (ref 0–0.9)
MONOCYTES NFR BLD AUTO: 10.5 % — SIGNIFICANT CHANGE UP (ref 2–14)
NEUTROPHILS # BLD AUTO: 5.72 K/UL — SIGNIFICANT CHANGE UP (ref 1.8–7.4)
NEUTROPHILS NFR BLD AUTO: 67.5 % — SIGNIFICANT CHANGE UP (ref 43–77)
ORGANISM # SPEC MICROSCOPIC CNT: SIGNIFICANT CHANGE UP
ORGANISM # SPEC MICROSCOPIC CNT: SIGNIFICANT CHANGE UP
PHOSPHATE SERPL-MCNC: 3.8 MG/DL — SIGNIFICANT CHANGE UP (ref 2.5–4.5)
PLATELET # BLD AUTO: 269 K/UL — SIGNIFICANT CHANGE UP (ref 150–400)
POTASSIUM SERPL-MCNC: 4.5 MMOL/L — SIGNIFICANT CHANGE UP (ref 3.5–5.3)
POTASSIUM SERPL-SCNC: 4.5 MMOL/L — SIGNIFICANT CHANGE UP (ref 3.5–5.3)
PROT SERPL-MCNC: 7.6 G/DL — SIGNIFICANT CHANGE UP (ref 6–8.3)
RBC # BLD: 4.34 M/UL — SIGNIFICANT CHANGE UP (ref 3.8–5.2)
RBC # FLD: 13.9 % — SIGNIFICANT CHANGE UP (ref 10.3–14.5)
SODIUM SERPL-SCNC: 135 MMOL/L — SIGNIFICANT CHANGE UP (ref 135–145)
SODIUM UR-SCNC: 46 MMOL/L — SIGNIFICANT CHANGE UP
SPECIMEN SOURCE: SIGNIFICANT CHANGE UP
WBC # BLD: 8.48 K/UL — SIGNIFICANT CHANGE UP (ref 3.8–10.5)
WBC # FLD AUTO: 8.48 K/UL — SIGNIFICANT CHANGE UP (ref 3.8–10.5)

## 2019-12-17 PROCEDURE — 76770 US EXAM ABDO BACK WALL COMP: CPT | Mod: 26

## 2019-12-17 RX ADMIN — HEPARIN SODIUM 5000 UNIT(S): 5000 INJECTION INTRAVENOUS; SUBCUTANEOUS at 18:51

## 2019-12-17 RX ADMIN — INSULIN GLARGINE 6 UNIT(S): 100 INJECTION, SOLUTION SUBCUTANEOUS at 21:53

## 2019-12-17 RX ADMIN — POLYETHYLENE GLYCOL 3350 17 GRAM(S): 17 POWDER, FOR SOLUTION ORAL at 11:38

## 2019-12-17 RX ADMIN — HEPARIN SODIUM 5000 UNIT(S): 5000 INJECTION INTRAVENOUS; SUBCUTANEOUS at 06:13

## 2019-12-17 RX ADMIN — Medication 1: at 21:46

## 2019-12-17 RX ADMIN — Medication 3: at 18:49

## 2019-12-17 RX ADMIN — OLANZAPINE 5 MILLIGRAM(S): 15 TABLET, FILM COATED ORAL at 21:46

## 2019-12-17 RX ADMIN — Medication 1000 UNIT(S): at 18:49

## 2019-12-17 RX ADMIN — DULOXETINE HYDROCHLORIDE 30 MILLIGRAM(S): 30 CAPSULE, DELAYED RELEASE ORAL at 13:06

## 2019-12-17 RX ADMIN — SIMVASTATIN 20 MILLIGRAM(S): 20 TABLET, FILM COATED ORAL at 21:46

## 2019-12-17 RX ADMIN — Medication 500 MILLIGRAM(S): at 11:37

## 2019-12-17 RX ADMIN — MIRTAZAPINE 30 MILLIGRAM(S): 45 TABLET, ORALLY DISINTEGRATING ORAL at 21:46

## 2019-12-17 RX ADMIN — Medication 2: at 13:03

## 2019-12-17 NOTE — DIETITIAN INITIAL EVALUATION ADULT. - ENERGY NEEDS
Pertinent Information: This is a 88 year old female, with  PMHx of COPD on home oxygen qhs, complicated T2DM with long term complications including nephropathy, Essential HTN, Systolic CHF, CKD 4, PPM, nephrectomy, Atrial fibrillation. Presents after fall on day of admission, endocrine following for glycemic control.

## 2019-12-17 NOTE — PROGRESS NOTE ADULT - SUBJECTIVE AND OBJECTIVE BOX
Infectious Diseases progress note:    Subjective: NAD, afebrile.  Cr improving    ROS:  CONSTITUTIONAL:  No fever, chills, rigors  CARDIOVASCULAR:  No chest pain or palpitations  RESPIRATORY:   No SOB, cough, dyspnea on exertion.  No wheezing  GASTROINTESTINAL:  No abd pain, N/V, diarrhea/constipation  EXTREMITIES:  No swelling or joint pain  GENITOURINARY:  No burning on urination, increased frequency or urgency.  No flank pain  NEUROLOGIC:  No HA, visual disturbances  SKIN: No rashes    Allergies    amoxicillin (Flushing; Vomiting; Nausea)  lactose (Diarrhea)  lisinopril (Angioedema)  penicillin (Hives)    Intolerances        ANTIBIOTICS/RELEVANT:  antimicrobials    immunologic:    OTHER:  acetaminophen   Tablet .. 650 milliGRAM(s) Oral every 4 hours PRN  albuterol/ipratropium for Nebulization 3 milliLiter(s) Nebulizer every 6 hours PRN  aMIOdarone    Tablet 200 milliGRAM(s) Oral daily  ascorbic acid 500 milliGRAM(s) Oral daily  cholecalciferol 1000 Unit(s) Oral two times a day  dextrose 40% Gel 15 Gram(s) Oral once PRN  dextrose 40% Gel 15 Gram(s) Oral once PRN  dextrose 5%. 1000 milliLiter(s) IV Continuous <Continuous>  dextrose 5%. 1000 milliLiter(s) IV Continuous <Continuous>  dextrose 50% Injectable 12.5 Gram(s) IV Push once  dextrose 50% Injectable 25 Gram(s) IV Push once  dextrose 50% Injectable 25 Gram(s) IV Push once  dextrose 50% Injectable 12.5 Gram(s) IV Push once  dextrose 50% Injectable 25 Gram(s) IV Push once  dextrose 50% Injectable 50 milliLiter(s) IV Push once  DULoxetine 30 milliGRAM(s) Oral daily  glucagon  Injectable 1 milliGRAM(s) IntraMuscular once PRN  glucagon  Injectable 1 milliGRAM(s) IntraMuscular once PRN  heparin  Injectable 5000 Unit(s) SubCutaneous every 12 hours  insulin glargine Injectable (LANTUS) 6 Unit(s) SubCutaneous at bedtime  insulin lispro (HumaLOG) corrective regimen sliding scale   SubCutaneous three times a day before meals  insulin lispro (HumaLOG) corrective regimen sliding scale   SubCutaneous at bedtime  metoprolol succinate  milliGRAM(s) Oral daily  mirtazapine 30 milliGRAM(s) Oral at bedtime  OLANZapine 5 milliGRAM(s) Oral at bedtime  pantoprazole    Tablet 40 milliGRAM(s) Oral before breakfast  polyethylene glycol 3350 17 Gram(s) Oral daily  simvastatin 20 milliGRAM(s) Oral at bedtime      Objective:  Vital Signs Last 24 Hrs  T(C): 37.1 (17 Dec 2019 13:40), Max: 37.1 (17 Dec 2019 13:40)  T(F): 98.7 (17 Dec 2019 13:40), Max: 98.7 (17 Dec 2019 13:40)  HR: 74 (17 Dec 2019 13:40) (60 - 74)  BP: 164/78 (17 Dec 2019 13:40) (120/88 - 186/76)  BP(mean): --  RR: 18 (17 Dec 2019 13:40) (18 - 18)  SpO2: 98% (17 Dec 2019 13:40) (95% - 100%)    PHYSICAL EXAM:  Constitutional:NAD  Eyes:MAICO, EOMI  Ear/Nose/Throat: no thrush, mucositis.  Moist mucous membranes	  Neck:no JVD, no lymphadenopathy, supple  Respiratory: CTA tyree  Cardiovascular: S1S2 RRR, no murmurs  Gastrointestinal:soft, nontender,  nondistended (+) BS  Extremities:no e/e/c  Skin:  no rashes, open wounds or ulcerations        LABS:                        12.4   8.48  )-----------( 269      ( 17 Dec 2019 10:46 )             40.3     12-17    135  |  100  |  37<H>  ----------------------------<  163<H>  4.5   |  22  |  2.54<H>    Ca    8.9      17 Dec 2019 09:43  Phos  3.8     12-17  Mg     2.4     12-17    TPro  7.6  /  Alb  3.2<L>  /  TBili  0.4  /  DBili  x   /  AST  32  /  ALT  17  /  AlkPhos  91  12-17              MICROBIOLOGY:    Culture - Urine (12.15.19 @ 10:00)    -  Gentamicin: R >8    -  Imipenem: S <=1    -  Levofloxacin: S <=2    -  Meropenem: S <=1    -  Nitrofurantoin: S <=32 Should not be used to treat pyelonephritis    -  Piperacillin/Tazobactam: S <=16    -  Tigecycline: S <=2    -  Tobramycin: I 8    -  Trimethoprim/Sulfamethoxazole: R >2/38    -  Amikacin: S <=16    -  Ampicillin: R >16 These ampicillin results predict results for amoxicillin    -  Ampicillin/Sulbactam: I 16/8 Enterobacter, Citrobacter, and Serratia may develop resistance during prolonged therapy (3-4 days)    -  Aztreonam: S <=4    -  Cefazolin: S <=8 (MIC_CL_COM_ENTERIC_[id number]) For uncomplicated UTI with K. pneumoniae, E. coli, or P. mirablis: OBDULIO <=16 is sensitive and OBDULIO >=32 is resistant. This also predicts results for oral agents cefaclor, cefdinir, cefpodoxime, cefprozil, cefuroxime axetil, cephalexin and locarbef for uncomplicated UTI. Note that some isolates may be susceptible to these agents while testing resistant to cefazolin.    -  Cefepime: S <=4    -  Cefoxitin: S <=8    -  Ceftriaxone: S <=1 Enterobacter, Citrobacter, and Serratia may develop resistance during prolonged therapy    -  Ciprofloxacin: S <=1    Specimen Source: .Urine Clean Catch (Midstream)    Culture Results:   >100,000 CFU/ml Escherichia coli    Organism Identification: Escherichia coli    Organism: Escherichia coli    Method Type: OBDULIO    Urinalysis + Microscopic Examination (12.15.19 @ 06:22)    Glucose Qualitative, Urine: Negative    Blood, Urine: Negative    Urine Appearance: Clear    Bilirubin: Negative    Color: Light Yellow    Urobilinogen: Negative    Specific Gravity: 1.010    Protein, Urine: 30 mg/dL    pH Urine: 6.5    Leukocyte Esterase Concentration: Small    Nitrite: Negative    Ketone - Urine: Negative    Red Blood Cell - Urine: 4 /hpf    White Blood Cell - Urine: 6 /HPF    Epithelial Cells: 1 /hpf    Hyaline Casts: 0 /lpf    Bacteria: Many          RADIOLOGY & ADDITIONAL STUDIES:    < from: CT Cervical Spine No Cont (12.15.19 @ 02:55) >  FINDINGS:    There is nonspecific straightening of the cervical spine lordosis. There   is no acute cervical spine fracture or evidence of traumatic   malalignment. There is no significant prevertebral soft tissue   swelling/hematoma. There are multilevel degenerative changes with   multilevel intervertebral disc space narrowing, disc osteophyte   complexes, and facet and uncinate hypertrophy contributing to varying   degrees of segmental canal stenosis and neural foraminal stenosis. There   is fusion of the right-sided C2-C3 facets. The regional soft tissues of   the neck are otherwise unremarkable apart from mild vascular   atherosclerotic calcifications. There is a 6 mm nodule at the right lung   apex which is unchanged compared to prior studies dated back to April 2017.      IMPRESSION:    No acute cervical spine fracture or evidence of traumatic malalignment.   Multilevel cervical spondylosis.    < end of copied text >

## 2019-12-17 NOTE — PROGRESS NOTE ADULT - ASSESSMENT
87y Female with history of COPD presents with a fall. Nephrology consulted for elevated Scr.    1) ARUN: suspect hemodynamically mediated in setting of infection and diuretic use given bland UA and improving ARUN off of bumex. Follow up renal US. Avoid nephrotoxins.    2) CKD-3: Baseline Scr 1.5-1.6 as per prior labs. 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 Defer inpatient work up as patient follows with Dr. Manrique as an outpatient. Monitor electrolytes.    3) HTN with CKD: BP labile. Start hydralazine 10 mg TID and titrate as needed. Monitor BP.    4) LE edema: Patient appears euvolemic. Holding home bumex 1 mg daily. Monitor UO.    5) Acute cystitis without hematuria: Off abx as per ID.

## 2019-12-17 NOTE — PROGRESS NOTE ADULT - ASSESSMENT
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   Patient reports slipping on a loose carpet ,falling backwards while ambulating with her walker ,  she sustaining trauma to the back of her head, she reports no LOC. She was immediately attended to by her home health aid , and was assisted back on her feet. She had no preceding chest pain or shortness of breath. NO focal motor weakness or loss of sensation. No recent infection , no fever or chills.  She reports normal appetite and PO intake. (15 Dec 2019 05:35)    Pt a/w syncope, undergoing cardiology w/u.     ER vss, afebrile.  No leukocytosis.  UA small LE, (-) nit, > 100K GNR.  Pt given one dose of levaquin on 12/16.  ID consult called for further abx managment.     Asymptomatic bacteruria:    - UA unremarkable.  Positive ucx represent colonization, not infection.  No indication for further abx at this time.      - Cont to monitor for new fever, wbc, or urinary symptoms (frequency, burning, urgency).      - Creatinine improving.  Renal f/u appreciated.       Will follow,    Emilee Jacobo  [phone]

## 2019-12-17 NOTE — PROGRESS NOTE ADULT - ASSESSMENT
88 F w/ PMHx of COPD on home oxygen,DM II,  HTN, Systolic CHF, CKD 4, PPM, nephrectomy, Atrial fibrillation presents after fall , labs with ARUN.        Problem/Plan - 1:  ·  Problem: ARUN (acute kidney injury).  Plan: appears hypovolemic , s/p IV fluid   - strict ins and outs   - monitor renal function   - renally dose medications       Problem/Plan - 2:  ·  Problem: Fall.  Plan: history suggestive of mechanical fall , no evidence of cardiac ischemia, no major injuries noted on CTH and c spine , PPM appears to be capturing on cardiac tracing   - PPM interrogation     Problem/Plan - 3:  ·  Problem: COPD (chronic obstructive pulmonary disease).  Plan: no active symptoms   - continue oxygen supplementation   - PRN nebulizers.     Problem/Plan - 4:  ·  Problem: Diabetes.  Plan: - basal, bolus   - consistent carb diet.     Problem/Plan - 5:  ·  Problem: Atrial fibrillation, unspecified type.  Plan: rate controlled , Home medications need to be verified , will continue medications listed in chart from  prior admission   - continue amiodarone   - continue metoprolol.     Problem/Plan - 6:  Problem: Heart failure. Plan: - cards fu   - strict ins and outs   - daily weights   - continue statin.    Problem/Plan - 7:  ·  Problem: Depression.  Plan: Home medications need to be verified , will continue medications listed in chart from  prior admission   - continue Olanzapine   - continue Duloxetine   - continue Remaron.

## 2019-12-17 NOTE — DIETITIAN INITIAL EVALUATION ADULT. - PHYSICAL APPEARANCE
obese/other (specify)/well nourished Ht: 65 inches, Wt: 168.3lbs, BMI: 28.1kg/m2, IBW: 125lbs +/- 10%, %IBW: 135%  Edema: none, Skin: free of pressure injuries per nursing flow sheets, right arm abrasion noted. (pt previously noted with stage 2 sacrum pressure injury, discontinued on 12/16).    Nutrition focused physical exam deferred at this time, pt sleeping. No visual signs of depletion noted in temples, appears well nourished. Will follow up as appropriate

## 2019-12-17 NOTE — PROGRESS NOTE ADULT - ASSESSMENT
Assessment  DMT2: 88y Female with DM T2 with hyperglycemia, A1C 9.4% , was on insulin at home, started on low-dose basal bolus insulin yesterday, patient had hypoglycemic episode last night, partial basal dose was given, blood sugars now trending up, fluctuating. Patient appears confused, wants to go home, eating meals as per family by the bedside.  HF: on medications, no chest pain, stable, monitored.  A.Fib: On meds, stable, monitored.  HTN: Controlled,  on antihypertensive medications.  CKD: Monitor labs/BMP          Brandon Mayer MD  Cell: [phone]  Office: [phone] Assessment  DMT2: 88y Female with DM T2 with hyperglycemia, A1C 9.4% , was on insulin at home, started on low-dose basal bolus insulin yesterday, patient had hypoglycemic episode last night, partial basal dose was given, blood sugars now trending up, fluctuating.  Patient appears confused, wants to go home, eating meals as per family by the bedside.  HF: on medications, no chest pain, stable, monitored.  A.Fib: On meds, stable, monitored.  HTN: Controlled,  on antihypertensive medications.  CKD: Monitor labs/BMP          Brandon Mayer MD  Cell: [phone]  Office: [phone]

## 2019-12-17 NOTE — PROGRESS NOTE ADULT - SUBJECTIVE AND OBJECTIVE BOX
Patient is a 88y old  Female who presents with a chief complaint of Fall x one episode (17 Dec 2019 15:11)      INTERVAL HPI/OVERNIGHT EVENTS:  T(C): 36.7 (12-17-19 @ 16:32), Max: 37.1 (12-17-19 @ 13:40)  HR: 60 (12-17-19 @ 16:32) (60 - 74)  BP: 147/67 (12-17-19 @ 16:32) (120/88 - 186/76)  RR: 18 (12-17-19 @ 16:32) (18 - 18)  SpO2: 97% (12-17-19 @ 16:32) (96% - 100%)  Wt(kg): --  I&O's Summary    16 Dec 2019 07:01  -  17 Dec 2019 07:00  --------------------------------------------------------  IN: 840 mL / OUT: 1050 mL / NET: -210 mL    17 Dec 2019 07:01  -  17 Dec 2019 17:25  --------------------------------------------------------  IN: 150 mL / OUT: 0 mL / NET: 150 mL        LABS:                        12.4   8.48  )-----------( 269      ( 17 Dec 2019 10:46 )             40.3     12-17    135  |  100  |  37<H>  ----------------------------<  163<H>  4.5   |  22  |  2.54<H>    Ca    8.9      17 Dec 2019 09:43  Phos  3.8     12-17  Mg     2.4     12-17    TPro  7.6  /  Alb  3.2<L>  /  TBili  0.4  /  DBili  x   /  AST  32  /  ALT  17  /  AlkPhos  91  12-17        CAPILLARY BLOOD GLUCOSE      POCT Blood Glucose.: 248 mg/dL (17 Dec 2019 12:48)  POCT Blood Glucose.: 136 mg/dL (17 Dec 2019 08:45)  POCT Blood Glucose.: 187 mg/dL (16 Dec 2019 22:13)  POCT Blood Glucose.: 171 mg/dL (16 Dec 2019 18:20)  POCT Blood Glucose.: 62 mg/dL (16 Dec 2019 17:39)  POCT Blood Glucose.: 47 mg/dL (16 Dec 2019 17:37)            MEDICATIONS  (STANDING):  aMIOdarone    Tablet 200 milliGRAM(s) Oral daily  ascorbic acid 500 milliGRAM(s) Oral daily  cholecalciferol 1000 Unit(s) Oral two times a day  dextrose 5%. 1000 milliLiter(s) (50 mL/Hr) IV Continuous <Continuous>  dextrose 5%. 1000 milliLiter(s) (50 mL/Hr) IV Continuous <Continuous>  dextrose 50% Injectable 12.5 Gram(s) IV Push once  dextrose 50% Injectable 25 Gram(s) IV Push once  dextrose 50% Injectable 25 Gram(s) IV Push once  dextrose 50% Injectable 12.5 Gram(s) IV Push once  dextrose 50% Injectable 25 Gram(s) IV Push once  dextrose 50% Injectable 50 milliLiter(s) IV Push once  DULoxetine 30 milliGRAM(s) Oral daily  heparin  Injectable 5000 Unit(s) SubCutaneous every 12 hours  insulin glargine Injectable (LANTUS) 6 Unit(s) SubCutaneous at bedtime  insulin lispro (HumaLOG) corrective regimen sliding scale   SubCutaneous three times a day before meals  insulin lispro (HumaLOG) corrective regimen sliding scale   SubCutaneous at bedtime  metoprolol succinate  milliGRAM(s) Oral daily  mirtazapine 30 milliGRAM(s) Oral at bedtime  OLANZapine 5 milliGRAM(s) Oral at bedtime  pantoprazole    Tablet 40 milliGRAM(s) Oral before breakfast  polyethylene glycol 3350 17 Gram(s) Oral daily  simvastatin 20 milliGRAM(s) Oral at bedtime    MEDICATIONS  (PRN):  acetaminophen   Tablet .. 650 milliGRAM(s) Oral every 4 hours PRN Mild Pain (1 - 3)  albuterol/ipratropium for Nebulization 3 milliLiter(s) Nebulizer every 6 hours PRN Shortness of Breath and/or Wheezing  dextrose 40% Gel 15 Gram(s) Oral once PRN Blood Glucose LESS THAN 70 milliGRAM(s)/deciliter  dextrose 40% Gel 15 Gram(s) Oral once PRN Blood Glucose LESS THAN 70 milliGRAM(s)/deciliter  glucagon  Injectable 1 milliGRAM(s) IntraMuscular once PRN Glucose LESS THAN 70 milligrams/deciliter  glucagon  Injectable 1 milliGRAM(s) IntraMuscular once PRN Glucose LESS THAN 70 milligrams/deciliter          PHYSICAL EXAM:  GENERAL: NAD, well-groomed, well-developed  HEAD:  Atraumatic, Normocephalic  CHEST/LUNG: Clear to percussion bilaterally; No rales, rhonchi, wheezing, or rubs  HEART: Regular rate and rhythm; No murmurs, rubs, or gallops  ABDOMEN: Soft, Nontender, Nondistended; Bowel sounds present  EXTREMITIES:  2+ Peripheral Pulses, No clubbing, cyanosis, or edema  LYMPH: No lymphadenopathy noted  SKIN: No rashes or lesions    Care Discussed with Consultants/Other Providers [ ] YES  [ ] NO

## 2019-12-17 NOTE — PROGRESS NOTE ADULT - ASSESSMENT
88 year old woman with history of COPD on home oxygen, DM II with long term complications including nephropathy, Essential HTN, Systolic CHF, CKD 4, PPM, nephrectomy, and Atrial fibrillation, s/p PPM, not on a/c admitted s/p fall.     1. PAF  -stable, continue amio, beta blocker  -not on a/c, not good candidate due to fall history   -cont asa    2. Chronic CHF  -does not appear volume overloaded  -continue hold diuretics   -echo w/ concentric LVH, grossly normal LVEF , no sig valve disease    3. Fall, r/o syncope  -mechanical by report  -PPM check w/ normal function, no episodes correlating w/ syncope     4. ARUN  -renal f/u , diuretics on hold

## 2019-12-17 NOTE — PROGRESS NOTE ADULT - SUBJECTIVE AND OBJECTIVE BOX
[facility] NEPHROLOGY- PROGRESS NOTE    87y Female with history of COPD presents with a fall. Nephrology consulted for elevated Scr.    REVIEW OF SYSTEMS:  Gen: no changes in weight  Cards: no chest pain  Resp: no dyspnea  GI: no nausea or vomiting or diarrhea  Vascular: no LE edema    amoxicillin (Flushing; Vomiting; Nausea)  lactose (Diarrhea)  lisinopril (Angioedema)  penicillin (Hives)      Hospital Medications: Medications reviewed    VITALS:  T(F): 97.9 (19 @ 09:48), Max: 98.3 (19 @ 13:39)  HR: 60 (19 @ 09:48)  BP: 186/76 (19 @ 09:48)  RR: 18 (19 @ 09:48)  SpO2: 97% (19 @ 09:48)  Wt(kg): --  Height (cm): 165.1 ( @ :00)  Weight (kg): 76.5 (:00)  BMI (kg/m2): 28.1 (:00)  BSA (m2): 1.84 ( @ 13:00)     @ 07:01  -   @ 07:00  --------------------------------------------------------  IN: 840 mL / OUT: 1050 mL / NET: -210 mL        PHYSICAL EXAM:    Gen: NAD, calm  Cards: RRR, +S1/S2, + ZAKI  Resp: course BS B/L  GI: soft, NT, + ab distention, NABS  Vascular: no LE edema B/L    LABS:      135  |  100  |  37<H>  ----------------------------<  163<H>  4.5   |  22  |  2.54<H>    Ca    8.9      17 Dec 2019 09:43  Phos  3.8       Mg     2.4         TPro  7.6  /  Alb  3.2<L>  /  TBili  0.4  /  DBili      /  AST  32  /  ALT  17  /  AlkPhos  91  -    Creatinine Trend: 2.54 <--, 3.01 <--, 2.79 <--, 2.93 <--                        12.4   8.48  )-----------( 269      ( 17 Dec 2019 10:46 )             40.3     Urine Studies:  Urinalysis Basic - ( 15 Dec 2019 06:22 )    Color: Light Yellow / Appearance: Clear / S.010 / pH:   Gluc:  / Ketone: Negative  / Bili: Negative / Urobili: Negative   Blood:  / Protein: 30 mg/dL / Nitrite: Negative   Leuk Esterase: Small / RBC: 4 /hpf / WBC 6 /HPF   Sq Epi:  / Non Sq Epi: 1 /hpf / Bacteria: Many      Sodium, Random Urine: 46 mmol/L ( @ 23:57)  Creatinine, Random Urine: 65 mg/dL ( @ 23:57)

## 2019-12-17 NOTE — PROGRESS NOTE ADULT - SUBJECTIVE AND OBJECTIVE BOX
Chief complaint  Patient is a 88y old  Female who presents with a chief complaint of Fall x one episode (17 Dec 2019 14:29)   Review of systems  Patient in bed, looks comfortable, no fever, had hypoglycemia.    Labs and Fingersticks  CAPILLARY BLOOD GLUCOSE      POCT Blood Glucose.: 248 mg/dL (17 Dec 2019 12:48)  POCT Blood Glucose.: 136 mg/dL (17 Dec 2019 08:45)  POCT Blood Glucose.: 187 mg/dL (16 Dec 2019 22:13)  POCT Blood Glucose.: 171 mg/dL (16 Dec 2019 18:20)  POCT Blood Glucose.: 62 mg/dL (16 Dec 2019 17:39)  POCT Blood Glucose.: 47 mg/dL (16 Dec 2019 17:37)  POCT Blood Glucose.: 50 mg/dL (16 Dec 2019 17:21)  POCT Blood Glucose.: 49 mg/dL (16 Dec 2019 17:19)      Anion Gap, Serum: 13 (12-17 @ 09:43)  Anion Gap, Serum: 11 (12-16 @ 06:58)    Hemoglobin A1C, Whole Blood: 9.4 <H> (12-16 @ 08:20)    Calcium, Total Serum: 8.9 (12-17 @ 09:43)  Calcium, Total Serum: 8.9 (12-16 @ 06:58)  Albumin, Serum: 3.2 <L> (12-17 @ 09:43)    Alanine Aminotransferase (ALT/SGPT): 17 (12-17 @ 09:43)  Alkaline Phosphatase, Serum: 91 (12-17 @ 09:43)  Aspartate Aminotransferase (AST/SGOT): 32 (12-17 @ 09:43)        12-17    135  |  100  |  37<H>  ----------------------------<  163<H>  4.5   |  22  |  2.54<H>    Ca    8.9      17 Dec 2019 09:43  Phos  3.8     12-17  Mg     2.4     12-17    TPro  7.6  /  Alb  3.2<L>  /  TBili  0.4  /  DBili  x   /  AST  32  /  ALT  17  /  AlkPhos  91  12-17                        12.4   8.48  )-----------( 269      ( 17 Dec 2019 10:46 )             40.3     Medications  MEDICATIONS  (STANDING):  aMIOdarone    Tablet 200 milliGRAM(s) Oral daily  ascorbic acid 500 milliGRAM(s) Oral daily  cholecalciferol 1000 Unit(s) Oral two times a day  dextrose 5%. 1000 milliLiter(s) (50 mL/Hr) IV Continuous <Continuous>  dextrose 5%. 1000 milliLiter(s) (50 mL/Hr) IV Continuous <Continuous>  dextrose 50% Injectable 12.5 Gram(s) IV Push once  dextrose 50% Injectable 25 Gram(s) IV Push once  dextrose 50% Injectable 25 Gram(s) IV Push once  dextrose 50% Injectable 12.5 Gram(s) IV Push once  dextrose 50% Injectable 25 Gram(s) IV Push once  dextrose 50% Injectable 50 milliLiter(s) IV Push once  DULoxetine 30 milliGRAM(s) Oral daily  heparin  Injectable 5000 Unit(s) SubCutaneous every 12 hours  insulin glargine Injectable (LANTUS) 6 Unit(s) SubCutaneous at bedtime  insulin lispro (HumaLOG) corrective regimen sliding scale   SubCutaneous three times a day before meals  insulin lispro (HumaLOG) corrective regimen sliding scale   SubCutaneous at bedtime  metoprolol succinate  milliGRAM(s) Oral daily  mirtazapine 30 milliGRAM(s) Oral at bedtime  OLANZapine 5 milliGRAM(s) Oral at bedtime  pantoprazole    Tablet 40 milliGRAM(s) Oral before breakfast  polyethylene glycol 3350 17 Gram(s) Oral daily  simvastatin 20 milliGRAM(s) Oral at bedtime      Physical Exam  General: Patient comfortable in bed  Vital Signs Last 12 Hrs  T(F): 98.7 (12-17-19 @ 13:40), Max: 98.7 (12-17-19 @ 13:40)  HR: 74 (12-17-19 @ 13:40) (60 - 74)  BP: 164/78 (12-17-19 @ 13:40) (161/74 - 186/76)  BP(mean): --  RR: 18 (12-17-19 @ 13:40) (18 - 18)  SpO2: 98% (12-17-19 @ 13:40) (96% - 98%)  Neck: No palpable thyroid nodules.  CVS: S1S2, No murmurs  Respiratory: No wheezing, no crepitations  GI: Abdomen soft, bowel sounds positive  Musculoskeletal:  edema lower extremities.   Skin: No skin rashes, no ecchymosis    Diagnostics Chief complaint  Patient is a 88y old  Female who presents with a chief complaint of Fall x one episode (17 Dec 2019 14:29)   Review of systems  Patient in bed, looks comfortable, no fever,  had hypoglycemia.    Labs and Fingersticks  CAPILLARY BLOOD GLUCOSE      POCT Blood Glucose.: 248 mg/dL (17 Dec 2019 12:48)  POCT Blood Glucose.: 136 mg/dL (17 Dec 2019 08:45)  POCT Blood Glucose.: 187 mg/dL (16 Dec 2019 22:13)  POCT Blood Glucose.: 171 mg/dL (16 Dec 2019 18:20)  POCT Blood Glucose.: 62 mg/dL (16 Dec 2019 17:39)  POCT Blood Glucose.: 47 mg/dL (16 Dec 2019 17:37)  POCT Blood Glucose.: 50 mg/dL (16 Dec 2019 17:21)  POCT Blood Glucose.: 49 mg/dL (16 Dec 2019 17:19)      Anion Gap, Serum: 13 (12-17 @ 09:43)  Anion Gap, Serum: 11 (12-16 @ 06:58)    Hemoglobin A1C, Whole Blood: 9.4 <H> (12-16 @ 08:20)    Calcium, Total Serum: 8.9 (12-17 @ 09:43)  Calcium, Total Serum: 8.9 (12-16 @ 06:58)  Albumin, Serum: 3.2 <L> (12-17 @ 09:43)    Alanine Aminotransferase (ALT/SGPT): 17 (12-17 @ 09:43)  Alkaline Phosphatase, Serum: 91 (12-17 @ 09:43)  Aspartate Aminotransferase (AST/SGOT): 32 (12-17 @ 09:43)        12-17    135  |  100  |  37<H>  ----------------------------<  163<H>  4.5   |  22  |  2.54<H>    Ca    8.9      17 Dec 2019 09:43  Phos  3.8     12-17  Mg     2.4     12-17    TPro  7.6  /  Alb  3.2<L>  /  TBili  0.4  /  DBili  x   /  AST  32  /  ALT  17  /  AlkPhos  91  12-17                        12.4   8.48  )-----------( 269      ( 17 Dec 2019 10:46 )             40.3     Medications  MEDICATIONS  (STANDING):  aMIOdarone    Tablet 200 milliGRAM(s) Oral daily  ascorbic acid 500 milliGRAM(s) Oral daily  cholecalciferol 1000 Unit(s) Oral two times a day  dextrose 5%. 1000 milliLiter(s) (50 mL/Hr) IV Continuous <Continuous>  dextrose 5%. 1000 milliLiter(s) (50 mL/Hr) IV Continuous <Continuous>  dextrose 50% Injectable 12.5 Gram(s) IV Push once  dextrose 50% Injectable 25 Gram(s) IV Push once  dextrose 50% Injectable 25 Gram(s) IV Push once  dextrose 50% Injectable 12.5 Gram(s) IV Push once  dextrose 50% Injectable 25 Gram(s) IV Push once  dextrose 50% Injectable 50 milliLiter(s) IV Push once  DULoxetine 30 milliGRAM(s) Oral daily  heparin  Injectable 5000 Unit(s) SubCutaneous every 12 hours  insulin glargine Injectable (LANTUS) 6 Unit(s) SubCutaneous at bedtime  insulin lispro (HumaLOG) corrective regimen sliding scale   SubCutaneous three times a day before meals  insulin lispro (HumaLOG) corrective regimen sliding scale   SubCutaneous at bedtime  metoprolol succinate  milliGRAM(s) Oral daily  mirtazapine 30 milliGRAM(s) Oral at bedtime  OLANZapine 5 milliGRAM(s) Oral at bedtime  pantoprazole    Tablet 40 milliGRAM(s) Oral before breakfast  polyethylene glycol 3350 17 Gram(s) Oral daily  simvastatin 20 milliGRAM(s) Oral at bedtime      Physical Exam  General: Patient comfortable in bed  Vital Signs Last 12 Hrs  T(F): 98.7 (12-17-19 @ 13:40), Max: 98.7 (12-17-19 @ 13:40)  HR: 74 (12-17-19 @ 13:40) (60 - 74)  BP: 164/78 (12-17-19 @ 13:40) (161/74 - 186/76)  BP(mean): --  RR: 18 (12-17-19 @ 13:40) (18 - 18)  SpO2: 98% (12-17-19 @ 13:40) (96% - 98%)  Neck: No palpable thyroid nodules.  CVS: S1S2, No murmurs  Respiratory: No wheezing, no crepitations  GI: Abdomen soft, bowel sounds positive  Musculoskeletal:  edema lower extremities.   Skin: No skin rashes, no ecchymosis    Diagnostics

## 2019-12-17 NOTE — DIETITIAN INITIAL EVALUATION ADULT. - ADD RECOMMEND
1) Continue current diet as tolerated. If PO intake remains suboptimal recommend liberalizing diet to Low Sodium + carbohydrate consistent (evening snack). 2) Provide diet education as appropriate. 3) Monitor need for nutrition supplement.

## 2019-12-17 NOTE — PROVIDER CONTACT NOTE (OTHER) - ASSESSMENT
pt confused A&Ox1. Combative, screaming and yelling at staff. pt spit morning medications out at RN.

## 2019-12-17 NOTE — PROGRESS NOTE ADULT - SUBJECTIVE AND OBJECTIVE BOX
CARDIOLOGY FOLLOW UP - Dr. Garcia    CC no acute events, awake alert, no complaints       PHYSICAL EXAM:  T(C): 36.6 (12-17-19 @ 09:48), Max: 36.8 (12-16-19 @ 13:39)  HR: 60 (12-17-19 @ 09:48) (60 - 70)  BP: 186/76 (12-17-19 @ 09:48) (107/71 - 186/76)  RR: 18 (12-17-19 @ 09:48) (18 - 18)  SpO2: 97% (12-17-19 @ 09:48) (95% - 100%)  Wt(kg): --  I&O's Summary    16 Dec 2019 07:01  -  17 Dec 2019 07:00  --------------------------------------------------------  IN: 840 mL / OUT: 1050 mL / NET: -210 mL        Appearance: Normal	  Cardiovascular: Normal S1 S2,RRR, No JVD, no murmurs  Respiratory: Lungs clear to auscultation	  Gastrointestinal:  Soft, Non-tender, + BS	  Extremities: Normal range of motion, No clubbing, cyanosis or edema        MEDICATIONS  (STANDING):  aMIOdarone    Tablet 200 milliGRAM(s) Oral daily  ascorbic acid 500 milliGRAM(s) Oral daily  cholecalciferol 1000 Unit(s) Oral two times a day  dextrose 5%. 1000 milliLiter(s) (50 mL/Hr) IV Continuous <Continuous>  dextrose 5%. 1000 milliLiter(s) (50 mL/Hr) IV Continuous <Continuous>  dextrose 50% Injectable 12.5 Gram(s) IV Push once  dextrose 50% Injectable 25 Gram(s) IV Push once  dextrose 50% Injectable 25 Gram(s) IV Push once  dextrose 50% Injectable 12.5 Gram(s) IV Push once  dextrose 50% Injectable 25 Gram(s) IV Push once  dextrose 50% Injectable 50 milliLiter(s) IV Push once  DULoxetine 30 milliGRAM(s) Oral daily  heparin  Injectable 5000 Unit(s) SubCutaneous every 12 hours  insulin glargine Injectable (LANTUS) 6 Unit(s) SubCutaneous at bedtime  insulin lispro (HumaLOG) corrective regimen sliding scale   SubCutaneous three times a day before meals  insulin lispro (HumaLOG) corrective regimen sliding scale   SubCutaneous at bedtime  metoprolol succinate  milliGRAM(s) Oral daily  mirtazapine 30 milliGRAM(s) Oral at bedtime  OLANZapine 5 milliGRAM(s) Oral at bedtime  pantoprazole    Tablet 40 milliGRAM(s) Oral before breakfast  polyethylene glycol 3350 17 Gram(s) Oral daily  simvastatin 20 milliGRAM(s) Oral at bedtime      TELEMETRY: 	    ECG:  	  RADIOLOGY:   DIAGNOSTIC TESTING:  [ ] Echocardiogram: < from: Transthoracic Echocardiogram (12.16.19 @ 15:54) >  Conclusions:  1. Concentric left ventricular hypertrophy.  2. Endocardium not well visualized; grossly normal left  ventricular systolic function.  Inferior wall hypokinesis.  Septal motion is consistent with RV pacing.  Consider limited re imaging with echo enhancement agent.    < end of copied text >    [ ]  Catheterization:  [ ] Stress Test:    OTHER: 	    LABS:	 	                                12.4   8.48  )-----------( 269      ( 17 Dec 2019 10:46 )             40.3     12-17    135  |  100  |  37<H>  ----------------------------<  163<H>  4.5   |  22  |  2.54<H>    Ca    8.9      17 Dec 2019 09:43  Phos  3.8     12-17  Mg     2.4     12-17    TPro  7.6  /  Alb  3.2<L>  /  TBili  0.4  /  DBili  x   /  AST  32  /  ALT  17  /  AlkPhos  91  12-17

## 2019-12-17 NOTE — DIETITIAN INITIAL EVALUATION ADULT. - OTHER INFO
Patient sleeping during time of visit, noted with confusion this morning A&O x1, no family at bedside. Limited subjective information available, unable to obtain full diet recall at this time. Per chart pt noted with normal PO intake and appetite, no report of weight loss. No chewing/swallowing difficulty, nausea, vomiting, diarrhea, constipation noted PTA. Per chart pt noted with allergy to lactose (diarrhea). Pt noted with T2DM, HbA1c: 9.4% (12/16/19), previous HbA1c in April 2019 was 9.6%, suggesting some improvement. Pt on Toujeo  and Humalog at home, follows with PMD for glycemic management. During previous admission in April pt and family declined diet education. Per chart pt takes vitamin D and C supplements.     Weights: pt had reported UBW as ~150-160lbs, had reported ~20lbs weight loss while at rehab but noted with wt regain during assessment with dosing weight noted as 149.6lbs. [facility] current dosing wt noted as 168.3lbs (12/16/19) suggesting wt gain.     Pt admitted s/p fall. Currently on DASH + carbohydrate consistent (evening snack) diet. per nursing flow sheets pt with decreased PO intake consuming 30-50% of meals yesterday. RD observed untouched breakfast tray at bedside. No acute GI distress noted, no BM record, pt noted with fecal incontinence. Diet education deferred at this time as pt noted with confusion (currently sleeping),no family at bedside, will follow as appropriate.

## 2019-12-18 ENCOUNTER — TRANSCRIPTION ENCOUNTER (OUTPATIENT)
Age: 84
End: 2019-12-18

## 2019-12-18 LAB
ANION GAP SERPL CALC-SCNC: 13 MMOL/L — SIGNIFICANT CHANGE UP (ref 5–17)
BASOPHILS # BLD AUTO: 0.06 K/UL — SIGNIFICANT CHANGE UP (ref 0–0.2)
BASOPHILS NFR BLD AUTO: 0.8 % — SIGNIFICANT CHANGE UP (ref 0–2)
BUN SERPL-MCNC: 37 MG/DL — HIGH (ref 7–23)
CALCIUM SERPL-MCNC: 8.8 MG/DL — SIGNIFICANT CHANGE UP (ref 8.4–10.5)
CHLORIDE SERPL-SCNC: 97 MMOL/L — SIGNIFICANT CHANGE UP (ref 96–108)
CO2 SERPL-SCNC: 22 MMOL/L — SIGNIFICANT CHANGE UP (ref 22–31)
CREAT SERPL-MCNC: 3.04 MG/DL — HIGH (ref 0.5–1.3)
EOSINOPHIL # BLD AUTO: 0.52 K/UL — HIGH (ref 0–0.5)
EOSINOPHIL NFR BLD AUTO: 6.8 % — HIGH (ref 0–6)
GLUCOSE BLDC GLUCOMTR-MCNC: 240 MG/DL — HIGH (ref 70–99)
GLUCOSE BLDC GLUCOMTR-MCNC: 261 MG/DL — HIGH (ref 70–99)
GLUCOSE BLDC GLUCOMTR-MCNC: 304 MG/DL — HIGH (ref 70–99)
GLUCOSE BLDC GLUCOMTR-MCNC: 316 MG/DL — HIGH (ref 70–99)
GLUCOSE BLDC GLUCOMTR-MCNC: 350 MG/DL — HIGH (ref 70–99)
GLUCOSE SERPL-MCNC: 232 MG/DL — HIGH (ref 70–99)
HCT VFR BLD CALC: 35.4 % — SIGNIFICANT CHANGE UP (ref 34.5–45)
HGB BLD-MCNC: 10.9 G/DL — LOW (ref 11.5–15.5)
IMM GRANULOCYTES NFR BLD AUTO: 0.7 % — SIGNIFICANT CHANGE UP (ref 0–1.5)
LYMPHOCYTES # BLD AUTO: 1.74 K/UL — SIGNIFICANT CHANGE UP (ref 1–3.3)
LYMPHOCYTES # BLD AUTO: 22.7 % — SIGNIFICANT CHANGE UP (ref 13–44)
MCHC RBC-ENTMCNC: 28.5 PG — SIGNIFICANT CHANGE UP (ref 27–34)
MCHC RBC-ENTMCNC: 30.8 GM/DL — LOW (ref 32–36)
MCV RBC AUTO: 92.4 FL — SIGNIFICANT CHANGE UP (ref 80–100)
MONOCYTES # BLD AUTO: 1.01 K/UL — HIGH (ref 0–0.9)
MONOCYTES NFR BLD AUTO: 13.2 % — SIGNIFICANT CHANGE UP (ref 2–14)
NEUTROPHILS # BLD AUTO: 4.3 K/UL — SIGNIFICANT CHANGE UP (ref 1.8–7.4)
NEUTROPHILS NFR BLD AUTO: 55.8 % — SIGNIFICANT CHANGE UP (ref 43–77)
PLATELET # BLD AUTO: 265 K/UL — SIGNIFICANT CHANGE UP (ref 150–400)
POTASSIUM SERPL-MCNC: 4.5 MMOL/L — SIGNIFICANT CHANGE UP (ref 3.5–5.3)
POTASSIUM SERPL-SCNC: 4.5 MMOL/L — SIGNIFICANT CHANGE UP (ref 3.5–5.3)
RBC # BLD: 3.83 M/UL — SIGNIFICANT CHANGE UP (ref 3.8–5.2)
RBC # FLD: 14.2 % — SIGNIFICANT CHANGE UP (ref 10.3–14.5)
SODIUM SERPL-SCNC: 132 MMOL/L — LOW (ref 135–145)
WBC # BLD: 7.68 K/UL — SIGNIFICANT CHANGE UP (ref 3.8–10.5)
WBC # FLD AUTO: 7.68 K/UL — SIGNIFICANT CHANGE UP (ref 3.8–10.5)

## 2019-12-18 RX ORDER — SODIUM CHLORIDE 9 MG/ML
1000 INJECTION INTRAMUSCULAR; INTRAVENOUS; SUBCUTANEOUS
Refills: 0 | Status: DISCONTINUED | OUTPATIENT
Start: 2019-12-18 | End: 2019-12-19

## 2019-12-18 RX ORDER — INSULIN LISPRO 100/ML
3 VIAL (ML) SUBCUTANEOUS
Refills: 0 | Status: DISCONTINUED | OUTPATIENT
Start: 2019-12-18 | End: 2019-12-19

## 2019-12-18 RX ORDER — HYDRALAZINE HCL 50 MG
25 TABLET ORAL THREE TIMES A DAY
Refills: 0 | Status: DISCONTINUED | OUTPATIENT
Start: 2019-12-18 | End: 2019-12-19

## 2019-12-18 RX ORDER — INSULIN GLARGINE 100 [IU]/ML
8 INJECTION, SOLUTION SUBCUTANEOUS AT BEDTIME
Refills: 0 | Status: DISCONTINUED | OUTPATIENT
Start: 2019-12-18 | End: 2019-12-19

## 2019-12-18 RX ADMIN — Medication 4: at 18:39

## 2019-12-18 RX ADMIN — DULOXETINE HYDROCHLORIDE 30 MILLIGRAM(S): 30 CAPSULE, DELAYED RELEASE ORAL at 12:30

## 2019-12-18 RX ADMIN — MIRTAZAPINE 30 MILLIGRAM(S): 45 TABLET, ORALLY DISINTEGRATING ORAL at 21:24

## 2019-12-18 RX ADMIN — Medication 2: at 21:25

## 2019-12-18 RX ADMIN — OLANZAPINE 5 MILLIGRAM(S): 15 TABLET, FILM COATED ORAL at 21:24

## 2019-12-18 RX ADMIN — PANTOPRAZOLE SODIUM 40 MILLIGRAM(S): 20 TABLET, DELAYED RELEASE ORAL at 06:50

## 2019-12-18 RX ADMIN — Medication 3 UNIT(S): at 18:39

## 2019-12-18 RX ADMIN — AMIODARONE HYDROCHLORIDE 200 MILLIGRAM(S): 400 TABLET ORAL at 06:50

## 2019-12-18 RX ADMIN — Medication 100 MILLIGRAM(S): at 06:50

## 2019-12-18 RX ADMIN — Medication 25 MILLIGRAM(S): at 12:42

## 2019-12-18 RX ADMIN — HEPARIN SODIUM 5000 UNIT(S): 5000 INJECTION INTRAVENOUS; SUBCUTANEOUS at 06:50

## 2019-12-18 RX ADMIN — SIMVASTATIN 20 MILLIGRAM(S): 20 TABLET, FILM COATED ORAL at 21:24

## 2019-12-18 RX ADMIN — Medication 2: at 09:28

## 2019-12-18 RX ADMIN — SODIUM CHLORIDE 50 MILLILITER(S): 9 INJECTION INTRAMUSCULAR; INTRAVENOUS; SUBCUTANEOUS at 12:51

## 2019-12-18 RX ADMIN — INSULIN GLARGINE 8 UNIT(S): 100 INJECTION, SOLUTION SUBCUTANEOUS at 21:24

## 2019-12-18 RX ADMIN — Medication 1000 UNIT(S): at 18:40

## 2019-12-18 RX ADMIN — Medication 25 MILLIGRAM(S): at 21:24

## 2019-12-18 RX ADMIN — POLYETHYLENE GLYCOL 3350 17 GRAM(S): 17 POWDER, FOR SOLUTION ORAL at 12:42

## 2019-12-18 RX ADMIN — Medication 500 MILLIGRAM(S): at 12:30

## 2019-12-18 RX ADMIN — Medication 3 UNIT(S): at 12:30

## 2019-12-18 RX ADMIN — Medication 3: at 12:30

## 2019-12-18 RX ADMIN — HEPARIN SODIUM 5000 UNIT(S): 5000 INJECTION INTRAVENOUS; SUBCUTANEOUS at 18:40

## 2019-12-18 RX ADMIN — Medication 1000 UNIT(S): at 06:50

## 2019-12-18 NOTE — PROGRESS NOTE ADULT - SUBJECTIVE AND OBJECTIVE BOX
Patient is a 88y old  Female who presents with a chief complaint of Fall x one episode (18 Dec 2019 14:13)      INTERVAL HPI/OVERNIGHT EVENTS:  T(C): 37.4 (12-18-19 @ 13:12), Max: 37.4 (12-18-19 @ 13:12)  HR: 62 (12-18-19 @ 14:33) (60 - 66)  BP: 136/75 (12-18-19 @ 14:33) (136/75 - 175/74)  RR: 18 (12-18-19 @ 14:33) (18 - 18)  SpO2: 95% (12-18-19 @ 14:33) (95% - 100%)  Wt(kg): --  I&O's Summary    17 Dec 2019 07:01  -  18 Dec 2019 07:00  --------------------------------------------------------  IN: 810 mL / OUT: 150 mL / NET: 660 mL    18 Dec 2019 07:01  -  18 Dec 2019 17:05  --------------------------------------------------------  IN: 460 mL / OUT: 350 mL / NET: 110 mL        LABS:                        10.9   7.68  )-----------( 265      ( 18 Dec 2019 08:30 )             35.4     12-18    132<L>  |  97  |  37<H>  ----------------------------<  232<H>  4.5   |  22  |  3.04<H>    Ca    8.8      18 Dec 2019 07:13  Phos  3.8     12-17  Mg     2.4     12-17    TPro  7.6  /  Alb  3.2<L>  /  TBili  0.4  /  DBili  x   /  AST  32  /  ALT  17  /  AlkPhos  91  12-17        CAPILLARY BLOOD GLUCOSE      POCT Blood Glucose.: 261 mg/dL (18 Dec 2019 12:23)  POCT Blood Glucose.: 240 mg/dL (18 Dec 2019 09:08)  POCT Blood Glucose.: 282 mg/dL (17 Dec 2019 21:29)  POCT Blood Glucose.: 252 mg/dL (17 Dec 2019 18:40)            MEDICATIONS  (STANDING):  aMIOdarone    Tablet 200 milliGRAM(s) Oral daily  ascorbic acid 500 milliGRAM(s) Oral daily  cholecalciferol 1000 Unit(s) Oral two times a day  dextrose 5%. 1000 milliLiter(s) (50 mL/Hr) IV Continuous <Continuous>  dextrose 5%. 1000 milliLiter(s) (50 mL/Hr) IV Continuous <Continuous>  dextrose 50% Injectable 12.5 Gram(s) IV Push once  dextrose 50% Injectable 25 Gram(s) IV Push once  dextrose 50% Injectable 25 Gram(s) IV Push once  dextrose 50% Injectable 12.5 Gram(s) IV Push once  dextrose 50% Injectable 25 Gram(s) IV Push once  dextrose 50% Injectable 50 milliLiter(s) IV Push once  DULoxetine 30 milliGRAM(s) Oral daily  heparin  Injectable 5000 Unit(s) SubCutaneous every 12 hours  hydrALAZINE 25 milliGRAM(s) Oral three times a day  insulin glargine Injectable (LANTUS) 8 Unit(s) SubCutaneous at bedtime  insulin lispro (HumaLOG) corrective regimen sliding scale   SubCutaneous three times a day before meals  insulin lispro (HumaLOG) corrective regimen sliding scale   SubCutaneous at bedtime  insulin lispro Injectable (HumaLOG) 3 Unit(s) SubCutaneous three times a day before meals  metoprolol succinate  milliGRAM(s) Oral daily  mirtazapine 30 milliGRAM(s) Oral at bedtime  OLANZapine 5 milliGRAM(s) Oral at bedtime  pantoprazole    Tablet 40 milliGRAM(s) Oral before breakfast  polyethylene glycol 3350 17 Gram(s) Oral daily  simvastatin 20 milliGRAM(s) Oral at bedtime  sodium chloride 0.9%. 1000 milliLiter(s) (50 mL/Hr) IV Continuous <Continuous>    MEDICATIONS  (PRN):  acetaminophen   Tablet .. 650 milliGRAM(s) Oral every 4 hours PRN Mild Pain (1 - 3)  albuterol/ipratropium for Nebulization 3 milliLiter(s) Nebulizer every 6 hours PRN Shortness of Breath and/or Wheezing  dextrose 40% Gel 15 Gram(s) Oral once PRN Blood Glucose LESS THAN 70 milliGRAM(s)/deciliter  dextrose 40% Gel 15 Gram(s) Oral once PRN Blood Glucose LESS THAN 70 milliGRAM(s)/deciliter  glucagon  Injectable 1 milliGRAM(s) IntraMuscular once PRN Glucose LESS THAN 70 milligrams/deciliter  glucagon  Injectable 1 milliGRAM(s) IntraMuscular once PRN Glucose LESS THAN 70 milligrams/deciliter          PHYSICAL EXAM:  GENERAL: NAD, well-groomed, well-developed  HEAD:  Atraumatic, Normocephalic  CHEST/LUNG: Clear to percussion bilaterally; No rales, rhonchi, wheezing, or rubs  HEART: Regular rate and rhythm; No murmurs, rubs, or gallops  ABDOMEN: Soft, Nontender, Nondistended; Bowel sounds present  EXTREMITIES:  2+ Peripheral Pulses, No clubbing, cyanosis, or edema  LYMPH: No lymphadenopathy noted  SKIN: No rashes or lesions    Care Discussed with Consultants/Other Providers [ ] YES  [ ] NO

## 2019-12-18 NOTE — PROGRESS NOTE ADULT - ASSESSMENT
88 F w/ PMHx of COPD on home oxygen,DM II,  HTN, Systolic CHF, CKD 4, PPM, nephrectomy, Atrial fibrillation presents after fall , labs with ARUN.        Problem/Plan - 1:  ·  Problem: ARUN (acute kidney injury).  Plan: appears hypovolemic , s/p IV fluid   - strict ins and outs   - monitor renal function   - renally dose medications       Problem/Plan - 2:  ·  Problem: Fall.  Plan: history suggestive of mechanical fall , no evidence of cardiac ischemia, no major injuries noted on CTH and c spine , PPM appears to be capturing on cardiac tracing   - PPM interrogation     Problem/Plan - 3:  ·  Problem: COPD (chronic obstructive pulmonary disease).  Plan: no active symptoms   - continue oxygen supplementation   - PRN nebulizers.     Problem/Plan - 4:  ·  Problem: Diabetes.  Plan: - basal, bolus   - consistent carb diet.     Problem/Plan - 5:  ·  Problem: Atrial fibrillation, unspecified type.  Plan: rate controlled , Home medications need to be verified , will continue medications listed in chart from  prior admission   - continue amiodarone   - continue metoprolol.     Problem/Plan - 6:  Problem: Heart failure. Plan: - cards fu   - strict ins and outs   - daily weights   - continue statin.

## 2019-12-18 NOTE — PROGRESS NOTE ADULT - SUBJECTIVE AND OBJECTIVE BOX
Infectious Diseases progress note:    Subjective:  NAD, afebrile.  No acute o/n events.     ROS:  CONSTITUTIONAL:  No fever, chills, rigors  CARDIOVASCULAR:  No chest pain or palpitations  RESPIRATORY:   No SOB, cough, dyspnea on exertion.  No wheezing  GASTROINTESTINAL:  No abd pain, N/V, diarrhea/constipation  EXTREMITIES:  No swelling or joint pain  GENITOURINARY:  No burning on urination, increased frequency or urgency.  No flank pain  NEUROLOGIC:  No HA, visual disturbances  SKIN: No rashes    Allergies    amoxicillin (Flushing; Vomiting; Nausea)  lactose (Diarrhea)  lisinopril (Angioedema)  penicillin (Hives)    Intolerances        ANTIBIOTICS/RELEVANT:  antimicrobials    immunologic:    OTHER:  acetaminophen   Tablet .. 650 milliGRAM(s) Oral every 4 hours PRN  albuterol/ipratropium for Nebulization 3 milliLiter(s) Nebulizer every 6 hours PRN  aMIOdarone    Tablet 200 milliGRAM(s) Oral daily  ascorbic acid 500 milliGRAM(s) Oral daily  cholecalciferol 1000 Unit(s) Oral two times a day  dextrose 40% Gel 15 Gram(s) Oral once PRN  dextrose 40% Gel 15 Gram(s) Oral once PRN  dextrose 5%. 1000 milliLiter(s) IV Continuous <Continuous>  dextrose 5%. 1000 milliLiter(s) IV Continuous <Continuous>  dextrose 50% Injectable 12.5 Gram(s) IV Push once  dextrose 50% Injectable 25 Gram(s) IV Push once  dextrose 50% Injectable 25 Gram(s) IV Push once  dextrose 50% Injectable 12.5 Gram(s) IV Push once  dextrose 50% Injectable 25 Gram(s) IV Push once  dextrose 50% Injectable 50 milliLiter(s) IV Push once  DULoxetine 30 milliGRAM(s) Oral daily  glucagon  Injectable 1 milliGRAM(s) IntraMuscular once PRN  glucagon  Injectable 1 milliGRAM(s) IntraMuscular once PRN  heparin  Injectable 5000 Unit(s) SubCutaneous every 12 hours  hydrALAZINE 25 milliGRAM(s) Oral three times a day  insulin glargine Injectable (LANTUS) 8 Unit(s) SubCutaneous at bedtime  insulin lispro (HumaLOG) corrective regimen sliding scale   SubCutaneous three times a day before meals  insulin lispro (HumaLOG) corrective regimen sliding scale   SubCutaneous at bedtime  insulin lispro Injectable (HumaLOG) 3 Unit(s) SubCutaneous three times a day before meals  metoprolol succinate  milliGRAM(s) Oral daily  mirtazapine 30 milliGRAM(s) Oral at bedtime  OLANZapine 5 milliGRAM(s) Oral at bedtime  pantoprazole    Tablet 40 milliGRAM(s) Oral before breakfast  polyethylene glycol 3350 17 Gram(s) Oral daily  simvastatin 20 milliGRAM(s) Oral at bedtime  sodium chloride 0.9%. 1000 milliLiter(s) IV Continuous <Continuous>      Objective:  Vital Signs Last 24 Hrs  T(C): 36.6 (19 Dec 2019 05:30), Max: 37.4 (18 Dec 2019 13:12)  T(F): 97.9 (19 Dec 2019 05:30), Max: 99.4 (18 Dec 2019 13:12)  HR: 67 (19 Dec 2019 05:30) (60 - 70)  BP: 165/82 (19 Dec 2019 05:30) (136/75 - 175/74)  BP(mean): --  RR: 18 (19 Dec 2019 05:30) (18 - 18)  SpO2: 95% (19 Dec 2019 05:30) (95% - 99%)    PHYSICAL EXAM:  Constitutional:NAD  Eyes:MAICO, EOMI  Ear/Nose/Throat: no thrush, mucositis.  Moist mucous membranes	  Neck:no JVD, no lymphadenopathy, supple  Respiratory: CTA tyree  Cardiovascular: S1S2 RRR, no murmurs  Gastrointestinal:soft, nontender,  nondistended (+) BS  Extremities:no e/e/c  Skin:  no rashes, open wounds or ulcerations        LABS:                        10.9   7.68  )-----------( 265      ( 18 Dec 2019 08:30 )             35.4     12-18    132<L>  |  97  |  37<H>  ----------------------------<  232<H>  4.5   |  22  |  3.04<H>    Ca    8.8      18 Dec 2019 07:13  Phos  3.8     12-17  Mg     2.4     12-17    TPro  7.6  /  Alb  3.2<L>  /  TBili  0.4  /  DBili  x   /  AST  32  /  ALT  17  /  AlkPhos  91  12-17                            MICROBIOLOGY:    Culture - Urine (12.15.19 @ 10:00)    -  Amikacin: S <=16    -  Ampicillin: R >16 These ampicillin results predict results for amoxicillin    -  Ampicillin/Sulbactam: I 16/8 Enterobacter, Citrobacter, and Serratia may develop resistance during prolonged therapy (3-4 days)    -  Aztreonam: S <=4    -  Cefazolin: S <=8 (MIC_CL_[id number]) For uncomplicated UTI with K. pneumoniae, E. coli, or P. mirablis: OBDULIO <=16 is sensitive and OBDULIO >=32 is resistant. This also predicts results for oral agents cefaclor, cefdinir, cefpodoxime, cefprozil, cefuroxime axetil, cephalexin and locarbef for uncomplicated UTI. Note that some isolates may be susceptible to these agents while testing resistant to cefazolin.    -  Cefepime: S <=4    -  Cefoxitin: S <=8    -  Ceftriaxone: S <=1 Enterobacter, Citrobacter, and Serratia may develop resistance during prolonged therapy    -  Ciprofloxacin: S <=1    -  Gentamicin: R >8    -  Imipenem: S <=1    -  Levofloxacin: S <=2    -  Meropenem: S <=1    -  Nitrofurantoin: S <=32 Should not be used to treat pyelonephritis    -  Piperacillin/Tazobactam: S <=16    -  Tigecycline: S <=2    -  Tobramycin: I 8    -  Trimethoprim/Sulfamethoxazole: R >2/38    Specimen Source: .Urine Clean Catch (Midstream)    Culture Results:   >100,000 CFU/ml Escherichia coli    Organism Identification: Escherichia coli    Organism: Escherichia coli    Method Type: OBDULIO            RADIOLOGY & ADDITIONAL STUDIES:    < from:  Kidney and Bladder (12.17.19 @ 19:33) >    FINDINGS:    Right kidney:  Surgically absent     Left kidney:  11.2 cm cm. echogenic parenchyma consistent with underlying renal disease. No renal mass, hydronephrosis or calculi.    Urinary bladder: Distended.    IMPRESSION:     Echogenic left renal parenchyma consistent renal parenchymal disease. No hydronephrosis or nephrolithiasis.    Right kidney is surgically absent. Distended bladder.    < end of copied text >

## 2019-12-18 NOTE — DISCHARGE NOTE PROVIDER - CARE PROVIDER_API CALL
Reggie Rhoades)  Gastroenterology; Internal Medicine  [address], [address]  [place], [place] [id number]  Phone: [phone]  Fax: [phone]  Follow Up Time:

## 2019-12-18 NOTE — PROGRESS NOTE ADULT - PROBLEM SELECTOR PLAN 1
Will increase Lantus to 8u at bedtime.  Will start patient on Humalog 3u before each meal and continue coverage scale. Will continue monitoring FS and FU.  Patient counseled for compliance with consistent low carb diet and exercise as tolerated outpatient. Patient counseled for compliance with consistent low carb diet and exercise as tolerated outpatient.

## 2019-12-18 NOTE — DISCHARGE NOTE PROVIDER - NSDCMRMEDTOKEN_GEN_ALL_CORE_FT
Advair Diskus 250 mcg-50 mcg inhalation powder: 1 puff(s) inhaled 2 times a day  amiodarone 200 mg oral tablet: 1 tab(s) orally once a day  Cymbalta 30 mg oral delayed release capsule: 1 cap(s) orally once a day  Hospital bed with gel overlay mattress:   HumaLOG 100 units/mL subcutaneous solution: 6 unit(s) subcutaneous 3 times a day (before meals)  lactulose 10 g/15 mL oral solution: 30 milliliter(s) orally once a day at supper  Lasix 40 mg oral tablet: 1 tab(s) orally once a day  methenamine hippurate 1 g oral tablet: 1 tab(s) orally 2 times a day  OLANZapine 5 mg oral tablet: 1 tab(s) orally once a day (at bedtime)  Remeron 30 mg oral tablet: 1 tab(s) orally once a day (at bedtime)  simvastatin 20 mg oral tablet: 1 tab(s) orally once a day (at bedtime)  Toprol- mg oral tablet, extended release: 1 tab(s) orally once a day  Toujeo SoloStar 300 units/mL subcutaneous solution: 20 unit(s) subcutaneous once a day Advair Diskus 250 mcg-50 mcg inhalation powder: 1 puff(s) inhaled 2 times a day  amiodarone 200 mg oral tablet: 1 tab(s) orally once a day  ascorbic acid 500 mg oral tablet: 1 tab(s) orally once a day  cholecalciferol oral tablet: 1000 unit(s) orally once a day   DULoxetine 30 mg oral delayed release capsule: 1 cap(s) orally once a day  Hospital bed with gel overlay mattress:   HumaLOG 100 units/mL subcutaneous solution: 5 unit(s) subcutaneous 3 times a day (before meals)  hydrALAZINE 25 mg oral tablet: 1 tab(s) orally 3 times a day  methenamine hippurate 1 g oral tablet: 1 tab(s) orally 2 times a day, As Needed  mirtazapine 30 mg oral tablet: 1 tab(s) orally once a day (at bedtime)  OLANZapine 5 mg oral tablet: 1 tab(s) orally once a day (at bedtime)  pantoprazole 40 mg oral delayed release tablet: 1 tab(s) orally once a day (before a meal)  polyethylene glycol 3350 oral powder for reconstitution: 17 gram(s) orally once a day  simvastatin 20 mg oral tablet: 1 tab(s) orally once a day (at bedtime)  Toprol- mg oral tablet, extended release: 1 tab(s) orally once a day  Toujeo SoloStar 300 units/mL subcutaneous solution: 20 unit(s) subcutaneous once a day

## 2019-12-18 NOTE — PROGRESS NOTE ADULT - ASSESSMENT
Assessment  DMT2: 88y Female with DM T2 with hyperglycemia, A1C 9.4% , was on insulin at home, now on insulin, dose was decreased yesterday s/p hypoglycemic episode, blood sugars now trending up and not at target (FS 200s), no new hypoglycemic episodes. Patient eats partial meals, resting comfortably.  HF: on medications, no chest pain, stable, monitored.  A.Fib: On meds, stable, monitored.  HTN: Controlled,  on antihypertensive medications.  CKD: Monitor labs/BMP          Brandon Mayer MD  Cell: [phone]  Office: [phone] Assessment  DMT2: 88y Female with DM T2 with hyperglycemia, A1C 9.4% , was on insulin at home, now on insulin, dose was decreased yesterday s/p hypoglycemic episode, blood sugars now trending up and not at target (FS 200s),  no new hypoglycemic episodes. Patient eats partial meals, resting comfortably.  HF: on medications, no chest pain, stable, monitored.  A.Fib: On meds, stable, monitored.  HTN: Controlled,  on antihypertensive medications.  CKD: Monitor labs/BMP          Brandon Mayer MD  Cell: [phone]  Office: [phone]

## 2019-12-18 NOTE — PHYSICAL THERAPY INITIAL EVALUATION ADULT - PRECAUTIONS/LIMITATIONS, REHAB EVAL
fall precautions/12/14:	CT head/cervical spine neg for acute changes, Pre-lanza pelvis xray: no emergent findings

## 2019-12-18 NOTE — PROGRESS NOTE ADULT - SUBJECTIVE AND OBJECTIVE BOX
Chief complaint  Patient is a 88y old  Female who presents with a chief complaint of Fall x one episode (18 Dec 2019 11:35)   Review of systems  Patient in bed, looks comfortable, no fever,  no hypoglycemia.    Labs and Fingersticks  CAPILLARY BLOOD GLUCOSE      POCT Blood Glucose.: 261 mg/dL (18 Dec 2019 12:23)  POCT Blood Glucose.: 240 mg/dL (18 Dec 2019 09:08)  POCT Blood Glucose.: 282 mg/dL (17 Dec 2019 21:29)  POCT Blood Glucose.: 252 mg/dL (17 Dec 2019 18:40)      Anion Gap, Serum: 13 (12-18 @ 07:13)  Anion Gap, Serum: 13 (12-17 @ 09:43)      Calcium, Total Serum: 8.8 (12-18 @ 07:13)  Calcium, Total Serum: 8.9 (12-17 @ 09:43)  Albumin, Serum: 3.2 <L> (12-17 @ 09:43)    Alanine Aminotransferase (ALT/SGPT): 17 (12-17 @ 09:43)  Alkaline Phosphatase, Serum: 91 (12-17 @ 09:43)  Aspartate Aminotransferase (AST/SGOT): 32 (12-17 @ 09:43)        12-18    132<L>  |  97  |  37<H>  ----------------------------<  232<H>  4.5   |  22  |  3.04<H>    Ca    8.8      18 Dec 2019 07:13  Phos  3.8     12-17  Mg     2.4     12-17    TPro  7.6  /  Alb  3.2<L>  /  TBili  0.4  /  DBili  x   /  AST  32  /  ALT  17  /  AlkPhos  91  12-17                        10.9   7.68  )-----------( 265      ( 18 Dec 2019 08:30 )             35.4     Medications  MEDICATIONS  (STANDING):  aMIOdarone    Tablet 200 milliGRAM(s) Oral daily  ascorbic acid 500 milliGRAM(s) Oral daily  cholecalciferol 1000 Unit(s) Oral two times a day  dextrose 5%. 1000 milliLiter(s) (50 mL/Hr) IV Continuous <Continuous>  dextrose 5%. 1000 milliLiter(s) (50 mL/Hr) IV Continuous <Continuous>  dextrose 50% Injectable 12.5 Gram(s) IV Push once  dextrose 50% Injectable 25 Gram(s) IV Push once  dextrose 50% Injectable 25 Gram(s) IV Push once  dextrose 50% Injectable 12.5 Gram(s) IV Push once  dextrose 50% Injectable 25 Gram(s) IV Push once  dextrose 50% Injectable 50 milliLiter(s) IV Push once  DULoxetine 30 milliGRAM(s) Oral daily  heparin  Injectable 5000 Unit(s) SubCutaneous every 12 hours  hydrALAZINE 25 milliGRAM(s) Oral three times a day  insulin glargine Injectable (LANTUS) 8 Unit(s) SubCutaneous at bedtime  insulin lispro (HumaLOG) corrective regimen sliding scale   SubCutaneous three times a day before meals  insulin lispro (HumaLOG) corrective regimen sliding scale   SubCutaneous at bedtime  insulin lispro Injectable (HumaLOG) 3 Unit(s) SubCutaneous three times a day before meals  metoprolol succinate  milliGRAM(s) Oral daily  mirtazapine 30 milliGRAM(s) Oral at bedtime  OLANZapine 5 milliGRAM(s) Oral at bedtime  pantoprazole    Tablet 40 milliGRAM(s) Oral before breakfast  polyethylene glycol 3350 17 Gram(s) Oral daily  simvastatin 20 milliGRAM(s) Oral at bedtime  sodium chloride 0.9%. 1000 milliLiter(s) (50 mL/Hr) IV Continuous <Continuous>      Physical Exam  General: Patient comfortable in bed  Vital Signs Last 12 Hrs  T(F): 99.4 (12-18-19 @ 13:12), Max: 99.4 (12-18-19 @ 13:12)  HR: 60 (12-18-19 @ 13:12) (60 - 66)  BP: 148/72 (12-18-19 @ 13:12) (148/72 - 175/74)  BP(mean): --  RR: 18 (12-18-19 @ 13:12) (18 - 18)  SpO2: 95% (12-18-19 @ 13:12) (95% - 100%)  Neck: No palpable thyroid nodules.  CVS: S1S2, No murmurs  Respiratory: No wheezing, no crepitations  GI: Abdomen soft, bowel sounds positive  Musculoskeletal:  edema lower extremities.   Skin: No skin rashes, no ecchymosis    Diagnostics Chief complaint  Patient is a 88y old  Female who presents with a chief complaint of Fall x one episode (18 Dec 2019 11:35)   Review of systems  Patient in bed, looks comfortable, no fever,   no hypoglycemia.    Labs and Fingersticks  CAPILLARY BLOOD GLUCOSE      POCT Blood Glucose.: 261 mg/dL (18 Dec 2019 12:23)  POCT Blood Glucose.: 240 mg/dL (18 Dec 2019 09:08)  POCT Blood Glucose.: 282 mg/dL (17 Dec 2019 21:29)  POCT Blood Glucose.: 252 mg/dL (17 Dec 2019 18:40)      Anion Gap, Serum: 13 (12-18 @ 07:13)  Anion Gap, Serum: 13 (12-17 @ 09:43)      Calcium, Total Serum: 8.8 (12-18 @ 07:13)  Calcium, Total Serum: 8.9 (12-17 @ 09:43)  Albumin, Serum: 3.2 <L> (12-17 @ 09:43)    Alanine Aminotransferase (ALT/SGPT): 17 (12-17 @ 09:43)  Alkaline Phosphatase, Serum: 91 (12-17 @ 09:43)  Aspartate Aminotransferase (AST/SGOT): 32 (12-17 @ 09:43)        12-18    132<L>  |  97  |  37<H>  ----------------------------<  232<H>  4.5   |  22  |  3.04<H>    Ca    8.8      18 Dec 2019 07:13  Phos  3.8     12-17  Mg     2.4     12-17    TPro  7.6  /  Alb  3.2<L>  /  TBili  0.4  /  DBili  x   /  AST  32  /  ALT  17  /  AlkPhos  91  12-17                        10.9   7.68  )-----------( 265      ( 18 Dec 2019 08:30 )             35.4     Medications  MEDICATIONS  (STANDING):  aMIOdarone    Tablet 200 milliGRAM(s) Oral daily  ascorbic acid 500 milliGRAM(s) Oral daily  cholecalciferol 1000 Unit(s) Oral two times a day  dextrose 5%. 1000 milliLiter(s) (50 mL/Hr) IV Continuous <Continuous>  dextrose 5%. 1000 milliLiter(s) (50 mL/Hr) IV Continuous <Continuous>  dextrose 50% Injectable 12.5 Gram(s) IV Push once  dextrose 50% Injectable 25 Gram(s) IV Push once  dextrose 50% Injectable 25 Gram(s) IV Push once  dextrose 50% Injectable 12.5 Gram(s) IV Push once  dextrose 50% Injectable 25 Gram(s) IV Push once  dextrose 50% Injectable 50 milliLiter(s) IV Push once  DULoxetine 30 milliGRAM(s) Oral daily  heparin  Injectable 5000 Unit(s) SubCutaneous every 12 hours  hydrALAZINE 25 milliGRAM(s) Oral three times a day  insulin glargine Injectable (LANTUS) 8 Unit(s) SubCutaneous at bedtime  insulin lispro (HumaLOG) corrective regimen sliding scale   SubCutaneous three times a day before meals  insulin lispro (HumaLOG) corrective regimen sliding scale   SubCutaneous at bedtime  insulin lispro Injectable (HumaLOG) 3 Unit(s) SubCutaneous three times a day before meals  metoprolol succinate  milliGRAM(s) Oral daily  mirtazapine 30 milliGRAM(s) Oral at bedtime  OLANZapine 5 milliGRAM(s) Oral at bedtime  pantoprazole    Tablet 40 milliGRAM(s) Oral before breakfast  polyethylene glycol 3350 17 Gram(s) Oral daily  simvastatin 20 milliGRAM(s) Oral at bedtime  sodium chloride 0.9%. 1000 milliLiter(s) (50 mL/Hr) IV Continuous <Continuous>      Physical Exam  General: Patient comfortable in bed  Vital Signs Last 12 Hrs  T(F): 99.4 (12-18-19 @ 13:12), Max: 99.4 (12-18-19 @ 13:12)  HR: 60 (12-18-19 @ 13:12) (60 - 66)  BP: 148/72 (12-18-19 @ 13:12) (148/72 - 175/74)  BP(mean): --  RR: 18 (12-18-19 @ 13:12) (18 - 18)  SpO2: 95% (12-18-19 @ 13:12) (95% - 100%)  Neck: No palpable thyroid nodules.  CVS: S1S2, No murmurs  Respiratory: No wheezing, no crepitations  GI: Abdomen soft, bowel sounds positive  Musculoskeletal:  edema lower extremities.   Skin: No skin rashes, no ecchymosis    Diagnostics

## 2019-12-18 NOTE — PROGRESS NOTE ADULT - ASSESSMENT
87y Female with history of COPD presents with a fall. Nephrology consulted for elevated Scr.    1) ARUN: suspect hemodynamically mediated in setting of infection and diuretic use given bland UA. Patient with rise in Scr today 2/2 urinary retention? given distended abdomen. Follow up renal US (read pending). Trial of gentle IVF. Avoid nephrotoxins.    2) CKD-3: Baseline Scr 1.5-1.6 as per prior labs. 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 Defer inpatient work up as patient follows with Dr. Manrique as an outpatient. Monitor electrolytes.    3) HTN with CKD: BP uncontrolled. Start hydralazine 25 mg TID and titrate as needed. Monitor BP.    4) LE edema: Patient appears euvolemic. Holding home bumex 1 mg daily. Monitor UO.    5) Acute cystitis without hematuria: Off abx as per ID.

## 2019-12-18 NOTE — PHYSICAL THERAPY INITIAL EVALUATION ADULT - PERTINENT HX OF CURRENT PROBLEM, REHAB EVAL
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 Was using walker when episode happened. Admitted for fall , labs with ARUN.

## 2019-12-18 NOTE — PROGRESS NOTE ADULT - SUBJECTIVE AND OBJECTIVE BOX
[facility] NEPHROLOGY- PROGRESS NOTE    87y Female with history of COPD presents with a fall. Nephrology consulted for elevated Scr.    REVIEW OF SYSTEMS:  Gen: no changes in weight  Cards: no chest pain  Resp: no dyspnea  GI: no nausea or vomiting or diarrhea  Vascular: no LE edema    amoxicillin (Flushing; Vomiting; Nausea)  lactose (Diarrhea)  lisinopril (Angioedema)  penicillin (Hives)      Hospital Medications: Medications reviewed      VITALS:  T(F): 97.8 (19 @ 09:50), Max: 98.8 (19 @ 21:25)  HR: 60 (19 @ 09:50)  BP: 175/74 (19 @ 09:50)  RR: 18 (19 @ 09:50)  SpO2: 99% (19 @ 09:50)  Wt(kg): --     @ 07:01  -   @ 07:00  --------------------------------------------------------  IN: 810 mL / OUT: 150 mL / NET: 660 mL      PHYSICAL EXAM:    Gen: NAD, calm, dry MM  Cards: RRR, +S1/S2, + ZAKI  Resp: course BS B/L  GI: soft, NT, + ab distention, NABS  Vascular: no LE edema B/L      LABS:      132<L>  |  97  |  37<H>  ----------------------------<  232<H>  4.5   |  22  |  3.04<H>    Ca    8.8      18 Dec 2019 07:13  Phos  3.8       Mg     2.4         TPro  7.6  /  Alb  3.2<L>  /  TBili  0.4  /  DBili      /  AST  32  /  ALT  17  /  AlkPhos  91      Creatinine Trend: 3.04 <--, 2.54 <--, 3.01 <--, 2.79 <--, 2.93 <--                        10.9   7.68  )-----------( 265      ( 18 Dec 2019 08:30 )             35.4     Urine Studies:  Urinalysis Basic - ( 15 Dec 2019 06:22 )    Color: Light Yellow / Appearance: Clear / S.010 / pH:   Gluc:  / Ketone: Negative  / Bili: Negative / Urobili: Negative   Blood:  / Protein: 30 mg/dL / Nitrite: Negative   Leuk Esterase: Small / RBC: 4 /hpf / WBC 6 /HPF   Sq Epi:  / Non Sq Epi: 1 /hpf / Bacteria: Many      Sodium, Random Urine: 46 mmol/L ( @ 23:57)  Creatinine, Random Urine: 65 mg/dL ( @ 23:57)

## 2019-12-18 NOTE — DISCHARGE NOTE PROVIDER - HOSPITAL COURSE
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     Patient reports slipping on a loose carpet ,falling backwards while ambulating with her walker ,  she sustaining trauma to the back of her head, she reports no LOC. She was immediately attended to by her home health aid , and was assisted back on her feet. She had no preceding chest pain or shortness of breath. NO focal motor weakness or loss of sensation. No recent infection , no fever or chills.  She reports normal appetite and PO intake. (15 Dec 2019 05:35)        Seen by ID for asymptomatic bacteruria:    UA unremarkable.  Positive ucx represent colonization, not infection.  No indication for further abx         Pt seen by Renal for arun:     1) ARUN: suspect hemodynamically mediated in setting of infection and diuretic use given bland UA. Patient with rise in Scr today 2/2 urinary retention.    2) CKD-3: Baseline Scr 1.5-1.6 as per prior labs. 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 Defer inpatient work up as patient follows with Dr. Manrique as an outpatient. Monitor electrolytes.    3) HTN with CKD: BP uncontrolled. Start hydralazine 25 mg TID and titrate as needed.     4) LE edema: Patient appears euvolemic. Holding home bumex 1 mg daily. Monitor UO.        seen by Card:     1. PAF    -stable, continue amio, beta blocker    -not on a/c, not good candidate due to fall history     -cont asa        2. Chronic CHF    -does not appear volume overloaded    -continue hold diuretics - f/u w/ pcp when to resume    -echo w/ concentric LVH, grossly normal LVEF , no sig valve disease

## 2019-12-18 NOTE — DISCHARGE NOTE PROVIDER - NSDCCPCAREPLAN_GEN_ALL_CORE_FT
PRINCIPAL DISCHARGE DIAGNOSIS  Diagnosis: Acute renal failure superimposed on stage 3 chronic kidney disease  Assessment and Plan of Treatment: Creatinine stable      SECONDARY DISCHARGE DIAGNOSES  Diagnosis: Fall  Assessment and Plan of Treatment: Home Physical Therapy for increase mobility and strengthening    Diagnosis: Edema, lower extremity  Assessment and Plan of Treatment: Discuss with your doctor when to resume Bumex    Diagnosis: Type 2 diabetes mellitus with stage 3 chronic kidney disease and hypertension  Assessment and Plan of Treatment: 12/16: HgbA1c- 9.4. Continue with your diabetic medications as prescribed    Diagnosis: PAF (paroxysmal atrial fibrillation)  Assessment and Plan of Treatment: Continue with Amiodarone and Toprol    Diagnosis: CHF, chronic  Assessment and Plan of Treatment:

## 2019-12-18 NOTE — PROGRESS NOTE ADULT - ASSESSMENT
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   Patient reports slipping on a loose carpet ,falling backwards while ambulating with her walker ,  she sustaining trauma to the back of her head, she reports no LOC. She was immediately attended to by her home health aid , and was assisted back on her feet. She had no preceding chest pain or shortness of breath. NO focal motor weakness or loss of sensation. No recent infection , no fever or chills.  She reports normal appetite and PO intake. (15 Dec 2019 05:35)    Pt a/w syncope, undergoing cardiology w/u.     ER vss, afebrile.  No leukocytosis.  UA small LE, (-) nit, > 100K GNR.  Pt given one dose of levaquin on 12/16.  ID consult called for further abx managment.     Asymptomatic bacteruria:    - UA unremarkable.  Positive ucx represent colonization, not infection.  No indication for further abx at this time.      - Cont to monitor for new fever, wbc, or urinary symptoms (frequency, burning, urgency).   Pt currently without new symptoms.    - Creatinine fluctuating.  Renal f/u appreciated.       Will follow,    Emilee Jacobo  [phone]

## 2019-12-18 NOTE — PROGRESS NOTE ADULT - SUBJECTIVE AND OBJECTIVE BOX
CC: no cp/sob    TELEMETRY:     PHYSICAL EXAM:    T(C): 36.6 (12-18-19 @ 09:50), Max: 37.1 (12-17-19 @ 13:40)  HR: 60 (12-18-19 @ 09:50) (60 - 74)  BP: 175/74 (12-18-19 @ 09:50) (147/67 - 175/74)  RR: 18 (12-18-19 @ 09:50) (18 - 18)  SpO2: 99% (12-18-19 @ 09:50) (97% - 100%)  Wt(kg): --  I&O's Summary    17 Dec 2019 07:01  -  18 Dec 2019 07:00  --------------------------------------------------------  IN: 810 mL / OUT: 150 mL / NET: 660 mL        Appearance: Normal	  Cardiovascular: Normal S1 S2,RRR, No JVD, No murmurs  Respiratory: Lungs clear to auscultation	  Gastrointestinal:  Soft, Non-tender, + BS	  Extremities: Normal range of motion, No clubbing, cyanosis or edema  Vascular: Peripheral pulses palpable 2+ bilaterally     LABS:	 	                          10.9   7.68  )-----------( 265      ( 18 Dec 2019 08:30 )             35.4     12-18    132<L>  |  97  |  37<H>  ----------------------------<  232<H>  4.5   |  22  |  3.04<H>    Ca    8.8      18 Dec 2019 07:13  Phos  3.8     12-17  Mg     2.4     12-17    TPro  7.6  /  Alb  3.2<L>  /  TBili  0.4  /  DBili  x   /  AST  32  /  ALT  17  /  AlkPhos  91  12-17          CARDIAC MARKERS:

## 2019-12-18 NOTE — CHART NOTE - NSCHARTNOTEFT_GEN_A_CORE
Based on Pt Angeline on going issues with deconditioning and generalized weakness secondary to patient's diagnosis of COPD on home oxygen qhs, 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, Essential HTN, Systolic CHF, CKD 4, PPM, nephrectomy and Atrial fibrillation. Patient will require a semi electric hospital bed. Pt will also require gel overlay  to prevent pressure ulcers.This is necessary to achieve [id number], elevation and the head of bed needs to be elevated at least 30 degrees most of the time. Bed pillows and wedges have been tried and ruled out.

## 2019-12-19 ENCOUNTER — TRANSCRIPTION ENCOUNTER (OUTPATIENT)
Age: 84
End: 2019-12-19

## 2019-12-19 VITALS
RESPIRATION RATE: 18 BRPM | DIASTOLIC BLOOD PRESSURE: 51 MMHG | HEART RATE: 80 BPM | SYSTOLIC BLOOD PRESSURE: 156 MMHG | OXYGEN SATURATION: 94 % | TEMPERATURE: 98 F

## 2019-12-19 LAB
ANION GAP SERPL CALC-SCNC: 15 MMOL/L — SIGNIFICANT CHANGE UP (ref 5–17)
BUN SERPL-MCNC: 35 MG/DL — HIGH (ref 7–23)
CALCIUM SERPL-MCNC: 9.3 MG/DL — SIGNIFICANT CHANGE UP (ref 8.4–10.5)
CHLORIDE SERPL-SCNC: 99 MMOL/L — SIGNIFICANT CHANGE UP (ref 96–108)
CO2 SERPL-SCNC: 24 MMOL/L — SIGNIFICANT CHANGE UP (ref 22–31)
CREAT SERPL-MCNC: 2.46 MG/DL — HIGH (ref 0.5–1.3)
GLUCOSE BLDC GLUCOMTR-MCNC: 179 MG/DL — HIGH (ref 70–99)
GLUCOSE BLDC GLUCOMTR-MCNC: 263 MG/DL — HIGH (ref 70–99)
GLUCOSE SERPL-MCNC: 176 MG/DL — HIGH (ref 70–99)
POTASSIUM SERPL-MCNC: 4.3 MMOL/L — SIGNIFICANT CHANGE UP (ref 3.5–5.3)
POTASSIUM SERPL-SCNC: 4.3 MMOL/L — SIGNIFICANT CHANGE UP (ref 3.5–5.3)
SODIUM SERPL-SCNC: 138 MMOL/L — SIGNIFICANT CHANGE UP (ref 135–145)

## 2019-12-19 PROCEDURE — 93005 ELECTROCARDIOGRAM TRACING: CPT

## 2019-12-19 PROCEDURE — 71045 X-RAY EXAM CHEST 1 VIEW: CPT

## 2019-12-19 PROCEDURE — 82803 BLOOD GASES ANY COMBINATION: CPT

## 2019-12-19 PROCEDURE — 99285 EMERGENCY DEPT VISIT HI MDM: CPT

## 2019-12-19 PROCEDURE — 84100 ASSAY OF PHOSPHORUS: CPT

## 2019-12-19 PROCEDURE — 83690 ASSAY OF LIPASE: CPT

## 2019-12-19 PROCEDURE — 84300 ASSAY OF URINE SODIUM: CPT

## 2019-12-19 PROCEDURE — 76770 US EXAM ABDO BACK WALL COMP: CPT

## 2019-12-19 PROCEDURE — 83036 HEMOGLOBIN GLYCOSYLATED A1C: CPT

## 2019-12-19 PROCEDURE — 80048 BASIC METABOLIC PNL TOTAL CA: CPT

## 2019-12-19 PROCEDURE — 82570 ASSAY OF URINE CREATININE: CPT

## 2019-12-19 PROCEDURE — 85730 THROMBOPLASTIN TIME PARTIAL: CPT

## 2019-12-19 PROCEDURE — 81001 URINALYSIS AUTO W/SCOPE: CPT

## 2019-12-19 PROCEDURE — 82550 ASSAY OF CK (CPK): CPT

## 2019-12-19 PROCEDURE — 82962 GLUCOSE BLOOD TEST: CPT

## 2019-12-19 PROCEDURE — 83735 ASSAY OF MAGNESIUM: CPT

## 2019-12-19 PROCEDURE — 80053 COMPREHEN METABOLIC PANEL: CPT

## 2019-12-19 PROCEDURE — 87186 SC STD MICRODIL/AGAR DIL: CPT

## 2019-12-19 PROCEDURE — 97162 PT EVAL MOD COMPLEX 30 MIN: CPT

## 2019-12-19 PROCEDURE — 85610 PROTHROMBIN TIME: CPT

## 2019-12-19 PROCEDURE — 87086 URINE CULTURE/COLONY COUNT: CPT

## 2019-12-19 PROCEDURE — 70450 CT HEAD/BRAIN W/O DYE: CPT

## 2019-12-19 PROCEDURE — 72125 CT NECK SPINE W/O DYE: CPT

## 2019-12-19 PROCEDURE — 72170 X-RAY EXAM OF PELVIS: CPT

## 2019-12-19 PROCEDURE — 84484 ASSAY OF TROPONIN QUANT: CPT

## 2019-12-19 PROCEDURE — 93306 TTE W/DOPPLER COMPLETE: CPT

## 2019-12-19 PROCEDURE — 85027 COMPLETE CBC AUTOMATED: CPT

## 2019-12-19 RX ORDER — POLYETHYLENE GLYCOL 3350 17 G/17G
17 POWDER, FOR SOLUTION ORAL
Qty: 14 | Refills: 0
Start: 2019-12-19 | End: 2020-01-01

## 2019-12-19 RX ORDER — INSULIN GLARGINE 100 [IU]/ML
20 INJECTION, SOLUTION SUBCUTANEOUS
Qty: 0 | Refills: 0 | DISCHARGE

## 2019-12-19 RX ORDER — SIMVASTATIN 20 MG/1
1 TABLET, FILM COATED ORAL
Qty: 0 | Refills: 0 | DISCHARGE

## 2019-12-19 RX ORDER — AMIODARONE HYDROCHLORIDE 400 MG/1
1 TABLET ORAL
Qty: 0 | Refills: 0 | DISCHARGE

## 2019-12-19 RX ORDER — PANTOPRAZOLE SODIUM 20 MG/1
1 TABLET, DELAYED RELEASE ORAL
Qty: 14 | Refills: 0
Start: 2019-12-19 | End: 2020-01-01

## 2019-12-19 RX ORDER — OLANZAPINE 15 MG/1
1 TABLET, FILM COATED ORAL
Qty: 0 | Refills: 0 | DISCHARGE

## 2019-12-19 RX ORDER — OLANZAPINE 15 MG/1
1 TABLET, FILM COATED ORAL
Qty: 0 | Refills: 0 | DISCHARGE
Start: 2019-12-19

## 2019-12-19 RX ORDER — AMIODARONE HYDROCHLORIDE 400 MG/1
1 TABLET ORAL
Qty: 0 | Refills: 0 | DISCHARGE
Start: 2019-12-19

## 2019-12-19 RX ORDER — METHENAMINE MANDELATE 1 G
1 TABLET ORAL
Qty: 0 | Refills: 0 | DISCHARGE

## 2019-12-19 RX ORDER — MIRTAZAPINE 45 MG/1
1 TABLET, ORALLY DISINTEGRATING ORAL
Qty: 0 | Refills: 0 | DISCHARGE

## 2019-12-19 RX ORDER — SIMVASTATIN 20 MG/1
1 TABLET, FILM COATED ORAL
Qty: 0 | Refills: 0 | DISCHARGE
Start: 2019-12-19

## 2019-12-19 RX ORDER — HYDRALAZINE HCL 50 MG
1 TABLET ORAL
Qty: 42 | Refills: 0
Start: 2019-12-19 | End: 2020-01-01

## 2019-12-19 RX ORDER — ASCORBIC ACID 60 MG
1 TABLET,CHEWABLE ORAL
Qty: 30 | Refills: 0
Start: 2019-12-19 | End: 2020-01-17

## 2019-12-19 RX ORDER — FUROSEMIDE 40 MG
1 TABLET ORAL
Qty: 0 | Refills: 0 | DISCHARGE

## 2019-12-19 RX ORDER — INSULIN GLARGINE 100 [IU]/ML
8 INJECTION, SOLUTION SUBCUTANEOUS
Qty: 0 | Refills: 0 | DISCHARGE

## 2019-12-19 RX ORDER — MIRTAZAPINE 45 MG/1
1 TABLET, ORALLY DISINTEGRATING ORAL
Qty: 0 | Refills: 0 | DISCHARGE
Start: 2019-12-19

## 2019-12-19 RX ORDER — INSULIN LISPRO 100/ML
5 VIAL (ML) SUBCUTANEOUS
Refills: 0 | Status: DISCONTINUED | OUTPATIENT
Start: 2019-12-19 | End: 2019-12-19

## 2019-12-19 RX ORDER — DULOXETINE HYDROCHLORIDE 30 MG/1
1 CAPSULE, DELAYED RELEASE ORAL
Qty: 0 | Refills: 0 | DISCHARGE

## 2019-12-19 RX ORDER — INSULIN LISPRO 100/ML
6 VIAL (ML) SUBCUTANEOUS
Qty: 0 | Refills: 0 | DISCHARGE

## 2019-12-19 RX ORDER — LACTULOSE 10 G/15ML
30 SOLUTION ORAL
Qty: 0 | Refills: 0 | DISCHARGE

## 2019-12-19 RX ORDER — CHOLECALCIFEROL (VITAMIN D3) 125 MCG
1000 CAPSULE ORAL
Qty: 30 | Refills: 0
Start: 2019-12-19 | End: 2020-01-17

## 2019-12-19 RX ORDER — DULOXETINE HYDROCHLORIDE 30 MG/1
1 CAPSULE, DELAYED RELEASE ORAL
Qty: 0 | Refills: 0 | DISCHARGE
Start: 2019-12-19

## 2019-12-19 RX ORDER — INSULIN LISPRO 100/ML
5 VIAL (ML) SUBCUTANEOUS
Qty: 0 | Refills: 0 | DISCHARGE

## 2019-12-19 RX ADMIN — Medication 500 MILLIGRAM(S): at 12:35

## 2019-12-19 RX ADMIN — HEPARIN SODIUM 5000 UNIT(S): 5000 INJECTION INTRAVENOUS; SUBCUTANEOUS at 05:51

## 2019-12-19 RX ADMIN — Medication 3: at 13:03

## 2019-12-19 RX ADMIN — PANTOPRAZOLE SODIUM 40 MILLIGRAM(S): 20 TABLET, DELAYED RELEASE ORAL at 05:51

## 2019-12-19 RX ADMIN — Medication 1: at 09:09

## 2019-12-19 RX ADMIN — Medication 25 MILLIGRAM(S): at 05:51

## 2019-12-19 RX ADMIN — Medication 25 MILLIGRAM(S): at 13:03

## 2019-12-19 RX ADMIN — AMIODARONE HYDROCHLORIDE 200 MILLIGRAM(S): 400 TABLET ORAL at 05:51

## 2019-12-19 RX ADMIN — POLYETHYLENE GLYCOL 3350 17 GRAM(S): 17 POWDER, FOR SOLUTION ORAL at 12:35

## 2019-12-19 RX ADMIN — Medication 5 UNIT(S): at 13:03

## 2019-12-19 RX ADMIN — Medication 3 UNIT(S): at 09:10

## 2019-12-19 RX ADMIN — Medication 100 MILLIGRAM(S): at 05:51

## 2019-12-19 RX ADMIN — Medication 1000 UNIT(S): at 05:51

## 2019-12-19 RX ADMIN — DULOXETINE HYDROCHLORIDE 30 MILLIGRAM(S): 30 CAPSULE, DELAYED RELEASE ORAL at 12:35

## 2019-12-19 NOTE — PROGRESS NOTE ADULT - SUBJECTIVE AND OBJECTIVE BOX
Chief complaint  Patient is a 88y old  Female who presents with a chief complaint of Fall x one episode (19 Dec 2019 12:39)   Review of systems  Patient in bed, looks comfortable, no fever, no hypoglycemia.    Labs and Fingersticks  CAPILLARY BLOOD GLUCOSE      POCT Blood Glucose.: 179 mg/dL (19 Dec 2019 08:25)  POCT Blood Glucose.: 350 mg/dL (18 Dec 2019 21:21)  POCT Blood Glucose.: 304 mg/dL (18 Dec 2019 18:37)  POCT Blood Glucose.: 316 mg/dL (18 Dec 2019 17:19)      Anion Gap, Serum: 15 (12-19 @ 07:13)  Anion Gap, Serum: 13 (12-18 @ 07:13)      Calcium, Total Serum: 9.3 (12-19 @ 07:13)  Calcium, Total Serum: 8.8 (12-18 @ 07:13)          12-19    138  |  99  |  35<H>  ----------------------------<  176<H>  4.3   |  24  |  2.46<H>    Ca    9.3      19 Dec 2019 07:13                          10.9   7.68  )-----------( 265      ( 18 Dec 2019 08:30 )             35.4     Medications  MEDICATIONS  (STANDING):  aMIOdarone    Tablet 200 milliGRAM(s) Oral daily  ascorbic acid 500 milliGRAM(s) Oral daily  cholecalciferol 1000 Unit(s) Oral two times a day  dextrose 5%. 1000 milliLiter(s) (50 mL/Hr) IV Continuous <Continuous>  dextrose 5%. 1000 milliLiter(s) (50 mL/Hr) IV Continuous <Continuous>  dextrose 50% Injectable 12.5 Gram(s) IV Push once  dextrose 50% Injectable 25 Gram(s) IV Push once  dextrose 50% Injectable 25 Gram(s) IV Push once  dextrose 50% Injectable 12.5 Gram(s) IV Push once  dextrose 50% Injectable 25 Gram(s) IV Push once  dextrose 50% Injectable 50 milliLiter(s) IV Push once  DULoxetine 30 milliGRAM(s) Oral daily  heparin  Injectable 5000 Unit(s) SubCutaneous every 12 hours  hydrALAZINE 25 milliGRAM(s) Oral three times a day  insulin glargine Injectable (LANTUS) 8 Unit(s) SubCutaneous at bedtime  insulin lispro (HumaLOG) corrective regimen sliding scale   SubCutaneous three times a day before meals  insulin lispro (HumaLOG) corrective regimen sliding scale   SubCutaneous at bedtime  insulin lispro Injectable (HumaLOG) 5 Unit(s) SubCutaneous three times a day before meals  metoprolol succinate  milliGRAM(s) Oral daily  mirtazapine 30 milliGRAM(s) Oral at bedtime  OLANZapine 5 milliGRAM(s) Oral at bedtime  pantoprazole    Tablet 40 milliGRAM(s) Oral before breakfast  polyethylene glycol 3350 17 Gram(s) Oral daily  simvastatin 20 milliGRAM(s) Oral at bedtime  sodium chloride 0.9%. 1000 milliLiter(s) (50 mL/Hr) IV Continuous <Continuous>      Physical Exam  General: Patient comfortable in bed  Vital Signs Last 12 Hrs  T(F): 97.8 (12-19-19 @ 08:34), Max: 97.9 (12-19-19 @ 05:30)  HR: 85 (12-19-19 @ 08:34) (67 - 85)  BP: 129/79 (12-19-19 @ 08:34) (129/79 - 165/82)  BP(mean): --  RR: 18 (12-19-19 @ 08:34) (18 - 18)  SpO2: 94% (12-19-19 @ 08:34) (94% - 95%)  Neck: No palpable thyroid nodules.  CVS: S1S2, No murmurs  Respiratory: No wheezing, no crepitations  GI: Abdomen soft, bowel sounds positive  Musculoskeletal:  edema lower extremities.   Skin: No skin rashes, no ecchymosis    Diagnostics Chief complaint  Patient is a 88y old  Female who presents with a chief complaint of Fall x one episode (19 Dec 2019 12:39)   Review of systems  Patient in bed, looks comfortable, no fever,  no hypoglycemia.    Labs and Fingersticks  CAPILLARY BLOOD GLUCOSE      POCT Blood Glucose.: 179 mg/dL (19 Dec 2019 08:25)  POCT Blood Glucose.: 350 mg/dL (18 Dec 2019 21:21)  POCT Blood Glucose.: 304 mg/dL (18 Dec 2019 18:37)  POCT Blood Glucose.: 316 mg/dL (18 Dec 2019 17:19)      Anion Gap, Serum: 15 (12-19 @ 07:13)  Anion Gap, Serum: 13 (12-18 @ 07:13)      Calcium, Total Serum: 9.3 (12-19 @ 07:13)  Calcium, Total Serum: 8.8 (12-18 @ 07:13)          12-19    138  |  99  |  35<H>  ----------------------------<  176<H>  4.3   |  24  |  2.46<H>    Ca    9.3      19 Dec 2019 07:13                          10.9   7.68  )-----------( 265      ( 18 Dec 2019 08:30 )             35.4     Medications  MEDICATIONS  (STANDING):  aMIOdarone    Tablet 200 milliGRAM(s) Oral daily  ascorbic acid 500 milliGRAM(s) Oral daily  cholecalciferol 1000 Unit(s) Oral two times a day  dextrose 5%. 1000 milliLiter(s) (50 mL/Hr) IV Continuous <Continuous>  dextrose 5%. 1000 milliLiter(s) (50 mL/Hr) IV Continuous <Continuous>  dextrose 50% Injectable 12.5 Gram(s) IV Push once  dextrose 50% Injectable 25 Gram(s) IV Push once  dextrose 50% Injectable 25 Gram(s) IV Push once  dextrose 50% Injectable 12.5 Gram(s) IV Push once  dextrose 50% Injectable 25 Gram(s) IV Push once  dextrose 50% Injectable 50 milliLiter(s) IV Push once  DULoxetine 30 milliGRAM(s) Oral daily  heparin  Injectable 5000 Unit(s) SubCutaneous every 12 hours  hydrALAZINE 25 milliGRAM(s) Oral three times a day  insulin glargine Injectable (LANTUS) 8 Unit(s) SubCutaneous at bedtime  insulin lispro (HumaLOG) corrective regimen sliding scale   SubCutaneous three times a day before meals  insulin lispro (HumaLOG) corrective regimen sliding scale   SubCutaneous at bedtime  insulin lispro Injectable (HumaLOG) 5 Unit(s) SubCutaneous three times a day before meals  metoprolol succinate  milliGRAM(s) Oral daily  mirtazapine 30 milliGRAM(s) Oral at bedtime  OLANZapine 5 milliGRAM(s) Oral at bedtime  pantoprazole    Tablet 40 milliGRAM(s) Oral before breakfast  polyethylene glycol 3350 17 Gram(s) Oral daily  simvastatin 20 milliGRAM(s) Oral at bedtime  sodium chloride 0.9%. 1000 milliLiter(s) (50 mL/Hr) IV Continuous <Continuous>      Physical Exam  General: Patient comfortable in bed  Vital Signs Last 12 Hrs  T(F): 97.8 (12-19-19 @ 08:34), Max: 97.9 (12-19-19 @ 05:30)  HR: 85 (12-19-19 @ 08:34) (67 - 85)  BP: 129/79 (12-19-19 @ 08:34) (129/79 - 165/82)  BP(mean): --  RR: 18 (12-19-19 @ 08:34) (18 - 18)  SpO2: 94% (12-19-19 @ 08:34) (94% - 95%)  Neck: No palpable thyroid nodules.  CVS: S1S2, No murmurs  Respiratory: No wheezing, no crepitations  GI: Abdomen soft, bowel sounds positive  Musculoskeletal:  edema lower extremities.   Skin: No skin rashes, no ecchymosis    Diagnostics

## 2019-12-19 NOTE — PROGRESS NOTE ADULT - PROBLEM SELECTOR PLAN 1
Will increase Humalog to 5u before each meal.  Will continue Lantus 8u at bedtime as well as Humalog correction scale coverage. Will continue monitoring FS and FU.  Patient counseled for compliance with consistent low carb diet and exercise as tolerated outpatient. Patient counseled for compliance with consistent low carb diet and exercise as tolerated outpatient.

## 2019-12-19 NOTE — DISCHARGE NOTE NURSING/CASE MANAGEMENT/SOCIAL WORK - NSDCDMETYPESERV_GEN_ALL_CORE_FT
Hospital bed ordered and vendor will follow up with [employer] for delivery once approved by insurance.

## 2019-12-19 NOTE — PROGRESS NOTE ADULT - REASON FOR ADMISSION
Fall x one episode

## 2019-12-19 NOTE — PROGRESS NOTE ADULT - ASSESSMENT
87y Female with history of COPD presents with a fall. Nephrology consulted for elevated Scr.    1) ARUN: suspect hemodynamically mediated in setting of infection and diuretic use given bland UA with improvement in Scr today after gentle IVF. Avoid nephrotoxins.    2) CKD-3: Baseline Scr 1.5-1.6 as per prior labs. 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 Defer inpatient work up as patient follows with Dr. Manrique as an outpatient. Monitor electrolytes.    3) HTN with CKD: BP improving. Titrate hydralazine 25 mg TID as needed. Monitor BP.    4) LE edema: Patient appears euvolemic. Holding home bumex 1 mg daily. Monitor UO.    5) Acute cystitis without hematuria: Off abx as per ID.

## 2019-12-19 NOTE — PROGRESS NOTE ADULT - ATTENDING COMMENTS
[facility] NEPHROLOGY  Rad Mojica M.D.  Les Mckeon D.O.  Selene Conn M.D.  Odessa Rodriguez, MSN, ANP-C    Telephone: [phone]  Facsimile: [phone]    [address] [place] [address]
[facility] NEPHROLOGY  Rad Mojica M.D.  Les Mckeon D.O.  Selene Conn M.D.  Odessa Rodriguez, MSN, ANP-C    Telephone: [phone]  Facsimile: [phone]    [address] [place] [address]
[facility] NEPHROLOGY  Rad Mojica M.D.  Les Mckeon D.O.  Selene Conn M.D.  Odessa Rodriguez, MSN, ANP-C    Telephone: [phone]  Facsimile: [phone]    [address] [place] [address]
Agree with above NP note.  cv stable  cont current tx  ppm check ok  renal/med f/u
Agree with above NP note.  cv stable  cont current tx  ppm check ok  renal/med f/u
Agree with above NP note.  cv stable  d/c planning per med  cont current tx
Will continue current insulin regimen for now. Will continue monitoring FS, log, and FU.
Will increase Humalog to 5u before each meal.  Will continue Lantus 8u at bedtime as well as Humalog correction scale coverage. Will continue monitoring FS and FU.
Will increase Lantus to 8u at bedtime.  Will start patient on Humalog 3u before each meal and continue coverage scale. Will continue monitoring FS and FU.

## 2019-12-19 NOTE — PROGRESS NOTE ADULT - SUBJECTIVE AND OBJECTIVE BOX
CARDIOLOGY FOLLOW UP - Dr. Garcia    CC nad      PHYSICAL EXAM:  T(C): 36.6 (12-19-19 @ 08:34), Max: 37.4 (12-18-19 @ 13:12)  HR: 85 (12-19-19 @ 08:34) (60 - 85)  BP: 129/79 (12-19-19 @ 08:34) (129/79 - 165/82)  RR: 18 (12-19-19 @ 08:34) (18 - 18)  SpO2: 94% (12-19-19 @ 08:34) (94% - 96%)  Wt(kg): --  I&O's Summary    18 Dec 2019 07:01  -  19 Dec 2019 07:00  --------------------------------------------------------  IN: 900 mL / OUT: 1300 mL / NET: -400 mL    19 Dec 2019 07:01  -  19 Dec 2019 12:39  --------------------------------------------------------  IN: 240 mL / OUT: 0 mL / NET: 240 mL        Appearance: Normal	  Cardiovascular: Normal S1 S2,RRR, No JVD, No murmurs  Respiratory: Lungs clear to auscultation	  Gastrointestinal:  Soft, Non-tender, + BS	  Extremities: Normal range of motion, No clubbing, cyanosis or edema        MEDICATIONS  (STANDING):  aMIOdarone    Tablet 200 milliGRAM(s) Oral daily  ascorbic acid 500 milliGRAM(s) Oral daily  cholecalciferol 1000 Unit(s) Oral two times a day  dextrose 5%. 1000 milliLiter(s) (50 mL/Hr) IV Continuous <Continuous>  dextrose 5%. 1000 milliLiter(s) (50 mL/Hr) IV Continuous <Continuous>  dextrose 50% Injectable 12.5 Gram(s) IV Push once  dextrose 50% Injectable 25 Gram(s) IV Push once  dextrose 50% Injectable 25 Gram(s) IV Push once  dextrose 50% Injectable 12.5 Gram(s) IV Push once  dextrose 50% Injectable 25 Gram(s) IV Push once  dextrose 50% Injectable 50 milliLiter(s) IV Push once  DULoxetine 30 milliGRAM(s) Oral daily  heparin  Injectable 5000 Unit(s) SubCutaneous every 12 hours  hydrALAZINE 25 milliGRAM(s) Oral three times a day  insulin glargine Injectable (LANTUS) 8 Unit(s) SubCutaneous at bedtime  insulin lispro (HumaLOG) corrective regimen sliding scale   SubCutaneous three times a day before meals  insulin lispro (HumaLOG) corrective regimen sliding scale   SubCutaneous at bedtime  insulin lispro Injectable (HumaLOG) 5 Unit(s) SubCutaneous three times a day before meals  metoprolol succinate  milliGRAM(s) Oral daily  mirtazapine 30 milliGRAM(s) Oral at bedtime  OLANZapine 5 milliGRAM(s) Oral at bedtime  pantoprazole    Tablet 40 milliGRAM(s) Oral before breakfast  polyethylene glycol 3350 17 Gram(s) Oral daily  simvastatin 20 milliGRAM(s) Oral at bedtime  sodium chloride 0.9%. 1000 milliLiter(s) (50 mL/Hr) IV Continuous <Continuous>      TELEMETRY: 	    ECG:  	  RADIOLOGY:   DIAGNOSTIC TESTING:  [ ] Echocardiogram:  [ ]  Catheterization:  [ ] Stress Test:    OTHER: 	    LABS:	 	                                10.9   7.68  )-----------( 265      ( 18 Dec 2019 08:30 )             35.4     12-19    138  |  99  |  35<H>  ----------------------------<  176<H>  4.3   |  24  |  2.46<H>    Ca    9.3      19 Dec 2019 07:13

## 2019-12-19 NOTE — DISCHARGE NOTE NURSING/CASE MANAGEMENT/SOCIAL WORK - PATIENT PORTAL LINK FT
You can access the FollowMyHealth Patient Portal offered by [facility] by registering at the following website: [email or web address]. By joining [employer]’s FollowMyHealth portal, you will also be able to view your health information using other applications (apps) compatible with our system.

## 2019-12-19 NOTE — PROGRESS NOTE ADULT - PROBLEM/PLAN-3
DISPLAY PLAN FREE TEXT
Attending Attestation (For Attendings USE Only)...

## 2019-12-19 NOTE — PROGRESS NOTE ADULT - SUBJECTIVE AND OBJECTIVE BOX
Infectious Diseases progress note:    Subjective:  NAD, afebrile.  Resting comfortably.  Denies f/c/r/abd pain/dysuria.  Son present at bedside    ROS:  CONSTITUTIONAL:  No fever, chills, rigors  CARDIOVASCULAR:  No chest pain or palpitations  RESPIRATORY:   No SOB, cough, dyspnea on exertion.  No wheezing  GASTROINTESTINAL:  No abd pain, N/V, diarrhea/constipation  EXTREMITIES:  No swelling or joint pain  GENITOURINARY:  No burning on urination, increased frequency or urgency.  No flank pain  NEUROLOGIC:  No HA, visual disturbances  SKIN: No rashes    Allergies    amoxicillin (Flushing; Vomiting; Nausea)  lactose (Diarrhea)  lisinopril (Angioedema)  penicillin (Hives)    Intolerances        ANTIBIOTICS/RELEVANT:  antimicrobials    immunologic:    OTHER:  acetaminophen   Tablet .. 650 milliGRAM(s) Oral every 4 hours PRN  albuterol/ipratropium for Nebulization 3 milliLiter(s) Nebulizer every 6 hours PRN  aMIOdarone    Tablet 200 milliGRAM(s) Oral daily  ascorbic acid 500 milliGRAM(s) Oral daily  cholecalciferol 1000 Unit(s) Oral two times a day  dextrose 40% Gel 15 Gram(s) Oral once PRN  dextrose 40% Gel 15 Gram(s) Oral once PRN  dextrose 5%. 1000 milliLiter(s) IV Continuous <Continuous>  dextrose 5%. 1000 milliLiter(s) IV Continuous <Continuous>  dextrose 50% Injectable 12.5 Gram(s) IV Push once  dextrose 50% Injectable 25 Gram(s) IV Push once  dextrose 50% Injectable 25 Gram(s) IV Push once  dextrose 50% Injectable 12.5 Gram(s) IV Push once  dextrose 50% Injectable 25 Gram(s) IV Push once  dextrose 50% Injectable 50 milliLiter(s) IV Push once  DULoxetine 30 milliGRAM(s) Oral daily  glucagon  Injectable 1 milliGRAM(s) IntraMuscular once PRN  glucagon  Injectable 1 milliGRAM(s) IntraMuscular once PRN  heparin  Injectable 5000 Unit(s) SubCutaneous every 12 hours  hydrALAZINE 25 milliGRAM(s) Oral three times a day  insulin glargine Injectable (LANTUS) 8 Unit(s) SubCutaneous at bedtime  insulin lispro (HumaLOG) corrective regimen sliding scale   SubCutaneous three times a day before meals  insulin lispro (HumaLOG) corrective regimen sliding scale   SubCutaneous at bedtime  insulin lispro Injectable (HumaLOG) 5 Unit(s) SubCutaneous three times a day before meals  metoprolol succinate  milliGRAM(s) Oral daily  mirtazapine 30 milliGRAM(s) Oral at bedtime  OLANZapine 5 milliGRAM(s) Oral at bedtime  pantoprazole    Tablet 40 milliGRAM(s) Oral before breakfast  polyethylene glycol 3350 17 Gram(s) Oral daily  simvastatin 20 milliGRAM(s) Oral at bedtime  sodium chloride 0.9%. 1000 milliLiter(s) IV Continuous <Continuous>      Objective:  Vital Signs Last 24 Hrs  T(C): 36.7 (19 Dec 2019 13:01), Max: 37.2 (18 Dec 2019 21:10)  T(F): 98.1 (19 Dec 2019 13:01), Max: 99 (18 Dec 2019 21:10)  HR: 80 (19 Dec 2019 13:01) (66 - 85)  BP: 156/51 (19 Dec 2019 13:01) (129/79 - 165/82)  BP(mean): --  RR: 18 (19 Dec 2019 13:01) (18 - 18)  SpO2: 94% (19 Dec 2019 13:01) (94% - 96%)    PHYSICAL EXAM:  Constitutional:NAD  Eyes:MAICO, EOMI  Ear/Nose/Throat: no thrush, mucositis.  Moist mucous membranes	  Neck:no JVD, no lymphadenopathy, supple  Respiratory: CTA tyree  Cardiovascular: S1S2 RRR, no murmurs  Gastrointestinal:soft, nontender,  nondistended (+) BS  Extremities:no e/e/c  Skin:  no rashes, open wounds or ulcerations        LABS:                        10.9   7.68  )-----------( 265      ( 18 Dec 2019 08:30 )             35.4     12-19    138  |  99  |  35<H>  ----------------------------<  176<H>  4.3   |  24  |  2.46<H>    Ca    9.3      19 Dec 2019 07:13                MICROBIOLOGY:    Culture - Urine (12.15.19 @ 10:00)    -  Amikacin: S <=16    -  Ampicillin: R >16 These ampicillin results predict results for amoxicillin    -  Ampicillin/Sulbactam: I 16/8 Enterobacter, Citrobacter, and Serratia may develop resistance during prolonged therapy (3-4 days)    -  Aztreonam: S <=4    -  Cefazolin: S <=8 (MIC_CL_COM_ENTERIC_CEFAZU) For uncomplicated UTI with K. pneumoniae, E. coli, or P. mirablis: OBDULIO <=16 is sensitive and OBDULIO >=32 is resistant. This also predicts results for oral agents cefaclor, cefdinir, cefpodoxime, cefprozil, cefuroxime axetil, cephalexin and locarbef for uncomplicated UTI. Note that some isolates may be susceptible to these agents while testing resistant to cefazolin.    -  Cefepime: S <=4    -  Cefoxitin: S <=8    -  Ceftriaxone: S <=1 Enterobacter, Citrobacter, and Serratia may develop resistance during prolonged therapy    -  Ciprofloxacin: S <=1    -  Gentamicin: R >8    -  Imipenem: S <=1    -  Levofloxacin: S <=2    -  Meropenem: S <=1    -  Nitrofurantoin: S <=32 Should not be used to treat pyelonephritis    -  Piperacillin/Tazobactam: S <=16    -  Tigecycline: S <=2    -  Tobramycin: I 8    -  Trimethoprim/Sulfamethoxazole: R >2/38    Specimen Source: .Urine Clean Catch (Midstream)    Culture Results:   >100,000 CFU/ml Escherichia coli    Organism Identification: Escherichia coli    Organism: Escherichia coli    Method Type: OBDULIO            RADIOLOGY & ADDITIONAL STUDIES:    < from:  Kidney and Bladder (12.17.19 @ 19:33) >  FINDINGS:    Right kidney:  Surgically absent     Left kidney:  11.2 cm cm. echogenic parenchyma consistent with underlying renal disease. No renal mass, hydronephrosis or calculi.    Urinary bladder: Distended.    IMPRESSION:     Echogenic left renal parenchyma consistent renal parenchymal disease. No hydronephrosis or nephrolithiasis.    Right kidney is surgically absent. Distended bladder.    < end of copied text >

## 2019-12-19 NOTE — PROGRESS NOTE ADULT - ASSESSMENT
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   Patient reports slipping on a loose carpet ,falling backwards while ambulating with her walker ,  she sustaining trauma to the back of her head, she reports no LOC. She was immediately attended to by her home health aid , and was assisted back on her feet. She had no preceding chest pain or shortness of breath. NO focal motor weakness or loss of sensation. No recent infection , no fever or chills.  She reports normal appetite and PO intake. (15 Dec 2019 05:35)    Pt a/w syncope, undergoing cardiology w/u.     ER vss, afebrile.  No leukocytosis.  UA small LE, (-) nit, > 100K GNR.  Pt given one dose of levaquin on 12/16.  ID consult called for further abx managment.     Asymptomatic bacteruria:    - UA unremarkable.  Positive ucx represent colonization, not infection.  No indication for further abx at this time.      - Cont to monitor for new fever, wbc, or urinary symptoms (frequency, burning, urgency).   Pt currently without new symptoms.    - Creatinine improved today, s/p gentle IVF hydration.  Renal f/u appreciated.     OK to d/c from ID standpoint.  No further w/u at present.           Emilee Jacobo  [phone]

## 2019-12-19 NOTE — DISCHARGE NOTE NURSING/CASE MANAGEMENT/SOCIAL WORK - NSDCPEPT PROEDHF_GEN_ALL_CORE
Monitor weight daily/Report signs and symptoms to primary care provider/Low salt diet/Activities as tolerated/Call primary care provider for follow up after discharge

## 2019-12-19 NOTE — PROGRESS NOTE ADULT - SUBJECTIVE AND OBJECTIVE BOX
[facility] NEPHROLOGY- PROGRESS NOTE    87y Female with history of COPD presents with a fall. Nephrology consulted for elevated Scr.    REVIEW OF SYSTEMS:  Gen: no changes in weight  Cards: no chest pain  Resp: no dyspnea  GI: no nausea or vomiting or diarrhea  Vascular: no LE edema    amoxicillin (Flushing; Vomiting; Nausea)  lactose (Diarrhea)  lisinopril (Angioedema)  penicillin (Hives)      Hospital Medications: Medications reviewed      VITALS:  T(F): 98.1 (19 @ 13:01), Max: 99 (19 @ 21:10)  HR: 80 (19 @ 13:01)  BP: 156/51 (19 @ 13:01)  RR: 18 (19 @ 13:01)  SpO2: 94% (19 @ 13:01)  Wt(kg): --     @ 07:01  -   @ 07:00  --------------------------------------------------------  IN: 900 mL / OUT: 1300 mL / NET: -400 mL     @ 07:01  -   @ 14:11  --------------------------------------------------------  IN: 480 mL / OUT: 0 mL / NET: 480 mL      LABS:      138  |  99  |  35<H>  ----------------------------<  176<H>  4.3   |  24  |  2.46<H>    Ca    9.3      19 Dec 2019 07:13      Creatinine Trend: 2.46 <--, 3.04 <--, 2.54 <--, 3.01 <--, 2.79 <--, 2.93 <--                        10.9   7.68  )-----------( 265      ( 18 Dec 2019 08:30 )             35.4     Urine Studies:  Urinalysis Basic - ( 15 Dec 2019 06:22 )    Color: Light Yellow / Appearance: Clear / S.010 / pH:   Gluc:  / Ketone: Negative  / Bili: Negative / Urobili: Negative   Blood:  / Protein: 30 mg/dL / Nitrite: Negative   Leuk Esterase: Small / RBC: 4 /hpf / WBC 6 /HPF   Sq Epi:  / Non Sq Epi: 1 /hpf / Bacteria: Many      Sodium, Random Urine: 46 mmol/L ( @ 23:57)  Creatinine, Random Urine: 65 mg/dL ( @ 23:57)    PHYSICAL EXAM:    Gen: NAD, calm  Cards: RRR, +S1/S2, + ZAKI  Resp: course BS B/L  GI: soft, NT, + ab distention, NABS  Vascular: no LE edema B/L        < from: US Kidney and Bladder (19 @ 19:33) >  IMPRESSION:     Echogenic left renal parenchyma consistent renal parenchymal disease. No hydronephrosis or nephrolithiasis.    Right kidney is surgically absent. Distended bladder.    < end of copied text >

## 2019-12-19 NOTE — DISCHARGE NOTE NURSING/CASE MANAGEMENT/SOCIAL WORK - NSSCTYPOFSERV_GEN_ALL_CORE
Skilled RN visits and home physical therapy visits. RN will call 1-2 days after discharge to schedule visit.

## 2019-12-20 ENCOUNTER — APPOINTMENT (OUTPATIENT)
Dept: ELECTROPHYSIOLOGY | Facility: CLINIC | Age: 84
End: 2019-12-20

## 2020-01-13 NOTE — PATIENT PROFILE ADULT - SURGICAL SITE INCISION
Dr. Martínez reviewed patient's MRI. I called patient to discuss results:  IMPRESSION:   1. Proximal tibial fracture, extending into the articular surface along the  medial tibial spine. Fracture extends into the medial and lateral tibial  metadiaphysis. No articular surface depression.  2. Additional findings as described.    She reports pain has gradually been improving, but will occasionally get pain now down the side of her leg. She is 3 months out from the initial injury. Recommend she ambulate with an assistive device until symptoms resolve. She has a cane and walker at home and will plan to use one of those. Patient should follow up with Dr. Martínez in 2 months for repeat x-rays. Offered to make appt now, but patient plans to call back later to schedule. All questions answered.    no
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
## 2020-03-02 ENCOUNTER — APPOINTMENT (OUTPATIENT)
Dept: ELECTROPHYSIOLOGY | Facility: CLINIC | Age: 85
End: 2020-03-02

## 2020-05-04 NOTE — ED ADULT NURSE NOTE - CHIEF COMPLAINT QUOTE
From: Pablo Cuevas  To: DASIA Bravo  Sent: 5/3/2020 4:34 PM CDT  Subject: Visit Follow-up Question    Just a note   Hope you and  are well  Holding up  And thank you for all your assistance  Jamie Carvajal
Thank you!   I hope you are doing well    Genevieve Mansfield, ANP
vomiting

## 2020-10-06 NOTE — PROGRESS NOTE ADULT - PROBLEM SELECTOR PROBLEM 1
Diabetes dictation # [id number]  history indicated cerclage, indocin 25mg q6hr x48hr to be started in PACU  FHR @ completion of case: 140bpm

## 2020-10-12 NOTE — PATIENT PROFILE ADULT - HAS THE PATIENT BEEN ADMITTED FROM SHORT TERM REHAB?
Rusty Shrestha  PLASTIC SURGERY  [address] [place] [address], [address]  [place] [address]  Phone: [phone]  Fax: [phone]  Follow Up Time:    no

## 2020-11-24 NOTE — PROGRESS NOTE ADULT - ASSESSMENT
Chief Complaint   Patient presents with    Right Hip - Post-op Evaluation     11/9/20 - R EDDIE       HPI:  65 y.o. female returns to clinic today status post right total hip arthroplasty 2 weeks ago. Pain is mild. Patient is compliant most of the time with restrictions.     right Hip Exam     Tenderness   The patient is experiencing no tenderness.     Incision well approximated with staples    Range of Motion   The patient has normal right hip ROM.    Muscle Strength   Flexion: 4/5     Other   Erythema: absent  Sensation: normal  Pulse: present    X-rays were performed today, personally reviewed by me and findings discussed with the patient.  2 views of the right hip show implants intact with no evidence of loosening    S/P hip replacement, right  -     Ambulatory referral/consult to Physical/Occupational Therapy; Future; Expected date: 12/01/2020    Primary osteoarthritis of right hip  -     Ambulatory referral/consult to Physical/Occupational Therapy; Future; Expected date: 12/01/2020    Other orders  -     methocarbamoL (ROBAXIN) 750 MG Tab; Take 1 tablet (750 mg total) by mouth 4 (four) times daily as needed.  Dispense: 44 tablet; Refill: 0              Staples removed, steri strips applied, Referral to [facility]  PT, Encouraged ROM. RTC in 6 weeks with repeat xrays          A 86 yo Female  from home, with multiple co-morbidities including COPD on home oxygen at night, who was recently hospitalized at [facility] and treated for Pneumonia currently on Doxycycline, presents to the ER for evaluation of worsening shortness of breath and generalized weakness. On admission, she found to have Leukocytosis, positive Urine analysis and RUL consolidation on CT chest.  She has received dose of Cefepime and Vancomycin, cultures pending. The ID consult requested to assist  with further evaluation and antibiotic management.     # RUL Pneumonia- Most likely HCAP in the setting of recently discharged from [facility] - RVP negative  # UTI - culture has no significant  growth   # leukocytosis - trending down    Would recommend:  1. CBC in AM to check WBC count  2. Continue Cefepime since tolerating in the setting of PCN allergy and may change to oral Abx on discharge  3. Supplemental oxygenation and bronchodilator  as needed  4. Aspiration  precaution     d/w Patient and Nursing staff    will follow the patient with you

## 2020-12-29 NOTE — PROGRESS NOTE ADULT - PROBLEM/PLAN-2
DISPLAY PLAN FREE TEXT Double O-Z Flap Text: The defect edges were debeveled with a #15 scalpel blade.  Given the location of the defect, shape of the defect and the proximity to free margins a Double O-Z flap was deemed most appropriate.  Using a sterile surgical marker, an appropriate transposition flap was drawn incorporating the defect and placing the expected incisions within the relaxed skin tension lines where possible. The area thus outlined was incised deep to adipose tissue with a #15 scalpel blade.  The skin margins were undermined to an appropriate distance in all directions utilizing iris scissors.

## 2021-01-06 NOTE — PATIENT PROFILE ADULT - NSPROPTRIGHTSUPPORTPERSON_GEN_A_NUR
VORB Dr. Pelayo increase LR to 500ml/hour for a total of 1 liter. Rate changed, see MAR.   same name as above
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
## 2021-05-24 NOTE — H&P ADULT - PROBLEM SELECTOR PLAN 4
[address] [place], [place]  [birth year] 0788 - lantus   - Lispro  - Start HISS   - check fsg qac/qhs  - check a1c in am  - consistent carb diet

## 2021-06-18 NOTE — DISCHARGE NOTE ADULT - HOME CARE AGENCY
Patient presenting with fever, viral symptoms  vital stable  lung clear  abd soft  will obtain lab, assess for pe, sepsis, symptomatic treatment and reassess
[facility]  [phone]  A visiting nurse will call to schedule an appointment to visit you in your home within 48-72 hours after discharge.  Physical therapy, home health aide and social work services are also requested.

## 2021-07-02 NOTE — DISCHARGE NOTE ADULT - CARE PROVIDER_API CALL
Calm Young Doty (MD), Cardiovascular Disease; Internal Medicine  [address] [place] [address]  Phone: [phone]  Fax: [phone]

## 2021-07-02 NOTE — PROVIDER CONTACT NOTE (OTHER) - ACTION/TREATMENT ORDERED:
see above
EKG to be done. Awaiting orders for medication
PT ON RECLINER  AND CHAIR ALARM ON WILL CONTINUE TO MONITOR

## 2021-07-06 NOTE — ED PROVIDER NOTE - ST/T WAVE
Please advise  Ok to schedule in office appt? Additional Information for Provider? Patient believes she has strep throat. She stated her symptoms started on Friday. She stated she has some sores in her mouth and her tonsils are swollen also. Patient would like an in office appointment and would like a call back asap. No  (Service Expert  click yes below to proceed with [facility] As Usual   Scheduling)?  Yes non-specific

## 2021-11-20 NOTE — CONSULT NOTE ADULT - ASSESSMENT
Cardiology Progress Note      Subjective:   Admitted with syncope/fall resulting in central cord syndrome  Wife at bedside.   Patient remains in cervical collar    Current Medications:  • sodium chloride (PF)  2 mL Intracatheter 2 times per day   • Potassium Standard Replacement Protocol   Does not apply See Admin Instructions   • Magnesium Standard Replacement Protocol   Does not apply See Admin Instructions   • Phosphorus Standard Replacement Protocol   Does not apply See Admin Instructions   • famotidine  20 mg Oral 2 times per day   • pregabalin  75 mg Oral Daily   • enoxaparin  40 mg Subcutaneous Q24H   • acetaminophen  1,000 mg Oral 4 times per day   • magnesium hydroxide  10 mL Oral Daily      • NORepinephrine (LEVOPHED) 8 mg/250 mL in sodium chloride 0.9 % infusion     • lactated ringers infusion 125 mL/hr at 11/20/21 0910       Objective:  Vital signs in last 24 hours:  Temp:  [97.6 °F (36.4 °C)-98.7 °F (37.1 °C)] 97.6 °F (36.4 °C)  Heart Rate:  [46-69] 46  Resp:  [8-18] 14  BP: (112-135)/(61-79) 131/74  Arterial Line BP: (115-140)/(63-74) 138/73    Weight:  Weight    11/19/21 0242 11/20/21 0513   Weight: 94.1 kg (207 lb 7.3 oz) 90 kg (198 lb 6.6 oz)       Vital Most Recent Value First Value   Weight 90 kg (198 lb 6.6 oz) Weight: 94.1 kg (207 lb 7.3 oz)   Height 5' 10\" (177.8 cm) Height: 5' 10\" (177.8 cm)       Intake/output:    Intake/Output Summary (Last 24 hours) at 11/20/2021 0956  Last data filed at 11/20/2021 0637  Gross per 24 hour   Intake 6099.6 ml   Output 4077 ml   Net 2022.6 ml                General:  Patient is alert and oriented x3.  Appears to be in no acute distress.  HEENT:  No xanthelasma or conjunctival pallor.    Neck: C Collar on  Heart:  Normal S1 and S2.  There are no cardiac murmurs, rubs or gallops.  Lungs:  Clear to auscultation anterior  Abdomen:  Soft and non-tender.  There are no palpable masses or hepatoslenomegaly. Bowel sounds are normal.  Extremity:  No cyanosis, clubbing  or edema.  Peripheral Pulses:  Normal dorsalis pedis and posterior tibial pulses bilaterally.  Neurologic:  Weakness upper extremities, per neurology    Recent Labs   Lab 11/19/21  0747 11/19/21  0255   WBC 8.8 4.8   RBC 4.06* 3.90*   HGB 12.8* 12.4*   HCT 37.6* 36.6*   MCV 92.6 93.8    217     Recent Labs     11/19/21  0255   INR 1.0     Recent Labs   Lab 11/19/21  1442   Cholesterol 160   HDL 47   Triglycerides 61   CALCLDL 101   Non-HDL Cholesterol 113     Recent Labs   Lab 11/19/21  0747 11/19/21  0259 11/19/21  0255   SODIUM 138  --  139   POTASSIUM 3.8  --  3.6   CHLORIDE 103  --  102   CO2 26  --  29   CREATININE 0.91 1.20* 1.16   BUN 17  --  20   GLUCOSE 83  --  69*   ANIONGAP 13  --  12     Telemetry: sinus bradycardia, HR 40-50s.     Echocardiogram 11/20/2021  Technically difficult study.  Normal left ventricular size, systolic function and wall thickness, with no regional wall motion abnormalities.  Normal left ventricular size and systolic function, EF 65 %.  No significant valve abnormalities.  No pericardial effusion.      Assessment/Plan:    1. Syncope likely secondary to neurocardiogenic dysfunction. Patient and wife updated, evaluation in the future    Consider EP evaluation and tilt table test as an outpatient.   Stay well hydrated. Add midodrine if needed for orthostatic hypotension.   Discussed immediate sitting or laying if dizzy or diaphoretic to avoid falls.      2. Dyslipidemia.  . continue statin     3. CAD. mild plaque by CTA. Stable. Normal hs-Troponin. Normal LVEF.          Lucia Angulo PA-C  9:56 AM       I attest that I interviewed and examined the patient, reviewed the data and the care plan with Lucia Angulo PA-C and agree with the documentation as outlined above.    I reviewed and edited the above note as needed.   Wife updated.    Jaime Waterman MD [facility]  Pager: [phone]  Cardiology       Assessment  DMT2: 87y Female with DM T2 with hyperglycemia, was on o insulin at home, blood sugars running high, no hypoglycemic episode, eating meals,  non compliant with low carb diet.  CHF: on medications, no chest pain, stable, monitored.  COPD: Has SOB, on oxygen, stable now.  HTN: Un Controlled,  on antihypertensive medications.  CKD: Monitor labs/BMP,         Brandon Mayer MD  Cell: [phone]  Office: [phone]

## 2021-12-17 NOTE — ED ADULT NURSE NOTE - NSFALLRSKUNASSIST_ED_ALL_ED
Group Therapy Documentation    PATIENT'S NAME: Boyd Ruiz  MRN:   [id number]  :   2005  ACCT. NUMBER: [id number]  DATE OF SERVICE: 21  START TIME:  9:00 AM  END TIME: 10:00 AM  FACILITATOR(S): Elizabeth Molina; Tiana Márquez LADC  TOPIC: BEH Group Therapy  Number of patients attending the group:  6  Group Length:  1 Hours    Dimensions addressed 3 and 6    Summary of Group / Topics Discussed:    Distress tolerance:  Self-Soothe:  Patients learned to apply self-soothe as a way to decrease heightened stress in the moment.  Patients identified situations that necessitate self-soothe strategies.  They focused on ways to manage physical symptoms of distress using the senses. They discussed how to distinguish when this can be useful in their lives when other strategies are more relevant or helpful.    Patient Session Goals / Objectives:   *  Understand the purpose of using the senses to decrease distress   *  Process what happens in the body when using self-soothe strategies   *  Demonstrate understanding of when to use self-soothe strategies   *  Identify and problem solve barriers to applying self-soothe strategies.   *  Choose 1-2 self-soothe strategies to apply during times of distress.      Group Attendance:  Attended group session    Patient's response to the group topic/interactions:  cooperative with task    Patient appeared to be Attentive.       Client specific details:  Client engaged in DBT group. Client participated in conversation and provided examples of how to use the skill.         no

## 2022-03-15 NOTE — PROGRESS NOTE ADULT - ASSESSMENT
Assessment  DMT2: 86y Female with DM T2 with hyperglycemia started on basal bolus insulin, blood sugar improving, no new hypoglycemic episode,  poor PO intake.  COPD: On medications, stable, monitored.  HTN: Controlled, On med.  Subarachnoid Bleed: Monitor, stable Detail Level: Detailed Size Of Lesion: 1.3 cm Detail Level: Simple

## 2022-05-19 NOTE — ED ADULT TRIAGE NOTE - MODE OF ARRIVAL
This is a recent snapshot of the patient's [facility] medical record.  For current drug dose and complete information and questions, call [phone] or In Basket pool, [facility] home infusion ([id number])  CSN Number:  [id number]     EMS

## 2022-05-27 NOTE — PROGRESS NOTE ADULT - SUBJECTIVE AND OBJECTIVE BOX
Discussed risks and benefits of a transconjunctival lower eyelid blepharoplasty with laser resurfacing. Patient is a 86y Female whom presented to the hospital with ckd and carlene     PAST MEDICAL & SURGICAL HISTORY:  COPD (chronic obstructive pulmonary disease)  Depression  Anxiety  Atrial fibrillation, unspecified type  Pacemaker  Overweight  PVD (Peripheral Vascular Disease)  Arteriosclerotic Heart Disease (ASHD)  HTN (Hypertension)  Diabetes  S/P carpal tunnel release  S/P left cataract extraction  s/p colon resection  S/P Nephrectomy: right  Hip Replacement  History of Total Knee Replacement  S/P Hernia Surgery  S/P Cholecystectomy      MEDICATIONS  (STANDING):  ALBUTerol    90 MICROgram(s) HFA Inhaler 1 Puff(s) Inhalation every 4 hours  ALBUTerol/ipratropium for Nebulization 3 milliLiter(s) Nebulizer every 6 hours  amiodarone    Tablet 200 milliGRAM(s) Oral daily  ARIPiprazole 2 milliGRAM(s) Oral daily  aspirin  chewable 81 milliGRAM(s) Oral daily  clonazePAM Tablet 0.5 milliGRAM(s) Oral two times a day  dextrose 5%. 1000 milliLiter(s) (50 mL/Hr) IV Continuous <Continuous>  dextrose 50% Injectable 12.5 Gram(s) IV Push once  dextrose 50% Injectable 25 Gram(s) IV Push once  dextrose 50% Injectable 25 Gram(s) IV Push once  docusate sodium 100 milliGRAM(s) Oral two times a day  DULoxetine 60 milliGRAM(s) Oral daily  furosemide    Tablet 40 milliGRAM(s) Oral daily  insulin glargine Injectable (LANTUS) 15 Unit(s) SubCutaneous at bedtime  insulin lispro (HumaLOG) corrective regimen sliding scale   SubCutaneous three times a day before meals  insulin lispro (HumaLOG) corrective regimen sliding scale   SubCutaneous three times a day before meals  insulin lispro Injectable (HumaLOG) 7 Unit(s) SubCutaneous three times a day before meals  metoprolol succinate  milliGRAM(s) Oral daily  mirtazapine 15 milliGRAM(s) Oral at bedtime  simvastatin 20 milliGRAM(s) Oral at bedtime  tiotropium 18 MICROgram(s) Capsule 1 Capsule(s) Inhalation daily      Allergies    amoxicillin (Flushing; Vomiting; Nausea)  lisinopril (Angioedema)    Intolerances        SOCIAL HISTORY:  Denies ETOh,Smoking,     FAMILY HISTORY:  Family history of pancreatic cancer (Sibling)    Constitutional: No fever, fatigue or weight loss.  Skin: pos  rash.  Eyes: No recent vision problems or eye pain.  ENT: No congestion, ear pain, or sore throat.  Endocrine: No thyroid problems.  Cardiovascular: No chest pain or palpation.  Respiratory: No cough, shortness of breath, congestion, or wheezing.  Gastrointestinal: No abdominal pain, nausea, vomiting, or diarrhea.  Genitourinary: No dysuria.  Musculoskeletal: No joint swelling.  Neurologic: No headache.                            9.8    8.80  )-----------( 220      ( 2018 08:25 )             30.6       CBC Full  -  ( 2018 08:25 )  WBC Count : 8.80 K/uL  Hemoglobin : 9.8 g/dL  Hematocrit : 30.6 %  Platelet Count - Automated : 220 K/uL  Mean Cell Volume : 88.2 fl  Mean Cell Hemoglobin : 28.2 pg  Mean Cell Hemoglobin Concentration : 32.0 gm/dL  Auto Neutrophil # : x  Auto Lymphocyte # : x  Auto Monocyte # : x  Auto Eosinophil # : x  Auto Basophil # : x  Auto Neutrophil % : x  Auto Lymphocyte % : x  Auto Monocyte % : x  Auto Eosinophil % : x  Auto Basophil % : x          137  |  98  |  43<H>  ----------------------------<  148<H>  3.6   |  25  |  1.98<H>    Ca    8.7      2018 06:41    TPro  7.2  /  Alb  3.4  /  TBili  0.3  /  DBili  x   /  AST  39  /  ALT  31  /  AlkPhos  98  -08      CAPILLARY BLOOD GLUCOSE      POCT Blood Glucose.: 276 mg/dL (2018 16:19)  POCT Blood Glucose.: 266 mg/dL (2018 12:27)  POCT Blood Glucose.: 209 mg/dL (2018 11:17)  POCT Blood Glucose.: 134 mg/dL (2018 07:38)  POCT Blood Glucose.: 256 mg/dL (2018 22:52)  POCT Blood Glucose.: 226 mg/dL (2018 21:24)      Vital Signs Last 24 Hrs  T(C): 36.4 (2018 11:52), Max: 37.4 (2018 20:23)  T(F): 97.5 (2018 11:52), Max: 99.4 (2018 20:23)  HR: 64 (2018 11:52) (59 - 70)  BP: 101/61 (2018 11:52) (101/61 - 159/69)  BP(mean): --  RR: 18 (2018 11:52) (16 - 18)  SpO2: 96% (2018 11:52) (95% - 100%)    Urinalysis Basic - ( 2018 09:34 )    Color: Yellow / Appearance: Clear / S.012 / pH: x  Gluc: x / Ketone: Negative  / Bili: Negative / Urobili: Negative   Blood: x / Protein: 30 mg/dL / Nitrite: Negative   Leuk Esterase: Negative / RBC: x / WBC x   Sq Epi: x / Non Sq Epi: OCC /HPF / Bacteria: x        PT/INR - ( 2018 08:25 )   PT: 11.1 sec;   INR: 0.98 ratio         PTT - ( 2018 08:25 )  PTT:29.3 sec  PHYSICAL EXAM:    Constitutional: NAD  HEENT: conjunctive   clear   Neck:  No JVD  Respiratory: CTAB  Cardiovascular: S1 and S2  Gastrointestinal: BS+, soft, NT/ND  Extremities: pos  peripheral edema  Neurological: no focal deficits  Psychiatric: Normal mood, normal affect  : No [place]  Skin: pos  rashes , dry   Access: Not applicable    LABS:                        10.8   9.8   )-----------( 260      ( 2018 17:00 )             33.3     06-08    131<L>  |  93<L>  |  54<H>  ----------------------------<  325<H>  3.9   |  25  |  2.43<H>    Ca    9.2      2018 08:23    TPro  7.2  /  Alb  3.4  /  TBili  0.3  /  DBili  x   /  AST  39  /  ALT  31  /  AlkPhos  98  06-08      Urine Studies:  Urinalysis Basic - ( 2018 09:34 )    Color: Yellow / Appearance: Clear / S.012 / pH: x  Gluc: x / Ketone: Negative  / Bili: Negative / Urobili: Negative   Blood: x / Protein: 30 mg/dL / Nitrite: Negative   Leuk Esterase: Negative / RBC: x / WBC x   Sq Epi: x / Non Sq Epi: OCC /HPF / Bacteria: x            RADIOLOGY & ADDITIONAL STUDIES:

## 2022-07-14 NOTE — ED ADULT NURSE NOTE - NS ED NURSE PATIENT LEFT UNIT TIME
----- Message from Felipe Angulo sent at 2022  8:32 AM CDT -----  Contact: Patient  Debora Vidal  MRN: [id number]  : 1974  PCP: No primary care provider on file.  Home Phone      [phone]  Work Phone      Not on file.  Mobile          Not on file.      MESSAGE: new patient ([place]) -- requesting appt for pap, also needs MMG -- Dr Pelaez is listed as PCP -- please advise    Call [phone]    PCP: ????        14:17

## 2023-05-15 NOTE — DISCHARGE NOTE NURSING/CASE MANAGEMENT/SOCIAL WORK - NSSCCONTNUM_GEN_ALL_CORE
Initial SW/CM Assessment/Plan of Care Note     Baseline Assessment  87 year old admitted 5/13/2023 as Inpatient with a diagnosis of CHF.   Prior to admission patient was living with Alone and residing at Condo    .  Patient does  have a Power of  for Healthcare.  Document is not activated.  Agent is Sylvester Callejas. Patient’s Primary Care Provider is Maritza Harmon DO.     Progress Note  SW trigger received for new home care services. SW met with pt and introduced self and reason for visit. Pt had son Sylvester in room and stated it was ok Sylvester was there for initial assessment. Sylvester provided additional information for pt. At baseline, pt is alert and oriented, her own decision maker, and willing to talk to SW.     Pt lives alone in an condo and is primarily independent in ADLs and IADLs and manages her own medications. Pt private pays for housekeeping services. Pts children assist with transportation and will also deliver groceries if pt orders online. Pt uses a walker and wheelchair at home and in the community. Pt stated she has had a PCW in the past, but has not had services since then. Pt is open to home care services at discharge if recommended. Sylvester stated he would like to discuss with pt and family members on recommendations while pt is admitted before making  decisions. Pt and son had no other questions at this time. SW will inform unit CM of pt and family's interest in home care services. Pt had no needs for SW at this time.    SW to sign off pt. Please re-consult SW if needs arise.      Plan  Patient/Family Discharge Goal: Home, Home Care, Home therapy   Is patient/family goal achievable: Yes    SW/CM - Recommendations for Discharge: Home, Home care, Home therapy   PT - Recommendations for Discharge: Pending functional progress    OT - Recommendations for Discharge: Home with Home therapy, Pending functional progress, Pending progress in medical condition    SLP - Recommendations for Discharge:       minimal assist, Use long handled shoe horn, Use sock aid  OT Toileting Tips: Toilets with minimal assist  OT Feeding Tips:    SLP Swallow/Feeding Tips:    SLP Comm/Cog Tips:    Current Mental Status: Alert, Oriented to Person, Oriented to Place, Oriented to Reason for Hospitalization  Stressors:      Insurance  Primary: MEDICARE  Secondary: ANTHEM/BCBS    Disposition Recommendations:  SW/CM recommendation for discharge: Home, Home care, Home therapy          [phone]

## 2023-06-23 NOTE — CONSULT NOTE ADULT - ATTENDING COMMENTS
[facility] NEPHROLOGY  Rad Mojica M.D.  Les Mckeon D.O.  Selene Conn M.D.  Odessa Rodriguez, MSN, ANP-C    Telephone: [phone]  Facsimile: [phone]    [address] [place] [address] Yes

## 2023-07-19 NOTE — PHYSICAL THERAPY INITIAL EVALUATION ADULT - NAME OF DISCHARGE PLANNER
Oncology Nurse Navigation  Pt phones stating she has questions regarding bilateral mastectomies.  She states she has friends in the medical field who strongly feel she should undergo bilateral mastectomies as their understanding is that the risk for contralateral breast cancer is quite high.  Strongly encouraged her to talk to her surgeon Dr. Koch to discuss the incidence of contralateral breast cancer and the risks and benefits of prophylactic mastectomy.  Pt agrees.  Pt is scheduled for genetic counseling virtual visit tomorrow and genetic testing results could impact her final treatment decisions.  Pt plans to talk to [facility] staff to see if she can access testing in an area close to her current location in hopes to avoid a delay.  Pt is not scheduled to be back in the area until the date of her surgery on 8/22/23.  
Ruth Spencer

## 2023-07-25 NOTE — ED ADULT TRIAGE NOTE - CADM TRG TX PRIOR TO ARRIVAL
HANDOFF:    Patient received in sign out from [place], [place]. See below for handoff:  \"79 y/o female presenting with complaint of abdominal pain and back pain. The patient states she has had RLQ/right groin pain intermittently for the past few months, which noticeably worsened a few days ago with radiation to her low back. The pain is currently rated 7/10. Associated symptoms include nausea. She denies fevers, vomiting, diarrhea, dysuria, hematuria, urgency/frequency, body aches or syncope. \"      LABORATORY RESULTS:  Labs Reviewed   URINE CULTURE HOLD SAMPLE   CBC WITH AUTO DIFFERENTIAL   COMPREHENSIVE METABOLIC PANEL   LIPASE   URINALYSIS   EXTRA TUBES HOLD       IMAGING RESULTS:  CT ABDOMEN PELVIS WO CONTRAST Additional Contrast? None   Final Result   No acute abdominal or pelvic pathology. No evidence of genitourinary stone or   hydronephrosis. MEDICATIONS GIVEN:  Medications - No data to display         Medical Decision Making  Amount and/or Complexity of Data Reviewed  Labs: ordered. Radiology: ordered. Patient presents today with chief complaint of abdominal pain. Considered differentials including pyelonephritis, UA shows no evidence of UTI. Considered nephrolithiasis, small bowel obstruction, acute appendicitis, diverticulitis, cholecystitis, aortic dissection, pancreatitis, so CT abdomen and pelvis performed and shows no acute process. Note: CT abdomen and pelvis done without contrast, does show a normal appendix, no concern for appendicitis. Blood work is reassuring, normal kidney function, normal liver enzymes. Presentation not consistent with acute, emergent causes of abdominal pain at this time. Patient is tolerating PO fluids and well-appearing. Discussed results and work-up with patient and answered all questions, the patient expresses understanding and agrees with the care plan and disposition.   The patient was given an opportunity to ask questions and all concerns
[facility] EMERGENCY DEP  EMERGENCY DEPARTMENT ENCOUNTER      Pt Name: Al Lizarraga  MRN: [id number]  [address] 1953  Date of evaluation: 7/24/2023  Provider: Dwayne Daugherty       Chief Complaint   Patient presents with    Abdominal Pain    Back Pain         HISTORY OF PRESENT ILLNESS   (Location/Symptom, Timing/Onset, Context/Setting, Quality, Duration, Modifying Factors, Severity)  Note limiting factors. 78 y/o female presenting with complaint of abdominal pain and back pain. The patient states she has had RLQ/right groin pain intermittently for the past few months, which noticeably worsened a few days ago with radiation to her low back. The pain is currently rated 7/10. Associated symptoms include nausea. She denies fevers, vomiting, diarrhea, dysuria, hematuria, urgency/frequency, body aches or syncope. The history is provided by the patient. Review of External Medical Records:     Nursing Notes were reviewed. REVIEW OF SYSTEMS    (2-9 systems for level 4, 10 or more for level 5)     Review of Systems   Constitutional:  Negative for chills and fever. Gastrointestinal:  Positive for abdominal pain and nausea. Negative for diarrhea and vomiting. Genitourinary:  Negative for dysuria, frequency, hematuria and urgency. Musculoskeletal:  Positive for back pain. Negative for myalgias. Neurological:  Negative for syncope. Except as noted above the remainder of the review of systems was reviewed and negative.        PAST MEDICAL HISTORY     Past Medical History:   Diagnosis Date    Arthritis     Cervical ca ([address])     Hyperlipidemia     Hypothyroid     Leg swelling     Ocular migraine     Psychiatric disorder     depression    Skipped beats     Snoring          SURGICAL HISTORY       Past Surgical History:   Procedure Laterality Date    HYSTERECTOMY (CERVIX STATUS UNKNOWN)      ORTHOPEDIC SURGERY      L thumb    ORTHOPEDIC SURGERY      R thumb joint replacement
oxygen

## 2023-11-14 NOTE — PROVIDER CONTACT NOTE (OTHER) - ACTION/TREATMENT ORDERED:
MD to bedside. Will continue to monitor.
Will give morning medications now. Per PA, ok to give metoprolol with hr 60 because pt has pacemaker. Will continue to monitor.
97

## 2024-03-03 NOTE — CONSULT NOTE ADULT - PROBLEM SELECTOR RECOMMENDATION 3
[FreeTextEntry1] : A:34yo Continue medications, monitoring, FU primary team/cardiology.

## 2024-03-06 NOTE — ED CDU PROVIDER INITIAL DAY NOTE - OBJECTIVE STATEMENT
FMLA or Disability Forms were received and faxed to the Forms Completion Department today at [phone]. (Please include all appropriate authorization forms with your fax).    Did you have the patient complete (in full) and sign the “Authorization For Disclosure of Health Information Forms Completion” form? No, Reason: faxed    DUE TO VERY HIGH FORM VOLUMES, please communicate to the patient that the Forms Completion team estimates their form may take longer than our usual 14 business day turnaround goal.    If you have questions about this encounter, please contact the Forms Completion Dept at [phone], Option 3.    
The form received does not meet Forms Completion criteria for processing.     The form was then routed to the appropriate provider pool with instructions that the doctor needs to complete, sign and send to the patient when done, and then fax or email us a copy for scanning purposes only.      
86y Female PMH DM2, HTN complaining of allergic reaction. Took lisinopril this morning at 8:30am, recently started from last admission to hospital on 10/9, started to have left upper lip swelling noticed at 11:30am by son, Now swelling of entire upper lip. No swelling of upper lip or oropharynx. No airway compromise, no issues with swallowing. Went to urgent care, given 20mg of prednisone, sent here for further ED evaluation. Appearing well otherwise.    In the ED VSS.  Patient started on bendryl and pepcid.  Patient reports that she has felt some slight improvement.  Denies difficulty breathing or swallowing.

## 2024-08-26 NOTE — ED ADULT NURSE NOTE - CHIEF COMPLAINT QUOTE
Cough, chills today. Dyspnea when laying down for a few months. Detail Level: Detailed Quality 226: Preventive Care And Screening: Tobacco Use: Screening And Cessation Intervention: Patient screened for tobacco use and is an ex/non-smoker Quality 130: Documentation Of Current Medications In The Medical Record: Current Medications Documented Quality 431: Preventive Care And Screening: Unhealthy Alcohol Use - Screening: Patient not identified as an unhealthy alcohol user when screened for unhealthy alcohol use using a systematic screening method

## 2024-09-22 NOTE — PROGRESS NOTE ADULT - SUBJECTIVE AND OBJECTIVE BOX
CHIEF COMPLAINT: SOB, palpitation    Interval Events: No acute event overnight. Patient is seen and examined at the bedside this morning.     REVIEW OF SYSTEMS:  Gen: + chills, weight loss. Negative for fevers, anorexia, weight gain, malaise, fatigue  Eyes: no blurred vision  ENT: no tinnitus, vertigo, or difficulty hearing  Resp: + dyspnea, orthopnea. No wheezing, pleuritic chest pain, hemoptysis  CV: + dyspnea on exertion, palpitations. No chest pain  GI: no nausea, vomiting, abdominal pain, diarrhea, constipation, melena, or hematochezia  : no dysuria, hematuria, discharge, or incontinence  MSK: + chronic arthralgias. No joint stiffness, joint swelling, or myalgias  Neuro: no focal deficits, confusion, weakness, dizziness, tremors, or seizures  Skin: no rash, lesions, or edema  Allergic; no rash or itching    OBJECTIVE:  Vital Signs Last 24 Hrs  T(C): 36.8 (23 Dec 2017 07:20), Max: 37.1 (22 Dec 2017 21:03)  T(F): 98.3 (23 Dec 2017 07:20), Max: 98.8 (22 Dec 2017 21:06)  HR: 60 (23 Dec 2017 09:20) (59 - 68)  BP: 105/55 (23 Dec 2017 07:20) (105/55 - 152/86)  BP(mean): --  RR: 20 (23 Dec 2017 09:20) (18 - 31)  SpO2: 96% (23 Dec 2017 09:20) (85% - 99%)    CAPILLARY BLOOD GLUCOSE      POCT Blood Glucose.: 215 mg/dL (23 Dec 2017 08:39)      PHYSICAL EXAM:  GENERAL: No acute distress, on BiPAP  HEENT: PERRL, EOMI, MM dry, no oropharyngeal lesions  NECK: supple, no stiffness, no JVD, no thyromegaly  Lungs: respirations non-labored, bilateral lower lobe rales, R>L. No overt wheezes.  Heart: regular rate and rhythm, no murmurs, gallops, or rubs  Abd: abdomen soft, nontender, nondistended, no masses felt, normal bowel sounds  MSK: rom intact. No joint swelling, erythema, or warmth.  NEURO: A&Ox3, no tremors, sensation intact strength intact b/l.   SKIN: scattered purpurae across lower extremities. No edema      HOSPITAL MEDICATIONS:  apixaban 2.5 milliGRAM(s) Oral every 12 hours  aspirin  chewable 81 milliGRAM(s) Oral daily    azithromycin  IVPB 500 milliGRAM(s) IV Intermittent every 24 hours  cefTRIAXone   IVPB 1 Gram(s) IV Intermittent every 24 hours    amiodarone    Tablet 200 milliGRAM(s) Oral daily  furosemide    Tablet 40 milliGRAM(s) Oral daily  metoprolol succinate  milliGRAM(s) Oral daily    insulin glargine Injectable (LANTUS) 12 Unit(s) SubCutaneous at bedtime  insulin lispro (HumaLOG) corrective regimen sliding scale   SubCutaneous three times a day before meals  insulin lispro (HumaLOG) corrective regimen sliding scale   SubCutaneous at bedtime  insulin lispro Injectable (HumaLOG) 4 Unit(s) SubCutaneous three times a day before meals  predniSONE   Tablet 40 milliGRAM(s) Oral daily  simvastatin 20 milliGRAM(s) Oral at bedtime    ALBUTerol/ipratropium for Nebulization 3 milliLiter(s) Nebulizer every 6 hours  buDESOnide 160 MICROgram(s)/formoterol 4.5 MICROgram(s) Inhaler 2 Puff(s) Inhalation two times a day    acetaminophen   Tablet 650 milliGRAM(s) Oral every 6 hours PRN  ARIPiprazole 2 milliGRAM(s) Oral daily  clonazePAM Tablet 0.5 milliGRAM(s) Oral at bedtime PRN  DULoxetine 60 milliGRAM(s) Oral daily  mirtazapine 15 milliGRAM(s) Oral at bedtime    docusate sodium 100 milliGRAM(s) Oral three times a day                  LABS:                        10.8   17.3  )-----------( 304      ( 23 Dec 2017 03:30 )             33.6     Hgb Trend: 10.8<--, 11.8<--  12-23    134<L>  |  97  |  41<H>  ----------------------------<  204<H>  3.4<L>   |  23  |  1.95<H>    Ca    8.3<L>      23 Dec 2017 03:30  Phos  3.1     12-23  Mg     1.7     12-23    TPro  7.5  /  Alb  3.4  /  TBili  0.3  /  DBili  x   /  AST  42<H>  /  ALT  22  /  AlkPhos  94  12-22    Creatinine Trend: 1.95<--, 2.06<--  PT/INR - ( 22 Dec 2017 21:52 )   PT: 14.1 sec;   INR: 1.29 ratio         PTT - ( 22 Dec 2017 21:52 )  PTT:27.9 sec      Venous Blood Gas:  12-23 @ 03:30  7.37/42/38/24/68  VBG Lactate: 1.1  Venous Blood Gas:  12-23 @ 00:09  7.31/52/22/25/28  VBG Lactate: 1.3  Venous Blood Gas:  12-22 @ 21:52  7.29/56/18/26/18  VBG Lactate: 2.0    < from: Transthoracic Echocardiogram (09.18.17 @ 17:36) >  EF 35%  1. Mitral annular calcification. Mild-moderate mitral  regurgitation.  2. Normal trileaflet aortic valve. Mild aortic  regurgitation.  3. Eccentric left ventricular hypertrophy (dilated left  ventricle with normal relative wall thickness).  4. Moderate-severe segmental left ventricular systolic  dysfunction. The mid to distal inferior wall, mid to distal  septal wall and apex are hypokinetic.  5. Mild diastolic dysfunction (Stage I).  6. Normal right ventricular size and function.    < end of copied text > Low Acute Suicide Risk CHIEF COMPLAINT: SOB, palpitation    Interval Events: No acute event overnight. Patient is seen and examined at the bedside this morning. Patient is saturating well on 3 L. She denies chest pain, no palpitations currently.     REVIEW OF SYSTEMS:  Gen: + chills, weight loss. Negative for fevers, anorexia, weight gain, malaise, fatigue  Eyes: no blurred vision  ENT: no tinnitus, vertigo, or difficulty hearing  Resp: + dyspnea, orthopnea. No wheezing, pleuritic chest pain, hemoptysis  CV: + dyspnea on exertion, palpitations. No chest pain  GI: no nausea, vomiting, abdominal pain, diarrhea, constipation, melena, or hematochezia  : no dysuria, hematuria, discharge, or incontinence  MSK: + chronic arthralgias. No joint stiffness, joint swelling, or myalgias  Neuro: no focal deficits, confusion, weakness, dizziness, tremors, or seizures  Skin: no rash, lesions, or edema  Allergic; no rash or itching    OBJECTIVE:  Vital Signs Last 24 Hrs  T(C): 36.8 (23 Dec 2017 07:20), Max: 37.1 (22 Dec 2017 21:03)  T(F): 98.3 (23 Dec 2017 07:20), Max: 98.8 (22 Dec 2017 21:06)  HR: 60 (23 Dec 2017 09:20) (59 - 68)  BP: 105/55 (23 Dec 2017 07:20) (105/55 - 152/86)  BP(mean): --  RR: 20 (23 Dec 2017 09:20) (18 - 31)  SpO2: 96% (23 Dec 2017 09:20) (85% - 99%)    CAPILLARY BLOOD GLUCOSE      POCT Blood Glucose.: 215 mg/dL (23 Dec 2017 08:39)      PHYSICAL EXAM:  GENERAL: No acute distress, on BiPAP  HEENT: PERRL, EOMI, MM dry, no oropharyngeal lesions  NECK: supple, no stiffness, no JVD, no thyromegaly  Lungs: respirations non-labored, bilateral lower lobe rales, R>L. No overt wheezes.  Heart: regular rate and rhythm, no murmurs, gallops, or rubs  Abd: abdomen soft, nontender, nondistended, no masses felt, normal bowel sounds  MSK: rom intact. No joint swelling, erythema, or warmth.  NEURO: A&Ox3, no tremors, sensation intact strength intact b/l.   SKIN: scattered purpurae across lower extremities. No edema      HOSPITAL MEDICATIONS:  apixaban 2.5 milliGRAM(s) Oral every 12 hours  aspirin  chewable 81 milliGRAM(s) Oral daily    azithromycin  IVPB 500 milliGRAM(s) IV Intermittent every 24 hours  cefTRIAXone   IVPB 1 Gram(s) IV Intermittent every 24 hours    amiodarone    Tablet 200 milliGRAM(s) Oral daily  furosemide    Tablet 40 milliGRAM(s) Oral daily  metoprolol succinate  milliGRAM(s) Oral daily    insulin glargine Injectable (LANTUS) 12 Unit(s) SubCutaneous at bedtime  insulin lispro (HumaLOG) corrective regimen sliding scale   SubCutaneous three times a day before meals  insulin lispro (HumaLOG) corrective regimen sliding scale   SubCutaneous at bedtime  insulin lispro Injectable (HumaLOG) 4 Unit(s) SubCutaneous three times a day before meals  predniSONE   Tablet 40 milliGRAM(s) Oral daily  simvastatin 20 milliGRAM(s) Oral at bedtime    ALBUTerol/ipratropium for Nebulization 3 milliLiter(s) Nebulizer every 6 hours  buDESOnide 160 MICROgram(s)/formoterol 4.5 MICROgram(s) Inhaler 2 Puff(s) Inhalation two times a day    acetaminophen   Tablet 650 milliGRAM(s) Oral every 6 hours PRN  ARIPiprazole 2 milliGRAM(s) Oral daily  clonazePAM Tablet 0.5 milliGRAM(s) Oral at bedtime PRN  DULoxetine 60 milliGRAM(s) Oral daily  mirtazapine 15 milliGRAM(s) Oral at bedtime    docusate sodium 100 milliGRAM(s) Oral three times a day                  LABS:                        10.8   17.3  )-----------( 304      ( 23 Dec 2017 03:30 )             33.6     Hgb Trend: 10.8<--, 11.8<--  12-23    134<L>  |  97  |  41<H>  ----------------------------<  204<H>  3.4<L>   |  23  |  1.95<H>    Ca    8.3<L>      23 Dec 2017 03:30  Phos  3.1     12-23  Mg     1.7     12-23    TPro  7.5  /  Alb  3.4  /  TBili  0.3  /  DBili  x   /  AST  42<H>  /  ALT  22  /  AlkPhos  94  12-22    Creatinine Trend: 1.95<--, 2.06<--  PT/INR - ( 22 Dec 2017 21:52 )   PT: 14.1 sec;   INR: 1.29 ratio         PTT - ( 22 Dec 2017 21:52 )  PTT:27.9 sec      Venous Blood Gas:  12-23 @ 03:30  7.37/42/38/24/68  VBG Lactate: 1.1  Venous Blood Gas:  12-23 @ 00:09  7.31/52/22/25/28  VBG Lactate: 1.3  Venous Blood Gas:  12-22 @ 21:52  7.29/56/18/26/18  VBG Lactate: 2.0    < from: Transthoracic Echocardiogram (09.18.17 @ 17:36) >  EF 35%  1. Mitral annular calcification. Mild-moderate mitral  regurgitation.  2. Normal trileaflet aortic valve. Mild aortic  regurgitation.  3. Eccentric left ventricular hypertrophy (dilated left  ventricle with normal relative wall thickness).  4. Moderate-severe segmental left ventricular systolic  dysfunction. The mid to distal inferior wall, mid to distal  septal wall and apex are hypokinetic.  5. Mild diastolic dysfunction (Stage I).  6. Normal right ventricular size and function.    < end of copied text > CHIEF COMPLAINT: SOB, palpitation    Interval Events: No acute event overnight. Patient is seen and examined at the bedside this morning. Patient is saturating well on 3 L. She denies chest pain, no palpitations currently.     REVIEW OF SYSTEMS:  Gen: + chills, weight loss. Negative for fevers, anorexia, weight gain, malaise, fatigue  Eyes: no blurred vision  ENT: no tinnitus, vertigo, or difficulty hearing  Resp: + dyspnea, orthopnea. No wheezing, pleuritic chest pain, hemoptysis  CV: + dyspnea on exertion, palpitations. No chest pain  GI: no nausea, vomiting, abdominal pain, diarrhea, constipation, melena, or hematochezia  : no dysuria, hematuria, discharge, or incontinence  MSK: + chronic arthralgias. No joint stiffness, joint swelling, or myalgias  Neuro: no focal deficits, confusion, weakness, dizziness, tremors, or seizures  Skin: no rash, lesions, or edema  Allergic; no rash or itching    OBJECTIVE:  Vital Signs Last 24 Hrs  T(C): 36.8 (23 Dec 2017 07:20), Max: 37.1 (22 Dec 2017 21:03)  T(F): 98.3 (23 Dec 2017 07:20), Max: 98.8 (22 Dec 2017 21:06)  HR: 60 (23 Dec 2017 09:20) (59 - 68)  BP: 105/55 (23 Dec 2017 07:20) (105/55 - 152/86)  BP(mean): --  RR: 20 (23 Dec 2017 09:20) (18 - 31)  SpO2: 96% (23 Dec 2017 09:20) (85% - 99%)    CAPILLARY BLOOD GLUCOSE      POCT Blood Glucose.: 215 mg/dL (23 Dec 2017 08:39)      PHYSICAL EXAM:  GENERAL: No acute distress, on BiPAP  HEENT: PERRL, EOMI, MM dry, no oropharyngeal lesions  NECK: supple, no stiffness, no JVD, no thyromegaly  Lungs: respirations non-labored, bilateral lower lobe rales, R>L. No overt wheezes.  Heart: regular rate and rhythm, no murmurs, gallops, or rubs  Abd: abdomen soft, nontender, nondistended, no masses felt, normal bowel sounds  MSK: rom intact. No joint swelling, erythema, or warmth.  NEURO: A&Ox3, no tremors, sensation intact strength intact b/l.   SKIN: scattered purpurae across lower extremities. No edema      HOSPITAL MEDICATIONS:  apixaban 2.5 milliGRAM(s) Oral every 12 hours  aspirin  chewable 81 milliGRAM(s) Oral daily    azithromycin  IVPB 500 milliGRAM(s) IV Intermittent every 24 hours  cefTRIAXone   IVPB 1 Gram(s) IV Intermittent every 24 hours    amiodarone    Tablet 200 milliGRAM(s) Oral daily  furosemide    Tablet 40 milliGRAM(s) Oral daily  metoprolol succinate  milliGRAM(s) Oral daily    insulin glargine Injectable (LANTUS) 12 Unit(s) SubCutaneous at bedtime  insulin lispro (HumaLOG) corrective regimen sliding scale   SubCutaneous three times a day before meals  insulin lispro (HumaLOG) corrective regimen sliding scale   SubCutaneous at bedtime  insulin lispro Injectable (HumaLOG) 4 Unit(s) SubCutaneous three times a day before meals  predniSONE   Tablet 40 milliGRAM(s) Oral daily  simvastatin 20 milliGRAM(s) Oral at bedtime    ALBUTerol/ipratropium for Nebulization 3 milliLiter(s) Nebulizer every 6 hours  buDESOnide 160 MICROgram(s)/formoterol 4.5 MICROgram(s) Inhaler 2 Puff(s) Inhalation two times a day    acetaminophen   Tablet 650 milliGRAM(s) Oral every 6 hours PRN  ARIPiprazole 2 milliGRAM(s) Oral daily  clonazePAM Tablet 0.5 milliGRAM(s) Oral at bedtime PRN  DULoxetine 60 milliGRAM(s) Oral daily  mirtazapine 15 milliGRAM(s) Oral at bedtime    docusate sodium 100 milliGRAM(s) Oral three times a day      LABS:                        10.8   17.3  )-----------( 304      ( 23 Dec 2017 03:30 )             33.6     Hgb Trend: 10.8<--, 11.8<--  12-23    134<L>  |  97  |  41<H>  ----------------------------<  204<H>  3.4<L>   |  23  |  1.95<H>    Ca    8.3<L>      23 Dec 2017 03:30  Phos  3.1     12-23  Mg     1.7     12-23    TPro  7.5  /  Alb  3.4  /  TBili  0.3  /  DBili  x   /  AST  42<H>  /  ALT  22  /  AlkPhos  94  12-22    Creatinine Trend: 1.95<--, 2.06<--  PT/INR - ( 22 Dec 2017 21:52 )   PT: 14.1 sec;   INR: 1.29 ratio         PTT - ( 22 Dec 2017 21:52 )  PTT:27.9 sec      Venous Blood Gas:  12-23 @ 03:30  7.37/42/38/24/68  VBG Lactate: 1.1  Venous Blood Gas:  12-23 @ 00:09  7.31/52/22/25/28  VBG Lactate: 1.3  Venous Blood Gas:  12-22 @ 21:52  7.29/56/18/26/18  VBG Lactate: 2.0    < from: Transthoracic Echocardiogram (09.18.17 @ 17:36) >  EF 35%  1. Mitral annular calcification. Mild-moderate mitral  regurgitation.  2. Normal trileaflet aortic valve. Mild aortic  regurgitation.  3. Eccentric left ventricular hypertrophy (dilated left  ventricle with normal relative wall thickness).  4. Moderate-severe segmental left ventricular systolic  dysfunction. The mid to distal inferior wall, mid to distal  septal wall and apex are hypokinetic.  5. Mild diastolic dysfunction (Stage I).  6. Normal right ventricular size and function.    < end of copied text >    Reviewed consultants' notes.  Personally reviewed imaging.
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
## 2024-11-11 NOTE — ED ADULT NURSE NOTE - BREATHING, MLM
Check micronutrient levels - see orders  Adjust micronutrient supplementation per results  Continue recommended post bariatric surgery supplements   Spontaneous, unlabored and symmetrical

## 2025-03-06 NOTE — ED ADULT TRIAGE NOTE - TEMPERATURE IN FAHRENHEIT (DEGREES F)
97 Detail Level: Simple Render Risk Assessment In Note?: no Additional Notes: She declines treatment today

## 2025-03-19 NOTE — ED ADULT NURSE NOTE - HARM RISK FACTORS
Fred Mendieta DO   Surgeon: John Cano MD   Radiation Oncologist:     Reason Amanda Yu DO or Ronnie Izquierdo MD please call [phone] Codey Pinto MD  Medical  Oncology                no

## 2025-03-26 NOTE — PATIENT PROFILE ADULT. - BLOOD TRANSFUSION, PREVIOUS, PROFILE
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    Diagnosis Precautions: Weight Bearing As Tolerated - Left lower extremity    SUBJECTIVE                                                                                                               Patient referred to physical therapy prior to surgery for education and instruction in movement patterns following procedure. Anticipated Date of Surgery: 4/16/25; Surgery to be performed:  Left Total Knee Arthroplasty    Reports fall a month ago from going up stairs.    Pain / Symptoms  - Pain rating (out of 10): Current: 2 ; Best: 2; Worst: 10  - Location: left knee   - Quality / Description: ache  - Alleviating Factors: prescription medications     - Meloxicam   - Progression since onset: worsening    Function:   Limitations / Exacerbation Factors:   - Patient reports pain and difficulty with function reported below.  - , house/yard work, walking and squatting/lifting, stairs, walking quickly as required to cross a street/exit a building rapidly, community distances  Prior Level of Function:, increasing pain and difficulty with function, therefore planning surgery to regain function     Patient Goals: . education and instruction in home program    Prior treatment  - no therapies  - Discharged from hospital, home health, or skilled nursing facility in last 30 days: no  Home Environment   - Patient lives with: significant other (pt's wife will pick him up)  Patient lives at: 3 steps to get inside the house with a railing; no steps inside home otherwise.  - Bathroom setup: tub  with shower  - Assistance available: as needed  - Denies 2 or more falls or an unexplained fall with injury in the last year.  - Feel safe at home / work / school: yes      OBJECTIVE                                                                                                                     Range of Motion (ROM)   (degrees unless noted; active unless noted; norms in ( ); negative=lacking to 0, positive=beyond 0)  Knee:   - Flexion (150):       Left:  115        Right:  119    - Extension (0-10):       Left:  0        Right:  0                       Outcome/Assessments  Outcome Measures:   KOOS, Jr. Raw Score: 15  KOOS Jr Calculated Score: 50.01  (interval score: 0=total knee disability to 100=perfect knee health) see flowsheet for additional documentation       Treatment     Therapeutic Activity  Patient instructed on proper technique and performed the following activities:   - Sit to/from Stand Transfers   - Bed Mobility    While performing the above tasks, patient was educated on the following:  - Weightbearing status  - Precautions and positions that ensure adherence to precautions in supine and sitting   - Assistive device: 2 wheeled walker - pt did not bring his own  -review of HEP  - Home Safety/Preparation: home set up to prevent falls and optimize post-operative recovery   - Deep breathing: technique and frequency provided to avoid pneumonia following surgery  - Edema Control: proper positioning and location to reduce edema  - Driving restrictions: unable while taking narcotics, may be more limited if surgery on right lower extremity    All patient questions were answered, written handouts provided for future reference*      Skilled input: verbal instruction/cues and demonstration    Writer verbally educated and received verbal consent for hand placement, positioning of patient, and techniques to be performed today from patient for hand placement and palpation for techniques and therapist position for  techniques as described above and how they are pertinent to the patient's plan of care.  Home Exercise Program  glut sets  quad sets  straight leg raise  heel slides  seated knee extension  seated knee flexion  hanging knee extension  terminal knee extension  ankle pumps      ASSESSMENT                                                                                                          75 year old patient has reported functional limitations listed above impacted by signs and symptoms consistent with treatment diagnosis below.  Treatment Diagnosis:   - Involved: left knee.  - Symptoms/impairments: increased pain/symptoms, impaired range of motion, impaired strength, impaired joint play/mobility, impaired gait, impaired mobility, impaired balance, impaired activity tolerance and increased risk for falls.    Patient would benefit from skilled inpatient physical and occupational therapy consults post operatively to ensure safety for discharge from hospital.     Recommend Evaluation from skilled inpatient physical and occupational therapy consults post operatively to ensure safety for discharge from hospital.    Prognosis: patient will benefit from skilled therapy  Rehabilitative potential is: good.  Predicted patient presentation: Low (stable) - Patient comorbidities and complexities, as defined above, will have little effect on progress for prescribed plan of care.  Education:   - Present and ready to learn: patient  - Results of above outlined education: Verbalizes understanding and Demonstrates understanding    PLAN                                                                                                                         The following skilled interventions to be implemented to achieve goals listed below:  Therapeutic Exercise (01663)  Gait Training (78396)  Therapeutic Activity (70171)    Frequency / Duration for an estimated total of 1 visits    Patient involved in and agreed to plan of care and  goals.    Suggestions for next session as indicated: None: one time visit    Goals  Long Term Goals: to be met by end of plan of care  1. Patient will verbalize understanding of instructed home exercise program to be completed prior to surgery. Status: met       Therapy procedure time and total treatment time can be found documented on the Time Entry flowsheet     no

## 2025-03-29 NOTE — PROGRESS NOTE ADULT - PROBLEM SELECTOR PLAN 9
No
- DVT prophylaxis: Apixaban  - GI prophylaxis: not indicated  - Diet: DASH/diabetic  - GOC: DNR/DNI
- DVT prophylaxis: Apixaban  - GI prophylaxis: not indicated  - Diet: DASH/diabetic  - GOC: DNR/DNI

## 2025-08-02 NOTE — ED ADULT NURSE NOTE - NEURO ASSESSMENT
Emergency Department Encounter    Patient : Julisa Varela Age: 86 year old Sex: female   MRN: [id number] Encounter Date: 8/2/2025      History     Chief Complaint   Patient presents with    Abdominal Pain     HPI    History of Present Illness  The patient presents with acute abdominal pain, located mid-abdomen, onset at 9:00 PM. She reports nausea, weakness.  Pain is intermittent, with no history of abdominal surgeries, aneurysms, chest pain, falls, or injuries.  Seems to have spasms of pain in send discussed with her intermittent severe pain with feeling of not having significant pain at all.  Has chronic periumbilical hernia with no significant new bulging per patient.        History comes from patient.      Physical Exam     ED Triage Vitals [08/02/25 0040]   ED Triage Vitals Group      Temp 97.5 °F (36.4 °C)      Heart Rate 82      Resp 16      BP (!) 176/94      SpO2 99 %      EtCO2 mmHg       Height       Weight       Weight Scale Used       BMI (Calculated)       IBW/kg (Calculated)        Physical Exam  Gen: Alert and oriented and responds appropriately to questions. Moderately uncomfortable appearing, no distress.  Head: Normocephalic; atraumatic. No apparent abnormalities.  EENT:    PERRL, EOM grossly intact. Sclera anicteric.Conjunctivae clear.    Nose:  Clear without blood or purulent mucous   Mouth: Oropharynx normal.  Neck: Supple.  Nontender. No lymphadenopathy.  Chest: No deformities or tenderness.  CVS: Regular rate and rhythm with normal S1 and S2 heard. No murmurs, rubs, or gallops. Normal and symmetric distal pulses.  Resp: Respiratory rate and effort are normal.  Lung sounds are clear to auscultation bilaterally all lung fields.  Abd: Tenderness around periumbilical hernia, initially seemed partially reducible, however quickly returns. Moderate, diffuse tenderness without rebound or guarding.  Back: Appears normal and is nontender to palpation. No CVA tenderness.  Ext: Strength 5/5 in extremities.  Nontender to palpation; bulk and tone grossly normal. No clubbing, cyanosis, edema, or rashes noted.  Skin: Normal color. Warm and well perfused without diaphoresis, significant rashes or jaundice.  Neuro: Alert and oriented x4. PERRL,  EOM grossly intact. Cranial nerves grossly intact. symmetric facial features, no obvious focal neuro deficits with equal UE/LE strength and sensation.  Lymph: No significant Lymphadenopathy  Psych: Alert and interactive with normal affect.          ED Course     Pt's chart reviewed, including previous relevant ER visits, other hospital encounters, PMHx, previous relevant labs and imaging, and the ED RN/tech notes from this encounter.          ED Course as of 08/02/25 0414   Sat Aug 02, 2025   0224 Discussed findings of ileus versus partial small bowel obstruction with patient and son at bedside, plan for NG tube, will page hospitalist. [ZS]   0246 Patient discussed with Dr. Mccoy, plan for admission for observation. [ZS]   0247 Discussed with Dr. Zacarias, general surgery by secure chat, surgery will consult in the a.m. [ZS]      ED Course User Index  [ZS] Jose De Jesus Garcia MD         Procedures - none    Lab Results       Results for orders placed or performed during the hospital encounter of 08/02/25   Comprehensive Metabolic Panel    Specimen: Blood, Venous   Result Value    Fasting Status     Sodium 137    Potassium 3.8    Chloride 105    Carbon Dioxide 24    Anion Gap 12    Glucose 141 (H)    BUN 25 (H)    Creatinine 1.18 (H)    Glomerular Filtration Rate 45 (L)     Comment: eGFR 30-59 mL/min/1.73m2 = Moderate decrease in kidney function. Stage 3 CKD (chronic kidney disease) or moderate kidney disease. Estimated GFR calculated using the CKD-EPI-R (2021) equation that does not include race in the creatinine calculation.    BUN/Cr 21    Calcium 10.5 (H)    Bilirubin, Total 0.7    GOT/AST 15    GPT/ALT 19    Alkaline Phosphatase 104    Albumin 4.2    Protein, Total 8.3 (H)     Globulin 4.1 (H)    A/G Ratio 1.0   Lipase    Specimen: Blood, Venous   Result Value    Lipase 39   CBC with Automated Differential (performable only)    Specimen: Blood, Venous   Result Value    WBC 10.3    RBC 5.12    HGB 15.5    HCT 45.9    MCV 89.6    MCH 30.3    MCHC 33.8    RDW-CV 14.5    RDW-SD 47.3        NRBC 0    Neutrophil, Percent 82    Lymphocytes, Percent 10    Mono, Percent 7    Eosinophils, Percent 0    Basophils, Percent 1    Immature Granulocytes 0    Absolute Neutrophils 8.4 (H)    Absolute Lymphocytes 1.0    Absolute Monocytes 0.7    Absolute Eosinophils  0.0    Absolute Basophils 0.1    Absolute Immature Granulocytes 0.0   Urinalysis With Microscopy & Culture If Indicated    Specimen: Urine, Catheterized - Straight   Result Value    COLOR, URINALYSIS Straw    APPEARANCE, URINALYSIS Clear    GLUCOSE, URINALYSIS Negative    BILIRUBIN, URINALYSIS Negative    KETONES, URINALYSIS 20 (A)    SPECIFIC GRAVITY, URINALYSIS >1.030 (H)     Comment: Measured by refractometry  Specific Gravity results may be affected by elevated protein, glucose, or contrast media.    OCCULT BLOOD, URINALYSIS Negative    PH, URINALYSIS 7.5 (H)    PROTEIN, URINALYSIS Negative    UROBILINOGEN, URINALYSIS 0.2    NITRITE, URINALYSIS Negative    LEUKOCYTE ESTERASE, URINALYSIS Negative    SQUAMOUS EPITHELIAL, URINALYSIS 1 to 5    ERYTHROCYTES, URINALYSIS 3 to 5 (A)    LEUKOCYTES, URINALYSIS 1 to 5    BACTERIA, URINALYSIS None Seen    HYALINE CASTS, URINALYSIS None Seen    MUCUS Present           Radiology Results     CTA ABDOMEN PELVIS   Preliminary Result   IMPRESSION: Several loops of small bowel are mildly distended with fluid    and gas, measuring up to 3 cm in caliber.  Findings suggest developing    ileus versus partial small bowel obstruction.  No abrupt transition point    is identified.  No pneumatosis.  No intraperitoneal free fluid or free    air.      No acute dissection of the abdominal aorta.  No abdominal  aortic aneurysm.      4 cm fat containing umbilical hernia without associated stranding, fluid    or intervening loops of bowel.      Several hepatic cysts are present.      Moderate/large fluid-filled hiatal hernia.                   Electronically signed by Richard Vance MD on 08-02-25 at 0215      XR ABDOMEN 1 VIEW    (Results Pending)       I have reviewed these images and per my personal interpretation dilated bowel loops, no bowel loops noted in periumbilical hernia.  Distended stomach.    Formal read per radiologist as noted above.      ED Medication Orders (From admission, onward)      Ordered Start     Status Ordering Provider    08/02/25 0224 08/02/25 0225  sodium chloride (NORMAL SALINE) 0.9 % bolus 500 mL  ONCE         Last MAR action: Completed DELROY REA    08/02/25 0058 08/02/25 0059  morphine injection 4 mg  ONCE         Last MAR action: Given DELROY REA            Medical Decision Making      Assessment & Plan    Patient presents with abdominal pain, feeling of nausea, intermittent severe pain in the abdomen, differential concerning for possible abdominal aortic aneurysm, possible inflammatory bowel process such as diverticulitis, perforated ulcer, perforated diverticulitis, potential for bowel obstruction, patient without any prior history of abdominal surgeries reported.  Patient without any chest pain or shortness of breath at this time.  CT performed and does show evidence for ileus or partial SBO with multiple dilated bowel loops.  No clear transition point.  No free air is seen.  Patient with NG tube placed, does have improvement in pain with medications given.  Plan for admission to the hospital at this time.  Other labs are without significant acute concerning changes.  Patient discussed with Dr. Mccoy for admission for observation, discussed with Dr. Zacarias general surgery who will consult tomorrow.        Clinical Impression     1. Partial small bowel  obstruction  (CMD)            Disposition        Admit  Telemetry Bed?: No  Admitting Physician: MELISSA RING [[id number]]  Is this a telephone or verbal order?: This is a telephone order from the admitting physician                 Jose De Jesus Garcia MD  08/02/25 0522     - - -